# Patient Record
Sex: FEMALE | Race: WHITE | Employment: OTHER | URBAN - METROPOLITAN AREA
[De-identification: names, ages, dates, MRNs, and addresses within clinical notes are randomized per-mention and may not be internally consistent; named-entity substitution may affect disease eponyms.]

---

## 2017-01-02 ENCOUNTER — TELEPHONE (OUTPATIENT)
Dept: OTHER | Facility: CLINIC | Age: 72
End: 2017-01-02

## 2017-01-02 NOTE — TELEPHONE ENCOUNTER
Patient scheduled for diagnostic BLE CO2 angiogram on 1/13/17 based on her preference. Plan to check-in at 7:30 AM to the gold waiting room at the [facility]. Procedure at 9 am. NPO 6 hrs prior. Water ok up to 2 hrs prior. Pt may take AM medications. She has in insulin pump and will manage her blood sugar. She will plan to be here 6-8 hrs and will have a ride home.     She has my contact information with any questions.    Perla Ritter DNP, APRN  Interventional Radiology   Phone: [phone]  Pager: [phone]

## 2017-01-11 ENCOUNTER — TELEPHONE (OUTPATIENT)
Dept: INTERVENTIONAL RADIOLOGY/VASCULAR | Facility: CLINIC | Age: 72
End: 2017-01-11

## 2017-01-13 ENCOUNTER — APPOINTMENT (OUTPATIENT)
Dept: INTERVENTIONAL RADIOLOGY/VASCULAR | Facility: CLINIC | Age: 72
End: 2017-01-13
Attending: SURGERY
Payer: MEDICARE

## 2017-01-13 ENCOUNTER — APPOINTMENT (OUTPATIENT)
Dept: MEDSURG UNIT | Facility: CLINIC | Age: 72
End: 2017-01-13
Attending: SURGERY
Payer: MEDICARE

## 2017-01-13 ENCOUNTER — HOSPITAL ENCOUNTER (OUTPATIENT)
Facility: CLINIC | Age: 72
Discharge: HOME | End: 2017-01-13
Attending: SURGERY | Admitting: SURGERY
Payer: MEDICARE

## 2017-01-13 VITALS
DIASTOLIC BLOOD PRESSURE: 60 MMHG | RESPIRATION RATE: 16 BRPM | SYSTOLIC BLOOD PRESSURE: 162 MMHG | HEART RATE: 56 BPM | OXYGEN SATURATION: 98 %

## 2017-01-13 DIAGNOSIS — Z76.82 ORGAN TRANSPLANT CANDIDATE: ICD-10-CM

## 2017-01-13 DIAGNOSIS — N18.6 END STAGE RENAL DISEASE (H): ICD-10-CM

## 2017-01-13 LAB
APTT PPP: 32 SEC (ref 22–37)
CREAT SERPL-MCNC: 2.54 MG/DL (ref 0.52–1.04)
ERYTHROCYTE [DISTWIDTH] IN BLOOD BY AUTOMATED COUNT: 12.8 % (ref 10–15)
GFR SERPL CREATININE-BSD FRML MDRD: 19 ML/MIN/1.7M2
GLUCOSE BLDC GLUCOMTR-MCNC: 192 MG/DL (ref 70–99)
GLUCOSE BLDC GLUCOMTR-MCNC: 220 MG/DL (ref 70–99)
GLUCOSE BLDC GLUCOMTR-MCNC: 224 MG/DL (ref 70–99)
HCT VFR BLD AUTO: 37.2 % (ref 35–47)
HGB BLD-MCNC: 12.2 G/DL (ref 11.7–15.7)
INR PPP: 0.99 (ref 0.86–1.14)
MCH RBC QN AUTO: 30 PG (ref 26.5–33)
MCHC RBC AUTO-ENTMCNC: 32.8 G/DL (ref 31.5–36.5)
MCV RBC AUTO: 91 FL (ref 78–100)
PLATELET # BLD AUTO: 195 10E9/L (ref 150–450)
RBC # BLD AUTO: 4.07 10E12/L (ref 3.8–5.2)
WBC # BLD AUTO: 7 10E9/L (ref 4–11)

## 2017-01-13 PROCEDURE — 27210805 ZZH SHEATH CR4

## 2017-01-13 PROCEDURE — 25000128 H RX IP 250 OP 636: Performed by: RADIOLOGY

## 2017-01-13 PROCEDURE — 99153 MOD SED SAME PHYS/QHP EA: CPT

## 2017-01-13 PROCEDURE — C1887 CATHETER, GUIDING: HCPCS

## 2017-01-13 PROCEDURE — 85610 PROTHROMBIN TIME: CPT | Performed by: RADIOLOGY PRACTITIONER ASSISTANT

## 2017-01-13 PROCEDURE — 27210732 ZZH ACCESSORY CR1

## 2017-01-13 PROCEDURE — 40000168 ZZH STATISTIC PP CARE STAGE 3

## 2017-01-13 PROCEDURE — 85027 COMPLETE CBC AUTOMATED: CPT | Performed by: RADIOLOGY PRACTITIONER ASSISTANT

## 2017-01-13 PROCEDURE — 27210995 ZZH RX 272: Performed by: RADIOLOGY

## 2017-01-13 PROCEDURE — 27210804 ZZH SHEATH CR3

## 2017-01-13 PROCEDURE — 82565 ASSAY OF CREATININE: CPT | Performed by: RADIOLOGY PRACTITIONER ASSISTANT

## 2017-01-13 PROCEDURE — 82962 GLUCOSE BLOOD TEST: CPT

## 2017-01-13 PROCEDURE — 27210905 ZZH KIT CR7

## 2017-01-13 PROCEDURE — 99152 MOD SED SAME PHYS/QHP 5/>YRS: CPT

## 2017-01-13 PROCEDURE — 85730 THROMBOPLASTIN TIME PARTIAL: CPT | Performed by: RADIOLOGY PRACTITIONER ASSISTANT

## 2017-01-13 PROCEDURE — 36246 INS CATH ABD/L-EXT ART 2ND: CPT

## 2017-01-13 PROCEDURE — 25000125 ZZHC RX 250: Performed by: RADIOLOGY

## 2017-01-13 PROCEDURE — C1760 CLOSURE DEV, VASC: HCPCS

## 2017-01-13 PROCEDURE — 75710 ARTERY X-RAYS ARM/LEG: CPT

## 2017-01-13 PROCEDURE — C1769 GUIDE WIRE: HCPCS

## 2017-01-13 RX ORDER — FENTANYL CITRATE 50 UG/ML
25-50 INJECTION, SOLUTION INTRAMUSCULAR; INTRAVENOUS EVERY 5 MIN PRN
Status: DISCONTINUED | OUTPATIENT
Start: 2017-01-13 | End: 2017-01-13 | Stop reason: HOSPADM

## 2017-01-13 RX ORDER — ACETAMINOPHEN 500 MG
500-1000 TABLET ORAL EVERY 6 HOURS PRN
Status: CANCELLED | OUTPATIENT
Start: 2017-01-13

## 2017-01-13 RX ORDER — NALOXONE HYDROCHLORIDE 0.4 MG/ML
.1-.4 INJECTION, SOLUTION INTRAMUSCULAR; INTRAVENOUS; SUBCUTANEOUS
Status: DISCONTINUED | OUTPATIENT
Start: 2017-01-13 | End: 2017-01-13 | Stop reason: HOSPADM

## 2017-01-13 RX ORDER — SODIUM CHLORIDE 9 MG/ML
INJECTION, SOLUTION INTRAVENOUS CONTINUOUS
Status: CANCELLED | OUTPATIENT
Start: 2017-01-13

## 2017-01-13 RX ORDER — FLUMAZENIL 0.1 MG/ML
0.2 INJECTION, SOLUTION INTRAVENOUS
Status: DISCONTINUED | OUTPATIENT
Start: 2017-01-13 | End: 2017-01-13 | Stop reason: HOSPADM

## 2017-01-13 RX ORDER — IODIXANOL 320 MG/ML
50 INJECTION, SOLUTION INTRAVASCULAR ONCE
Status: DISCONTINUED | OUTPATIENT
Start: 2017-01-13 | End: 2017-01-13 | Stop reason: HOSPADM

## 2017-01-13 RX ORDER — SODIUM CHLORIDE 9 MG/ML
INJECTION, SOLUTION INTRAVENOUS CONTINUOUS
Status: DISCONTINUED | OUTPATIENT
Start: 2017-01-13 | End: 2017-01-13 | Stop reason: HOSPADM

## 2017-01-13 RX ADMIN — LIDOCAINE HYDROCHLORIDE 10 ML: 10 INJECTION, SOLUTION EPIDURAL; INFILTRATION; INTRACAUDAL; PERINEURAL at 09:39

## 2017-01-13 RX ADMIN — FENTANYL CITRATE 75 MCG: 50 INJECTION, SOLUTION INTRAMUSCULAR; INTRAVENOUS at 09:38

## 2017-01-13 RX ADMIN — SODIUM CHLORIDE: 9 INJECTION, SOLUTION INTRAVENOUS at 08:07

## 2017-01-13 RX ADMIN — MIDAZOLAM 1 MG: 1 INJECTION INTRAMUSCULAR; INTRAVENOUS at 09:38

## 2017-01-13 NOTE — PROGRESS NOTES
Pt arrived back to unit at 950.  Pt alert and oriented.  VSS.  Pt resting.  Right groin flat and dry.  Pulse 3+.      1315:  Pt up in mercado to bathroom.  Right groin flat and dry.  PIV d/c'd.  D/C instructions went over with pt.  Pt wheeled to front lobby at 1415.

## 2017-01-13 NOTE — IP AVS SNAPSHOT
[address] [address]                                       After Visit Summary   1/13/2017    Yue Valenzuela    MRN: [id number]           After Visit Summary Signature Page     I have received my discharge instructions, and my questions have been answered. I have discussed any challenges I see with this plan with the nurse or doctor.    ..........................................................................................................................................  Patient/Patient Representative Signature      ..........................................................................................................................................  Patient Representative Print Name and Relationship to Patient    ..................................................               ................................................  Date                                            Time    ..........................................................................................................................................  Reviewed by Signature/Title    ...................................................              ..............................................  Date                                                            Time

## 2017-01-13 NOTE — PROGRESS NOTES
Interventional Radiology Pre-Procedure Sedation Assessment   Time of Assessment: 7:46 AM    Expected Level: Moderate Sedation    Indication: Sedation is required for the following type of Procedure: Arterial    Sedation and procedural consent: Risks, benefits and alternatives were discussed with Patient    PO Intake: Appropriately NPO for procedure    ASA Class: Class 2 - MILD SYSTEMIC DISEASE, NO ACUTE PROBLEMS, NO FUNCTIONAL LIMITATIONS.    Mallampati: Grade 2:  Soft palate, base of uvula, tonsillar pillars, and portion of posterior pharyngeal wall visible    Lungs: Lungs Clear with good breath sounds bilaterally    Heart: Normal heart sounds and rate    History and physical reviewed and no updates needed. I have reviewed the lab findings, diagnostic data, medications, and the plan for sedation. I have determined this patient to be an appropriate candidate for the planned sedation and procedure and have reassessed the patient IMMEDIATELY PRIOR to sedation and procedure.    Alex P. Pallas, DO

## 2017-01-13 NOTE — PROGRESS NOTES
Interventional Radiology Intra-procedural Nursing Note    Patient Name: Yue Valenzuela  Medical Record Number: [id number]  Today's Date: January 13, 2017    Start Time: 0910  End of procedure time: 0945  Procedure: Diagnostic Lower extremity Angiogram  Staff: Shreshta, Pallas  Report given to: 2A RN      Other Notes:  Pt transported from  on cart.  Pt alert & oriented, VSS.  Pedal pulses assessed.  Pt positioned supine on table and prepped for procedure.  Pt tolerated procedure with no noted complications.  Transferred back to  for further care.    Mynx Closure Device Deployed @ 0945 to right groin.  3 hours bedrest.  Lot #: [id number]    Ambulation Time: 1245    ST: 30 min  Fentanyl: 75 mcg  Versed: 1 mg    Dorothea Ruth RN

## 2017-01-13 NOTE — DISCHARGE INSTRUCTIONS
[facility]   Interventional Radiology  Discharge Instructions Post Peripheral Angiogram     AFTER YOU GO HOME          Do relax and take it easy for 24 hours.       Do drink plenty of fluids.       Do resume your regular diet, unless otherwise instructed by your Primary Physician.       DO NOT smoke for at least 24 hours, if you were given any sedation.       DO NOT drink alcoholic beverages the day of your procedure.       Do not drive or operate machinery at home or at work for 24 hours.          DO NOT do any strenuous exercise or lifting for at least 2 days following your Procedure.       DO NOT take a bath or shower for at least 12 hours following your procedure.       DO NOT make any important or legal decisions for 24 hours following your procedure.    CALL THE PHYSICIAN IF:      - You start bleeding from the procedure site.  If you do start to bleed from the site, lie down flat and hold pressure on the site. A small lump or bruise is common at the puncture site.Your physician will tell you if you need to return to the hospital.      - You develop numbness, coolness or a change in color of the arm or leg that was punctured.      - You experience increased pain or redness at the puncture site.      - You develop hives or a rash or unexplained itching.      - You develop a temperature of 101 degrees F or greater    Instructions for closure device:   See brochure.            [facility] INTERVENTIONAL RADIOLOGY DEPARTMENT         Procedure Physician:  Dr. Pallas                             Date of Procedure: January 13, 2017       Telephone Numbers:   [phone]......Monday-Friday 8:00 to 4:30 pm                                        [phone].....After 4:30 pm Monday-Friday, Weekends and  Holidays. Ask for the Interventional Radiologist on call. Someone is on call 24 hrs/day.

## 2017-01-13 NOTE — PROGRESS NOTES
Pt arrived back to unit 2A at 950.  VSS.  Denies pain.  Right groin dry and intact.  Right pedal pulse 3+.  MD here to speak with pt and .  Pt declines food at this time.  RN will check glucose at 1030-11.   at bedside.

## 2017-01-13 NOTE — PROGRESS NOTES
Interventional Radiology Brief Post Procedure Note    Procedure: @FVRISFRMTLINK([id number])@    Proceduralist: AMBER Moran MD    Assistant: Alex P. Pallas, DO    Time Out: Prior to the start of the procedure and with procedural staff participation, I verbally confirmed the patient s identity using two indicators, relevant allergies, that the procedure was appropriate and matched the consent or emergent situation, and that the correct equipment/implants were available. Immediately prior to starting the procedure I conducted the Time Out with the procedural staff and re-confirmed the patient s name, procedure, and site/side. (The Joint [facility] universal protocol was followed.)  Yes    Sedation: IR Nurse Monitored Care   Post Procedure Summary:  Prior to the start of the procedure and with procedural staff participation, I verbally confirmed the patient s identity using two indicators, relevant allergies, that the procedure was appropriate and matched the consent or emergent situation, and that the correct equipment/implants were available. Immediately prior to starting the procedure I conducted the Time Out with the procedural staff and re-confirmed the patient s name, procedure, and site/side. (The Joint Commission universal protocol was followed.)  Yes       Sedatives: Fentanyl and Midazolam (Versed)    Vital signs, airway and pulse oximetry were monitored and remained stable throughout the procedure and sedation was maintained until the procedure was complete.  The patient was monitored by staff until sedation discharge criteria were met.    Patient tolerance: Patient tolerated the procedure well with no immediate complications.    Time of sedation in minutes: 30 Minutes minutes from beginning to end of physician one to one monitoring.    Findings: Hemodynamically significant short segment stenosis of the right common iliac artery with a pressure gradient of 10mmHg.    Estimated Blood Loss: Minimal    Fluoroscopy  Time: 2.7 minutes    SPECIMENS: None    Complications: 1. None     Condition: Stable    Plan: See dictation.    Comments: See dictated procedure note for full details.    Alex P. Pallas, DO

## 2017-01-19 ENCOUNTER — DOCUMENTATION ONLY (OUTPATIENT)
Dept: TRANSPLANT | Facility: CLINIC | Age: 72
End: 2017-01-19

## 2017-01-19 DIAGNOSIS — E10.9 TYPE 1 DIABETES MELLITUS (H): Primary | ICD-10-CM

## 2017-01-19 DIAGNOSIS — Z76.82 ORGAN TRANSPLANT CANDIDATE: ICD-10-CM

## 2017-01-19 DIAGNOSIS — I73.9 PVD (PERIPHERAL VASCULAR DISEASE) (H): ICD-10-CM

## 2017-01-19 DIAGNOSIS — N18.6 END STAGE RENAL DISEASE (H): ICD-10-CM

## 2017-01-19 NOTE — PROGRESS NOTES
Call to Yue to discuss review with Dr. Brewer yesterday. She is due for a return appt with nephrology and would like to do this before she goes to [place] from 2-9 to 3-9. She is also due for cardiology in March. Will ask Geovanna to schedule asap.

## 2017-01-23 ENCOUNTER — TELEPHONE (OUTPATIENT)
Dept: TRANSPLANT | Facility: CLINIC | Age: 72
End: 2017-01-23

## 2017-01-23 NOTE — TELEPHONE ENCOUNTER
Called Yue to see if 02/02 would work to see nephrology and SW and 3/15 for cards.  She agreed and I sent her a schedule.

## 2017-01-23 NOTE — Clinical Note
January 23, 2017    Yue Valenzuela  [address] [facility] [address]      Dear Ms. Valenzuela,    Enclosed you will find a copy of your transplant waitlist appointment schedule and a map to our location.    If you are unable to come in for your appointments for any reason, please contact your Transplant Coordinator or Transplant ; numbers for both are on your schedule.      Sincerely,       [facility]    CC: QUIQUE Robles, LUIS CARLOS                                          [facility]  [address] [place] [address]  [id number]    WAITLIST CLINIC APPOINTMENTS    Patient   Yue Valenzuela  MR#:    [id number]  Coordinator:  Lizette LUBIN     [phone]  LPN:    Shannon RASHEED     [phone]  :  Geovanna     [phone]  Location:   Transplant Center  Dates:   February 2, 2017 & March 15, 2017      Day/Date:  Thursday, February 2, 2017  Time Location Activity   9:00 a.m. Transplant Center Clinic   (3rd Floor [facility]) Appointment with Dr. Ellington,  Transplant Nephrology   10:00 a.m. Transplant Center Consult Room  (3rd Floor [facility]) Appointment with Catalina Hyde,  Transplant      Day/Date:  Wednesday, March 15, 2017  Time Location Activity   9:30 a.m. Cardiovascular Center  (3rd Floor [facility]) Appointment with Dr. Bryan,  Cardiology

## 2017-02-02 ENCOUNTER — ALLIED HEALTH/NURSE VISIT (OUTPATIENT)
Dept: TRANSPLANT | Facility: CLINIC | Age: 72
End: 2017-02-02
Attending: INTERNAL MEDICINE
Payer: MEDICARE

## 2017-02-02 ENCOUNTER — OFFICE VISIT (OUTPATIENT)
Dept: NEPHROLOGY | Facility: CLINIC | Age: 72
End: 2017-02-02
Attending: INTERNAL MEDICINE
Payer: MEDICARE

## 2017-02-02 VITALS
OXYGEN SATURATION: 98 % | HEART RATE: 67 BPM | DIASTOLIC BLOOD PRESSURE: 51 MMHG | BODY MASS INDEX: 23.8 KG/M2 | WEIGHT: 139.4 LBS | TEMPERATURE: 97.7 F | HEIGHT: 64 IN | SYSTOLIC BLOOD PRESSURE: 173 MMHG

## 2017-02-02 DIAGNOSIS — Z76.82 ORGAN TRANSPLANT CANDIDATE: ICD-10-CM

## 2017-02-02 DIAGNOSIS — N18.6 END STAGE RENAL DISEASE (H): ICD-10-CM

## 2017-02-02 DIAGNOSIS — Z76.82 AWAITING ORGAN TRANSPLANT: Primary | ICD-10-CM

## 2017-02-02 DIAGNOSIS — N18.4 CKD (CHRONIC KIDNEY DISEASE) STAGE 4, GFR 15-29 ML/MIN (H): ICD-10-CM

## 2017-02-02 DIAGNOSIS — I73.9 PVD (PERIPHERAL VASCULAR DISEASE) (H): ICD-10-CM

## 2017-02-02 DIAGNOSIS — N18.6 END STAGE RENAL DISEASE (H): Primary | ICD-10-CM

## 2017-02-02 PROCEDURE — 99212 OFFICE O/P EST SF 10 MIN: CPT | Mod: ZF

## 2017-02-02 RX ORDER — CLONAZEPAM 0.5 MG/1
0.25 TABLET ORAL 2 TIMES DAILY PRN
Qty: 90 TABLET | Refills: 1 | Status: ON HOLD | OUTPATIENT
Start: 2017-02-02 | End: 2017-05-26

## 2017-02-02 ASSESSMENT — PAIN SCALES - GENERAL: PAINLEVEL: NO PAIN (0)

## 2017-02-02 NOTE — PROGRESS NOTES
CHIEF COMPLAINT:  Evaluation for kidney transplantation and management for renal replacement therapy.       HISTORY OF PRESENT ILLNESS:  Yue is a delightful 71-year-old woman with a longstanding history of type 1 diabetes.  She is currently on an insulin pump.  She has been evaluated here for kidney transplantation roughly 2 years ago.  Her general issues center around peripheral vascular disease and iliac artery stenosis in addition to extensive coronary artery disease.  At one point, her  was considered for donation and I believe he may have passed the required testing.  However, there has been concern about the presence of enough support for her as she gets the transplant because although they have kids in town, they may not be able to be around much after the transplantation.       Yue has lost 20 pounds unintentionally due to poor appetite.  She is extremely tired and the most problematic symptom is myoclonic jerks at night which keep her from sleeping.  She is also somewhat cold.  In essence, she is exhibiting marked symptoms of uremia.       Regarding her current kidney function, her serum creatinine is 2.5 with an estimated GFR of    19 mL/minute.  This creatinine is certainly deceptive considering her age and the fact that she has lost 20 pounds and she has lost quite a bit of muscle mass.      To address her issues, we plan on doing the followin.  She will meet with a dietitian today to see if we could help provide more information regarding high biological value proteins to maintain her body weight.    2.  She is developing more hypoglycemia.  This is certainly due to lack of insulin clearance with reduced kidney function.  Therefore, she was instructed to back off on her insulin pump.   3.  To better understand the degree of her kidney dysfunction, we will proceed with an Iohexol GFR.  I suspect her true GFR is around 7 mL/minute.    4.  She has considered peritoneal dialysis.  I  certainly am not a big fan of peritoneal dialysis in diabetic females above age 55.  This is certainly the group that tends to have the highest of mortality with peritoneal dialysis.  I think she is better off with hemodialysis.  More importantly, I think she is obviously better off with a live donor transplantation.  I would like to discuss with our donor team the feasibility of her  donating.  She also has one person from work who is interested in donating.  Again, we will discuss that possibility as that would be an ideal solution rather than dialysis.       Forty-five minutes were spent with her and her , over half of which were in counseling regarding living donation.  I have also given her a prescription for Klonopin 0.5 mg to be taken at bedtime for myoclonic jerks.       cc:  Vinicius Sim MD, [facility]

## 2017-02-02 NOTE — MR AVS SNAPSHOT
After Visit Summary   2/2/2017    Yue Valenzuela    MRN: [id number]           Patient Information     Date Of Birth          1945        Visit Information        Provider Department      2/2/2017 10:00 AM Susan Hyde MSW [facility] Solid Organ Transplant        Today's Diagnoses     Awaiting organ transplant    -  1       Follow-ups after your visit        Your next 10 appointments already scheduled     Mar 15, 2017  9:30 AM CDT   (Arrive by 9:15 AM)   Return Visit with Mike Bryan MD   [facility] ([facility])    [address] [place] [id number] [phone]              Who to contact     If you have questions or need follow up information about today's clinic visit or your schedule please contact [facility] SOLID ORGAN TRANSPLANT directly at [phone].  Normal or non-critical lab and imaging results will be communicated to you by MyChart, letter or phone within 4 business days after the clinic has received the results. If you do not hear from us within 7 days, please contact the clinic through Spunkmobilehart or phone. If you have a critical or abnormal lab result, we will notify you by phone as soon as possible.  Submit refill requests through Server[employer] or call your pharmacy and they will forward the refill request to us. Please allow 3 business days for your refill to be completed.          Additional Information About Your Visit        MyChart Information     Servergy gives you secure access to your electronic health record. If you see a primary care provider, you can also send messages to your care team and make appointments. If you have questions, please call your primary care clinic.  If you do not have a primary care provider, please call [phone] and they will assist you.        Care EveryWhere ID     This is your Care EveryWhere ID. This could be used by other organizations to access your [facility]  records  [id number]         Blood Pressure from Last 3 Encounters:   02/08/17 (!) 163/36   02/02/17 173/51   01/13/17 162/60    Weight from Last 3 Encounters:   02/08/17 63.9 kg (140 lb 14.4 oz)   02/02/17 63.2 kg (139 lb 6.4 oz)   03/30/16 69.9 kg (154 lb 3.2 oz)              Today, you had the following     No orders found for display         Today's Medication Changes          These changes are accurate as of: 2/2/17 11:59 PM.  If you have any questions, ask your nurse or doctor.               Start taking these medicines.        Dose/Directions    clonazePAM 0.5 MG tablet   Commonly known as:  klonoPIN   Used for:  CKD (chronic kidney disease) stage 4, GFR 15-29 ml/min (H)   Started by:  Elisa Ellington MD        Dose:  0.25 mg   Take 0.5 tablets (0.25 mg) by mouth 2 times daily as needed for anxiety   Quantity:  90 tablet   Refills:  1            Where to get your medicines      Some of these will need a paper prescription and others can be bought over the counter.  Ask your nurse if you have questions.     Bring a paper prescription for each of these medications     clonazePAM 0.5 MG tablet                Primary Care Provider Office Phone # Fax #    Nory Elliott [phone]       [facility] [address] [facility] [address]        Thank you!     Thank you for choosing [facility] SOLID ORGAN TRANSPLANT  for your care. Our goal is always to provide you with excellent care. Hearing back from our patients is one way we can continue to improve our services. Please take a few minutes to complete the written survey that you may receive in the mail after your visit with us. Thank you!             Your Updated Medication List - Protect others around you: Learn how to safely use, store and throw away your medicines at www.disposemymeds.org.          This list is accurate as of: 2/2/17 11:59 PM.  Always use your most recent med list.                   Brand Name Dispense  Instructions for use    albuterol 108 (90 BASE) MCG/ACT Inhaler    PROAIR HFA/PROVENTIL HFA/VENTOLIN HFA    1 Inhaler    Inhale 2 puffs into the lungs every 4 hours as needed for shortness of breath / dyspnea or wheezing       amLODIPine 10 MG tablet    NORVASC     Take 10 mg by mouth       aspirin EC 81 MG EC tablet      Take 81 mg by mouth       budesonide-formoterol 80-4.5 MCG/ACT Inhaler    SYMBICORT    3 Inhaler    Inhale 2 puffs into the lungs 2 times daily       calcium acetate 667 MG Caps capsule    PHOSLO     Take 667 mg by mouth       Calcium Carb-Cholecalciferol 600-1000 MG-UNIT Tabs          carvedilol 3.125 MG tablet    COREG     Take 6.25 mg by mouth 2 times daily (with meals)       cetirizine 5 MG Chew    zyrTEC     Take 5 mg by mouth daily       cholecalciferol 1000 UNIT tablet    vitamin D     Take 1,000 Units by mouth       clonazePAM 0.5 MG tablet    klonoPIN    90 tablet    Take 0.5 tablets (0.25 mg) by mouth 2 times daily as needed for anxiety       furosemide 20 MG tablet    LASIX         GLUCAGEN 1 MG Solr injection   Generic drug:  glucagon      1 mg       insulin aspart 100 UNITS/ML injection    NovoLOG         lisinopril 20 MG tablet    PRINIVIL/ZESTRIL     Take 10 mg by mouth       ONE TOUCH ULTRA test strip   Generic drug:  blood glucose monitoring          ONETOUCH DELICA LANCETS 33G Misc          pravastatin 40 MG tablet    PRAVACHOL         TYLENOL PO      Take by mouth every 8 hours as needed for mild pain or fever

## 2017-02-02 NOTE — NURSING NOTE
"Chief Complaint   Patient presents with     Consult For     WAITLIST CLEARANCE, CKD       Initial /51 mmHg  Pulse 67  Temp(Src) 97.7  F (36.5  C) (Oral)  Ht 1.626 m (5' 4\")  Wt 63.231 kg (139 lb 6.4 oz)  BMI 23.92 kg/m2  SpO2 98% Estimated body mass index is 23.92 kg/(m^2) as calculated from the following:    Height as of this encounter: 1.626 m (5' 4\").    Weight as of this encounter: 63.231 kg (139 lb 6.4 oz).  BP completed using cuff size: kari COLEMAN CMA    "

## 2017-02-02 NOTE — PROGRESS NOTES
"OUTPATIENT NUTRITION REASSESSMENT NOTE    REASON FOR REASSESSMENT  Yue Valenzuela is a 71 year old female seen by the dietitian for concern for wt loss referred by Dr. Ellington  Pt accompanied by her   Initial RD assessment was for txp eval (8/2014).   F/U ON PREVIOUS GOALS  Limit Na <2000mg/day - Goal likely met: they do not cook with a lot of salt nor use heavily processed food items. They typically dine out 1-2x/week, except when they winter in [place] for 1 month.     PREVIOUS NUTRITION DX:  Food and nutrition related knowledge deficit r/t pre kidney transplant eval AEB pt verbalized not hearing pre/post transplant diet guidelines.   Update/Evaluation: Updated    INTERVAL HISTORY  Pt has lost ~10# since initial eval (8/2014), with this wt loss occurring in the past 1 year. This would be a 7% wt loss x 1 year. Pt has been eating less with noticeable taste changes. She continues to eat TID meals and has since transitioned from a diabetic diet to more of a kidney friendly diet. Her Nephrologist (Dr Sim) is concerned (per pt report) of her high K+ of 5.5 (last taken Dec/Jan). He is not as concerned about Phos, which supposedly is high end of WNL.   Pt and her  are leaving for [place] for 1 month within the next few days. There, they will dine out daily.     Current Diet Recall:  B: bagel, english muffin, or toast with <1 T peanut butter; occasional eggs  L: LS turkey or ham s/w with vee and halo mandarin oranges or peach or pear fruit cups   D: frozen veggies, rice/pasta/garlic bread and meat (pork, beef, occ seafood)  Sn: crackers or occasional cookie  Azul: water, 1 can soda/day (Diet Ginger Ale, Twist, or Root Beer), coffee, tea, cranberry juice, almond or lactose free milk only   ETOH: infrequent    ANTHROPOMETRICS  Height: 64\"  Weight: 139 lbs  BMI: 23.9  IBW: 120 lbs  % IBW: 116%  Weight History: Pt has lost 10 lbs x 1 year.   Adjusted/dosing weight: 139 lbs/63 kg    NUTRITION DIAGNOSIS:  Unintended " weight loss related to reduced PO 2/2 declining kidney function, uremic sx (taste changes) as evidenced by pt report, 10# or 7% wt loss x 1 year.     INTERVENTIONS  1. Keep an eye on your weight. If weight continues to drop of if your appetite or oral intake changes significantly, we can talk further.   2. Ways to add extra calories to meals: increase PB to 2 T (2 pieces of toast) instead of 1, cook with butter or oil (pt currently uses butter spray in cooking and on veggies).   3. Discussed kidney diet: low Na+ (ask for no added salt when out to eat, aim for 600-700 mg per meal); avoiding high K+ foods; moderation with high Phos foods (peanut butter, yogurt, etc pending labs).     Goals  1. Weight maintenance  2. Monitor K+/Phos/Na+ intakes    Follow up/Monitoring  PRN  Current adherence to recommended diet (low Na+, low K+, low Phos): Good    Patient Understanding: Good  Expected Compliance: Good  Follow-Up Plans: PRN  Provided pt with contact info.   Time spent with patient: 30 minutes.  Waleska Ennis, RD, LD

## 2017-02-02 NOTE — MR AVS SNAPSHOT
After Visit Summary   2/2/2017    Yue Valenzuela    MRN: [id number]           Patient Information     Date Of Birth          1945        Visit Information        Provider Department      2/2/2017 9:00 AM Elisa Ellington MD [facility]        Today's Diagnoses     Type 1 diabetes mellitus (H)    -  1     End stage renal disease (H)         Organ transplant candidate         PVD (peripheral vascular disease) (H)         CKD (chronic kidney disease) stage 4, GFR 15-29 ml/min (H)            Follow-ups after your visit        Your next 10 appointments already scheduled     Mar 15, 2017  9:30 AM   (Arrive by 9:15 AM)   Return Visit with Mike Bryan MD   [facility] ([facility])    [address]  [address] [place] [address] [phone]              Who to contact     If you have questions or need follow up information about today's clinic visit or your schedule please contact [facility] directly at [phone].  Normal or non-critical lab and imaging results will be communicated to you by CustExhart, letter or phone within 4 business days after the clinic has received the results. If you do not hear from us within 7 days, please contact the clinic through Lockr or phone. If you have a critical or abnormal lab result, we will notify you by phone as soon as possible.  Submit refill requests through Lockr or call your pharmacy and they will forward the refill request to us. Please allow 3 business days for your refill to be completed.          Additional Information About Your Visit        CustExharArchive Information     Lockr gives you secure access to your electronic health record. If you see a primary care provider, you can also send messages to your care team and make appointments. If you have questions, please call your primary care clinic.  If you do not have a primary care provider, please call [phone] and they will assist  "you.        Care EveryWhere ID     This is your Care EveryWhere ID. This could be used by other organizations to access your [facility] medical records  [id number]        Your Vitals Were     Pulse Temperature Height BMI (Body Mass Index) Pulse Oximetry       67 97.7  F (36.5  C) (Oral) 1.626 m (5' 4\") 23.92 kg/m2 98%        Blood Pressure from Last 3 Encounters:   02/02/17 173/51   01/13/17 162/60   11/30/16 177/59    Weight from Last 3 Encounters:   02/02/17 63.231 kg (139 lb 6.4 oz)   03/30/16 69.945 kg (154 lb 3.2 oz)   10/30/14 68.947 kg (152 lb)              Today, you had the following     No orders found for display         Today's Medication Changes          These changes are accurate as of: 2/2/17 10:14 AM.  If you have any questions, ask your nurse or doctor.               Start taking these medicines.        Dose/Directions    clonazePAM 0.5 MG tablet   Commonly known as:  klonoPIN   Used for:  CKD (chronic kidney disease) stage 4, GFR 15-29 ml/min (H)   Started by:  Elisa Ellington MD        Dose:  0.25 mg   Take 0.5 tablets (0.25 mg) by mouth 2 times daily as needed for anxiety   Quantity:  90 tablet   Refills:  1            Where to get your medicines      Some of these will need a paper prescription and others can be bought over the counter.  Ask your nurse if you have questions.     Bring a paper prescription for each of these medications    - clonazePAM 0.5 MG tablet             Primary Care Provider Office Phone # Fax #    Nory J Lexi [phone]       [facility] [address]  [facility] [address]        Thank you!     Thank you for choosing [facility]  for your care. Our goal is always to provide you with excellent care. Hearing back from our patients is one way we can continue to improve our services. Please take a few minutes to complete the written survey that you may receive in the mail after your visit with us. Thank you!             Your " Updated Medication List - Protect others around you: Learn how to safely use, store and throw away your medicines at www.disposemymeds.org.          This list is accurate as of: 2/2/17 10:14 AM.  Always use your most recent med list.                   Brand Name Dispense Instructions for use    albuterol 108 (90 BASE) MCG/ACT Inhaler    PROAIR HFA/PROVENTIL HFA/VENTOLIN HFA    1 Inhaler    Inhale 2 puffs into the lungs every 4 hours as needed for shortness of breath / dyspnea or wheezing       amLODIPine 10 MG tablet    NORVASC     Take 10 mg by mouth       aspirin EC 81 MG EC tablet      Take 81 mg by mouth       budesonide-formoterol 80-4.5 MCG/ACT Inhaler    SYMBICORT    3 Inhaler    Inhale 2 puffs into the lungs 2 times daily       calcium acetate 667 MG Caps capsule    PHOSLO     Take 667 mg by mouth       Calcium Carb-Cholecalciferol 600-1000 MG-UNIT Tabs          carvedilol 3.125 MG tablet    COREG     Take 6.25 mg by mouth 2 times daily (with meals)       cetirizine 5 MG Chew    zyrTEC     Take 5 mg by mouth daily       cholecalciferol 1000 UNIT tablet    vitamin D     Take 1,000 Units by mouth       clonazePAM 0.5 MG tablet    klonoPIN    90 tablet    Take 0.5 tablets (0.25 mg) by mouth 2 times daily as needed for anxiety       furosemide 20 MG tablet    LASIX         GLUCAGEN 1 MG Solr injection   Generic drug:  glucagon      1 mg       insulin aspart 100 UNITS/ML injection    NovoLOG         lisinopril 20 MG tablet    PRINIVIL/ZESTRIL     Take 10 mg by mouth       ONE TOUCH ULTRA test strip   Generic drug:  blood glucose monitoring          ONETOUCH DELICA LANCETS 33G Misc          pravastatin 40 MG tablet    PRAVACHOL         TYLENOL PO      Take by mouth every 8 hours as needed for mild pain or fever

## 2017-02-02 NOTE — Clinical Note
2017       RE: Yue Valenzuela  [address] [address]     Dear Colleague,    Thank you for referring your patient, Yue Valenzuela, to the [facility] at [facility]. Please see a copy of my visit note below.    CHIEF COMPLAINT:  Evaluation for kidney transplantation and management for renal replacement therapy.       HISTORY OF PRESENT ILLNESS:  Yue is a delightful 71-year-old woman with a longstanding history of type 1 diabetes.  She is currently on an insulin pump.  She has been evaluated here for kidney transplantation roughly 2 years ago.  Her general issues center around peripheral vascular disease and iliac artery stenosis in addition to extensive coronary artery disease.  At one point, her  was considered for donation and I believe he may have passed the required testing.  However, there has been concern about the presence of enough support for her as she gets the transplant because although they have kids in town, they may not be able to be around much after the transplantation.       Yue has lost 20 pounds unintentionally due to poor appetite.  She is extremely tired and the most problematic symptom is myoclonic jerks at night which keep her from sleeping.  She is also somewhat cold.  In essence, she is exhibiting marked symptoms of uremia.       Regarding her current kidney function, her serum creatinine is 2.5 with an estimated GFR of    19 mL/minute.  This creatinine is certainly deceptive considering her age and the fact that she has lost 20 pounds and she has lost quite a bit of muscle mass.      To address her issues, we plan on doing the followin.  She will meet with a dietitian today to see if we could help provide more information regarding high biological value proteins to maintain her body weight.    2.  She is developing more hypoglycemia.  This is certainly due to lack of insulin clearance with  reduced kidney function.  Therefore, she was instructed to back off on her insulin pump.   3.  To better understand the degree of her kidney dysfunction, we will proceed with an Iohexol GFR.  I suspect her true GFR is around 7 mL/minute.    4.  She has considered peritoneal dialysis.  I certainly am not a big fan of peritoneal dialysis in diabetic females above age 55.  This is certainly the group that tends to have the highest of mortality with peritoneal dialysis.  I think she is better off with hemodialysis.  More importantly, I think she is obviously better off with a live donor transplantation.  I would like to discuss with our donor team the feasibility of her  donating.  She also has one person from work who is interested in donating.  Again, we will discuss that possibility as that would be an ideal solution rather than dialysis.       Forty-five minutes were spent with her and her , over half of which were in counseling regarding living donation.  I have also given her a prescription for Klonopin 0.5 mg to be taken at bedtime for myoclonic jerks.       Again, thank you for allowing me to participate in the care of your patient.      Sincerely,    Elisa Ellington MD    cc:  Vinicius Sim MD, [facility]

## 2017-02-08 ENCOUNTER — INFUSION THERAPY VISIT (OUTPATIENT)
Dept: INFUSION THERAPY | Facility: CLINIC | Age: 72
End: 2017-02-08
Attending: INTERNAL MEDICINE
Payer: MEDICARE

## 2017-02-08 VITALS
WEIGHT: 140.9 LBS | SYSTOLIC BLOOD PRESSURE: 163 MMHG | BODY MASS INDEX: 24.05 KG/M2 | RESPIRATION RATE: 16 BRPM | HEART RATE: 57 BPM | DIASTOLIC BLOOD PRESSURE: 36 MMHG | TEMPERATURE: 96.2 F | HEIGHT: 64 IN

## 2017-02-08 DIAGNOSIS — N18.4 CHRONIC KIDNEY DISEASE, STAGE 4 (SEVERE) (H): Primary | ICD-10-CM

## 2017-02-08 PROCEDURE — 96374 THER/PROPH/DIAG INJ IV PUSH: CPT

## 2017-02-08 PROCEDURE — 36415 COLL VENOUS BLD VENIPUNCTURE: CPT

## 2017-02-08 PROCEDURE — 82542 COL CHROMOTOGRAPHY QUAL/QUAN: CPT | Performed by: INTERNAL MEDICINE

## 2017-02-08 PROCEDURE — 25500064 ZZH RX 255 OP 636: Performed by: INTERNAL MEDICINE

## 2017-02-08 RX ADMIN — IOHEXOL 5 ML: 300 INJECTION, SOLUTION INTRAVENOUS at 12:17

## 2017-02-08 ASSESSMENT — PAIN SCALES - GENERAL: PAINLEVEL: NO PAIN (0)

## 2017-02-08 NOTE — PROGRESS NOTES
Donor Iohexol test    Yue Valenzuela presents today to [facility] for a Donor Iohexol test.      Progress note:  ID verified by name and .     The following information was verified with the patient:  Female Patients is there any possibility of being pregnant No  Is there a history of allergy (skin rash, swelling, ect) to:   A.  Iodine (except skin reactions to betadine): No   B. Intravenous radio-contrast agents: No   C. Seafood No    R.N. provided patient with educational handout regarding timed test. Yes    22 gauge PIV placed in left AC.  Iohexol administered.  Following iohexol administration extension set replaced.  PIV left in place for blood draws this afternoon.    Medication administered:  Iohexol (Omnipaque 300mg iodine/ml concentration) 5 mls.    Start time: 1217  Stop time: 1219    Drug Waste Record    Drug Name: iohexol  Dose: 5ml  Route administered: IV  NDC #: [id number]  Amount of waste(mL):5ml  Reason for waste: Single use vial      Labs drawn by [facility] CMA/RN at +120, +150, +[id number], +210 and +240.    Patient tolerated the procedure:  Yes    Pt d/c'd after last lab draw.

## 2017-02-14 ENCOUNTER — CARE COORDINATION (OUTPATIENT)
Dept: NEPHROLOGY | Facility: CLINIC | Age: 72
End: 2017-02-14

## 2017-02-14 LAB
BSA: 1.7 M2
COEFF DETERMINATION: 0.99 %
IOHEXOL CL UR+SERPL-VRATE: 16.6 MG/DL
IOHEXOL CL UR+SERPL-VRATE: 18.21 MG/DL
IOHEXOL CL UR+SERPL-VRATE: 19.38 MG/DL
IOHEXOL CL UR+SERPL-VRATE: 20.09 MG/DL
IOHEXOL CL UR+SERPL-VRATE: 21 /1.73 M2
IOHEXOL CL UR+SERPL-VRATE: 21 ML/MIN
IOHEXOL CL UR+SERPL-VRATE: NORMAL MG/DL

## 2017-02-14 NOTE — PROGRESS NOTES
Reviewed patient's Iohexol results with Dr. Ellington. Iohexol test shows that patient's kidney function is at about 20% which is similar to what her GFR shows. Called patient and updated her on results. Informed her there is no need to prepare for dialysis at this time. Encouraged seeking out a living donor as was discussed during her last appointment.     Patient did question if she was active on the transplant list; informed patient that based on her chart she is listed as inactive. Advised her to call her transplant coordinator if she has further questions.     Tra Wayne, RN, BAN  Nephrology RN Care Coordinator

## 2017-02-16 ENCOUNTER — TELEPHONE (OUTPATIENT)
Dept: TRANSPLANT | Facility: CLINIC | Age: 72
End: 2017-02-16

## 2017-02-17 ENCOUNTER — TELEPHONE (OUTPATIENT)
Dept: TRANSPLANT | Facility: CLINIC | Age: 72
End: 2017-02-17

## 2017-02-20 NOTE — PROGRESS NOTES
D: Yue and her  Oscar for kidney Wait List Appointment.  Initial assessment completed on 8/4/2014, refer to note for details.  I: Yue indicated she continues to be employed two days a week and Oscar is working 3-4 days a week. Yue continues to not be on dialysis but has been prescribed Valium to assist with sleeping at night.  Yue continues to have the same insurance (Medicare Parts A and B, WPS Medicare Supplement Policy and Part D through [employer].  Yue pays her own premiums.   Yue continues to be an appropriate transplant candidate.  Reviewed transplant education (Medicare, rehabilitation, donor issues, community/financial resources, and psych/family adjustment) as well as psychosocial risks of transplant. Provided patient with a copy of post-transplant informational sheet that includes information on potential costs of medications, Medicare ESRD, post-transplant lodging, etc.   A: Patient seemed to process information well. Appeared well informed, motivated, and able to follow post transplant requirements. Behavior was appropriate during interview. Has adequate income and insurance coverage. Adequate social support. No major contraindications noted for transplant. At this time, patient appears to understand the risks and benefits of transplant.   P: SW to continue to follow as needed.

## 2017-03-13 ENCOUNTER — PRE VISIT (OUTPATIENT)
Dept: CARDIOLOGY | Facility: CLINIC | Age: 72
End: 2017-03-13

## 2017-03-15 ENCOUNTER — OFFICE VISIT (OUTPATIENT)
Dept: CARDIOLOGY | Facility: CLINIC | Age: 72
End: 2017-03-15
Attending: INTERNAL MEDICINE
Payer: MEDICARE

## 2017-03-15 VITALS
HEART RATE: 63 BPM | WEIGHT: 150.4 LBS | SYSTOLIC BLOOD PRESSURE: 150 MMHG | HEIGHT: 64 IN | OXYGEN SATURATION: 98 % | DIASTOLIC BLOOD PRESSURE: 61 MMHG | BODY MASS INDEX: 25.68 KG/M2

## 2017-03-15 DIAGNOSIS — I25.119 CORONARY ARTERY DISEASE INVOLVING NATIVE CORONARY ARTERY OF NATIVE HEART WITH ANGINA PECTORIS (H): Primary | ICD-10-CM

## 2017-03-15 PROCEDURE — 99212 OFFICE O/P EST SF 10 MIN: CPT | Mod: ZF

## 2017-03-15 PROCEDURE — 99213 OFFICE O/P EST LOW 20 MIN: CPT | Mod: ZP | Performed by: INTERNAL MEDICINE

## 2017-03-15 ASSESSMENT — PAIN SCALES - GENERAL: PAINLEVEL: NO PAIN (0)

## 2017-03-15 NOTE — NURSING NOTE
Cardiac Testing: Patient given instructions regarding Lexiscan. Discussed purpose, preparation, procedure and when to expect results reported back to the patient. Patient demonstrated understanding of this information and agreed to call with further questions or concerns.    Med Reconcile: Reviewed and verified all current medications with the patient. The updated medication list was printed and given to the patient.    Return Appointment: Follow up to be determined based on results of testing. Patient given instructions regarding scheduling next clinic visit. Patient demonstrated understanding of this information and agreed to call with further questions or concerns.    Patient stated she understood all health information given and agreed to call with further questions or concerns.    Dave Campo, RN  RN Care Coordinator  [facility]  [phone]

## 2017-03-15 NOTE — LETTER
3/15/2017      RE: Yue Valenzuela  [address]  [place] [address]       Dear Colleague,    Thank you for the opportunity to participate in the care of your patient, Yue Valenzuela, at [facility]. Please see a copy of my visit note below.    March 15, 2017            Nory Elliott MD   [facility]    [address] [place] [place] [address]      RE: Yue Valenzuela   MRN: [id number]   : 1945      Dear Dr. Elliott:      It was a pleasure participating in the care of your patient Ms. Yue Valenzuela.  As you know, she is a 71-year-old lady whom I see today in continuing preoperative evaluation prior to kidney transplant.        PAST MEDICAL HISTORY:      1.  Diabetes.   2.  Hypertension.   3.  Hyperlipidemia.   4.  Chronic renal insufficiency with a baseline GFR of 19 mL/min as of 2017.  Currently not on dialysis.   5.  Anemia.   6.  Leg cramps with peripheral vascular disease with stenosis of the right common iliac artery by lower extremity angiogram on 2017.  Left side without significant stenoses.   7.  Vitamin D deficiency.   8.  Osteopenia.      Her cardiac history is significant for known coronary artery disease and normal LV systolic function.  Coronary angiogram 2006 in [place], [place], revealed the following:     Left main normal.   LAD, moderate stenosis in the mid portion that involved the ostium of the first diagonal branch, which had a 50% lesion.   Circumflex 70%-80% proximal stenosis stented with a drug-eluting stent.   RCA, mild disease.        After her circumflex territory was revascularized, she had no change in symptoms.  Notably, she had no symptoms prior to the stenting, and her coronary artery disease was discovered incidentally after cardiac workup was performed.      I last saw her 2016.  She presents today for continuing care.      Since our last visit, she  still does not exercise routinely, but she does walk for about 30 minutes on a daily basis.  She denies any new chest pain or shortness of breath.  She denies PND, orthopnea, edema, palpitations, syncope or near-syncope.  She has not noticed any gross change in her overall exertional capacity over the past year, and she is tolerating her medications well with no other complaints.      PRESENT MEDICATIONS:       1.  Clonazepam.     2.  Zyrtec.     3.  Inhalers.     4.  Amlodipine 10 mg a day.   5.  Aspirin 81 mg a day.   6.  Carvedilol 6.25 twice daily.   7.  Lasix 20 mg a day.   8.  Lisinopril 10 mg a day.   9.  Pravastatin 40 mg a day.      PHYSICAL EXAMINATION:     VITAL SIGNS:  Her blood pressure is 150/60 with a pulse of 63.  Her weight is 150 pounds.   NECK:  No obvious jugular venous distention.   LUNGS:  Clear to auscultation.  Respiratory effort is normal.   CARDIAC:  Regular rate and rhythm.  There is a very soft 1-2/6 systolic ejection murmur that is benign in nature, a soft S4, no gross S3.   ABDOMEN:  Belly is soft and nontender.   EXTREMITIES:  Without gross edema.      IMAGING:  Echocardiogram 03/30/2016 reveals a technically difficult study, normal LV systolic function.        IMPRESSION:      Yue is a 71-year-old lady whose cardiac history is significant for known coronary artery disease and normal LV systolic function.  She had stenting of her circumflex coronary territory performed in 2006 and has a residual known moderate stenosis in the mid LAD involving the ostium of the first diagonal branch.  She presents now in continuing preoperative evaluation prior to kidney transplant.      Notably, from a functional standpoint, she has never had symptoms even prior to stenting, and her coronary artery disease was discovered through functional testing.      She is currently active at a low to moderate level of exertion and has not had any gross changes over the past year.  However, in the context of her  complete lack of symptoms despite hemodynamically significant underlying coronary artery disease, further noninvasive evaluation would be indicated.        PLAN:      1.  Pharmacologic nuclear stress test to rule out significant inducible ischemia.      If this is completely unremarkable, then she would be once again approved for her procedure at a somewhat increased but reasonable perioperative risk for event; otherwise, further updates will follow.      Once again, it was a pleasure participating in the care of your patient Ms. Yue Valenzuela.  Please feel free to contact me at any time if there are any questions regarding her care in the future.       Sincerely,   Mike Bryan MD

## 2017-03-15 NOTE — PROGRESS NOTES
March 15, 2017            Nory Elliott MD   [facility]    [address] [place] [address]      RE: Yue Valenzuela   MRN: [id number]   : 1945      Dear Dr. Elliott:      It was a pleasure participating in the care of your patient Ms. Yue Valenzuela.  As you know, she is a 71-year-old lady whom I see today in continuing preoperative evaluation prior to kidney transplant.        PAST MEDICAL HISTORY:      1.  Diabetes.   2.  Hypertension.   3.  Hyperlipidemia.   4.  Chronic renal insufficiency with a baseline GFR of 19 mL/min as of 2017.  Currently not on dialysis.   5.  Anemia.   6.  Leg cramps with peripheral vascular disease with stenosis of the right common iliac artery by lower extremity angiogram on 2017.  Left side without significant stenoses.   7.  Vitamin D deficiency.   8.  Osteopenia.      Her cardiac history is significant for known coronary artery disease and normal LV systolic function.  Coronary angiogram 2006 in [place], [place], revealed the following:     Left main normal.   LAD, moderate stenosis in the mid portion that involved the ostium of the first diagonal branch, which had a 50% lesion.   Circumflex 70%-80% proximal stenosis stented with a drug-eluting stent.   RCA, mild disease.        After her circumflex territory was revascularized, she had no change in symptoms.  Notably, she had no symptoms prior to the stenting, and her coronary artery disease was discovered incidentally after cardiac workup was performed.      I last saw her 2016.  She presents today for continuing care.      Since our last visit, she still does not exercise routinely, but she does walk for about 30 minutes on a daily basis.  She denies any new chest pain or shortness of breath.  She denies PND, orthopnea, edema, palpitations, syncope or near-syncope.  She has not noticed any gross change in her overall exertional capacity over the past year, and she is  tolerating her medications well with no other complaints.      PRESENT MEDICATIONS:       1.  Clonazepam.     2.  Zyrtec.     3.  Inhalers.     4.  Amlodipine 10 mg a day.   5.  Aspirin 81 mg a day.   6.  Carvedilol 6.25 twice daily.   7.  Lasix 20 mg a day.   8.  Lisinopril 10 mg a day.   9.  Pravastatin 40 mg a day.      PHYSICAL EXAMINATION:     VITAL SIGNS:  Her blood pressure is 150/60 with a pulse of 63.  Her weight is 150 pounds.   NECK:  No obvious jugular venous distention.   LUNGS:  Clear to auscultation.  Respiratory effort is normal.   CARDIAC:  Regular rate and rhythm.  There is a very soft 1-2/6 systolic ejection murmur that is benign in nature, a soft S4, no gross S3.   ABDOMEN:  Belly is soft and nontender.   EXTREMITIES:  Without gross edema.      IMAGING:  Echocardiogram 03/30/2016 reveals a technically difficult study, normal LV systolic function.        IMPRESSION:      Yue is a 71-year-old lady whose cardiac history is significant for known coronary artery disease and normal LV systolic function.  She had stenting of her circumflex coronary territory performed in 2006 and has a residual known moderate stenosis in the mid LAD involving the ostium of the first diagonal branch.  She presents now in continuing preoperative evaluation prior to kidney transplant.      Notably, from a functional standpoint, she has never had symptoms even prior to stenting, and her coronary artery disease was discovered through functional testing.      She is currently active at a low to moderate level of exertion and has not had any gross changes over the past year.  However, in the context of her complete lack of symptoms despite hemodynamically significant underlying coronary artery disease, further noninvasive evaluation would be indicated.        PLAN:      1.  Pharmacologic nuclear stress test to rule out significant inducible ischemia.      If this is completely unremarkable, then she would be once again  approved for her procedure at a somewhat increased but reasonable perioperative risk for event; otherwise, further updates will follow.      Once again, it was a pleasure participating in the care of your patient Ms. Yue Valenzuela.  Please feel free to contact me at any time if there are any questions regarding her care in the future.       Sincerely,      Mike Bryan MD

## 2017-03-15 NOTE — NURSING NOTE
Chief Complaint   Patient presents with     Follow Up For     heart problem-- 71 year old female with a history of Hypertension, Hyperlipidemia, CAD s/p PTCI, DM and CKD presenting for cardiac clearance for pre kidney transplant

## 2017-03-15 NOTE — MR AVS SNAPSHOT
After Visit Summary   3/15/2017    Yue Valenzuela    MRN: [id number]           Patient Information     Date Of Birth          1945        Visit Information        Provider Department      3/15/2017 9:30 AM Mike Bryan MD [facility]        Today's Diagnoses     Coronary artery disease involving native coronary artery of native heart with angina pectoris (H)    -  1      Care Instructions    You were seen today in the Cardiovascular Clinic at the [facility].     Cardiology Providers you saw during your visit: Dr Mike Bryna    Diagnosis: History of Coronary Artery Disease    Results: No testing at this time    Recommendations:   1.  We will schedule a Lexiscan.  For this nothing to eat or drink 3 hours prior except for water.  No caffeine, alcohol, or tobacco 12 hours prior.  No need to hold any medications for this test    Follow-up: Follow up to be determined based on results of testing      For emergencies call 911.    For any scheduling needs or nursing related questions, please call [phone].    Thank you for your visit today! If you have questions or concerns about today's visit, please call me.      Dave Campo RN  RN Care Coordinator  [facility]  [phone]    Press option 1 for the University and then 3 for nursing related questions                Follow-ups after your visit        Follow-up notes from your care team     Return for Dr Bryan Follow up Dx: PKW.      Your next 10 appointments already scheduled     Mar 24, 2017 10:00 AM CDT   NM INJECTION with UUNMINJ2   [facility], Nuclear Medicine ([facility])    [address] [address] [phone]            Mar 24, 2017 10:45 AM CDT   NM SCAN3 with UUNM1   [facility], Nuclear Medicine ([facility])    [address] [address]    [phone]            Mar 24, 2017 11:15 AM CDT   Ekg Stress Nm Lexiscan with UUEKGNMS   [facility] ([facility])    [address] [address]               Mar 24, 2017 12:15 PM CDT   NM MPI WITH LEXISCAN with UUNM1   [facility] [place], Nuclear Medicine ([facility])    [address] [place] [address] [phone]           For a ONE day exam: Allow 3-4 hours for test. For a TWO day exam: Allow 2 hours PER day for test.  You may need to stop some medicines before the test. Follow your doctor s orders. - If you take a beta blocker: Follow your doctor s specific instructions on taking it prior to and on the day of your exam. - If you take Aggrenox or dipyridamole (Persantine, Permole), stop taking it 48 hours before your test. - If you take Viagra, Cialis or Levitra, stop taking it 48 hours before your test. - If you take theophylline or aminophylline, stop taking it 12 hours before your test.  For patients with diabetes: - If you take insulin, call your diabetes care team. Ask if you should take a 1/2 dose the morning of your test. - If you take diabetes medicine by mouth, don t take it on the morning of your test. Bring it with you to take after the test. (If you have questions, call your diabetes care team.)  Do not take nitrates on the day of your test. Do not wear your Nitro-Patch.  Stop all caffeine 12 hours before the test. This includes coffee, tea, soda pop, chocolate and certain medicines (such as Anacin, Excedrin and NoDoz). Also avoid decaf coffee and tea, as these contain small amounts of caffeine.  No alcohol, smoking or other tobacco for 12 hours before the test.  Stop eating 3 hours before the test. You may drink water.  Please wear a loose two-piece outfit. If you will have an exercise test, bring rubber-soled walking shoes.  When you arrive, please tell us if you: - Have  "diabetes - Are breastfeeding - May be pregnant - Have a pacemaker of ICD (implantable defibrillator).  Please call your Imaging Department at your exam site with any questions.              Future tests that were ordered for you today     Open Future Orders        Priority Expected Expires Ordered    Stress NM Lexiscan Routine  3/15/2018 3/15/2017            Who to contact     If you have questions or need follow up information about today's clinic visit or your schedule please contact [facility] directly at [phone].  Normal or non-critical lab and imaging results will be communicated to you by [employer], letter or phone within 4 business days after the clinic has received the results. If you do not hear from us within 7 days, please contact the clinic through Clause[employer] or phone. If you have a critical or abnormal lab result, we will notify you by phone as soon as possible.  Submit refill requests through [employer] or call your pharmacy and they will forward the refill request to us. Please allow 3 business days for your refill to be completed.          Additional Information About Your Visit        [employer] Information     [employer] gives you secure access to your electronic health record. If you see a primary care provider, you can also send messages to your care team and make appointments. If you have questions, please call your primary care clinic.  If you do not have a primary care provider, please call [phone] and they will assist you.        Care EveryWhere ID     This is your Care EveryWhere ID. This could be used by other organizations to access your [place] medical records  [id number]        Your Vitals Were     Pulse Height Pulse Oximetry BMI (Body Mass Index)          63 1.626 m (5' 4\") 98% 25.82 kg/m2         Blood Pressure from Last 3 Encounters:   03/15/17 150/61   02/08/17 (!) 163/36   02/02/17 173/51    Weight from Last 3 Encounters:   03/15/17 68.2 kg (150 lb 6.4 oz)   02/08/17 63.9 kg " (140 lb 14.4 oz)   02/02/17 63.2 kg (139 lb 6.4 oz)               Primary Care Provider Office Phone # Fax #    Nory Elliott [phone]       [facility] [address]  [facility] [address]        Thank you!     Thank you for choosing [facility]  for your care. Our goal is always to provide you with excellent care. Hearing back from our patients is one way we can continue to improve our services. Please take a few minutes to complete the written survey that you may receive in the mail after your visit with us. Thank you!             Your Updated Medication List - Protect others around you: Learn how to safely use, store and throw away your medicines at www.disposemymeds.org.          This list is accurate as of: 3/15/17 10:04 AM.  Always use your most recent med list.                   Brand Name Dispense Instructions for use    albuterol 108 (90 BASE) MCG/ACT Inhaler    PROAIR HFA/PROVENTIL HFA/VENTOLIN HFA    1 Inhaler    Inhale 2 puffs into the lungs every 4 hours as needed for shortness of breath / dyspnea or wheezing       amLODIPine 10 MG tablet    NORVASC     Take 10 mg by mouth       aspirin EC 81 MG EC tablet      Take 81 mg by mouth       budesonide-formoterol 80-4.5 MCG/ACT Inhaler    SYMBICORT    3 Inhaler    Inhale 2 puffs into the lungs 2 times daily       calcium acetate 667 MG Caps capsule    PHOSLO     Take 667 mg by mouth       Calcium Carb-Cholecalciferol 600-1000 MG-UNIT Tabs          carvedilol 3.125 MG tablet    COREG     Take 6.25 mg by mouth 2 times daily (with meals)       cetirizine 5 MG Chew    zyrTEC     Take 5 mg by mouth daily       cholecalciferol 1000 UNIT tablet    vitamin D     Take 1,000 Units by mouth       clonazePAM 0.5 MG tablet    klonoPIN    90 tablet    Take 0.5 tablets (0.25 mg) by mouth 2 times daily as needed for anxiety       furosemide 20 MG tablet    LASIX         GLUCAGEN 1 MG Solr injection   Generic drug:  glucagon      1  mg       insulin aspart 100 UNITS/ML injection    NovoLOG         lisinopril 20 MG tablet    PRINIVIL/ZESTRIL     Take 10 mg by mouth       ONE TOUCH ULTRA test strip   Generic drug:  blood glucose monitoring          ONETOUCH DELICA LANCETS 33G Misc          pravastatin 40 MG tablet    PRAVACHOL         TYLENOL PO      Take by mouth every 8 hours as needed for mild pain or fever

## 2017-03-15 NOTE — PATIENT INSTRUCTIONS
You were seen today in the Cardiovascular Clinic at the [facility].     Cardiology Providers you saw during your visit: Dr Mike Bryan    Diagnosis: History of Coronary Artery Disease    Results: No testing at this time    Recommendations:   1.  We will schedule a Lexiscan.  For this nothing to eat or drink 3 hours prior except for water.  No caffeine, alcohol, or tobacco 12 hours prior.  No need to hold any medications for this test    Follow-up: Follow up to be determined based on results of testing      For emergencies call 911.    For any scheduling needs or nursing related questions, please call [phone].    Thank you for your visit today! If you have questions or concerns about today's visit, please call me.      Dave Campo RN  RN Care Coordinator  [facility]  [phone]    Press option 1 for the University and then 3 for nursing related questions

## 2017-03-22 ENCOUNTER — RESULTS ONLY (OUTPATIENT)
Dept: OTHER | Facility: CLINIC | Age: 72
End: 2017-03-22

## 2017-03-22 ENCOUNTER — APPOINTMENT (OUTPATIENT)
Dept: LAB | Facility: CLINIC | Age: 72
End: 2017-03-22
Attending: TRANSPLANT SURGERY
Payer: MEDICARE

## 2017-03-22 PROCEDURE — 86832 HLA CLASS I HIGH DEFIN QUAL: CPT | Performed by: INTERNAL MEDICINE

## 2017-03-22 PROCEDURE — 86833 HLA CLASS II HIGH DEFIN QUAL: CPT | Performed by: INTERNAL MEDICINE

## 2017-03-24 ENCOUNTER — HOSPITAL ENCOUNTER (OUTPATIENT)
Dept: NUCLEAR MEDICINE | Facility: CLINIC | Age: 72
Setting detail: NUCLEAR MEDICINE
End: 2017-03-24
Attending: INTERNAL MEDICINE
Payer: MEDICARE

## 2017-03-24 ENCOUNTER — HOSPITAL ENCOUNTER (OUTPATIENT)
Dept: CARDIOLOGY | Facility: CLINIC | Age: 72
Discharge: HOME | End: 2017-03-24
Attending: INTERNAL MEDICINE | Admitting: INTERNAL MEDICINE
Payer: MEDICARE

## 2017-03-24 ENCOUNTER — TRANSFERRED RECORDS (OUTPATIENT)
Dept: CARDIOLOGY | Facility: CLINIC | Age: 72
End: 2017-03-24

## 2017-03-24 DIAGNOSIS — I25.119 CORONARY ARTERY DISEASE INVOLVING NATIVE CORONARY ARTERY OF NATIVE HEART WITH ANGINA PECTORIS (H): ICD-10-CM

## 2017-03-24 LAB — RADIOLOGIST FLAGS: NORMAL

## 2017-03-24 PROCEDURE — 93017 CV STRESS TEST TRACING ONLY: CPT

## 2017-03-24 PROCEDURE — 34300033 ZZH RX 343: Performed by: INTERNAL MEDICINE

## 2017-03-24 PROCEDURE — A9502 TC99M TETROFOSMIN: HCPCS | Performed by: INTERNAL MEDICINE

## 2017-03-24 PROCEDURE — 93018 CV STRESS TEST I&R ONLY: CPT | Performed by: INTERNAL MEDICINE

## 2017-03-24 PROCEDURE — 93016 CV STRESS TEST SUPVJ ONLY: CPT | Performed by: INTERNAL MEDICINE

## 2017-03-24 PROCEDURE — 78452 HT MUSCLE IMAGE SPECT MULT: CPT

## 2017-03-24 PROCEDURE — 25000128 H RX IP 250 OP 636: Performed by: INTERNAL MEDICINE

## 2017-03-24 RX ORDER — REGADENOSON 0.08 MG/ML
0.4 INJECTION, SOLUTION INTRAVENOUS ONCE
Status: COMPLETED | OUTPATIENT
Start: 2017-03-24 | End: 2017-03-24

## 2017-03-24 RX ADMIN — REGADENOSON 0.4 MG: 0.08 INJECTION, SOLUTION INTRAVENOUS at 11:17

## 2017-03-24 RX ADMIN — TETROFOSMIN 10.7 MCI.: 0.23 INJECTION, POWDER, LYOPHILIZED, FOR SOLUTION INTRAVENOUS at 10:59

## 2017-03-24 RX ADMIN — TETROFOSMIN 41 MCI.: 0.23 INJECTION, POWDER, LYOPHILIZED, FOR SOLUTION INTRAVENOUS at 12:16

## 2017-03-31 ENCOUNTER — TELEPHONE (OUTPATIENT)
Dept: TRANSPLANT | Facility: CLINIC | Age: 72
End: 2017-03-31

## 2017-03-31 DIAGNOSIS — R91.8 MASS OF RIGHT LUNG: Primary | ICD-10-CM

## 2017-03-31 NOTE — TELEPHONE ENCOUNTER
Called patient to discuss new and unexpected finding of R lung mass on recent lexiscan.  This requires further workup, preferably urgent. I explained that it could be lung cancer, other cancer metastatic to lung, or benign, and that further imaging and workup would be required to sort out.  Her  was also part of the conversation via speakerphone.  I have contacted pulmonary fellow on call (Natalee) who guided recs for urgen CT chest.  We will do without contrast due to measured GFR of 20 per recent iohexol and patient no needing to start dialysis yet.  Has longstanding diabetes as well.   The pulmonary fellow will contact her counterpart taking over Monday who will help secure a near term appt with the pulmonary nodule clinic faculty (?Opal or Bola).   The patient knows to expect call from radiology scheduling and pulmonary clinic by end of day Monday.      Cc Lizette Jones

## 2017-04-03 DIAGNOSIS — R91.1 NODULE OF RIGHT LUNG: Primary | ICD-10-CM

## 2017-04-04 ENCOUNTER — OFFICE VISIT (OUTPATIENT)
Dept: PULMONOLOGY | Facility: CLINIC | Age: 72
End: 2017-04-04
Attending: INTERNAL MEDICINE
Payer: MEDICARE

## 2017-04-04 ENCOUNTER — OFFICE VISIT (OUTPATIENT)
Dept: INTERVENTIONAL RADIOLOGY/VASCULAR | Facility: CLINIC | Age: 72
End: 2017-04-04

## 2017-04-04 VITALS
SYSTOLIC BLOOD PRESSURE: 179 MMHG | HEIGHT: 64 IN | HEART RATE: 60 BPM | WEIGHT: 143.9 LBS | TEMPERATURE: 97.7 F | BODY MASS INDEX: 24.57 KG/M2 | DIASTOLIC BLOOD PRESSURE: 69 MMHG | RESPIRATION RATE: 18 BRPM | OXYGEN SATURATION: 97 %

## 2017-04-04 VITALS — SYSTOLIC BLOOD PRESSURE: 162 MMHG | OXYGEN SATURATION: 100 % | DIASTOLIC BLOOD PRESSURE: 62 MMHG | HEART RATE: 60 BPM

## 2017-04-04 DIAGNOSIS — D49.9 NEOPLASM: ICD-10-CM

## 2017-04-04 DIAGNOSIS — R91.1 LESION OF RIGHT LUNG: Primary | ICD-10-CM

## 2017-04-04 DIAGNOSIS — R91.1 LESION OF RIGHT LUNG: ICD-10-CM

## 2017-04-04 DIAGNOSIS — R91.1 PULMONARY NODULE: Primary | ICD-10-CM

## 2017-04-04 LAB
HCT VFR BLD AUTO: 37.2 % (ref 35–47)
HGB BLD-MCNC: 12.2 G/DL (ref 11.7–15.7)
INR PPP: 1.08 (ref 0.86–1.14)
PLATELET # BLD AUTO: 219 10E9/L (ref 150–450)
SA1 CELL: NORMAL
SA1 COMMENTS: NORMAL
SA1 HI RISK ABY: NORMAL
SA1 MOD RISK ABY: NORMAL
SA1 TEST METHOD: NORMAL
SA2 CELL: NORMAL
SA2 COMMENTS: NORMAL
SA2 HI RISK ABY UA: NORMAL
SA2 MOD RISK ABY: NORMAL
SA2 TEST METHOD: NORMAL

## 2017-04-04 PROCEDURE — 87385 HISTOPLASMA CAPSUL AG IA: CPT | Performed by: INTERNAL MEDICINE

## 2017-04-04 PROCEDURE — 86612 BLASTOMYCES ANTIBODY: CPT | Performed by: INTERNAL MEDICINE

## 2017-04-04 PROCEDURE — 86606 ASPERGILLUS ANTIBODY: CPT | Performed by: INTERNAL MEDICINE

## 2017-04-04 PROCEDURE — 99212 OFFICE O/P EST SF 10 MIN: CPT | Mod: ZF

## 2017-04-04 PROCEDURE — 86698 HISTOPLASMA ANTIBODY: CPT | Performed by: INTERNAL MEDICINE

## 2017-04-04 PROCEDURE — 86635 COCCIDIOIDES ANTIBODY: CPT | Performed by: INTERNAL MEDICINE

## 2017-04-04 ASSESSMENT — PAIN SCALES - GENERAL: PAINLEVEL: NO PAIN (0)

## 2017-04-04 NOTE — PATIENT INSTRUCTIONS
You are scheduled for your biopsy on Tuesday, April 11, 2017   Report to the [facility] Waiting room at 9:30 AM  The [facility] Waiting Room is located on the 2nd floor (street level) of the [address]  Your procedure is scheduled to start at approximately 11:00 AM    No solid foods or milk products for 6 hours prior on the day of the procedure.5:00 AM  You may have clear liquids until 2 hours prior on the day of the procedure.(water, apple juice, broth, coffee or melissa without milk or sugar, jell-o, white grape juice) 9:00 AM    Stop the aspirin until after the biopsy    You will need a     If you have any questions you may call the Radiology Nurse Line [phone]

## 2017-04-04 NOTE — MR AVS SNAPSHOT
After Visit Summary   4/4/2017    Yue Valenzuela    MRN: [id number]           Patient Information     Date Of Birth          1945        Visit Information        Provider Department      4/4/2017 11:00 AM Ira Parekh, KATHERYN [facility] Interventional Radiology        Today's Diagnoses     Lesion of right lung    -  1    Neoplasm           Care Instructions    You are scheduled for your biopsy on Tuesday, April 11, 2017   Report to the [facility] Waiting room at 9:30 AM  The [facility] Waiting Room is located on the 2nd floor (street level) of [address]  Your procedure is scheduled to start at approximately 11:00 AM    No solid foods or milk products for 6 hours prior on the day of the procedure.5:00 AM  You may have clear liquids until 2 hours prior on the day of the procedure.(water, apple juice, broth, coffee or melissa without milk or sugar, jell-o, white grape juice) 9:00 AM    Stop the aspirin until after the biopsy    You will need a     If you have any questions you may call the Radiology Nurse Line [phone]        Follow-ups after your visit        Your next 10 appointments already scheduled     Jul 11, 2017  9:20 AM CDT   (Arrive by 9:05 AM)   CT CHEST W/O CONTRAST with UCCT2   [facility] ([facility])    [address]  1st Floor  [facility] [address] [phone]           Please bring any scans or X-rays taken at other hospitals, if similar tests were done. Also bring a list of your medicines, including vitamins, minerals and over-the-counter drugs. It is safest to leave personal items at home.  Be sure to tell your doctor:   If you have any allergies.   If there s any chance you are pregnant.   If you are breastfeeding.   If you have any special needs.  You do not need to do anything special to prepare.  Please wear loose clothing, such as a sweat suit or jogging clothes. Avoid snaps, zippers and  other metal. We may ask you to undress and put on a hospital gown.            Jul 11, 2017 10:30 AM CDT   (Arrive by 10:15 AM)   Return Visit with John Plaza MD   [facility] ([facility])    [address]  [address] [facility] [address] [phone]              Future tests that were ordered for you today     Open Future Orders        Priority Expected Expires Ordered    Hemoglobin and hematocrit Routine  4/4/2018 4/4/2017    Platelet count Routine  4/4/2018 4/4/2017    INR Routine  4/4/2018 4/4/2017    CT Lung Mediastinum Biopsy Routine 4/10/2017 5/19/2017 4/4/2017    CT Chest w/o contrast Routine 7/4/2017 10/4/2017 4/4/2017            Who to contact     Please call your clinic at [phone] to:    Ask questions about your health    Make or cancel appointments    Discuss your medicines    Learn about your test results    Speak to your doctor   If you have compliments or concerns about an experience at your clinic, or if you wish to file a complaint, please contact [facility] Patient Relations at [phone] or email us at [email or web address]         Additional Information About Your Visit        [employer] Information     Mind[employer] gives you secure access to your electronic health record. If you see a primary care provider, you can also send messages to your care team and make appointments. If you have questions, please call your primary care clinic.  If you do not have a primary care provider, please call [phone] and they will assist you.      Mindmancer is an electronic gateway that provides easy, online access to your medical records. With Mind[employer], you can request a clinic appointment, read your test results, renew a prescription or communicate with your care team.     To access your existing account, please contact your [facility] or call [phone] for assistance.         Care EveryWhere ID     This is your Care EveryWhere ID. This could be used by other organizations to access your [place] medical records  [id number]        Your Vitals Were     Pulse Pulse Oximetry                60 100%           Blood Pressure from Last 3 Encounters:   04/04/17 162/62   04/04/17 179/69   03/15/17 150/61    Weight from Last 3 Encounters:   04/04/17 65.3 kg (143 lb 14.4 oz)   03/15/17 68.2 kg (150 lb 6.4 oz)   02/08/17 63.9 kg (140 lb 14.4 oz)               Primary Care Provider Office Phone # Fax #    Nory Elliott [phone]       [facility] [address]  [facility] [address]        Thank you!     Thank you for choosing [facility] INTERVENTIONAL RADIOLOGY  for your care. Our goal is always to provide you with excellent care. Hearing back from our patients is one way we can continue to improve our services. Please take a few minutes to complete the written survey that you may receive in the mail after your visit with us. Thank you!             Your Updated Medication List - Protect others around you: Learn how to safely use, store and throw away your medicines at www.disposemymeds.org.          This list is accurate as of: 4/4/17 11:48 AM.  Always use your most recent med list.                   Brand Name Dispense Instructions for use    albuterol 108 (90 BASE) MCG/ACT Inhaler    PROAIR HFA/PROVENTIL HFA/VENTOLIN HFA    1 Inhaler    Inhale 2 puffs into the lungs every 4 hours as needed for shortness of breath / dyspnea or wheezing       amLODIPine 10 MG tablet    NORVASC     Take 10 mg by mouth       aspirin EC 81 MG EC tablet      Take 81 mg by mouth       budesonide-formoterol 80-4.5 MCG/ACT Inhaler    SYMBICORT    3 Inhaler    Inhale 2 puffs into the lungs 2 times daily       calcium acetate 667 MG Caps capsule    PHOSLO     Take 667 mg by mouth       Calcium Carb-Cholecalciferol 600-1000 MG-UNIT Tabs          carvedilol 3.125 MG tablet    COREG     Take  6.25 mg by mouth 2 times daily (with meals)       cetirizine 5 MG Chew    zyrTEC     Take 5 mg by mouth daily       cholecalciferol 1000 UNIT tablet    vitamin D     Take 1,000 Units by mouth       clonazePAM 0.5 MG tablet    klonoPIN    90 tablet    Take 0.5 tablets (0.25 mg) by mouth 2 times daily as needed for anxiety       furosemide 20 MG tablet    LASIX         GLUCAGEN 1 MG Solr injection   Generic drug:  glucagon      1 mg       insulin aspart 100 UNITS/ML injection    NovoLOG         lisinopril 20 MG tablet    PRINIVIL/ZESTRIL     Take 10 mg by mouth       ONE TOUCH ULTRA test strip   Generic drug:  blood glucose monitoring          ONETOUCH DELICA LANCETS 33G Misc          pravastatin 40 MG tablet    PRAVACHOL         TYLENOL PO      Take by mouth every 8 hours as needed for mild pain or fever

## 2017-04-04 NOTE — PATIENT INSTRUCTIONS
1. Have urine test  2.  Have blood test  3.  See IR (interventional radiology)  4.  CT scan of chest in 3 months

## 2017-04-04 NOTE — PROGRESS NOTES
Pulmonary Clinic     We have been asked by Dr. Everett to evaluate this patient in regards to   Chief Complaint   Patient presents with     Oncology Clinic Visit     Diabetes mellitus type 1         HPI:     71 year old female referred to the Pulmonary Clinic secondary to the above noted chief complaint.  The patient has long-standing type 1 diabetes and with this she has renal failure and she is being further evaluated for the possibility of kidney transplantation.  She has other comorbidities including coronary artery disease and peripheral vascular disease.  There has been report of some weight loss but she reports that this has been over-estimated and she reports that she has lost less than 10 pounds which is not far from her norm.    She had a lexiscan performed which demonstrated a right lower lobe pulmonary nodule.  She has not had imaging of her chest in the past.  I do see an MRI scan was performed on November 8, 2016 where the right lower lobe nodular opacity can be identified in hindsight.  Quantiferon gold negative. No active pulmonary symptoms outside of known mild obstructive symptoms which she is taking symbicort on a prn basis (rx'd for scheduled dosing).     She does note that in November to December she had a significant respiratory illness with cough, fevers, increasing fatigue.  This is subsequently resolved.         Review of Systems:   10 point ROS performed with pertinent +/- noted in the HPI.  The remainder of the ROS was otherwise negative.        Pertinent Medications     Current Outpatient Prescriptions   Medication     clonazePAM (KLONOPIN) 0.5 MG tablet     Acetaminophen (TYLENOL PO)     cetirizine (ZYRTEC) 5 MG CHEW     budesonide-formoterol (SYMBICORT) 80-4.5 MCG/ACT inhaler     albuterol (PROAIR HFA, PROVENTIL HFA, VENTOLIN HFA) 108 (90 BASE) MCG/ACT inhaler     amLODIPine (NORVASC) 10 MG tablet     aspirin EC 81 MG tablet     blood glucose (ONE TOUCH ULTRA) test strip     calcium  acetate (PHOSLO) 667 MG CAPS     Calcium Carb-Cholecalciferol 600-1000 MG-UNIT TABS     carvedilol (COREG) 3.125 MG tablet     cholecalciferol (VITAMIN D) 1000 UNIT tablet     furosemide (LASIX) 20 MG tablet     glucagon (GLUCAGEN) 1 MG SOLR     insulin aspart (NOVOLOG VIAL) 100 UNITS/ML soln     ONETOUCH DELICA LANCETS 33G MISC     lisinopril (PRINIVIL,ZESTRIL) 20 MG tablet     pravastatin (PRAVACHOL) 40 MG tablet     No current facility-administered medications for this visit.         Allergies:      Allergies   Allergen Reactions     Loratadine Other (See Comments)     Other reaction(s): Other, see comments  Extreme sleepiness  Extreme sleepiness     Codeine Other (See Comments)     Other reaction(s): Other, see comments  Makes her sleepless  Makes her sleepless     Sulfa Drugs Unknown     Other reaction(s): Unknown     Terazosin Other (See Comments)     Other reaction(s): Other, see comments  Swellling in legs, improved when went off. Rere Becker, [facility]  12/30/2015, 8:52 AM  Swellling in legs, improved when went off. Rere Becker, [facility]  12/30/2015, 8:52 AM     Adhesive Tape Rash     Latex Rash     Liquid Adhesive Rash          Past Medical Hx:     Diabetes    Hypertension    Dyslipidemia    Chronic kidney disease    Anemia    Peripheral vascular disease    Osteopenia    Pulmonary nodule    Coronary artery disease    Asthma - symbicort and albuterol     Hx of thyroid lesion / multinodular goiter- benign (needle and surgical evaluation)       Family Hx:     Family History   Problem Relation Age of Onset     Cancer - colorectal Father      Scleroderma Mother      DIABETES Father      CEREBROVASCULAR DISEASE Brother      DIABETES Brother      Hypertension Brother      DIABETES Brother      2nd brother        Social Hx:   7 pk year history, no active use since 1985. No alcohol use. She is retired and previously worked in retail.  She is  and has 2 children.          Objective   Vitals:  /69 (BP  "Location: Left arm, Patient Position: Chair, Cuff Size: Adult Regular)  Pulse 60  Temp 97.7  F (36.5  C) (Oral)  Resp 18  Ht 1.626 m (5' 4.02\")  Wt 65.3 kg (143 lb 14.4 oz)  SpO2 97%  BMI 24.69 kg/m2    General:  Adult female;appears stated age; no acute distress; the patient is a good historian  HEENT:  NCAT; EOMI; No icterus; no injection; MMM  Pulm: CTAB, normal to percussion  CV: RRR, no murmurs, rubs or gallops        Labs / Imaging/Studies     CBC RESULTS:   Recent Labs   Lab Test  01/13/17   0800  08/04/14   0712   WBC  7.0  7.4   RBC  4.07  3.90   HGB  12.2  11.2*   HCT  37.2  35.0   MCV  91  90   MCH  30.0  28.7   MCHC  32.8  32.0   RDW  12.8  13.1   PLT  195  179          Lab Results   Component Value Date     08/04/2014    Lab Results   Component Value Date    CHLORIDE 105 08/04/2014    Lab Results   Component Value Date    BUN 50 08/04/2014      Lab Results   Component Value Date    POTASSIUM 4.6 08/04/2014    Lab Results   Component Value Date    CO2 24 08/04/2014    Lab Results   Component Value Date    CR 2.54 01/13/2017    CR 2.25 08/04/2014        Pertinent Cultures / Microbiology:   None    Imaging:   CT pending         Assessment and Plan:   Assessment: This is a 71-year-old female with some history of reversible obstructive lung disease who presents now during kidney transplant evaluation with a spiculated right lower lobe lung nodule with a fair amount of surrounding groundglass opacity.  She has no active respiratory symptoms or infectious symptoms though there is a possibility of a fungal infection and this is less likely.  She does have about a seven-pack-year tobacco use history.  At this time the right lower lobe nodule remains indeterminate but I would recommend further evaluation with core biopsy which can be performed by interventional radiology.  We have made an appointment for the patient to be seen by interventional radiology at 11:00 AM to discuss the feasibility of this " biopsy.  If interventional radiology will strongly that we should perform a bronchoscopy prior to biopsy that is possible though the yield will be very low in the right lower lobe.  In regards to pulmonary nodules these may or may not be related to this primary right lower lobe nodule and may be more consistent with atypical infection.  I would recommend a three-month follow-up CT scan for the other small nodules, this may very based on the biopsy results from the right lower lobe lesion.    I would also recommend fungal antibodies and histoplasma antigen (urine) test.  Would recommend a CT scan in three months.    1. Pulmonary nodule  See above  - Fungal antibodies  - Histoplasma Capsulatum Agn Non Blood  - CT Chest w/o contrast; Future    I spent 30 minutes with direct face to face interaction with this patient and provided at least 50% of this time counseling and coordinating care for pulmonary nodules as noted above in the assessment and plan.        John Plaza MD  Pulmonary and Critical Care  [facility]  Pager:  [phone]

## 2017-04-04 NOTE — MR AVS SNAPSHOT
After Visit Summary   4/4/2017    Yue Valenzuela    MRN: [id number]           Patient Information     Date Of Birth          1945        Visit Information        Provider Department      4/4/2017 9:30 AM John Plaza MD [facility]        Today's Diagnoses     Pulmonary nodule    -  1      Care Instructions    1. Have urine test  2.  Have blood test  3.  See IR (interventional radiology)  4.  CT scan of chest in 3 months          Follow-ups after your visit        Follow-up notes from your care team     Return in about 3 months (around 7/4/2017).      Your next 10 appointments already scheduled     Jul 11, 2017  9:20 AM CDT   (Arrive by 9:05 AM)   CT CHEST W/O CONTRAST with UCCT2   [facility] [place] [facility] ([facility])    [address] [place] [id number] [phone]           Please bring any scans or X-rays taken at other hospitals, if similar tests were done. Also bring a list of your medicines, including vitamins, minerals and over-the-counter drugs. It is safest to leave personal items at home.  Be sure to tell your doctor:   If you have any allergies.   If there s any chance you are pregnant.   If you are breastfeeding.   If you have any special needs.  You do not need to do anything special to prepare.  Please wear loose clothing, such as a sweat suit or jogging clothes. Avoid snaps, zippers and other metal. We may ask you to undress and put on a hospital gown.            Jul 11, 2017 10:30 AM CDT   (Arrive by 10:15 AM)   Return Visit with John Plaza MD   [facility] ([facility] [place])    [address] [place] [id number] [phone]              Future tests that were ordered for you today     Open Future Orders        Priority Expected Expires Ordered    CT Chest w/o contrast Routine 7/4/2017 10/4/2017 4/4/2017            Who to  "contact     If you have questions or need follow up information about today's clinic visit or your schedule please contact [facility] directly at [phone].  Normal or non-critical lab and imaging results will be communicated to you by [employer], letter or phone within 4 business days after the clinic has received the results. If you do not hear from us within 7 days, please contact the clinic through [employer] or phone. If you have a critical or abnormal lab result, we will notify you by phone as soon as possible.  Submit refill requests through [employer] or call your pharmacy and they will forward the refill request to us. Please allow 3 business days for your refill to be completed.          Additional Information About Your Visit        [employer] Information     [employer] gives you secure access to your electronic health record. If you see a primary care provider, you can also send messages to your care team and make appointments. If you have questions, please call your primary care clinic.  If you do not have a primary care provider, please call [phone] and they will assist you.        Care EveryWhere ID     This is your Care EveryWhere ID. This could be used by other organizations to access your [facility] medical records  [id number]        Your Vitals Were     Pulse Temperature Respirations Height Pulse Oximetry BMI (Body Mass Index)    60 97.7  F (36.5  C) (Oral) 18 1.626 m (5' 4.02\") 97% 24.69 kg/m2       Blood Pressure from Last 3 Encounters:   04/04/17 179/69   03/15/17 150/61   02/08/17 (!) 163/36    Weight from Last 3 Encounters:   04/04/17 65.3 kg (143 lb 14.4 oz)   03/15/17 68.2 kg (150 lb 6.4 oz)   02/08/17 63.9 kg (140 lb 14.4 oz)              We Performed the Following     Fungal antibodies     Histoplasma Capsulatum Agn Non Blood        Primary Care Provider Office Phone # Fax #    Nory RADER Jefryjay [phone]       [facility] [address] " [facility] [address]        Thank you!     Thank you for choosing [facility]  for your care. Our goal is always to provide you with excellent care. Hearing back from our patients is one way we can continue to improve our services. Please take a few minutes to complete the written survey that you may receive in the mail after your visit with us. Thank you!             Your Updated Medication List - Protect others around you: Learn how to safely use, store and throw away your medicines at www.disposemymeds.org.          This list is accurate as of: 4/4/17 11:04 AM.  Always use your most recent med list.                   Brand Name Dispense Instructions for use    albuterol 108 (90 BASE) MCG/ACT Inhaler    PROAIR HFA/PROVENTIL HFA/VENTOLIN HFA    1 Inhaler    Inhale 2 puffs into the lungs every 4 hours as needed for shortness of breath / dyspnea or wheezing       amLODIPine 10 MG tablet    NORVASC     Take 10 mg by mouth       aspirin EC 81 MG EC tablet      Take 81 mg by mouth       budesonide-formoterol 80-4.5 MCG/ACT Inhaler    SYMBICORT    3 Inhaler    Inhale 2 puffs into the lungs 2 times daily       calcium acetate 667 MG Caps capsule    PHOSLO     Take 667 mg by mouth       Calcium Carb-Cholecalciferol 600-1000 MG-UNIT Tabs          carvedilol 3.125 MG tablet    COREG     Take 6.25 mg by mouth 2 times daily (with meals)       cetirizine 5 MG Chew    zyrTEC     Take 5 mg by mouth daily       cholecalciferol 1000 UNIT tablet    vitamin D     Take 1,000 Units by mouth       clonazePAM 0.5 MG tablet    klonoPIN    90 tablet    Take 0.5 tablets (0.25 mg) by mouth 2 times daily as needed for anxiety       furosemide 20 MG tablet    LASIX         GLUCAGEN 1 MG Solr injection   Generic drug:  glucagon      1 mg       insulin aspart 100 UNITS/ML injection    NovoLOG         lisinopril 20 MG tablet    PRINIVIL/ZESTRIL     Take 10 mg by mouth       ONE TOUCH ULTRA test strip   Generic drug:  blood  glucose monitoring          ONETOUCH DELICA LANCETS 33G Misc          pravastatin 40 MG tablet    PRAVACHOL         TYLENOL PO      Take by mouth every 8 hours as needed for mild pain or fever

## 2017-04-04 NOTE — LETTER
4/4/2017       RE: Yue Valenzuela  [address]  [place] [address]     Dear Colleague,    Thank you for referring your patient, Yue Valenzuela, to the [facility] INTERVENTIONAL RADIOLOGY at [facility]. Please see a copy of my visit note below.    First Name: Yue   Age: 71 year old   Referring Physician: Dr. Plaza   REASON FOR REFERRAL: Consult for right lower lobe lesion bipsy    Assessment:  Yue is 72 yo with a PMH of failing kidney's undergoing work-up for kidney transplant who was incidentally found to have a 2.7 x 2.3 cm right lower lobe lung lesion.  Biopsy requested to evaluate for malignancy.  Plan:  Image guided biopsy of right lower lobe lung lesion.  In addition to pathology please send for fungal  Hold aspirin for 5 days prior to the biopsy  She uses an insulin pump and will adjust herself depending on her glucose when she is not eating.  She will just take if off during the biopsy as she will be prone.    HPI: This is a patient with failing kidney's who is currently undergoing work-up for a kidney transplant.  She recently had a Lexiscan performed and it was at that time that a right lung lesion was noted.  The patient then had a CT scan of the chest which found a 2.7 x 2.3 cm right lower lobe lesion that has a small area of calcification and has ground glass appearance around the perihiphery.  The patient met with pulmonology today and recommended that the patient have a biopsy.  Dr. Bernal from  reviewed the imaging and approved the biopsy.   The patient also has a PMH of type 1 diabetes and uses an insulin pump. She has hypertension, h/o Factor V Leiden mutation, coronary artery stent in 2006, mixed hyperlipidemia. She smoked 0.5 ppd for 15 yrs and quit in 1985.  She tells me that where she lived in [place] for 20 yrs it is known for blastomycosis.  They have returned to the year to help out their friends with year work, etc.  From  November 2016-January 2017 she had a chronic cough which was eventually treated with a Z-Evangelist and eventually she got over it.  PAST MEDICAL HISTORY:   Past Medical History:   Diagnosis Date     CAD (coronary artery disease)      Carotid artery stenosis      CKD (chronic kidney disease) stage 4, GFR 15-29 ml/min (H)      Diabetes mellitus type 1 (H)      H/O factor V Leiden mutation      H/O heart artery stent     2006     H/O partial thyroidectomy     2012     Hypertension      Mixed hyperlipidemia (DYSLIPIDEMIA) 8/1/2014     Thyroid nodule 04/12/2012    Hurtle Cells, non malignant     PAST SURGICAL HISTORY:   Past Surgical History:   Procedure Laterality Date     ganglion cyst removal       hemithyroidectomy  4/13/2012    PATH report showed benign Hurtle Cell Adenoma     lipoma removal       SHOULDER SURGERY Left      TONSILLECTOMY       FAMILY HISTORY:   Family History   Problem Relation Age of Onset     Cancer - colorectal Father      DIABETES Father      Scleroderma Mother      CEREBROVASCULAR DISEASE Brother      DIABETES Brother      Hypertension Brother      DIABETES Brother      2nd brother     SOCIAL HISTORY:   Social History   Substance Use Topics     Smoking status: Former Smoker     Packs/day: 0.50     Years: 15.00     Types: Cigarettes     Start date: 1/1/1970     Quit date: 12/31/1985     Smokeless tobacco: Never Used     Alcohol use 0.0 - 0.5 oz/week     0 - 1 Standard drinks or equivalent per week     PROBLEM LIST:   Patient Active Problem List    Diagnosis Date Noted     CAD (coronary artery disease) 09/17/2014     Priority: Medium     2006 PCI circumflex territory   Mid LAD lesion       Personal history of tobacco use, presenting hazards to health 09/17/2014     Priority: Medium     No tobacco since 86       Reactive airway disease 09/17/2014     Priority: Medium     History of tonsillectomy 09/17/2014     Priority: Medium     History of partial thyroidectomy 09/17/2014     Priority: Medium      Chronic kidney disease 08/01/2014     Priority: Medium     Essential hypertension 08/01/2014     Priority: Medium     Problem list name updated by automated process. Provider to review       Diabetes mellitus type 1 (H) 08/01/2014     Priority: Medium     Mixed hyperlipidemia (DYSLIPIDEMIA) 08/01/2014     Priority: Medium     Cramp of limb 08/01/2014     Priority: Medium     MEDICATIONS:   Prescription Medications as of 4/5/2017             clonazePAM (KLONOPIN) 0.5 MG tablet Take 0.5 tablets (0.25 mg) by mouth 2 times daily as needed for anxiety    Acetaminophen (TYLENOL PO) Take by mouth every 8 hours as needed for mild pain or fever    cetirizine (ZYRTEC) 5 MG CHEW Take 5 mg by mouth daily    budesonide-formoterol (SYMBICORT) 80-4.5 MCG/ACT inhaler Inhale 2 puffs into the lungs 2 times daily    albuterol (PROAIR HFA, PROVENTIL HFA, VENTOLIN HFA) 108 (90 BASE) MCG/ACT inhaler Inhale 2 puffs into the lungs every 4 hours as needed for shortness of breath / dyspnea or wheezing    amLODIPine (NORVASC) 10 MG tablet Take 10 mg by mouth    aspirin EC 81 MG tablet Take 81 mg by mouth    blood glucose (ONE TOUCH ULTRA) test strip     calcium acetate (PHOSLO) 667 MG CAPS Take 667 mg by mouth    Calcium Carb-Cholecalciferol 600-1000 MG-UNIT TABS     carvedilol (COREG) 3.125 MG tablet Take 6.25 mg by mouth 2 times daily (with meals)     cholecalciferol (VITAMIN D) 1000 UNIT tablet Take 1,000 Units by mouth    furosemide (LASIX) 20 MG tablet     glucagon (GLUCAGEN) 1 MG SOLR 1 mg    insulin aspart (NOVOLOG VIAL) 100 UNITS/ML soln     ONETOUCH DELICA LANCETS 33G MISC     lisinopril (PRINIVIL,ZESTRIL) 20 MG tablet Take 10 mg by mouth     pravastatin (PRAVACHOL) 40 MG tablet         ALLERGIES: Loratadine; Codeine; Sulfa drugs; Terazosin; Adhesive tape; Latex; and Liquid adhesive  VITALS: /62  Pulse 60  SpO2 100%    ROS:  Skin: negative  Eyes: negative  Ears/Nose/Throat: negative  Respiratory: No shortness of breath,  dyspnea on exertion, cough, or hemoptysis  Cardiovascular: negative  Gastrointestinal: negative  Genitourinary: negative  Musculoskeletal: negative  Neurologic: negative  Psychiatric: negative  Hematologic/Lymphatic/Immunologic: negative  Endocrine: negative      Physical Examination: Vital signs are reviewed and they are stable  Constitutional: Pleasant, older woman, in no acute physical distress, came with her  to the appt  Cardiovascular: negative, RRR  Respiratory: negative findings: normal respiratory rate and rhythm, lungs clear to auscultation  Musculoskeletal: extremities normal- no gross deformities noted, gait normal and normal muscle tone  Skin: no suspicious lesions or rashes  Neurologic: negative  Psychiatric: affect normal/bright and mentation appears normal.    BMP RESULTS:  Lab Results   Component Value Date     08/04/2014    POTASSIUM 4.6 08/04/2014    CHLORIDE 105 08/04/2014    CO2 24 08/04/2014    ANIONGAP 10 08/04/2014     (H) 08/04/2014    BUN 50 (H) 08/04/2014    CR 2.54 (H) 01/13/2017    GFRESTIMATED 19 (L) 01/13/2017    GFRESTBLACK 23 (L) 01/13/2017    ZAHEER 9.0 08/04/2014        CBC RESULTS:  Lab Results   Component Value Date    WBC 7.0 01/13/2017    RBC 4.07 01/13/2017    HGB 12.2 04/04/2017    HCT 37.2 04/04/2017    MCV 91 01/13/2017    MCH 30.0 01/13/2017    MCHC 32.8 01/13/2017    RDW 12.8 01/13/2017     04/04/2017       INR/PTT:  Lab Results   Component Value Date    INR 1.08 04/04/2017    PTT 32 01/13/2017       Diagnostic studies: see CT scan of the chest    PROVIDER NOTE:  I explained the procedure to Yue and her , Oscar.  This included:  Preparing for the procedure, the actual procedure and recovery.  I explained the risk of the lung biopsy:  pneumothorax, hemoptysis, possible need for a chest tube and overnight stay in the hospital, bleeding from the lung requiring an embolization procedure and death from the procedure.  I explained that usually the  results return after three to four business days.    I explained that they would be contacted by a nurse coordinator following this to determine a future plan.  Thank you for involving us in the care of your patient.    40 minutes was spent with Yue and her , Oscar.  30 minutes was spent in counseling.  Ira Parekh MS, APRN, CNS  Clinical Nurse Specialist  Interventional Radiology  [phone] (voice mail)  [phone] (pager)    CC  Patient Care Team:  Nory Elliott as PCP - General (Internal Medicine)  Vinicius Sim (Nephrology)  Juan Dvaid Sprague MD as MD (INTERNAL MEDICINE - ENDOCRINOLOGY, DIABETES & METABOLISM)  Dave Campo, RN as Nurse Coordinator (Cardiology)  Leoncio Brewer MD as MD (Transplant)  Betito Moran MD as Resident (Radiology)  John Plaza MD as Referring Physician (Pulmonary)  Ira Parekh APRN CNS as Nurse Practitioner (Nurse)  JOHN PLAZA

## 2017-04-04 NOTE — NURSING NOTE
"Yue Valenzuela is a 71 year old female who presents for:  Chief Complaint   Patient presents with     Oncology Clinic Visit     Diabetes mellitus type 1        Initial Vitals:  /69 (BP Location: Left arm, Patient Position: Chair, Cuff Size: Adult Regular)  Pulse 60  Temp 97.7  F (36.5  C) (Oral)  Resp 18  Ht 1.626 m (5' 4.02\")  Wt 65.3 kg (143 lb 14.4 oz)  SpO2 97%  BMI 24.69 kg/m2 Estimated body mass index is 24.69 kg/(m^2) as calculated from the following:    Height as of this encounter: 1.626 m (5' 4.02\").    Weight as of this encounter: 65.3 kg (143 lb 14.4 oz).. Body surface area is 1.72 meters squared. BP completed using cuff size: regular  No Pain (0) No LMP recorded. Patient is postmenopausal. Allergies and medications reviewed.     Medications: Medication refills not needed today.  Pharmacy name entered into [employer]: [id number] - [facility] [address]    Comments:     7 minutes for nursing intake (face to face time)   Anabella Gardner MA        "

## 2017-04-04 NOTE — LETTER
4/4/2017       RE: Yue Valenzuela  [address]  [place] [facility] [address]     Dear Colleague,    Thank you for referring your patient, Yue Vlaenzuela, to the [facility] at [facility]. Please see a copy of my visit note below.    Pulmonary Clinic     We have been asked by Dr. Everett to evaluate this patient in regards to   Chief Complaint   Patient presents with     Oncology Clinic Visit     Diabetes mellitus type 1         HPI:     71 year old female referred to the Pulmonary Clinic secondary to the above noted chief complaint.  The patient has long-standing type 1 diabetes and with this she has renal failure and she is being further evaluated for the possibility of kidney transplantation.  She has other comorbidities including coronary artery disease and peripheral vascular disease.  There has been report of some weight loss but she reports that this has been over-estimated and she reports that she has lost less than 10 pounds which is not far from her norm.    She had a lexiscan performed which demonstrated a right lower lobe pulmonary nodule.  She has not had imaging of her chest in the past.  I do see an MRI scan was performed on November 8, 2016 where the right lower lobe nodular opacity can be identified in hindsight.  Quantiferon gold negative. No active pulmonary symptoms outside of known mild obstructive symptoms which she is taking symbicort on a prn basis (rx'd for scheduled dosing).     She does note that in November to December she had a significant respiratory illness with cough, fevers, increasing fatigue.  This is subsequently resolved.         Review of Systems:   10 point ROS performed with pertinent +/- noted in the HPI.  The remainder of the ROS was otherwise negative.        Pertinent Medications     Current Outpatient Prescriptions   Medication     clonazePAM (KLONOPIN) 0.5 MG tablet     Acetaminophen (TYLENOL PO)      cetirizine (ZYRTEC) 5 MG CHEW     budesonide-formoterol (SYMBICORT) 80-4.5 MCG/ACT inhaler     albuterol (PROAIR HFA, PROVENTIL HFA, VENTOLIN HFA) 108 (90 BASE) MCG/ACT inhaler     amLODIPine (NORVASC) 10 MG tablet     aspirin EC 81 MG tablet     blood glucose (ONE TOUCH ULTRA) test strip     calcium acetate (PHOSLO) 667 MG CAPS     Calcium Carb-Cholecalciferol 600-1000 MG-UNIT TABS     carvedilol (COREG) 3.125 MG tablet     cholecalciferol (VITAMIN D) 1000 UNIT tablet     furosemide (LASIX) 20 MG tablet     glucagon (GLUCAGEN) 1 MG SOLR     insulin aspart (NOVOLOG VIAL) 100 UNITS/ML soln     ONETOUCH DELICA LANCETS 33G MISC     lisinopril (PRINIVIL,ZESTRIL) 20 MG tablet     pravastatin (PRAVACHOL) 40 MG tablet     No current facility-administered medications for this visit.         Allergies:      Allergies   Allergen Reactions     Loratadine Other (See Comments)     Other reaction(s): Other, see comments  Extreme sleepiness  Extreme sleepiness     Codeine Other (See Comments)     Other reaction(s): Other, see comments  Makes her sleepless  Makes her sleepless     Sulfa Drugs Unknown     Other reaction(s): Unknown     Terazosin Other (See Comments)     Other reaction(s): Other, see comments  Swellling in legs, improved when went off. Rere Becker, [facility]  12/30/2015, 8:52 AM  Swellling in legs, improved when went off. Rere Becker, [facility]  12/30/2015, 8:52 AM     Adhesive Tape Rash     Latex Rash     Liquid Adhesive Rash          Past Medical Hx:     Diabetes    Hypertension    Dyslipidemia    Chronic kidney disease    Anemia    Peripheral vascular disease    Osteopenia    Pulmonary nodule    Coronary artery disease    Asthma - symbicort and albuterol     Hx of thyroid lesion / multinodular goiter- benign (needle and surgical evaluation)       Family Hx:     Family History   Problem Relation Age of Onset     Cancer - colorectal Father      Scleroderma Mother      DIABETES Father      CEREBROVASCULAR DISEASE  "Brother      DIABETES Brother      Hypertension Brother      DIABETES Brother      2nd brother        Social Hx:   7 pk year history, no active use since 1985. No alcohol use. She is retired and previously worked in retail.  She is  and has 2 children.          Objective   Vitals:  /69 (BP Location: Left arm, Patient Position: Chair, Cuff Size: Adult Regular)  Pulse 60  Temp 97.7  F (36.5  C) (Oral)  Resp 18  Ht 1.626 m (5' 4.02\")  Wt 65.3 kg (143 lb 14.4 oz)  SpO2 97%  BMI 24.69 kg/m2    General:  Adult female;appears stated age; no acute distress; the patient is a good historian  HEENT:  NCAT; EOMI; No icterus; no injection; MMM  Pulm: CTAB, normal to percussion  CV: RRR, no murmurs, rubs or gallops        Labs / Imaging/Studies     CBC RESULTS:   Recent Labs   Lab Test  01/13/17   0800  08/04/14   0712   WBC  7.0  7.4   RBC  4.07  3.90   HGB  12.2  11.2*   HCT  37.2  35.0   MCV  91  90   MCH  30.0  28.7   MCHC  32.8  32.0   RDW  12.8  13.1   PLT  195  179          Lab Results   Component Value Date     08/04/2014    Lab Results   Component Value Date    CHLORIDE 105 08/04/2014    Lab Results   Component Value Date    BUN 50 08/04/2014      Lab Results   Component Value Date    POTASSIUM 4.6 08/04/2014    Lab Results   Component Value Date    CO2 24 08/04/2014    Lab Results   Component Value Date    CR 2.54 01/13/2017    CR 2.25 08/04/2014        Pertinent Cultures / Microbiology:   None    Imaging:   CT pending         Assessment and Plan:   Assessment: This is a 71-year-old female with some history of reversible obstructive lung disease who presents now during kidney transplant evaluation with a spiculated right lower lobe lung nodule with a fair amount of surrounding groundglass opacity.  She has no active respiratory symptoms or infectious symptoms though there is a possibility of a fungal infection and this is less likely.  She does have about a seven-pack-year tobacco use history.  " At this time the right lower lobe nodule remains indeterminate but I would recommend further evaluation with core biopsy which can be performed by interventional radiology.  We have made an appointment for the patient to be seen by interventional radiology at 11:00 AM to discuss the feasibility of this biopsy.  If interventional radiology will strongly that we should perform a bronchoscopy prior to biopsy that is possible though the yield will be very low in the right lower lobe.  In regards to pulmonary nodules these may or may not be related to this primary right lower lobe nodule and may be more consistent with atypical infection.  I would recommend a three-month follow-up CT scan for the other small nodules, this may very based on the biopsy results from the right lower lobe lesion.    I would also recommend fungal antibodies and histoplasma antigen (urine) test.  Would recommend a CT scan in three months.    1. Pulmonary nodule  See above  - Fungal antibodies  - Histoplasma Capsulatum Agn Non Blood  - CT Chest w/o contrast; Future    I spent 30 minutes with direct face to face interaction with this patient and provided at least 50% of this time counseling and coordinating care for pulmonary nodules as noted above in the assessment and plan.        John Plaza MD  Pulmonary and Critical Care  [facility]  Pager:  [phone]

## 2017-04-05 ENCOUNTER — TELEPHONE (OUTPATIENT)
Dept: INTERVENTIONAL RADIOLOGY/VASCULAR | Facility: CLINIC | Age: 72
End: 2017-04-05

## 2017-04-05 NOTE — PROGRESS NOTES
First Name: Yue   Age: 71 year old   Referring Physician: Dr. Plaza   REASON FOR REFERRAL: Consult for right lower lobe lesion bipsy    Assessment:  Yue is 72 yo with a PMH of failing kidney's undergoing work-up for kidney transplant who was incidentally found to have a 2.7 x 2.3 cm right lower lobe lung lesion.  Biopsy requested to evaluate for malignancy.  Plan:  Image guided biopsy of right lower lobe lung lesion.  In addition to pathology please send for fungal  Hold aspirin for 5 days prior to the biopsy  She uses an insulin pump and will adjust herself depending on her glucose when she is not eating.  She will just take if off during the biopsy as she will be prone.    HPI: This is a patient with failing kidney's who is currently undergoing work-up for a kidney transplant.  She recently had a Lexiscan performed and it was at that time that a right lung lesion was noted.  The patient then had a CT scan of the chest which found a 2.7 x 2.3 cm right lower lobe lesion that has a small area of calcification and has ground glass appearance around the perihiphery.  The patient met with pulmonology today and recommended that the patient have a biopsy.  Dr. Bernal from  reviewed the imaging and approved the biopsy.   The patient also has a PMH of type 1 diabetes and uses an insulin pump. She has hypertension, h/o Factor V Leiden mutation, coronary artery stent in 2006, mixed hyperlipidemia. She smoked 0.5 ppd for 15 yrs and quit in 1985.  She tells me that where she lived in [place] for 20 yrs it is known for blastomycosis.  They have returned to the year to help out their friends with year work, etc.  From November 2016-January 2017 she had a chronic cough which was eventually treated with a Z-Evangelist and eventually she got over it.  PAST MEDICAL HISTORY:   Past Medical History:   Diagnosis Date     CAD (coronary artery disease)      Carotid artery stenosis      CKD (chronic kidney disease) stage 4, GFR 15-29  ml/min (H)      Diabetes mellitus type 1 (H)      H/O factor V Leiden mutation      H/O heart artery stent     2006     H/O partial thyroidectomy     2012     Hypertension      Mixed hyperlipidemia (DYSLIPIDEMIA) 8/1/2014     Thyroid nodule 04/12/2012    Hurtle Cells, non malignant     PAST SURGICAL HISTORY:   Past Surgical History:   Procedure Laterality Date     ganglion cyst removal       hemithyroidectomy  4/13/2012    PATH report showed benign Hurtle Cell Adenoma     lipoma removal       SHOULDER SURGERY Left      TONSILLECTOMY       FAMILY HISTORY:   Family History   Problem Relation Age of Onset     Cancer - colorectal Father      DIABETES Father      Scleroderma Mother      CEREBROVASCULAR DISEASE Brother      DIABETES Brother      Hypertension Brother      DIABETES Brother      2nd brother     SOCIAL HISTORY:   Social History   Substance Use Topics     Smoking status: Former Smoker     Packs/day: 0.50     Years: 15.00     Types: Cigarettes     Start date: 1/1/1970     Quit date: 12/31/1985     Smokeless tobacco: Never Used     Alcohol use 0.0 - 0.5 oz/week     0 - 1 Standard drinks or equivalent per week     PROBLEM LIST:   Patient Active Problem List    Diagnosis Date Noted     CAD (coronary artery disease) 09/17/2014     Priority: Medium     2006 PCI circumflex territory   Mid LAD lesion       Personal history of tobacco use, presenting hazards to health 09/17/2014     Priority: Medium     No tobacco since 86       Reactive airway disease 09/17/2014     Priority: Medium     History of tonsillectomy 09/17/2014     Priority: Medium     History of partial thyroidectomy 09/17/2014     Priority: Medium     Chronic kidney disease 08/01/2014     Priority: Medium     Essential hypertension 08/01/2014     Priority: Medium     Problem list name updated by automated process. Provider to review       Diabetes mellitus type 1 (H) 08/01/2014     Priority: Medium     Mixed hyperlipidemia (DYSLIPIDEMIA) 08/01/2014      Priority: Medium     Cramp of limb 08/01/2014     Priority: Medium     MEDICATIONS:   Prescription Medications as of 4/5/2017             clonazePAM (KLONOPIN) 0.5 MG tablet Take 0.5 tablets (0.25 mg) by mouth 2 times daily as needed for anxiety    Acetaminophen (TYLENOL PO) Take by mouth every 8 hours as needed for mild pain or fever    cetirizine (ZYRTEC) 5 MG CHEW Take 5 mg by mouth daily    budesonide-formoterol (SYMBICORT) 80-4.5 MCG/ACT inhaler Inhale 2 puffs into the lungs 2 times daily    albuterol (PROAIR HFA, PROVENTIL HFA, VENTOLIN HFA) 108 (90 BASE) MCG/ACT inhaler Inhale 2 puffs into the lungs every 4 hours as needed for shortness of breath / dyspnea or wheezing    amLODIPine (NORVASC) 10 MG tablet Take 10 mg by mouth    aspirin EC 81 MG tablet Take 81 mg by mouth    blood glucose (ONE TOUCH ULTRA) test strip     calcium acetate (PHOSLO) 667 MG CAPS Take 667 mg by mouth    Calcium Carb-Cholecalciferol 600-1000 MG-UNIT TABS     carvedilol (COREG) 3.125 MG tablet Take 6.25 mg by mouth 2 times daily (with meals)     cholecalciferol (VITAMIN D) 1000 UNIT tablet Take 1,000 Units by mouth    furosemide (LASIX) 20 MG tablet     glucagon (GLUCAGEN) 1 MG SOLR 1 mg    insulin aspart (NOVOLOG VIAL) 100 UNITS/ML soln     ONETOUCH DELICA LANCETS 33G MISC     lisinopril (PRINIVIL,ZESTRIL) 20 MG tablet Take 10 mg by mouth     pravastatin (PRAVACHOL) 40 MG tablet         ALLERGIES: Loratadine; Codeine; Sulfa drugs; Terazosin; Adhesive tape; Latex; and Liquid adhesive  VITALS: /62  Pulse 60  SpO2 100%    ROS:  Skin: negative  Eyes: negative  Ears/Nose/Throat: negative  Respiratory: No shortness of breath, dyspnea on exertion, cough, or hemoptysis  Cardiovascular: negative  Gastrointestinal: negative  Genitourinary: negative  Musculoskeletal: negative  Neurologic: negative  Psychiatric: negative  Hematologic/Lymphatic/Immunologic: negative  Endocrine: negative      Physical Examination: Vital signs are reviewed  and they are stable  Constitutional: Pleasant, older woman, in no acute physical distress, came with her  to the appt  Cardiovascular: negative, RRR  Respiratory: negative findings: normal respiratory rate and rhythm, lungs clear to auscultation  Musculoskeletal: extremities normal- no gross deformities noted, gait normal and normal muscle tone  Skin: no suspicious lesions or rashes  Neurologic: negative  Psychiatric: affect normal/bright and mentation appears normal.    BMP RESULTS:  Lab Results   Component Value Date     08/04/2014    POTASSIUM 4.6 08/04/2014    CHLORIDE 105 08/04/2014    CO2 24 08/04/2014    ANIONGAP 10 08/04/2014     (H) 08/04/2014    BUN 50 (H) 08/04/2014    CR 2.54 (H) 01/13/2017    GFRESTIMATED 19 (L) 01/13/2017    GFRESTBLACK 23 (L) 01/13/2017    ZAHEER 9.0 08/04/2014        CBC RESULTS:  Lab Results   Component Value Date    WBC 7.0 01/13/2017    RBC 4.07 01/13/2017    HGB 12.2 04/04/2017    HCT 37.2 04/04/2017    MCV 91 01/13/2017    MCH 30.0 01/13/2017    MCHC 32.8 01/13/2017    RDW 12.8 01/13/2017     04/04/2017       INR/PTT:  Lab Results   Component Value Date    INR 1.08 04/04/2017    PTT 32 01/13/2017       Diagnostic studies: see CT scan of the chest    PROVIDER NOTE:  I explained the procedure to Yue and her , Oscar.  This included:  Preparing for the procedure, the actual procedure and recovery.  I explained the risk of the lung biopsy:  pneumothorax, hemoptysis, possible need for a chest tube and overnight stay in the hospital, bleeding from the lung requiring an embolization procedure and death from the procedure.  I explained that usually the results return after three to four business days.    I explained that they would be contacted by a nurse coordinator following this to determine a future plan.  Thank you for involving us in the care of your patient.    40 minutes was spent with Yue and her , Oscar.  30 minutes was spent in  counseling.  Ira Parekh MS, APRN, CNS  Clinical Nurse Specialist  Interventional Radiology  [phone] (voice mail)  [phone] (pager)    CC  Patient Care Team:  Nory Elliott as PCP - General (Internal Medicine)  Vinicius Sim (Nephrology)  Juan David Sprague MD as MD (INTERNAL MEDICINE - ENDOCRINOLOGY, DIABETES & METABOLISM)  Dave Campo, RN as Nurse Coordinator (Cardiology)  Leoncio Brewer MD as MD (Transplant)  Betito Moran MD as Resident (Radiology)  John Plaza MD as Referring Physician (Pulmonary)  Ira Parekh, KATHERYN CNS as Nurse Practitioner (Nurse)  J[facility]

## 2017-04-08 LAB
ASPERGILLUS AB TITR SER CF: NORMAL {TITER}
B DERMAT AB TITR SER CF: NORMAL {TITER}
COCCIDIOIDES AB TITR SER CF: NORMAL {TITER}
H CAPSUL MYC AB TITR SER CF: NORMAL {TITER}
H CAPSUL YST AB TITR SER CF: NORMAL {TITER}

## 2017-04-09 LAB — LAB SCANNED RESULT: NORMAL

## 2017-04-11 ENCOUNTER — HOSPITAL ENCOUNTER (OUTPATIENT)
Dept: CT IMAGING | Facility: CLINIC | Age: 72
End: 2017-04-11
Attending: CLINICAL NURSE SPECIALIST | Admitting: INTERNAL MEDICINE
Payer: MEDICARE

## 2017-04-11 ENCOUNTER — HOSPITAL ENCOUNTER (OUTPATIENT)
Facility: CLINIC | Age: 72
Discharge: HOME | End: 2017-04-11
Attending: INTERNAL MEDICINE | Admitting: INTERNAL MEDICINE
Payer: MEDICARE

## 2017-04-11 ENCOUNTER — APPOINTMENT (OUTPATIENT)
Dept: MEDSURG UNIT | Facility: CLINIC | Age: 72
End: 2017-04-11
Attending: INTERNAL MEDICINE
Payer: MEDICARE

## 2017-04-11 ENCOUNTER — APPOINTMENT (OUTPATIENT)
Dept: GENERAL RADIOLOGY | Facility: CLINIC | Age: 72
End: 2017-04-11
Attending: RADIOLOGY
Payer: MEDICARE

## 2017-04-11 VITALS
HEART RATE: 62 BPM | RESPIRATION RATE: 18 BRPM | DIASTOLIC BLOOD PRESSURE: 48 MMHG | TEMPERATURE: 97.8 F | OXYGEN SATURATION: 98 % | SYSTOLIC BLOOD PRESSURE: 157 MMHG

## 2017-04-11 VITALS
RESPIRATION RATE: 16 BRPM | OXYGEN SATURATION: 100 % | SYSTOLIC BLOOD PRESSURE: 170 MMHG | DIASTOLIC BLOOD PRESSURE: 53 MMHG

## 2017-04-11 DIAGNOSIS — D49.9 NEOPLASM: ICD-10-CM

## 2017-04-11 DIAGNOSIS — R91.1 LESION OF RIGHT LUNG: ICD-10-CM

## 2017-04-11 LAB
GLUCOSE BLDC GLUCOMTR-MCNC: 225 MG/DL (ref 70–99)
GLUCOSE BLDC GLUCOMTR-MCNC: 245 MG/DL (ref 70–99)
GLUCOSE BLDC GLUCOMTR-MCNC: 251 MG/DL (ref 70–99)
KOH PREP SPEC: NORMAL
MICRO REPORT STATUS: NORMAL
SPECIMEN SOURCE: NORMAL

## 2017-04-11 PROCEDURE — 40000167 ZZH STATISTIC PP CARE STAGE 2

## 2017-04-11 PROCEDURE — 25000128 H RX IP 250 OP 636: Performed by: PHYSICIAN ASSISTANT

## 2017-04-11 PROCEDURE — 40000065 ZZH STATISTIC EKG NON-CHARGEABLE

## 2017-04-11 PROCEDURE — 93005 ELECTROCARDIOGRAM TRACING: CPT

## 2017-04-11 PROCEDURE — 27210995 ZZH RX 272: Performed by: RADIOLOGY

## 2017-04-11 PROCEDURE — 32405 CT LUNG MEDIASTINUM BIOPSY: CPT

## 2017-04-11 PROCEDURE — 25000132 ZZH RX MED GY IP 250 OP 250 PS 637: Mod: GY | Performed by: RADIOLOGY

## 2017-04-11 PROCEDURE — 82962 GLUCOSE BLOOD TEST: CPT

## 2017-04-11 PROCEDURE — 87102 FUNGUS ISOLATION CULTURE: CPT | Performed by: CLINICAL NURSE SPECIALIST

## 2017-04-11 PROCEDURE — 99152 MOD SED SAME PHYS/QHP 5/>YRS: CPT

## 2017-04-11 PROCEDURE — 71010 XR CHEST 1 VW: CPT

## 2017-04-11 PROCEDURE — 27210909 ZZH NEEDLE CR5

## 2017-04-11 PROCEDURE — 25000125 ZZHC RX 250: Performed by: RADIOLOGY

## 2017-04-11 PROCEDURE — 88342 IMHCHEM/IMCYTCHM 1ST ANTB: CPT | Performed by: INTERNAL MEDICINE

## 2017-04-11 PROCEDURE — 25000128 H RX IP 250 OP 636: Performed by: RADIOLOGY

## 2017-04-11 PROCEDURE — 88305 TISSUE EXAM BY PATHOLOGIST: CPT | Performed by: INTERNAL MEDICINE

## 2017-04-11 PROCEDURE — 88333 PATH CONSLTJ SURG CYTO XM 1: CPT | Performed by: INTERNAL MEDICINE

## 2017-04-11 PROCEDURE — 99153 MOD SED SAME PHYS/QHP EA: CPT

## 2017-04-11 PROCEDURE — 87210 SMEAR WET MOUNT SALINE/INK: CPT | Performed by: CLINICAL NURSE SPECIALIST

## 2017-04-11 PROCEDURE — A9270 NON-COVERED ITEM OR SERVICE: HCPCS | Mod: GY | Performed by: RADIOLOGY

## 2017-04-11 PROCEDURE — 88341 IMHCHEM/IMCYTCHM EA ADD ANTB: CPT | Performed by: INTERNAL MEDICINE

## 2017-04-11 RX ORDER — LIDOCAINE 40 MG/G
CREAM TOPICAL
Status: DISCONTINUED | OUTPATIENT
Start: 2017-04-11 | End: 2017-04-11 | Stop reason: HOSPADM

## 2017-04-11 RX ORDER — SODIUM CHLORIDE 9 MG/ML
INJECTION, SOLUTION INTRAVENOUS CONTINUOUS
Status: DISCONTINUED | OUTPATIENT
Start: 2017-04-11 | End: 2017-04-11 | Stop reason: HOSPADM

## 2017-04-11 RX ORDER — FENTANYL CITRATE 50 UG/ML
25-50 INJECTION, SOLUTION INTRAMUSCULAR; INTRAVENOUS EVERY 5 MIN PRN
Status: DISCONTINUED | OUTPATIENT
Start: 2017-04-11 | End: 2017-04-12 | Stop reason: HOSPADM

## 2017-04-11 RX ORDER — ACETAMINOPHEN 325 MG/1
650 TABLET ORAL EVERY 4 HOURS PRN
Status: DISCONTINUED | OUTPATIENT
Start: 2017-04-11 | End: 2017-04-11 | Stop reason: HOSPADM

## 2017-04-11 RX ORDER — NALOXONE HYDROCHLORIDE 0.4 MG/ML
.1-.4 INJECTION, SOLUTION INTRAMUSCULAR; INTRAVENOUS; SUBCUTANEOUS
Status: DISCONTINUED | OUTPATIENT
Start: 2017-04-11 | End: 2017-04-12 | Stop reason: HOSPADM

## 2017-04-11 RX ORDER — FLUMAZENIL 0.1 MG/ML
0.2 INJECTION, SOLUTION INTRAVENOUS
Status: DISCONTINUED | OUTPATIENT
Start: 2017-04-11 | End: 2017-04-12 | Stop reason: HOSPADM

## 2017-04-11 RX ORDER — IBUPROFEN 200 MG
200-400 TABLET ORAL EVERY 6 HOURS PRN
Status: DISCONTINUED | OUTPATIENT
Start: 2017-04-11 | End: 2017-04-11 | Stop reason: HOSPADM

## 2017-04-11 RX ADMIN — MIDAZOLAM 1 MG: 1 INJECTION INTRAMUSCULAR; INTRAVENOUS at 12:38

## 2017-04-11 RX ADMIN — ACETAMINOPHEN 650 MG: 325 TABLET, FILM COATED ORAL at 14:54

## 2017-04-11 RX ADMIN — SODIUM CHLORIDE: 9 INJECTION, SOLUTION INTRAVENOUS at 10:21

## 2017-04-11 RX ADMIN — LIDOCAINE HYDROCHLORIDE 10 ML: 10 INJECTION, SOLUTION EPIDURAL; INFILTRATION; INTRACAUDAL; PERINEURAL at 12:39

## 2017-04-11 RX ADMIN — FENTANYL CITRATE 50 MCG: 50 INJECTION INTRAMUSCULAR; INTRAVENOUS at 12:38

## 2017-04-11 NOTE — PROCEDURES
Interventional Radiology Brief Post Procedure Note    Procedure: CT guided RLL lung lesion biopsy.     Proceduralist: Rito King MD and None    Assistant: None    Time Out: Prior to the start of the procedure and with procedural staff participation, I verbally confirmed the patient s identity using two indicators, relevant allergies, that the procedure was appropriate and matched the consent or emergent situation, and that the correct equipment/implants were available. Immediately prior to starting the procedure I conducted the Time Out with the procedural staff and re-confirmed the patient s name, procedure, and site/side. (The Joint Commission universal protocol was followed.)  Yes    Sedation: IR Nurse Monitored Care   Post Procedure Summary:  Prior to the start of the procedure and with procedural staff participation, I verbally confirmed the patient s identity using two indicators, relevant allergies, that the procedure was appropriate and matched the consent or emergent situation, and that the correct equipment/implants were available. Immediately prior to starting the procedure I conducted the Time Out with the procedural staff and re-confirmed the patient s name, procedure, and site/side. (The Joint Commission universal protocol was followed.)  Yes       Sedatives: Fentanyl and Midazolam (Versed)    Vital signs, airway and pulse oximetry were monitored and remained stable throughout the procedure and sedation was maintained until the procedure was complete.  The patient was monitored by staff until sedation discharge criteria were met.    Patient tolerance: Patient tolerated the procedure well with no immediate complications.    Time of sedation in minutes: 45 Minutes minutes from beginning to end of physician one to one monitoring.        Findings: small ptx adjacent to the target lesion.  Otherwise uncomplicated biopsy.     Estimated Blood Loss: Minimal    Fluoroscopy Time: Not  applicable    SPECIMENS: Core needle biopsy specimens sent for pathological analysis and culture.     Complications: 1. None     Condition: Stable    Plan: Routine post procedure monitoring. CXR at one hour post.     Comments: See dictated procedure note for full details.    Rito King MD

## 2017-04-11 NOTE — PROGRESS NOTES
Interventional Radiology Intra-procedural Nursing Note    Patient Name: Yue Valenzuela  Medical Record Number: [id number]  Today's Date: April 11, 2017    Start Time: 1200  End of procedure time: 1240  Procedure: Right lung biopsy  Report given to: Yari RIVERO RN    Sedation Time: 40 minutes  Fentanyl: 50 mcg  Versed: 1 mg    Other Notes:  Pt to CT 2 on cart.  Consent verified, pt is alert & oriented.  VSS.  Positioned prone on table and pre-scan completed and reviewed by Dr. King.  Right side prepped.  Pathology present and responsible for specimen.  Specimen collected and sent to lab for fungal culture as well.  Pt tolerated procedure with no noted complications.  Transferred back to  for further care.    Dorothea Ruth RN

## 2017-04-11 NOTE — IP AVS SNAPSHOT
MRN:[id number]                      After Visit Summary   4/11/2017    Yue Valenzuela    MRN: [id number]           Visit Information        Department      4/11/2017  9:28 AM [address] [facility]          Review of your medicines      UNREVIEWED medicines. Ask your doctor about these medicines        Dose / Directions    albuterol 108 (90 BASE) MCG/ACT Inhaler   Commonly known as:  PROAIR HFA/PROVENTIL HFA/VENTOLIN HFA   Used for:  Obstructive lung disease (H)        Dose:  2 puff   Inhale 2 puffs into the lungs every 4 hours as needed for shortness of breath / dyspnea or wheezing   Quantity:  1 Inhaler   Refills:  3       amLODIPine 10 MG tablet   Commonly known as:  NORVASC        Dose:  10 mg   Take 10 mg by mouth daily   Refills:  0       aspirin EC 81 MG EC tablet        Dose:  81 mg   Take 81 mg by mouth daily   Refills:  0       budesonide-formoterol 80-4.5 MCG/ACT Inhaler   Commonly known as:  SYMBICORT   Used for:  Chronic airway obstruction, not elsewhere classified        Dose:  2 puff   Inhale 2 puffs into the lungs 2 times daily   Quantity:  3 Inhaler   Refills:  3       calcium acetate 667 MG Caps capsule   Commonly known as:  PHOSLO        Dose:  667 mg   Take 667 mg by mouth 3 times daily (with meals)   Refills:  0       Calcium Carb-Cholecalciferol 600-1000 MG-UNIT Tabs        Dose:  1 tablet   1 tablet daily   Refills:  0       carvedilol 3.125 MG tablet   Commonly known as:  COREG        Dose:  3.125 mg   Take 3.125 mg by mouth 2 times daily (with meals)   Refills:  0       cetirizine 5 MG Chew   Commonly known as:  zyrTEC        Dose:  5 mg   Take 5 mg by mouth as needed   Refills:  0       cholecalciferol 1000 UNIT tablet   Commonly known as:  vitamin D        Dose:  1000 Units   Take 1,000 Units by mouth daily   Refills:  0       clonazePAM 0.5 MG tablet   Commonly known as:  klonoPIN   Used for:  CKD (chronic kidney disease) stage 4, GFR 15-29 ml/min (H)        Dose:  0.25  mg   Take 0.5 tablets (0.25 mg) by mouth 2 times daily as needed for anxiety   Quantity:  90 tablet   Refills:  1       furosemide 20 MG tablet   Commonly known as:  LASIX        Dose:  20 mg   20 mg daily   Refills:  0       GLUCAGEN 1 MG Solr injection   Generic drug:  glucagon        Dose:  1 mg   1 mg   Refills:  0       insulin aspart 100 UNITS/ML injection   Commonly known as:  NovoLOG        Refills:  0       lisinopril 20 MG tablet   Commonly known as:  PRINIVIL/ZESTRIL        Dose:  10 mg   Take 10 mg by mouth daily   Refills:  0       pravastatin 40 MG tablet   Commonly known as:  PRAVACHOL        Dose:  40 mg   40 mg daily   Refills:  0       TYLENOL PO        Take by mouth every 8 hours as needed for mild pain or fever   Refills:  0         CONTINUE these medicines which have NOT CHANGED        Dose / Directions    ONE TOUCH ULTRA test strip   Generic drug:  blood glucose monitoring        Refills:  0       ONETOUCH DELICA LANCETS 33G Misc        Refills:  0                Protect others around you: Learn how to safely use, store and throw away your medicines at www.disposemymeds.org.         Follow-ups after your visit        Your next 10 appointments already scheduled     Jul 11, 2017  9:20 AM CDT   (Arrive by 9:05 AM)   CT CHEST W/O CONTRAST with UCCT2   [facility] ([facility])    [address] [address] [phone]           Please bring any scans or X-rays taken at other hospitals, if similar tests were done. Also bring a list of your medicines, including vitamins, minerals and over-the-counter drugs. It is safest to leave personal items at home.  Be sure to tell your doctor:   If you have any allergies.   If there s any chance you are pregnant.   If you are breastfeeding.   If you have any special needs.  You do not need to do anything special to prepare.  Please wear loose clothing, such as a sweat suit or jogging  clothes. Avoid snaps, zippers and other metal. We may ask you to undress and put on a hospital gown.            Jul 11, 2017 10:30 AM CDT   (Arrive by 10:15 AM)   Return Visit with John Plaza MD   [facility] ([facility])    [address] [facility]  [address] [facility] [id number] [phone]               Care Instructions        Further instructions from your care team       [facility],   Interventional Radiology Discharge Instructions Following  Lung Biopsy        AFTER YOU GO HOME:  ? Resume previous diet and medications  ? Limit strenuous physical activity such as lifting, straining, or exercising for 48 hours.  You may resume normal activity in 24 hours  ? Change gauze at the puncture site as needed to keep site dry.  Leaking of additional fluid is not uncommon during the first 24-48 hours    CALL THE PHYSICIAN:  ? If you develop a fever, shortness of breath, chest pain, cough up blood, excessive bleeding from the biopsy site, severe lightheadedness, or fainting call you doctor immediately or come to the closest Emergency Department.  Do not drive yourself.   ? If you have questions or concerns regarding your procedure call the radiologist.      ADDITIONAL INSTRUCTIONS: none      [facility] INTERVENTIONAL RADIOLOGY DEPARTMENT  Procedure Physician: Dr. King                 Date of procedure: April 11, 2017  Telephone Numbers: [phone]       Monday-Friday 8:00 am to 4:30 pm                                                          [phone]       After 4:30 pm Monday - Friday,  Ask for the Interventional Radiologist on call.  Someone is on call 24 hrs/day  [facility] toll free number: [phone]    Monday-Friday 8:00 am to 4:30 pm  [facility] Emergency Dept: [phone]  _             Additional Information About Your Visit        [email or web address] Information     The [employer] gives you secure access to your electronic health record. If you see a primary  care provider, you can also send messages to your care team and make appointments. If you have questions, please call your primary care clinic.  If you do not have a primary care provider, please call [phone] and they will assist you.        Care EveryWhere ID     This is your Care EveryWhere ID. This could be used by other organizations to access your [facility] medical records  [id number]        Your Vitals Were     Blood Pressure Pulse Temperature Respirations Pulse Oximetry       166/49 (BP Location: Right arm) 62 97.8  F (36.6  C) (Oral) 18 97%        Primary Care Provider Office Phone # Fax #    Nory Elliott [phone]      Thank you!     Thank you for choosing [facility] for your care. Our goal is always to provide you with excellent care. Hearing back from our patients is one way we can continue to improve our services. Please take a few minutes to complete the written survey that you may receive in the mail after you visit with us. Thank you!             Medication List: This is a list of all your medications and when to take them. Check marks below indicate your daily home schedule. Keep this list as a reference.      Medications           Morning Afternoon Evening Bedtime As Needed    albuterol 108 (90 BASE) MCG/ACT Inhaler   Commonly known as:  PROAIR HFA/PROVENTIL HFA/VENTOLIN HFA   Inhale 2 puffs into the lungs every 4 hours as needed for shortness of breath / dyspnea or wheezing                                amLODIPine 10 MG tablet   Commonly known as:  NORVASC   Take 10 mg by mouth daily                                aspirin EC 81 MG EC tablet   Take 81 mg by mouth daily                                budesonide-formoterol 80-4.5 MCG/ACT Inhaler   Commonly known as:  SYMBICORT   Inhale 2 puffs into the lungs 2 times daily                                calcium acetate 667 MG Caps capsule   Commonly known as:  PHOSLO   Take 667 mg by mouth 3 times daily (with meals)                                 Calcium Carb-Cholecalciferol 600-1000 MG-UNIT Tabs   1 tablet daily                                carvedilol 3.125 MG tablet   Commonly known as:  COREG   Take 3.125 mg by mouth 2 times daily (with meals)                                cetirizine 5 MG Chew   Commonly known as:  zyrTEC   Take 5 mg by mouth as needed                                cholecalciferol 1000 UNIT tablet   Commonly known as:  vitamin D   Take 1,000 Units by mouth daily                                clonazePAM 0.5 MG tablet   Commonly known as:  klonoPIN   Take 0.5 tablets (0.25 mg) by mouth 2 times daily as needed for anxiety                                furosemide 20 MG tablet   Commonly known as:  LASIX   20 mg daily                                GLUCAGEN 1 MG Solr injection   1 mg   Generic drug:  glucagon                                insulin aspart 100 UNITS/ML injection   Commonly known as:  NovoLOG                                lisinopril 20 MG tablet   Commonly known as:  PRINIVIL/ZESTRIL   Take 10 mg by mouth daily                                ONE TOUCH ULTRA test strip   Generic drug:  blood glucose monitoring                                ONETOUCH DELICA LANCETS 33G Misc                                pravastatin 40 MG tablet   Commonly known as:  PRAVACHOL   40 mg daily                                TYLENOL PO   Take by mouth every 8 hours as needed for mild pain or fever

## 2017-04-11 NOTE — PROGRESS NOTES
Interventional Radiology Pre-Procedure Sedation Assessment   Time of Assessment: 11:36 AM    Expected Level: Moderate Sedation    Indication: Sedation is required for the following type of Procedure: Biopsy    Sedation and procedural consent: Risks, benefits and alternatives were discussed with Patient    PO Intake: Appropriately NPO for procedure    ASA Class: Class 2 - MILD SYSTEMIC DISEASE, NO ACUTE PROBLEMS, NO FUNCTIONAL LIMITATIONS.    Mallampati: Grade 2:  Soft palate, base of uvula, tonsillar pillars, and portion of posterior pharyngeal wall visible    Lungs: Lungs Clear with good breath sounds bilaterally    Heart: Normal heart sounds and rate    History and physical reviewed and no updates needed. I have reviewed the lab findings, diagnostic data, medications, and the plan for sedation. I have determined this patient to be an appropriate candidate for the planned sedation and procedure and have reassessed the patient IMMEDIATELY PRIOR to sedation and procedure.    Sebastián Nielson MD

## 2017-04-11 NOTE — PROGRESS NOTES
1245 Pt arrived on 2a post lung biopsy. BP remains elevated, OVSS Ra. Dressing c/d/i. Pt exp mild pain under right breast. Glucose check 225. Family at BS. Pt sipping on pop at this time. 1400 Pt to cxr at this time acc by staff. 1415 Cxr ready by Dr. King, rec order for pt to dc at 1500. 1450 Pt alberto po intake. Pt up amb in [place], alberto well. PIV dc'd. Dc instructions reviewed with pt, copy given to pt. Tylenol given for mild pain. 1510 Pt dc'd home acc by .

## 2017-04-11 NOTE — IP AVS SNAPSHOT
[address] [address]                                       After Visit Summary   4/11/2017    Yue Valenzuela    MRN: [id number]           After Visit Summary Signature Page     I have received my discharge instructions, and my questions have been answered. I have discussed any challenges I see with this plan with the nurse or doctor.    ..........................................................................................................................................  Patient/Patient Representative Signature      ..........................................................................................................................................  Patient Representative Print Name and Relationship to Patient    ..................................................               ................................................  Date                                            Time    ..........................................................................................................................................  Reviewed by Signature/Title    ...................................................              ..............................................  Date                                                            Time

## 2017-04-11 NOTE — DISCHARGE INSTRUCTIONS
[facility],   Interventional Radiology Discharge Instructions Following  Lung Biopsy        AFTER YOU GO HOME:  ? Resume previous diet and medications  ? Limit strenuous physical activity such as lifting, straining, or exercising for 48 hours.  You may resume normal activity in 24 hours  ? Change gauze at the puncture site as needed to keep site dry.  Leaking of additional fluid is not uncommon during the first 24-48 hours    CALL THE PHYSICIAN:  ? If you develop a fever, shortness of breath, chest pain, cough up blood, excessive bleeding from the biopsy site, severe lightheadedness, or fainting call you doctor immediately or come to the closest Emergency Department.  Do not drive yourself.   ? If you have questions or concerns regarding your procedure call the radiologist.      ADDITIONAL INSTRUCTIONS: none      [facility] INTERVENTIONAL RADIOLOGY DEPARTMENT  Procedure Physician: Dr. King                 Date of procedure: April 11, 2017  Telephone Numbers: [phone]       Monday-Friday 8:00 am to 4:30 pm                                                          [phone]       After 4:30 pm Monday - Friday,  Ask for the Interventional Radiologist on call.  Someone is on call 24 hrs/day  [facility] toll free number: [phone]    Monday-Friday 8:00 am to 4:30 pm  [facility] Emergency Dept: [phone]  _

## 2017-04-11 NOTE — PROGRESS NOTES
1030 Pt on 2a prepped and ready for lung biopsy. PIV placed. Labs done prior to 2a arrival. H&P current. Family at BS. Glucose 253. Pt will turn off insulin pump for procedure. Pt ready for consent.

## 2017-04-12 LAB — INTERPRETATION ECG - MUSE: NORMAL

## 2017-04-13 LAB — COPATH REPORT: NORMAL

## 2017-04-14 ENCOUNTER — TELEPHONE (OUTPATIENT)
Dept: OTHER | Facility: CLINIC | Age: 72
End: 2017-04-14

## 2017-04-14 ENCOUNTER — TELEPHONE (OUTPATIENT)
Dept: SURGERY | Facility: CLINIC | Age: 72
End: 2017-04-14

## 2017-04-14 DIAGNOSIS — C34.90 MALIGNANT NEOPLASM OF LUNG, UNSPECIFIED LATERALITY, UNSPECIFIED PART OF LUNG (H): Primary | ICD-10-CM

## 2017-04-14 NOTE — TELEPHONE ENCOUNTER
Called pt in regards to bx results.   Called  and updated him.     Plan:   --> PET scan  --> MRI brain  --> Likely will need EBUS and lavage    John Plaza MD  Pulmonary and Critical Care  [facility]  Pager:  [phone]

## 2017-04-14 NOTE — TELEPHONE ENCOUNTER
"I spoke to patient and her  about scheduled PET and brain MRI next Thursday 4/20.   I reviewed prep including having her insulin pump on \"baseline setting\" and NPO x 6 hours.   She isn't sure what \"baseline setting\" means.   In addition, she expressed anxiety over being in a tube for long.   She has Klonopin 0.5 mg tablets at home to use PRN.   I explained that she can take 1 or 2 tablets 30-45 minutes prior to her scan and may repeat it as needed later in morning to get through both PET and brain MRI.   She asked about the tube she would be in.   I referred her to the PET department and told her to discuss with a tech because I hadn't had these image studies and wasn't aware of the equipment/what to expect.  I will try calling her endocrinologist at [facility] (Dr. Sprague) [phone];     ADDENDUM:  I left VM at Dr. Sprague's office and asked that someone call me and/or patient to explain baseline setting for insulin pump.  In addition, I suggested that patient ask the PET tech for more details about this setting, as well.       Patient asked if she could talk to Dr. Plaza again, \"now that I have had time to let this sink in, I have more questions for him\".   I will send message to Dr. Plaza and ask him to call her when able.  "

## 2017-04-14 NOTE — TELEPHONE ENCOUNTER
Called patient to answer additional questions.     Hx of anxiety with mri and has anti-anxiety medication that she will take to the examination.     Would like clarification on use of dye for MRI.     I will contact Dr. Sim () - I contacted Dr. Sim and he would prefer NO gadalinium and if needed he would prefer CT Head with contrast over MRI with gadalinium.     I spoke with radiology who would recommend MRI W/O fany.     Updated patient.     John Plaza MD  Pulmonary and Critical Care  [facility]  Pager:  [phone]

## 2017-04-16 ENCOUNTER — HOSPITAL ENCOUNTER (INPATIENT)
Facility: CLINIC | Age: 72
LOS: 5 days | Discharge: HOME | DRG: 200 | End: 2017-04-21
Attending: INTERNAL MEDICINE | Admitting: INTERNAL MEDICINE
Payer: MEDICARE

## 2017-04-16 ENCOUNTER — TELEPHONE (OUTPATIENT)
Dept: NURSING | Facility: CLINIC | Age: 72
End: 2017-04-16

## 2017-04-16 DIAGNOSIS — C34.90 MALIGNANT NEOPLASM OF LUNG, UNSPECIFIED LATERALITY, UNSPECIFIED PART OF LUNG (H): ICD-10-CM

## 2017-04-16 PROBLEM — J93.9 PNEUMOTHORAX: Status: ACTIVE | Noted: 2017-04-16

## 2017-04-16 LAB — GLUCOSE BLDC GLUCOMTR-MCNC: 98 MG/DL (ref 70–99)

## 2017-04-16 PROCEDURE — 25000128 H RX IP 250 OP 636: Performed by: PHYSICIAN ASSISTANT

## 2017-04-16 PROCEDURE — 80048 BASIC METABOLIC PNL TOTAL CA: CPT | Performed by: PHYSICIAN ASSISTANT

## 2017-04-16 PROCEDURE — 99207 ZZC APP CREDIT; MD BILLING SHARED VISIT: CPT | Performed by: PHYSICIAN ASSISTANT

## 2017-04-16 PROCEDURE — A9270 NON-COVERED ITEM OR SERVICE: HCPCS | Mod: GY | Performed by: PHYSICIAN ASSISTANT

## 2017-04-16 PROCEDURE — 00000146 ZZHCL STATISTIC GLUCOSE BY METER IP

## 2017-04-16 PROCEDURE — 36415 COLL VENOUS BLD VENIPUNCTURE: CPT | Performed by: PHYSICIAN ASSISTANT

## 2017-04-16 PROCEDURE — 85027 COMPLETE CBC AUTOMATED: CPT | Performed by: PHYSICIAN ASSISTANT

## 2017-04-16 PROCEDURE — 12000001 ZZH R&B MED SURG/OB UMMC

## 2017-04-16 PROCEDURE — 25000132 ZZH RX MED GY IP 250 OP 250 PS 637: Mod: GY | Performed by: PHYSICIAN ASSISTANT

## 2017-04-16 RX ORDER — NALOXONE HYDROCHLORIDE 0.4 MG/ML
.1-.4 INJECTION, SOLUTION INTRAMUSCULAR; INTRAVENOUS; SUBCUTANEOUS
Status: DISCONTINUED | OUTPATIENT
Start: 2017-04-16 | End: 2017-04-21 | Stop reason: HOSPADM

## 2017-04-16 RX ORDER — LIDOCAINE 40 MG/G
CREAM TOPICAL
Status: DISCONTINUED | OUTPATIENT
Start: 2017-04-16 | End: 2017-04-21 | Stop reason: HOSPADM

## 2017-04-16 RX ORDER — FUROSEMIDE 20 MG
20 TABLET ORAL DAILY
Status: DISCONTINUED | OUTPATIENT
Start: 2017-04-17 | End: 2017-04-21 | Stop reason: HOSPADM

## 2017-04-16 RX ORDER — LISINOPRIL 10 MG/1
10 TABLET ORAL DAILY
Status: DISCONTINUED | OUTPATIENT
Start: 2017-04-17 | End: 2017-04-16

## 2017-04-16 RX ORDER — NICOTINE POLACRILEX 4 MG
15-30 LOZENGE BUCCAL
Status: DISCONTINUED | OUTPATIENT
Start: 2017-04-16 | End: 2017-04-21 | Stop reason: HOSPADM

## 2017-04-16 RX ORDER — CARVEDILOL 3.12 MG/1
3.12 TABLET ORAL 2 TIMES DAILY WITH MEALS
Status: DISCONTINUED | OUTPATIENT
Start: 2017-04-17 | End: 2017-04-21 | Stop reason: HOSPADM

## 2017-04-16 RX ORDER — ASPIRIN 81 MG/1
81 TABLET ORAL EVERY EVENING
Status: DISCONTINUED | OUTPATIENT
Start: 2017-04-17 | End: 2017-04-21 | Stop reason: HOSPADM

## 2017-04-16 RX ORDER — ALBUTEROL SULFATE 90 UG/1
2 AEROSOL, METERED RESPIRATORY (INHALATION) EVERY 4 HOURS PRN
Status: DISCONTINUED | OUTPATIENT
Start: 2017-04-16 | End: 2017-04-21 | Stop reason: HOSPADM

## 2017-04-16 RX ORDER — ACETAMINOPHEN 325 MG/1
650 TABLET ORAL EVERY 6 HOURS PRN
Status: DISCONTINUED | OUTPATIENT
Start: 2017-04-16 | End: 2017-04-21 | Stop reason: HOSPADM

## 2017-04-16 RX ORDER — PRAVASTATIN SODIUM 10 MG
40 TABLET ORAL DAILY
Status: DISCONTINUED | OUTPATIENT
Start: 2017-04-17 | End: 2017-04-21 | Stop reason: HOSPADM

## 2017-04-16 RX ORDER — DEXTROSE MONOHYDRATE 25 G/50ML
25-50 INJECTION, SOLUTION INTRAVENOUS
Status: DISCONTINUED | OUTPATIENT
Start: 2017-04-16 | End: 2017-04-21 | Stop reason: HOSPADM

## 2017-04-16 RX ORDER — CLONAZEPAM 0.5 MG/1
0.25 TABLET ORAL 2 TIMES DAILY PRN
Status: DISCONTINUED | OUTPATIENT
Start: 2017-04-16 | End: 2017-04-21 | Stop reason: HOSPADM

## 2017-04-16 RX ORDER — HYDROMORPHONE HCL/0.9% NACL/PF 0.2MG/0.2
0.2 SYRINGE (ML) INTRAVENOUS
Status: DISCONTINUED | OUTPATIENT
Start: 2017-04-16 | End: 2017-04-21 | Stop reason: HOSPADM

## 2017-04-16 RX ORDER — AMLODIPINE BESYLATE 10 MG/1
10 TABLET ORAL DAILY
Status: DISCONTINUED | OUTPATIENT
Start: 2017-04-17 | End: 2017-04-21 | Stop reason: HOSPADM

## 2017-04-16 RX ADMIN — ACETAMINOPHEN 650 MG: 325 TABLET, FILM COATED ORAL at 23:54

## 2017-04-16 RX ADMIN — HYDROMORPHONE HYDROCHLORIDE 0.2 MG: 10 INJECTION, SOLUTION INTRAMUSCULAR; INTRAVENOUS; SUBCUTANEOUS at 22:40

## 2017-04-16 NOTE — LETTER
Transition Communication Hand-off for Care Transitions to Next Level of Care Provider    Name: Yue Valenzuela  MRN #: [id number]  Primary Care Provider: DIVYA RUSSELL     Primary Clinic: [facility] [address]  [facility] [address]     Reason for Hospitalization:  pneumothorax  Pneumothorax  Admit Date/Time: 4/16/2017  9:23 PM  Discharge Date: 4/21/2017   Payor Source: Payor: MEDICARE / Plan: MEDICARE / Product Type: Medicare /          Reason for Communication Hand-off Referral: Fragility  Multiple providers/specialties    Discharge Plan:  Per Pulm:  1. Iatrogenic Right Pneumothorax: Secondary to IR biopsy. Tube now removed without evidence or symptoms of PTX.   -will go home today   2. RLL adenocarcinoma: PET and MRI done without evidence of metastatic disease. Talked about in lung nodule conference this morning and everyone agreed that should get seen by thoracic surgery as an outpatient next Thursday, then discuss mediastinoscopy and R lower lobectomy. Will not plan on EBUS for staging as all negative and would prefer to get all procedures/surgery done in 1 setting.   -Contacted thoracic surgery nurses who will follow-up with patient to get appt next Thursday scheduled  -SHE SHOULD NOT GET PFT'S given her recent pneumothorax, told patient and the nurses doing scheduling    Pt to follow up with PCP this week, nephrology for BP control, and surgery will maicol to schedule her follow up.   Discharge Needs Assessment:  Needs       Most Recent Value    Equipment Currently Used at Home none    Transportation Available car [pt drives and has support of  ]        Follow-up plan:  Future Appointments  Date Time Provider Department Center   7/11/2017 9:20 AM UCCT2 [facility]   7/11/2017 10:30 AM John Plaza MD [facility]       Juana Castellon, RNCC, BSN    [facility]  [address] [place] [address]    [email or web address]   [email or web address]    Office: [phone] Pager: [phone]       AVS/Discharge Summary is the source of truth; this is a helpful guide for improved communication of patient story

## 2017-04-16 NOTE — IP AVS SNAPSHOT
[address] [address]    Phone:  [phone]                                       After Visit Summary   4/16/2017    Yue Valenzuela    MRN: [id number]           After Visit Summary Signature Page     I have received my discharge instructions, and my questions have been answered. I have discussed any challenges I see with this plan with the nurse or doctor.    ..........................................................................................................................................  Patient/Patient Representative Signature      ..........................................................................................................................................  Patient Representative Print Name and Relationship to Patient    ..................................................               ................................................  Date                                            Time    ..........................................................................................................................................  Reviewed by Signature/Title    ...................................................              ..............................................  Date                                                            Time

## 2017-04-16 NOTE — IP AVS SNAPSHOT
MRN:[id number]                      After Visit Summary   4/16/2017    Yue Valenzuela    MRN: [id number]           Thank you!     Thank you for choosing [facility] for your care. Our goal is always to provide you with excellent care. Hearing back from our patients is one way we can continue to improve our services. Please take a few minutes to complete the written survey that you may receive in the mail after you visit with us. Thank you!        Patient Information     Date Of Birth          1945        Designated Caregiver       Most Recent Value    Caregiver    Will someone help with your care after discharge? no      About your hospital stay     You were admitted on:  April 16, 2017 You last received care in the:  [address] [facility]    You were discharged on:  April 21, 2017        Reason for your hospital stay       You were hospitalized for a right pneumothorax                  Who to Call     For medical emergencies, please call 911.  For non-urgent questions about your medical care, please call your primary care provider or clinic, [phone]          Attending Provider     Provider Specialty    Eloise Huerta MD Internal Medicine    Tayo Jade MD Internal Medicine    Cruz Baer MD Internal Medicine       Primary Care Provider Office Phone # Fax #    Nory J Heck [phone]       [facility] [address] [facility] [address]         When to contact your care team       Call your primary doctor if you have any of the following: temperature greater than 101 or less than 95, shortness of breath, difficulty breathing, chest pain.                  After Care Instructions     Activity       Your activity upon discharge: activity as tolerated            Diet       Follow this diet upon discharge: Regular diet                  Follow-up Appointments     Adult [facility] Follow-up and recommended labs and tests       Follow  up with primary care provider, DIVYA RUSSELL, within 7 days for hospital follow- up.  The following labs/tests are recommended: BMP, CBC.  Follow up with Thoracic Surgery in clinic next week, they will call to schedule this appointment.  Follow up with Nephrology for continued blood pressure management.      Appointments on [place] and/or [facility] (with [facility] or [facility] provider or service). Call [phone] if you haven't heard regarding these appointments within 7 days of discharge.                  Your next 10 appointments already scheduled     Jul 11, 2017  9:20 AM CDT   (Arrive by 9:05 AM)   CT CHEST W/O CONTRAST with UC[facility]2   [facility] ([facility] [place])    [address] [place] [id number] [phone]           Please bring any scans or X-rays taken at other hospitals, if similar tests were done. Also bring a list of your medicines, including vitamins, minerals and over-the-counter drugs. It is safest to leave personal items at home.  Be sure to tell your doctor:   If you have any allergies.   If there s any chance you are pregnant.   If you are breastfeeding.   If you have any special needs.  You do not need to do anything special to prepare.  Please wear loose clothing, such as a sweat suit or jogging clothes. Avoid snaps, zippers and other metal. We may ask you to undress and put on a hospital gown.            Jul 11, 2017 10:30 AM CDT   (Arrive by 10:15 AM)   Return Visit with John Plaza MD   [facility] ([facility])    [address] [place] [address] [phone]              Pending Results     No orders found from 4/14/2017 to 4/17/2017.            Statement of Approval     Ordered          04/21/17 0942  I have reviewed and agree with all the recommendations and orders detailed in this document.  EFFECTIVE NOW     Approved and electronically signed  by:  Antonietta Cortez PA-C             Admission Information     Date & Time Provider Department Dept. Phone    4/16/2017 Cruz Baer MD [address] [facility] [phone]      Your Vitals Were     Blood Pressure Pulse Temperature Respirations Weight Pulse Oximetry    170/52 71 97.6  F (36.4  C) (Oral) 17 62.7 kg (138 lb 3.2 oz) 96%    BMI (Body Mass Index)                   23.71 kg/m2           [facility] Information     Walk-in gives you secure access to your electronic health record. If you see a primary care provider, you can also send messages to your care team and make appointments. If you have questions, please call your primary care clinic.  If you do not have a primary care provider, please call [phone] and they will assist you.        Care EveryWhere ID     This is your Care EveryWhere ID. This could be used by other organizations to access your [place] medical records  [id number]           Review of your medicines      CONTINUE these medicines which may have CHANGED, or have new prescriptions. If we are uncertain of the size of tablets/capsules you have at home, strength may be listed as something that might have changed.        Dose / Directions    budesonide-formoterol 80-4.5 MCG/ACT Inhaler   Commonly known as:  SYMBICORT   This may have changed:    - when to take this  - reasons to take this   Used for:  Chronic airway obstruction, not elsewhere classified        Dose:  2 puff   Inhale 2 puffs into the lungs 2 times daily   Quantity:  3 Inhaler   Refills:  3         CONTINUE these medicines which have NOT CHANGED        Dose / Directions    albuterol 108 (90 BASE) MCG/ACT Inhaler   Commonly known as:  PROAIR HFA/PROVENTIL HFA/VENTOLIN HFA   Used for:  Obstructive lung disease (H)        Dose:  2 puff   Inhale 2 puffs into the lungs every 4 hours as needed for shortness of breath / dyspnea or wheezing   Quantity:  1 Inhaler   Refills:  3       amLODIPine 10 MG tablet   Commonly known  as:  NORVASC        Dose:  10 mg   Take 10 mg by mouth daily   Refills:  0       aspirin EC 81 MG EC tablet        Dose:  81 mg   Take 81 mg by mouth daily   Refills:  0       calcium acetate 667 MG Caps capsule   Commonly known as:  PHOSLO        Dose:  667 mg   Take 667 mg by mouth 3 times daily (with meals)   Refills:  0       calcium carbonate 600 MG tablet   Commonly known as:  OS-ZAHEER 600 mg [place]. Ca        Dose:  1 tablet   Take 1 tablet by mouth daily   Refills:  0       carvedilol 3.125 MG tablet   Commonly known as:  COREG        Dose:  3.125 mg   Take 3.125 mg by mouth 2 times daily (with meals)   Refills:  0       cetirizine 5 MG Chew   Commonly known as:  zyrTEC        Dose:  5 mg   Take 5 mg by mouth as needed   Refills:  0       cholecalciferol 1000 UNIT tablet   Commonly known as:  vitamin D        Dose:  1000 Units   Take 1,000 Units by mouth daily   Refills:  0       clonazePAM 0.5 MG tablet   Commonly known as:  klonoPIN   Used for:  CKD (chronic kidney disease) stage 4, GFR 15-29 ml/min (H)        Dose:  0.25 mg   Take 0.5 tablets (0.25 mg) by mouth 2 times daily as needed for anxiety   Quantity:  90 tablet   Refills:  1       furosemide 20 MG tablet   Commonly known as:  LASIX        Dose:  20 mg   20 mg daily   Refills:  0       GLUCAGEN 1 MG Solr injection   Generic drug:  glucagon        Dose:  1 mg   1 mg   Refills:  0       insulin aspart 100 UNITS/ML injection   Commonly known as:  NovoLOG        Refills:  0       lisinopril 20 MG tablet   Commonly known as:  PRINIVIL/ZESTRIL        Dose:  10 mg   Take 10 mg by mouth daily   Refills:  0       ONE TOUCH ULTRA test strip   Generic drug:  blood glucose monitoring        Refills:  0       ONETOUCH DELICA LANCETS 33G Misc        Refills:  0       pravastatin 40 MG tablet   Commonly known as:  PRAVACHOL        Dose:  40 mg   40 mg daily   Refills:  0       TYLENOL PO        Take by mouth every 8 hours as needed for mild pain or fever   Refills:   0                Protect others around you: Learn how to safely use, store and throw away your medicines at www.disposemymeds.org.             Medication List: This is a list of all your medications and when to take them. Check marks below indicate your daily home schedule. Keep this list as a reference.      Medications           Morning Afternoon Evening Bedtime As Needed    albuterol 108 (90 BASE) MCG/ACT Inhaler   Commonly known as:  PROAIR HFA/PROVENTIL HFA/VENTOLIN HFA   Inhale 2 puffs into the lungs every 4 hours as needed for shortness of breath / dyspnea or wheezing                                amLODIPine 10 MG tablet   Commonly known as:  NORVASC   Take 10 mg by mouth daily   Last time this was given:  10 mg on 4/20/2017  9:51 PM                                aspirin EC 81 MG EC tablet   Take 81 mg by mouth daily   Last time this was given:  81 mg on 4/20/2017  9:51 PM                                budesonide-formoterol 80-4.5 MCG/ACT Inhaler   Commonly known as:  SYMBICORT   Inhale 2 puffs into the lungs 2 times daily                                calcium acetate 667 MG Caps capsule   Commonly known as:  PHOSLO   Take 667 mg by mouth 3 times daily (with meals)   Last time this was given:  667 mg on 4/21/2017  7:56 AM                                calcium carbonate 600 MG tablet   Commonly known as:  OS-ZAHEER 600 mg [place]. Ca   Take 1 tablet by mouth daily   Last time this was given:  1,500 mg on 4/20/2017  9:17 AM                                carvedilol 3.125 MG tablet   Commonly known as:  COREG   Take 3.125 mg by mouth 2 times daily (with meals)   Last time this was given:  3.125 mg on 4/21/2017  7:59 AM                                cetirizine 5 MG Chew   Commonly known as:  zyrTEC   Take 5 mg by mouth as needed                                cholecalciferol 1000 UNIT tablet   Commonly known as:  vitamin D   Take 1,000 Units by mouth daily   Last time this was given:  1,000 Units on 4/21/2017   7:57 AM                                clonazePAM 0.5 MG tablet   Commonly known as:  klonoPIN   Take 0.5 tablets (0.25 mg) by mouth 2 times daily as needed for anxiety                                furosemide 20 MG tablet   Commonly known as:  LASIX   20 mg daily                                GLUCAGEN 1 MG Solr injection   1 mg   Generic drug:  glucagon                                insulin aspart 100 UNITS/ML injection   Commonly known as:  NovoLOG                                lisinopril 20 MG tablet   Commonly known as:  PRINIVIL/ZESTRIL   Take 10 mg by mouth daily   Last time this was given:  10 mg on 4/20/2017  9:51 PM                                ONE TOUCH ULTRA test strip   Generic drug:  blood glucose monitoring                                ONEUCH DELICA LANCETS 33G Misc                                pravastatin 40 MG tablet   Commonly known as:  PRAVACHOL   40 mg daily   Last time this was given:  40 mg on 4/20/2017  9:51 PM                                TYLENOL PO   Take by mouth every 8 hours as needed for mild pain or fever   Last time this was given:  650 mg on 4/19/2017 11:12 AM

## 2017-04-17 ENCOUNTER — APPOINTMENT (OUTPATIENT)
Dept: GENERAL RADIOLOGY | Facility: CLINIC | Age: 72
DRG: 200 | End: 2017-04-17
Attending: PHYSICIAN ASSISTANT
Payer: MEDICARE

## 2017-04-17 LAB
ANION GAP SERPL CALCULATED.3IONS-SCNC: 9 MMOL/L (ref 3–14)
BUN SERPL-MCNC: 60 MG/DL (ref 7–30)
CALCIUM SERPL-MCNC: 9.2 MG/DL (ref 8.5–10.1)
CHLORIDE SERPL-SCNC: 106 MMOL/L (ref 94–109)
CO2 SERPL-SCNC: 20 MMOL/L (ref 20–32)
CREAT SERPL-MCNC: 2.36 MG/DL (ref 0.52–1.04)
GFR SERPL CREATININE-BSD FRML MDRD: 20 ML/MIN/1.7M2
GLUCOSE BLDC GLUCOMTR-MCNC: 128 MG/DL (ref 70–99)
GLUCOSE BLDC GLUCOMTR-MCNC: 186 MG/DL (ref 70–99)
GLUCOSE BLDC GLUCOMTR-MCNC: 235 MG/DL (ref 70–99)
GLUCOSE BLDC GLUCOMTR-MCNC: 252 MG/DL (ref 70–99)
GLUCOSE SERPL-MCNC: 264 MG/DL (ref 70–99)
POTASSIUM SERPL-SCNC: 4.9 MMOL/L (ref 3.4–5.3)
POTASSIUM SERPL-SCNC: 5.3 MMOL/L (ref 3.4–5.3)
POTASSIUM SERPL-SCNC: 5.7 MMOL/L (ref 3.4–5.3)
RADIOLOGIST FLAGS: ABNORMAL
SODIUM SERPL-SCNC: 135 MMOL/L (ref 133–144)

## 2017-04-17 PROCEDURE — 80048 BASIC METABOLIC PNL TOTAL CA: CPT | Performed by: PHYSICIAN ASSISTANT

## 2017-04-17 PROCEDURE — 99223 1ST HOSP IP/OBS HIGH 75: CPT | Mod: AI | Performed by: INTERNAL MEDICINE

## 2017-04-17 PROCEDURE — 25000132 ZZH RX MED GY IP 250 OP 250 PS 637: Mod: GY | Performed by: PHYSICIAN ASSISTANT

## 2017-04-17 PROCEDURE — 25000132 ZZH RX MED GY IP 250 OP 250 PS 637: Mod: GY | Performed by: NURSE PRACTITIONER

## 2017-04-17 PROCEDURE — 12000001 ZZH R&B MED SURG/OB UMMC

## 2017-04-17 PROCEDURE — A9270 NON-COVERED ITEM OR SERVICE: HCPCS | Mod: GY | Performed by: INTERNAL MEDICINE

## 2017-04-17 PROCEDURE — 25000132 ZZH RX MED GY IP 250 OP 250 PS 637: Mod: GY | Performed by: INTERNAL MEDICINE

## 2017-04-17 PROCEDURE — 84132 ASSAY OF SERUM POTASSIUM: CPT | Performed by: NURSE PRACTITIONER

## 2017-04-17 PROCEDURE — 25000128 H RX IP 250 OP 636: Performed by: INTERNAL MEDICINE

## 2017-04-17 PROCEDURE — 36415 COLL VENOUS BLD VENIPUNCTURE: CPT | Performed by: PHYSICIAN ASSISTANT

## 2017-04-17 PROCEDURE — 00000146 ZZHCL STATISTIC GLUCOSE BY METER IP

## 2017-04-17 PROCEDURE — 36415 COLL VENOUS BLD VENIPUNCTURE: CPT | Performed by: NURSE PRACTITIONER

## 2017-04-17 PROCEDURE — A9270 NON-COVERED ITEM OR SERVICE: HCPCS | Mod: GY | Performed by: PHYSICIAN ASSISTANT

## 2017-04-17 PROCEDURE — 99232 SBSQ HOSP IP/OBS MODERATE 35: CPT | Performed by: INTERNAL MEDICINE

## 2017-04-17 PROCEDURE — 25000128 H RX IP 250 OP 636: Performed by: PHYSICIAN ASSISTANT

## 2017-04-17 PROCEDURE — A9270 NON-COVERED ITEM OR SERVICE: HCPCS | Mod: GY | Performed by: NURSE PRACTITIONER

## 2017-04-17 PROCEDURE — 71020 XR CHEST 2 VW: CPT

## 2017-04-17 PROCEDURE — 99207 ZZC APP CREDIT; MD BILLING SHARED VISIT: CPT | Performed by: NURSE PRACTITIONER

## 2017-04-17 RX ORDER — SODIUM POLYSTYRENE SULFONATE 15 G/60ML
30 SUSPENSION ORAL; RECTAL ONCE
Status: COMPLETED | OUTPATIENT
Start: 2017-04-17 | End: 2017-04-17

## 2017-04-17 RX ORDER — HYDROMORPHONE HCL/0.9% NACL/PF 0.2MG/0.2
0.2 SYRINGE (ML) INTRAVENOUS ONCE
Status: DISCONTINUED | OUTPATIENT
Start: 2017-04-17 | End: 2017-04-17

## 2017-04-17 RX ORDER — IBUPROFEN 200 MG
1 CAPSULE ORAL DAILY
Status: DISCONTINUED | OUTPATIENT
Start: 2017-04-17 | End: 2017-04-17

## 2017-04-17 RX ADMIN — CALCIUM ACETATE 667 MG: 667 CAPSULE ORAL at 14:38

## 2017-04-17 RX ADMIN — ACETAMINOPHEN 650 MG: 325 TABLET, FILM COATED ORAL at 14:42

## 2017-04-17 RX ADMIN — HYDROMORPHONE HYDROCHLORIDE 0.2 MG: 10 INJECTION, SOLUTION INTRAMUSCULAR; INTRAVENOUS; SUBCUTANEOUS at 01:19

## 2017-04-17 RX ADMIN — SODIUM POLYSTYRENE SULFONATE 30 G: 15 SUSPENSION ORAL; RECTAL at 11:02

## 2017-04-17 RX ADMIN — CARVEDILOL 3.12 MG: 3.12 TABLET, FILM COATED ORAL at 11:01

## 2017-04-17 RX ADMIN — HYDROMORPHONE HYDROCHLORIDE 0.2 MG: 10 INJECTION, SOLUTION INTRAMUSCULAR; INTRAVENOUS; SUBCUTANEOUS at 00:17

## 2017-04-17 RX ADMIN — VITAMIN D, TAB 1000IU (100/BT) 1000 UNITS: 25 TAB at 16:10

## 2017-04-17 RX ADMIN — HYDROMORPHONE HYDROCHLORIDE 0.2 MG: 10 INJECTION, SOLUTION INTRAMUSCULAR; INTRAVENOUS; SUBCUTANEOUS at 08:23

## 2017-04-17 RX ADMIN — CARVEDILOL 3.12 MG: 3.12 TABLET, FILM COATED ORAL at 18:59

## 2017-04-17 RX ADMIN — ACETAMINOPHEN 650 MG: 325 TABLET, FILM COATED ORAL at 20:04

## 2017-04-17 RX ADMIN — AMLODIPINE BESYLATE 10 MG: 10 TABLET ORAL at 22:00

## 2017-04-17 RX ADMIN — Medication 1000 UNITS: at 09:56

## 2017-04-17 RX ADMIN — Medication 1500 MG: at 18:59

## 2017-04-17 RX ADMIN — CALCIUM ACETATE 667 MG: 667 CAPSULE ORAL at 11:01

## 2017-04-17 RX ADMIN — CALCIUM ACETATE 667 MG: 667 CAPSULE ORAL at 18:59

## 2017-04-17 RX ADMIN — PRAVASTATIN SODIUM 40 MG: 10 TABLET ORAL at 22:00

## 2017-04-17 RX ADMIN — ASPIRIN 81 MG: 81 TABLET, COATED ORAL at 22:00

## 2017-04-17 ASSESSMENT — PAIN DESCRIPTION - DESCRIPTORS
DESCRIPTORS: SHARP
DESCRIPTORS: SHARP

## 2017-04-17 NOTE — PROGRESS NOTES
SPIRITUAL HEALTH SERVICES  SPIRITUAL ASSESSMENT Progress Note  [facility]  I attempted to visit Pt but the pt was busy with medical staff     Andrew Del Rio   Intern  Pager [phone]

## 2017-04-17 NOTE — H&P
[facility] History and Physical  Department of Internal Medicine    Patient Name: Yue Valenzuela MRN# [id number]   Age: 71 year old YOB: 1945     Date of Admission: 4/16/2017    Primary care provider: Nory Elliott         Assessment and Plan:   Yue Valenzuela is a 71 year old woman with a history of DM I, CKD IV, factor V Leiden mutation, HTN, HLD, CAD s/p stent, PVD, carotid artery stenosis, asthma, and newly diagnosed lung adenocarcinoma who presented to outside ED w/ dyspnea and was found to have large right pneumothorax (likely iatrogenic following lung biospy on 4/11) now s/p chest tube placement. Transferred to [facility] for further management.     Right Pneumothorax, suspected Iatrogenic. Biopsy of right lower lobe lesion on 4/11. Dyspnea developed 4/14 w/ CXR today showing large right pneumothorax w/ essentially complete collapse of the right lung and mild leftward shift of the mediastinal contour. Chest tube placed and f/u CXR showed reexpansion of right lung w/ atelectasis at base and no visible residual pneumothorax. O2 sats 94% on 2L NC.   - Continue chest tube to suction overnight.   - F/u CXR ordered for AM.   - Pulmonology consult placed.   - PRN O2.   - Tylenol and/or low dose IV Dilaudid as needed for chest tube pain.      Lung Adenocarcinoma. Pt made aware of dx on 4/14. Per report has PET scan and brain MRI ordered for staging on 4/20. Continued OP f/u.      CKD IV. Cr 2.46 at outside ED, recent baseline appears 2.2-2.6. Was undergoing w/u for renal transplant however now on hold d/t new CA dx. Will need continued nephrology f/u; unclear discussions to date surrounding dialysis.     Hyperkalemia. K 5.2 at outside ED; was 5.0 on 4/5 and 5.5 on 1/6 as well as 5s in 9/2016. Likely d/t underlying late stage CKD. Also on ACE-I. Reports she follows a low K diet.   - Tele.   - Recheck K this evening, consider intervention if rising.   - Hold home lisinopril for now.     DM I. Recent  HgbA1c 6.9%. BG 98 this evening which is low for her. Has been experiencing more hypoglycemia as renal failure has progressed.   - Feels comfortable managing home insulin pump here. Will need close monitoring. If remaining in house may benefit from diabetic CNS consult.     HTN. /39.   - Continue home Coreg (dose due tonight), Norvasc, and Lasix 20 mg.   - Hold home lisinopril for now as above.     Asthma. Stable. Uses inhalers PRN.     CAD, PVD. Stable. Continue home ASA and statin.     CODE: full  DVT: mechanical   FEN: renal diet   Disposition/Admission Status: inpatient; anticipate > 2 midnight stay     Plan of care was discussed with attending physician, Dr. Huerta.     [place] ([facility] LUPE Munoz  Hospitalist Service  Pager: [phone]           Chief Complaint:   Pneumothorax          HPI:   History is obtained from the patient, , and medical record.     Patient presented to outside ED w/ shortness of breath since Friday 4/14. They had initially attributed this to anxiety but symptoms continued to worsen. In ED was found to have large right sided pneumothorax and chest tube was placed w/ improvement. Transferred to [facility] for further management as no bed was available in [facility]. Had lung biopsy here on 4/11 and is aware that path shows adenocarcinoma. The lung lesion had been incidentally found during w/u for kidney transplant and patient is now aware that she will not be able to move forward with this. Since having chest tube placed she feels less short of breath. She is having pain around the tube. No chest pain. No f/c. Feels comfortable managing her insulin pump here.          Review of Systems:   A 10 point ROS was performed and negative unless otherwise noted in HPI.          Past Medical History:   Reviewed and updated in Epic.   Past Medical History:   Diagnosis Date     Adenocarcinoma, lung (H)      Asthma      CAD (coronary artery disease)      Carotid artery stenosis      CKD  (chronic kidney disease) stage 4, GFR 15-29 ml/min (H)      Diabetes mellitus type 1 (H)      H/O factor V Leiden mutation      H/O heart artery stent     2006     Hypertension      Mixed hyperlipidemia (DYSLIPIDEMIA) 8/1/2014     Osteopenia      PVD (peripheral vascular disease) (H)      Thyroid nodule 04/12/2012    Hurtle Cells, non malignant            Past Surgical History:   Reviewed and updated in Epic.   Past Surgical History:   Procedure Laterality Date     ganglion cyst removal       hemithyroidectomy  4/13/2012    PATH report showed benign Hurtle Cell Adenoma     lipoma removal       SHOULDER SURGERY Left      TONSILLECTOMY              Social History:   Reviewed and updated in Epic.   Social History     Social History     Marital status:  w/ 2 adult children      Social History Main Topics     Smoking status: Former Smoker     Packs/day: 0.50     Years: 15.00     Types: Cigarettes     Start date: 1/1/1970     Quit date: 12/31/1985     Smokeless tobacco: Never Used     Alcohol use No     Drug use: No          Family History:   Reviewed and updated in Epic.   Family History   Problem Relation Age of Onset     Cancer - colorectal Father      DIABETES Father      Scleroderma Mother      CEREBROVASCULAR DISEASE Brother      DIABETES Brother      Hypertension Brother      DIABETES Brother      2nd brother            Allergies:      Allergies   Allergen Reactions     Codeine Other (See Comments)     Makes her sleepless     Loratadine Other (See Comments)     Extreme sleepiness       Sulfa Drugs Unknown     Other reaction(s): Unknown     Terazosin Other (See Comments)     Swellling in legs, improved when went off     Adhesive Tape Rash     Latex Rash     Liquid Adhesive Rash            Medications:     Prescriptions Prior to Admission   Medication Sig Dispense Refill Last Dose     clonazePAM (KLONOPIN) 0.5 MG tablet Take 0.5 tablets (0.25 mg) by mouth 2 times daily as needed for anxiety 90 tablet 1  4/15/2017 at Unknown time     Acetaminophen (TYLENOL PO) Take by mouth every 8 hours as needed for mild pain or fever   4/15/2017 at Unknown time     cetirizine (ZYRTEC) 5 MG CHEW Take 5 mg by mouth as needed    4/16/2017 at Unknown time     budesonide-formoterol (SYMBICORT) 80-4.5 MCG/ACT inhaler Inhale 2 puffs into the lungs 2 times daily (Patient taking differently: Inhale 2 puffs into the lungs as needed ) 3 Inhaler 3 4/16/2017 at Unknown time     albuterol (PROAIR HFA, PROVENTIL HFA, VENTOLIN HFA) 108 (90 BASE) MCG/ACT inhaler Inhale 2 puffs into the lungs every 4 hours as needed for shortness of breath / dyspnea or wheezing 1 Inhaler 3 4/15/2017 at Unknown time     amLODIPine (NORVASC) 10 MG tablet Take 10 mg by mouth daily    4/15/2017 at Unknown time     aspirin EC 81 MG tablet Take 81 mg by mouth daily    4/15/2017 at Unknown time     blood glucose (ONE TOUCH ULTRA) test strip    4/16/2017 at Unknown time     calcium acetate (PHOSLO) 667 MG CAPS Take 667 mg by mouth 3 times daily (with meals)    4/16/2017 at Unknown time     Calcium Carb-Cholecalciferol 600-1000 MG-UNIT TABS 1 tablet daily    4/16/2017 at Unknown time     carvedilol (COREG) 3.125 MG tablet Take 3.125 mg by mouth 2 times daily (with meals)    4/16/2017 at Unknown time     cholecalciferol (VITAMIN D) 1000 UNIT tablet Take 1,000 Units by mouth daily    4/16/2017 at Unknown time     furosemide (LASIX) 20 MG tablet 20 mg daily    4/16/2017 at Unknown time     insulin aspart (NOVOLOG VIAL) 100 UNITS/ML soln    4/16/2017 at Unknown time     ONETOUCH DELICA LANCETS 33G MISC    4/16/2017 at Unknown time     lisinopril (PRINIVIL,ZESTRIL) 20 MG tablet Take 10 mg by mouth daily    4/15/2017 at Unknown time     pravastatin (PRAVACHOL) 40 MG tablet 40 mg daily    4/15/2017 at Unknown time     glucagon (GLUCAGEN) 1 MG SOLR 1 mg   Taking             Physical Exam:   Blood pressure (!) 160/39, pulse 64, temperature 96.9  F (36.1  C), temperature source  Oral, resp. rate 16, weight 68.3 kg (150 lb 9.6 oz), SpO2 94 %, not currently breastfeeding.  GENERAL: Alert and oriented x 3. Lying in bed, appears comfortable. Pleasant and conversant.   HEENT: Anicteric sclera. Mucous membranes moist.   CV: RRR. S1, S2. No murmurs appreciated.   RESPIRATORY: Effort normal on 2L NC. Lungs diminished on right, otherwise clear.   GI: Abdomen soft and non distended, bowel sounds present. No tenderness, rebound, guarding.   MUSCULOSKELETAL: No joint swelling or tenderness.   NEUROLOGICAL: No focal deficits. Moves all extremities.   EXTREMITIES: No peripheral edema. Intact bilateral pedal pulses.   SKIN: No jaundice. No rashes.     Lines/Tubes/Drains: Right chest tube, PIV            Data:   I personally reviewed the following studies:   Outside labs 4/16/16:  Na 139  K 5.2  Cl 103  CO2 21  BUN 64  Cr 2.46   Hgb 12.4  Hct 37.6  WBC 9.7  Plt 236    CXR 4/16/16:  Initial:   FINDINGS: Large right pneumothorax with essentially complete collapse of   the right lung. Mild leftward shift of the mediastinal contour implies a   slight degree of tension. The left lung is expanded and clear. Heart size   is normal.  Follow up:   FINDINGS: Interval right-sided chest tube placement with reexpansion of the   right lung. There is atelectasis at the right lung base. No visible   residual pneumothorax. The left lung is clear. Heart size is normal.    Unresulted Labs Ordered in the Past 30 Days of this Admission     Date and Time Order Name Status Description    4/11/2017 1329 Fungus Culture, non-blood Preliminary

## 2017-04-17 NOTE — PLAN OF CARE
Problem: Goal Outcome Summary  Goal: Goal Outcome Summary  Outcome: No Change  Pt admitted from OSH with a right pneumothorax and new diagnosis of RLL adenocarcinoma.   at bedside and supportive. Pt is a SBA to the bathroom. Pt has a chest tube to - 20 suction with minimal sangious output.  Pt having moderate pain in right flank given tylenol and IV dilaudid with relief. Pt on 2 liters of oxygen with sats at 99%. Lungs on ausculation diminished in right lung and left base.  VSS, diastolic BP is low 38-40.  Will continue to monitor and follow POC.  Able to swallow pills and tolerating food and fluids.  Pt is orientated x 4.  Will continue to monitor and follow POC.

## 2017-04-17 NOTE — PLAN OF CARE
Problem: Goal Outcome Summary  Goal: Goal Outcome Summary  Outcome: No Change  Pt is A&O x 4, VSS ex hypertensive, and on 2.5L O2 NC. Pt was admitted from [facility] ED with possible collapsed lung. Chest tube in place and intact at -20. Tele is present. IV Dilaudid given for pain in back from chest tube. Up independent, added additional tubing for pt to get to bathroom independently.  Oscar is in room and will be staying with her. Pt denies nausea. Continue to monitor and POC.

## 2017-04-17 NOTE — CONSULTS
"[facility]    Pulmonary, Allergy, Critical Care and Sleep Medicine    Initial Consultation  April 17, 2017      Yue Valenzuela MRN# [id number]   Age: 71 year old YOB: 1945     Date of Admission: 4/16/2017  Reason for Consultation: Pneumothorax  Requesting Team: Medicine    Primary care provider: Nory Elliott     Assessment and Plan:  1. Iatrogenic Right Pneumothorax: Secondary to IR biopsy.  Small airleak with maneuvers this am.  Will get CXR to evaluate that lung all the way up.  On suction but can put to waterseal.  We will do clamping when air leak resolves.  -Continue to waterseal (ordered)  -Will review 2view CXR when done  2. RLL adenocarcinoma: Has PET and MRI ordered for staging purposes on Monday.  Staging will dictate treatment plan      Seen and discussed with Dr Jeannette Lamar  Pulmonary/Critical Care Fellow    CC:  \"Collapsed lung\"  HPI: Patient is a 72 y/o female with history of Type1 DM, CAD, PVD, history of asthma(patient questions and took symbicort PRN) and CKD who was referred for evaluation of possible kidney transplant and during the workup had a lexiscan which found a RLL pulmonary nodule.  No prior imaging done.  This nodule then prompted pulmonary evaluation which prompted IR guided lung biopsy on 4/11.  There was a small PTX immediately after the procedure that was evacuated and F/U CXR that day was without PTX so sent home.  She had a slight tickle in her throat for the past few days, no chest pain or dyspnea until yesterday which prompted her evaluation.  She had a CXR which we do not have the image of yet, that reportedly showed complete R lung collapse.  She had a chest tube placed in the ER and was transported here.  She is having some pain from the chest tube site however otherwise doing well.  Minimal cough, no fevers, chills.       Past Medical History:  Past Medical History:   Diagnosis Date     Adenocarcinoma, lung (H)      " Asthma      CAD (coronary artery disease)      Carotid artery stenosis      CKD (chronic kidney disease) stage 4, GFR 15-29 ml/min (H)      Diabetes mellitus type 1 (H)      H/O factor V Leiden mutation      H/O heart artery stent     2006     Hypertension      Mixed hyperlipidemia (DYSLIPIDEMIA) 8/1/2014     Osteopenia      PVD (peripheral vascular disease) (H)      Thyroid nodule 04/12/2012    Hurtle Cells, non malignant       Past Surgical History:  Past Surgical History:   Procedure Laterality Date     ganglion cyst removal       hemithyroidectomy  4/13/2012    PATH report showed benign Hurtle Cell Adenoma     lipoma removal       SHOULDER SURGERY Left      TONSILLECTOMY         Past Social History:  Social History     Social History     Marital status:      Spouse name: N/A     Number of children: N/A     Years of education: N/A     Occupational History     retail Other     part-time     Social History Main Topics     Smoking status: Former Smoker     Packs/day: 0.50     Years: 15.00     Types: Cigarettes     Start date: 1/1/1970     Quit date: 12/31/1985     Smokeless tobacco: Never Used     Alcohol use 0.0 - 0.5 oz/week     0 - 1 Standard drinks or equivalent per week     Drug use: No     Sexual activity: Not on file     Other Topics Concern     Not on file     Social History Narrative    The patient has a 7 pk yr tobacco hx.  She has no active use since 1985.  Alcohol use is 0 alcoholic drinks per week.  She denies use of recreational drugs.          She is retired.  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           The patient is .  Has 2 children.        Hot Tub Exposure: NO    Recent Travel: NO     Hx of incarceration:  NO    Bird Exposure:   NO    Animal Exposure:  NO    Inhalation Exposure:  NO             Family History:  Family History   Problem Relation Age of Onset     Cancer - colorectal Father      DIABETES Father      Scleroderma Mother       CEREBROVASCULAR DISEASE Brother      DIABETES Brother      Hypertension Brother      DIABETES Brother      2nd brother         Medications:    sodium chloride (PF)  3 mL Intracatheter Q8H     insulin bolus from AMBULATORY PUMP   Subcutaneous TID AC     insulin aspart   Device See Admin Instructions     aspirin EC  81 mg Oral QPM     calcium acetate  667 mg Oral TID w/meals     Calcium Carb-Cholecalciferol  1 tablet Oral Daily     carvedilol  3.125 mg Oral BID w/meals     cholecalciferol  1,000 Units Oral Daily     furosemide  20 mg Oral Daily     pravastatin  40 mg Oral Daily     amLODIPine  10 mg Oral Daily     acetaminophen (TYLENOL) tablet 650 mg, naloxone, lidocaine, lidocaine 4%, sodium chloride (PF), HYDROmorphone, glucose **OR** dextrose **OR** glucagon, albuterol, fluticasone-vilanterol, clonazePAM    Review of Systems:  Complete 12 point ROS negative unless mentioned in HPI    Physical Exam:  Temp:  [96.9  F (36.1  C)-97  F (36.1  C)] 97  F (36.1  C)  Pulse:  [64-70] 70  Resp:  [16-18] 18  BP: (147-160)/(39) 147/39  SpO2:  [94 %] 94 %  No intake or output data in the 24 hours ending 04/17/17 0841    General: laying in bed in NAD  HEENT: anicteric, moist mucosa  Neck: no palpable lymphadenopathy, no JVD noted  Chest: CTAB, no wheezing, R sided chest tube, small air leak with coughing, small amount crepitus in R armpit  Cardiac: RRR no murmurs  Abdomen: Soft, flat, non tender, active BS  Extremities: No LE Edema  Neuro: A&Ox3, no focal defecits  Skin: no rash noted    Data:  All laboratory and imaging data reviewed.    CMP  Recent Labs  Lab 04/17/17  0749      POTASSIUM 5.7*   CHLORIDE 106   CO2 20   ANIONGAP 9   *   BUN 60*   CR 2.36*   GFRESTIMATED 20*   GFRESTBLACK 25*   ZAHEER 9.2     Urine Studies  Recent Labs   Lab Test  08/04/14   0832   URINEPH  5.5   NITRITE  Negative   LEUKEST  Negative   WBCU  <1      Sputum Cultures in the last 3 months:  Specimen Description   Date Value Ref Range  Status   04/11/2017 Lung Biopsy Tissue  Final   04/11/2017 Lung Biopsy Tissue  Final    Culture Micro   Date Value Ref Range Status   04/11/2017 Culture negative after 3 days  Final

## 2017-04-17 NOTE — PLAN OF CARE
Problem: Goal Outcome Summary  Goal: Goal Outcome Summary  Outcome: No Change     BP (!) 147/39 (BP Location: Right arm)  Pulse 70  Temp 97  F (36.1  C) (Oral)  Resp 18  Wt 63.6 kg (140 lb 4.8 oz)  SpO2 94%  BMI 24.07 kg/m2     Pt A&Ox4, VSS on RA, up with SBA. C/o pain around chest tube site, PRN Dilaudid given once and PO Tylenol with relief in symptoms. Chest tube changed to water seal per orders after CXR, dressing CDI. K elevated this AM at 5.7, PO Kayexalate given and recheck at trending down with result of 5.3, will continue to monitor. BGs elevated in 200s, insulin via ambulatory pump self-administered per home regimen. Continue to monitor and with POC.

## 2017-04-17 NOTE — PHARMACY-CONSULT NOTE
Yue Valenzuela is a 71 year old female admitted from OSH for pneumothorax following RLL biopsy and new diagnosis of RLL adenocarcinoma. Labs from this AM show an elevated potassium at 5.7 mmol/L.    Pharmacy was consulted to review the patient's medications for any agents with potential to cause hyperkalemia. Findings include:  1. Lisinopril - per chart review, the patient has been taking this medication since at least August 2014.  2. Carvedilol and Aspirin have a small potential to cause hyperkalemia, however the patient has been taking these since at least August 2014 as well so this seems less likely.    Please contact pharmacy if there are any questions.    Daniel Lopez, PharmD Student  April 17, 2017

## 2017-04-17 NOTE — PROGRESS NOTES
[facility]  Transfer Triage Note    Date of call: 4/16/2017  Time of call: 7:45pm    Reason for Transfer: availability of higher level of care if needed, patient requesting transfer to [facility]  Diagnosis: pneumothorax following RLL biopsy    Outside Records: Unavailable    Stability of Patient: stable for transfer    Expected Time of Arrival for Transfer: less than 24 hours    Recommendations for Management and Stabilization: Pt presented to [facility] ED with SOB beginning on Friday 4/14, worsening yesterday and again today. In ED noted to have hypoxia to 80s on room air, on CXR has R pneumothorax. Chest tube placed by ED provider with quick improvement both subjectively in dyspnea and objectively with patient satting 99% on 2L NC (previously on higher O2 by re-breather mask), significant improvement in pneumothorax on repeat CXR after chest tube placement per ED provider. Pt undergoing work up for consideration of renal transplant, had recent biopsy of a RLL pulmonary mass with Dr. Plaza on 4/11/17, path returned adenocarcinoma, Pt called and informed of new malignancy diagnosis on Friday 4/14. Stable vitals, EKG unremarkable per transferring provider.     Additional Comments: Will admit to Med/Surg bed with telemetry given clinical stability and quick improvement clinically after chest tube placement, it is possible that chest tube may be able to be removed in short time frame pending clinical course. Consider Pulmonology consult once patient arrives, defer to primary team.     Eloise Huerta MD  [phone]

## 2017-04-18 ENCOUNTER — APPOINTMENT (OUTPATIENT)
Dept: GENERAL RADIOLOGY | Facility: CLINIC | Age: 72
DRG: 200 | End: 2017-04-18
Attending: PHYSICIAN ASSISTANT
Payer: MEDICARE

## 2017-04-18 ENCOUNTER — APPOINTMENT (OUTPATIENT)
Dept: GENERAL RADIOLOGY | Facility: CLINIC | Age: 72
DRG: 200 | End: 2017-04-18
Attending: NURSE PRACTITIONER
Payer: MEDICARE

## 2017-04-18 LAB
ANION GAP SERPL CALCULATED.3IONS-SCNC: 9 MMOL/L (ref 3–14)
BUN SERPL-MCNC: 52 MG/DL (ref 7–30)
CALCIUM SERPL-MCNC: 9 MG/DL (ref 8.5–10.1)
CHLORIDE SERPL-SCNC: 105 MMOL/L (ref 94–109)
CO2 SERPL-SCNC: 24 MMOL/L (ref 20–32)
CREAT SERPL-MCNC: 1.99 MG/DL (ref 0.52–1.04)
ERYTHROCYTE [DISTWIDTH] IN BLOOD BY AUTOMATED COUNT: 13.3 % (ref 10–15)
GFR SERPL CREATININE-BSD FRML MDRD: 25 ML/MIN/1.7M2
GLUCOSE BLDC GLUCOMTR-MCNC: 140 MG/DL (ref 70–99)
GLUCOSE BLDC GLUCOMTR-MCNC: 177 MG/DL (ref 70–99)
GLUCOSE BLDC GLUCOMTR-MCNC: 193 MG/DL (ref 70–99)
GLUCOSE BLDC GLUCOMTR-MCNC: 260 MG/DL (ref 70–99)
GLUCOSE BLDC GLUCOMTR-MCNC: 72 MG/DL (ref 70–99)
GLUCOSE SERPL-MCNC: 199 MG/DL (ref 70–99)
HCT VFR BLD AUTO: 35.5 % (ref 35–47)
HGB BLD-MCNC: 11.1 G/DL (ref 11.7–15.7)
INR PPP: 1.06 (ref 0.86–1.14)
MAGNESIUM SERPL-MCNC: 2.2 MG/DL (ref 1.6–2.3)
MCH RBC QN AUTO: 29.4 PG (ref 26.5–33)
MCHC RBC AUTO-ENTMCNC: 31.3 G/DL (ref 31.5–36.5)
MCV RBC AUTO: 94 FL (ref 78–100)
PLATELET # BLD AUTO: 178 10E9/L (ref 150–450)
POTASSIUM SERPL-SCNC: 4.3 MMOL/L (ref 3.4–5.3)
RBC # BLD AUTO: 3.78 10E12/L (ref 3.8–5.2)
SODIUM SERPL-SCNC: 138 MMOL/L (ref 133–144)
TROPONIN I SERPL-MCNC: 0.02 UG/L (ref 0–0.04)
WBC # BLD AUTO: 8.6 10E9/L (ref 4–11)

## 2017-04-18 PROCEDURE — A9270 NON-COVERED ITEM OR SERVICE: HCPCS | Mod: GY | Performed by: PHYSICIAN ASSISTANT

## 2017-04-18 PROCEDURE — 85027 COMPLETE CBC AUTOMATED: CPT | Performed by: NURSE PRACTITIONER

## 2017-04-18 PROCEDURE — 36415 COLL VENOUS BLD VENIPUNCTURE: CPT | Performed by: PHYSICIAN ASSISTANT

## 2017-04-18 PROCEDURE — 25000132 ZZH RX MED GY IP 250 OP 250 PS 637: Mod: GY | Performed by: INTERNAL MEDICINE

## 2017-04-18 PROCEDURE — 80048 BASIC METABOLIC PNL TOTAL CA: CPT | Performed by: NURSE PRACTITIONER

## 2017-04-18 PROCEDURE — 71020 XR CHEST 2 VW: CPT

## 2017-04-18 PROCEDURE — 25000128 H RX IP 250 OP 636: Performed by: PHYSICIAN ASSISTANT

## 2017-04-18 PROCEDURE — 12000008 ZZH R&B INTERMEDIATE UMMC

## 2017-04-18 PROCEDURE — 83735 ASSAY OF MAGNESIUM: CPT | Performed by: PHYSICIAN ASSISTANT

## 2017-04-18 PROCEDURE — 99232 SBSQ HOSP IP/OBS MODERATE 35: CPT | Performed by: INTERNAL MEDICINE

## 2017-04-18 PROCEDURE — 93005 ELECTROCARDIOGRAM TRACING: CPT

## 2017-04-18 PROCEDURE — 85610 PROTHROMBIN TIME: CPT | Performed by: NURSE PRACTITIONER

## 2017-04-18 PROCEDURE — 36415 COLL VENOUS BLD VENIPUNCTURE: CPT | Performed by: NURSE PRACTITIONER

## 2017-04-18 PROCEDURE — 71010 XR CHEST PORT 1 VW: CPT

## 2017-04-18 PROCEDURE — 84484 ASSAY OF TROPONIN QUANT: CPT | Performed by: PHYSICIAN ASSISTANT

## 2017-04-18 PROCEDURE — 99207 ZZC APP CREDIT; MD BILLING SHARED VISIT: CPT | Performed by: NURSE PRACTITIONER

## 2017-04-18 PROCEDURE — 00000146 ZZHCL STATISTIC GLUCOSE BY METER IP

## 2017-04-18 PROCEDURE — A9270 NON-COVERED ITEM OR SERVICE: HCPCS | Mod: GY | Performed by: INTERNAL MEDICINE

## 2017-04-18 PROCEDURE — 25000132 ZZH RX MED GY IP 250 OP 250 PS 637: Mod: GY | Performed by: PHYSICIAN ASSISTANT

## 2017-04-18 PROCEDURE — 93010 ELECTROCARDIOGRAM REPORT: CPT | Performed by: INTERNAL MEDICINE

## 2017-04-18 RX ORDER — PHENOL 1.4 %
1 AEROSOL, SPRAY (ML) MUCOUS MEMBRANE DAILY
COMMUNITY
End: 2017-04-27

## 2017-04-18 RX ORDER — HYDRALAZINE HYDROCHLORIDE 20 MG/ML
10 INJECTION INTRAMUSCULAR; INTRAVENOUS EVERY 6 HOURS PRN
Status: DISCONTINUED | OUTPATIENT
Start: 2017-04-18 | End: 2017-04-20

## 2017-04-18 RX ADMIN — HYDROMORPHONE HYDROCHLORIDE 0.2 MG: 10 INJECTION, SOLUTION INTRAMUSCULAR; INTRAVENOUS; SUBCUTANEOUS at 21:04

## 2017-04-18 RX ADMIN — ACETAMINOPHEN 650 MG: 325 TABLET, FILM COATED ORAL at 12:50

## 2017-04-18 RX ADMIN — AMLODIPINE BESYLATE 10 MG: 10 TABLET ORAL at 21:05

## 2017-04-18 RX ADMIN — CARVEDILOL 3.12 MG: 3.12 TABLET, FILM COATED ORAL at 18:30

## 2017-04-18 RX ADMIN — ASPIRIN 81 MG: 81 TABLET, COATED ORAL at 21:09

## 2017-04-18 RX ADMIN — ACETAMINOPHEN 325 MG: 325 TABLET, FILM COATED ORAL at 20:45

## 2017-04-18 RX ADMIN — ACETAMINOPHEN 325 MG: 325 TABLET, FILM COATED ORAL at 19:53

## 2017-04-18 RX ADMIN — Medication 1000 UNITS: at 08:39

## 2017-04-18 RX ADMIN — CARVEDILOL 3.12 MG: 3.12 TABLET, FILM COATED ORAL at 08:32

## 2017-04-18 RX ADMIN — ACETAMINOPHEN 650 MG: 325 TABLET, FILM COATED ORAL at 07:05

## 2017-04-18 RX ADMIN — CALCIUM ACETATE 667 MG: 667 CAPSULE ORAL at 08:32

## 2017-04-18 RX ADMIN — PRAVASTATIN SODIUM 40 MG: 10 TABLET ORAL at 21:09

## 2017-04-18 RX ADMIN — Medication 1500 MG: at 08:34

## 2017-04-18 RX ADMIN — VITAMIN D, TAB 1000IU (100/BT) 1000 UNITS: 25 TAB at 08:33

## 2017-04-18 RX ADMIN — CALCIUM ACETATE 667 MG: 667 CAPSULE ORAL at 18:30

## 2017-04-18 NOTE — DOWNTIME EVENT NOTE
The EMR was down for 10 hours on 4/18/2017.     Yanira Heath RN was responsible for completing the paper charting during this time period.      The following information was re-entered into the system by Sandi Blanton RN: Intake and output and MAR, and 0600 vital signs.     The following information will remain in the paper chart: flowsheets, progress note, and remaining vital signs     Sandi Blanton RN   4/18/2017

## 2017-04-18 NOTE — PHARMACY-ADMISSION MEDICATION HISTORY
Admission medication history interview status for the 4/16/2017 admission is complete. See Epic admission navigator for allergy information, pharmacy, prior to admission medications and immunization status.     Medication history interview sources:  Patient and , Oscar    Changes made to PTA medication list (reason)  Added:   Calcium carbonate 600mg tablet- Take 1 tablet by mouth daily. Patient is no longer taking a combination product  Deleted:   Calcium carb-cholecalciferol 600-1000 mg-unit- Take 1 tablet by mouth daily. Patient is not taking a combination product.   Changed: None    Additional medication history information (including reliability of information, actions taken by pharmacist):  Reliability-Good  Flu vaccination- Per patient and [facility], patient received a flu vaccination on 10/13/16  Insulin pump settings-Per patient and chart review, patients pump settings are:  12am-6am- 0.5 units/hr  6am-4pm- 1 unit/hr  4pm-8pm- 1.95 units/hr  8pm-12am- 1.35 units/hr      Prior to Admission medications    Medication Sig Last Dose Taking? Auth Provider   calcium carbonate (OS-ZAHEER 600 MG [place]'aq. CA) 600 MG tablet Take 1 tablet by mouth daily 4/16/2017 at Noon Yes Unknown, Entered By History   clonazePAM (KLONOPIN) 0.5 MG tablet Take 0.5 tablets (0.25 mg) by mouth 2 times daily as needed for anxiety 4/15/2017 at Unknown time Yes Elisa Ellington MD   Acetaminophen (TYLENOL PO) Take by mouth every 8 hours as needed for mild pain or fever Past Week at Unknown time Yes Reported, Patient   cetirizine (ZYRTEC) 5 MG CHEW Take 5 mg by mouth as needed  4/16/2017 at Unknown time Yes Reported, Patient   budesonide-formoterol (SYMBICORT) 80-4.5 MCG/ACT inhaler Inhale 2 puffs into the lungs 2 times daily  Patient taking differently: Inhale 2 puffs into the lungs as needed  4/16/2017 at Unknown time Yes John Plaza MD   albuterol (PROAIR HFA, PROVENTIL HFA, VENTOLIN HFA) 108 (90 BASE) MCG/ACT inhaler Inhale 2 puffs  into the lungs every 4 hours as needed for shortness of breath / dyspnea or wheezing 4/15/2017 at AM Yes John Plaza MD   amLODIPine (NORVASC) 10 MG tablet Take 10 mg by mouth daily  4/15/2017 at PM Yes Reported, Patient   aspirin EC 81 MG tablet Take 81 mg by mouth daily  4/15/2017 at PM Yes Reported, Patient   blood glucose (ONE TOUCH ULTRA) test strip  4/16/2017 at Unknown time Yes Reported, Patient   calcium acetate (PHOSLO) 667 MG CAPS Take 667 mg by mouth 3 times daily (with meals)  4/16/2017 at Noon Yes Reported, Patient   carvedilol (COREG) 3.125 MG tablet Take 3.125 mg by mouth 2 times daily (with meals)  4/16/2017 at Unknown time Yes Reported, Patient   cholecalciferol (VITAMIN D) 1000 UNIT tablet Take 1,000 Units by mouth daily  4/16/2017 at Noonme Yes Reported, Patient   furosemide (LASIX) 20 MG tablet 20 mg daily  4/16/2017 at Unknown time Yes Reported, Patient   insulin aspart (NOVOLOG VIAL) 100 UNITS/ML soln  4/16/2017 at Unknown time Yes Reported, Patient   ONETOUCH DELICA LANCETS 33G MISC  4/16/2017 at Unknown time Yes Reported, Patient   lisinopril (PRINIVIL,ZESTRIL) 20 MG tablet Take 10 mg by mouth daily  4/15/2017 at PMtime Yes Reported, Patient   pravastatin (PRAVACHOL) 40 MG tablet 40 mg daily  4/15/2017 at Unknown time Yes Reported, Patient   glucagon (GLUCAGEN) 1 MG SOLR 1 mg More than a month at Unknown time  Reported, Patient     Medication history completed by: Juan David Monroy, PharmD Student

## 2017-04-18 NOTE — PROGRESS NOTES
[facility], [place]    Internal Medicine Progress Note - Gold Service      Assessment & Plan   Yue Valenzuela is a 71 year old female admitted on 4/16/2017. She has a history of Type 1 DM, CKD Stage IV, factor V Leiden mutation, HTN, HLD, CAD s/p stent placement, PVD, carotid artery stenosis, asthma, and newly diagnosed lung adenocarcinoma (4/14/17) and is admitted from an OSH for further management of R sided iatrogenic pneumothorax with CT in place, following lung biopsy on 4/11.     # Right pneumothorax, likely iatrogenic - Stable.  No O2 overnight or this am, sats 94-98%. Currently c/o pain at insertion site but no dyspnea or chest pain.  CXR on 4/17 shows malpositioned CT projected outside pleural space, diffuse R sided opacities.  Per Pulmonary, small airleak and persistent residual ptx.  Plan for CT exchange for smaller tube in IR once space available. Patient is s/p R lung bx on 4/11 with worsening dyspnea on 4/14.        - Pulmonary following, appreciate recommendations - plan as above with CT exchange with continued to waterseal   - Continue Tylenol and Dilaudid for pain     # Lung adenocarcinoma - Newly diagnosed on 4/14.  Follows with Dr. Plaza and established with [facility] for PET scan and MRI brain, scheduled on 4/20 here at [facility]. Per discussion w/ Pulmonary, can order these while inpatient   - Pulmonary following    # CKD Stage IV - BUN 52 (60), Cr 1.99 (2.36) this am 4/18. BL appears to be 2.2 - 2.6. Follows with Dr. Vinicius Sim at [facility] outpatient, last seen on 4/6. Had been pursuing workup for kidney transplant at [facility] prior to CA on 4/14.    - BMP in am     # Hyperkalemia - Improved. K 4.3 this am. Appears to have chronically high K 2/2 advanced CKD, long term use w/ Lisinopril likely also contributory. Managed at home with kayexalate 1-2x weekly. BL typically 5.0-5.5.   - Recheck BMP in am   - Hold PTA Lisinopril   - Continue tele     # Type DM 1 - Ha1c  6.9% on 4/5.  BG 72 - 199 last 24 hr.  On insulin pump PTA.  - Continue home insulin pump  - Continue BG checks AC/HS  - Hypoglycemia protocol in place - pt usually drinks Sprite to correct when BG feels low, ok if spouse brings in from home     # CAD, Hypertension - Stable.   - Continue PTA Coreg, Norvasc, and Lasix  - Hold PTA Lisinopril - will increase other antihypertensives where possible if BP not adequately controlled  - Hydralazine 10mg Q6 hr PRN for SBP > 160   - Continue PTA Pravastatin     Diet: Renal Diet (non-dialysis)  DVT Prophylaxis: mechanical   Code Status: Full Code    Disposition Plan   Expected discharge: 2 - 3 days; likely discharge to home pending PT/OT evaluation      Entered: Carmen Garcia 04/18/2017, 4:08 PM   Information in the above section will display in the discharge planner report.      The patient's care was discussed with the Attending Physician, Dr. Cruz Baer.    Carmen Garcia  Internal Medicine Staff Hospitalist Service  [facility]  Pager: [phone]  Please see sticky note for cross cover information    Interval History   Patient reports dull, achy pain at chest tube insertion site, radiating to shoulder today d/t malpositioning.  No pain with inspiration, mostly occurring with movement of R arm, R side.  She denies fevers, chills, abdominal pain, nausea, vomiting, chest pain, and dyspnea.       Data reviewed today: I reviewed all medications, new labs and imaging results over the last 24 hours.     Physical Exam   Vital Signs: Temp: 99.4  F (37.4  C) Temp src: Oral BP: 168/43 Pulse: 72   Resp: 18 SpO2: 94 % O2 Device: None (Room air)    Weight: 141 lbs 1.6 oz  General Appearance: AAO x 4. Well nourished, well developed. No acute distress.   HEENT: Normocephalic, atraumatic. Anicteric sclera. Mucous membranes moist.   Respiratory: Effort normal on room air. Slight wheeze LLL, otherwise clear.   Cardiovascular: RRR. S1, S2. No murmurs appreciated.    GI: Abdomen soft and non distended, bowel sounds present x all 4 quadrants. No tenderness, rebound, guarding.  Skin: Grossly warm, dry, and intact. No jaundice.  Neurological: No focal deficits. Moves all extremities         Data   Data     Recent Labs  Lab 04/18/17  0722 04/17/17  1355 04/17/17  1149 04/17/17  0749   WBC 8.6  --   --   --    HGB 11.1*  --   --   --    MCV 94  --   --   --      --   --   --    INR 1.06  --   --   --      --   --  135   POTASSIUM 4.3 4.9 5.3 5.7*   CHLORIDE 105  --   --  106   CO2 24  --   --  20   BUN 52*  --   --  60*   CR 1.99*  --   --  2.36*   ANIONGAP 9  --   --  9   ZAHEER 9.0  --   --  9.2   *  --   --  264*

## 2017-04-18 NOTE — PROGRESS NOTES
[facility] Phys[facility]    Pulmonary, Allergy, Critical Care and Sleep Medicine    Follow-up Note  April 18, 2017    Assessment and Plan:  1. Iatrogenic Right Pneumothorax: Secondary to IR biopsy. Small airleak with maneuvers this am and small residual PTX.  Large bore tube in the ER is coming out.  Will put in small tube in IR today given minimal PTX left and would benefit from imaging guidance.    -exchange for small tube in IR, will need to remove her surgical tube once new one in place  -2view CXR in am  -continue to waterseal    2. RLL adenocarcinoma: Has PET and MRI ordered for staging purposes on Thursday, I am trying to contact to see if can get this done when inpatient or would need to discharge to have it done. Staging will dictate treatment plan.    Seen and discussed with Dr Jeannette Lamar  Pulmonary/Critical Care Fellow    Interval History:  Patient doing about the same this am.  Still has airleak.  Good spirits but having pain at site of chest tube.    Medications:       cholecalciferol  1,000 Units Oral Daily    And     calcium carbonate  1,500 mg Oral Daily     sodium chloride (PF)  3 mL Intracatheter Q8H     insulin bolus from AMBULATORY PUMP   Subcutaneous TID AC     insulin aspart   Device See Admin Instructions     aspirin EC  81 mg Oral QPM     calcium acetate  667 mg Oral TID w/meals     carvedilol  3.125 mg Oral BID w/meals     cholecalciferol  1,000 Units Oral Daily     furosemide  20 mg Oral Daily     pravastatin  40 mg Oral Daily     amLODIPine  10 mg Oral Daily     hydrALAZINE, acetaminophen (TYLENOL) tablet 650 mg, naloxone, lidocaine, lidocaine 4%, sodium chloride (PF), HYDROmorphone, glucose **OR** dextrose **OR** glucagon, albuterol, fluticasone-vilanterol, clonazePAM    Physical Exam:    Temp:  [99  F (37.2  C)] 99  F (37.2  C)  Pulse:  [68] 68  Resp:  [16] 16  BP: (160-164)/(32-36) 160/36  SpO2:  [95 %] 95 %    Intake/Output Summary (Last 24 hours) at 04/18/17  1407  Last data filed at 04/17/17 2300   Gross per 24 hour   Intake              320 ml   Output              400 ml   Net              -80 ml       General: laying in bed in NAD  HEENT: anicteric, moist mucosa  Neck: no palpable lymphadenopathy, no JVD noted  Chest: CTAB, no wheezing, R sided chest tube, small air leak with coughing, no crepitus in R armpit today  Cardiac: RRR no murmurs  Abdomen: Soft, flat, non tender, active BS  Extremities: No LE Edema  Neuro: A&Ox3, no focal defecits  Skin: no rash noted         Data:   All laboratory and imaging data reviewed.    CMP  Recent Labs  Lab 04/18/17  0722 04/17/17  1355 04/17/17  1149 04/17/17  0749     --   --  135   POTASSIUM 4.3 4.9 5.3 5.7*   CHLORIDE 105  --   --  106   CO2 24  --   --  20   ANIONGAP 9  --   --  9   *  --   --  264*   BUN 52*  --   --  60*   CR 1.99*  --   --  2.36*   GFRESTIMATED 25*  --   --  20*   GFRESTBLACK 30*  --   --  25*   ZAHEER 9.0  --   --  9.2     CBC  Recent Labs  Lab 04/18/17  0722   WBC 8.6   RBC 3.78*   HGB 11.1*   HCT 35.5   MCV 94   MCH 29.4   MCHC 31.3*   RDW 13.3        INR  Recent Labs  Lab 04/18/17  0722   INR 1.06       Urine Studies  Recent Labs   Lab Test  08/04/14   0832   URINEPH  5.5   NITRITE  Negative   LEUKEST  Negative   WBCU  <1     Sputum Culture last 3 months:  Specimen Description   Date Value Ref Range Status   04/11/2017 Lung Biopsy Tissue  Final   04/11/2017 Lung Biopsy Tissue  Final    Culture Micro   Date Value Ref Range Status   04/11/2017 Culture negative after 6 days  Final

## 2017-04-18 NOTE — PLAN OF CARE
Problem: Goal Outcome Summary  Goal: Goal Outcome Summary  Outcome: No Change  PT A&O x 4, hypertensive and on room air. Pt is managing BG on own through insulin pump, gave bolus at 1700,  at start of shift. Pt states having some pain around chest tube site. No PRN given during shift. States having diarrhea from Kayexalate, brief given per pt request. K+ down from 5.7 to 5.3, re-check scheduled for AM. Tele in place. Chest tube in place with water seal, small amount of output. R PIV saline locked. Continue to monitor and POC.

## 2017-04-18 NOTE — PLAN OF CARE
"Problem: Goal Outcome Summary  Goal: Goal Outcome Summary  Outcome: No Change  Pt AOx4, VSS on RA. Transfers ind to bathroom/in hallways. Tele NSR. R chest tube in place and plan is to receive new chest tube for better placement and comfort. IR does not have pt on schedule yet, so this will most likely happen tomorrow. C/o pain at tube insertion site. PRN tylenol available q6 hrs, pt has dilaudid available but wants to \"avoid taking it.\"  However, tylenol has helped manage her pain. Has ambulatory insulin pump, BG's checked before meals/bedtime. RPIV saline locked. Renal diet, good appetite.  at bedside. Very pleasant, calls appropriately and makes needs known. Will continue to monitor and follow POC.       "

## 2017-04-19 ENCOUNTER — APPOINTMENT (OUTPATIENT)
Dept: GENERAL RADIOLOGY | Facility: CLINIC | Age: 72
DRG: 200 | End: 2017-04-19
Attending: INTERNAL MEDICINE
Payer: MEDICARE

## 2017-04-19 ENCOUNTER — APPOINTMENT (OUTPATIENT)
Dept: PHYSICAL THERAPY | Facility: CLINIC | Age: 72
DRG: 200 | End: 2017-04-19
Attending: NURSE PRACTITIONER
Payer: MEDICARE

## 2017-04-19 ENCOUNTER — APPOINTMENT (OUTPATIENT)
Dept: GENERAL RADIOLOGY | Facility: CLINIC | Age: 72
DRG: 200 | End: 2017-04-19
Attending: NURSE PRACTITIONER
Payer: MEDICARE

## 2017-04-19 ENCOUNTER — TELEPHONE (OUTPATIENT)
Dept: PULMONOLOGY | Facility: CLINIC | Age: 72
End: 2017-04-19

## 2017-04-19 LAB
ANION GAP SERPL CALCULATED.3IONS-SCNC: 9 MMOL/L (ref 3–14)
BUN SERPL-MCNC: 44 MG/DL (ref 7–30)
CALCIUM SERPL-MCNC: 9.9 MG/DL (ref 8.5–10.1)
CHLORIDE SERPL-SCNC: 107 MMOL/L (ref 94–109)
CO2 SERPL-SCNC: 24 MMOL/L (ref 20–32)
CREAT SERPL-MCNC: 1.72 MG/DL (ref 0.52–1.04)
GFR SERPL CREATININE-BSD FRML MDRD: 29 ML/MIN/1.7M2
GLUCOSE BLDC GLUCOMTR-MCNC: 121 MG/DL (ref 70–99)
GLUCOSE BLDC GLUCOMTR-MCNC: 135 MG/DL (ref 70–99)
GLUCOSE BLDC GLUCOMTR-MCNC: 154 MG/DL (ref 70–99)
GLUCOSE BLDC GLUCOMTR-MCNC: 155 MG/DL (ref 70–99)
GLUCOSE BLDC GLUCOMTR-MCNC: 167 MG/DL (ref 70–99)
GLUCOSE SERPL-MCNC: 147 MG/DL (ref 70–99)
INTERPRETATION ECG - MUSE: NORMAL
POTASSIUM SERPL-SCNC: 4.3 MMOL/L (ref 3.4–5.3)
SODIUM SERPL-SCNC: 140 MMOL/L (ref 133–144)

## 2017-04-19 PROCEDURE — 71020 XR CHEST 2 VW: CPT | Mod: 77

## 2017-04-19 PROCEDURE — 71020 XR CHEST 2 VW: CPT | Mod: 76

## 2017-04-19 PROCEDURE — 12000008 ZZH R&B INTERMEDIATE UMMC

## 2017-04-19 PROCEDURE — 25000128 H RX IP 250 OP 636: Performed by: PHYSICIAN ASSISTANT

## 2017-04-19 PROCEDURE — 40000193 ZZH STATISTIC PT WARD VISIT

## 2017-04-19 PROCEDURE — 80048 BASIC METABOLIC PNL TOTAL CA: CPT | Performed by: NURSE PRACTITIONER

## 2017-04-19 PROCEDURE — 25000132 ZZH RX MED GY IP 250 OP 250 PS 637: Mod: GY | Performed by: INTERNAL MEDICINE

## 2017-04-19 PROCEDURE — 97116 GAIT TRAINING THERAPY: CPT | Mod: GP

## 2017-04-19 PROCEDURE — 36415 COLL VENOUS BLD VENIPUNCTURE: CPT | Performed by: NURSE PRACTITIONER

## 2017-04-19 PROCEDURE — 97110 THERAPEUTIC EXERCISES: CPT | Mod: GP

## 2017-04-19 PROCEDURE — 25000132 ZZH RX MED GY IP 250 OP 250 PS 637: Mod: GY | Performed by: PHYSICIAN ASSISTANT

## 2017-04-19 PROCEDURE — A9270 NON-COVERED ITEM OR SERVICE: HCPCS | Mod: GY | Performed by: PHYSICIAN ASSISTANT

## 2017-04-19 PROCEDURE — A9270 NON-COVERED ITEM OR SERVICE: HCPCS | Mod: GY | Performed by: INTERNAL MEDICINE

## 2017-04-19 PROCEDURE — 71020 XR CHEST 2 VW: CPT

## 2017-04-19 PROCEDURE — 00000146 ZZHCL STATISTIC GLUCOSE BY METER IP

## 2017-04-19 PROCEDURE — 99232 SBSQ HOSP IP/OBS MODERATE 35: CPT | Performed by: INTERNAL MEDICINE

## 2017-04-19 PROCEDURE — 97161 PT EVAL LOW COMPLEX 20 MIN: CPT | Mod: GP

## 2017-04-19 PROCEDURE — 99207 ZZC APP CREDIT; MD BILLING SHARED VISIT: CPT | Performed by: NURSE PRACTITIONER

## 2017-04-19 RX ORDER — LORAZEPAM 0.5 MG/1
0.5 TABLET ORAL ONCE
Status: DISCONTINUED | OUTPATIENT
Start: 2017-04-20 | End: 2017-04-19

## 2017-04-19 RX ORDER — LORAZEPAM 0.5 MG/1
0.5 TABLET ORAL ONCE
Status: COMPLETED | OUTPATIENT
Start: 2017-04-20 | End: 2017-04-20

## 2017-04-19 RX ORDER — LORAZEPAM 0.5 MG/1
0.25 TABLET ORAL ONCE
Status: DISCONTINUED | OUTPATIENT
Start: 2017-04-20 | End: 2017-04-19

## 2017-04-19 RX ORDER — LORAZEPAM 0.5 MG/1
0.25 TABLET ORAL
Status: COMPLETED | OUTPATIENT
Start: 2017-04-20 | End: 2017-04-20

## 2017-04-19 RX ADMIN — Medication 1000 UNITS: at 08:03

## 2017-04-19 RX ADMIN — ASPIRIN 81 MG: 81 TABLET, COATED ORAL at 21:25

## 2017-04-19 RX ADMIN — Medication 1500 MG: at 08:01

## 2017-04-19 RX ADMIN — CALCIUM ACETATE 667 MG: 667 CAPSULE ORAL at 12:47

## 2017-04-19 RX ADMIN — CARVEDILOL 3.12 MG: 3.12 TABLET, FILM COATED ORAL at 08:01

## 2017-04-19 RX ADMIN — CALCIUM ACETATE 667 MG: 667 CAPSULE ORAL at 08:01

## 2017-04-19 RX ADMIN — CARVEDILOL 3.12 MG: 3.12 TABLET, FILM COATED ORAL at 17:49

## 2017-04-19 RX ADMIN — CALCIUM ACETATE 667 MG: 667 CAPSULE ORAL at 17:49

## 2017-04-19 RX ADMIN — PRAVASTATIN SODIUM 40 MG: 10 TABLET ORAL at 21:25

## 2017-04-19 RX ADMIN — AMLODIPINE BESYLATE 10 MG: 10 TABLET ORAL at 21:25

## 2017-04-19 RX ADMIN — ACETAMINOPHEN 650 MG: 325 TABLET, FILM COATED ORAL at 11:12

## 2017-04-19 RX ADMIN — HYDROMORPHONE HYDROCHLORIDE 0.2 MG: 10 INJECTION, SOLUTION INTRAMUSCULAR; INTRAVENOUS; SUBCUTANEOUS at 20:54

## 2017-04-19 RX ADMIN — VITAMIN D, TAB 1000IU (100/BT) 1000 UNITS: 25 TAB at 08:01

## 2017-04-19 RX ADMIN — HYDROMORPHONE HYDROCHLORIDE 0.2 MG: 10 INJECTION, SOLUTION INTRAMUSCULAR; INTRAVENOUS; SUBCUTANEOUS at 17:49

## 2017-04-19 NOTE — PLAN OF CARE
Problem: Goal Outcome Summary  Goal: Goal Outcome Summary  OT/5A: Cancel- Per discussion with PT, pt with no acute OT needs at this time. Pt I in ADLs and no cognition concerns. Will defer OT at this time. Please reorder if new needs arise

## 2017-04-19 NOTE — PROGRESS NOTES
[facility]    Pulmonary, Allergy, Critical Care and Sleep Medicine    Follow-up Note  April 19, 2017    Assessment and Plan:  1. Iatrogenic Right Pneumothorax: Secondary to IR biopsy. Still has intermittent airleak even without maneuvers this am.  Did not get tube yesterday in IR, talked with them numerous times today.  They requested that we clamp tube and repeat CXR's to prove it increases size to allow safer procedure.  Initial looks like PTX increased but subsequent have been stable, unclear if that slight increase allowed the hole to seal?  IRasked that the tube be removed as the patient has pain there however patient and our team are not comfortable with that plan as she still has small leak this am and was under tension when first presented, in the event something got worse overnight would not have as easy access to get a repeat tube emergently.  Patient wants to leave it in overnight, will assess with CXR in the morning after remains clamped all night.  If PTX has not increased will then pull at that time.  -keep tube clamped overnight  -2view CXR in am  -if patient has any symptoms would unclamp tube (watching for air leak- and please note in the chart if happened) and get stat CXR    2. RLL adenocarcinoma: PET and MRI scheduled as an outpt can be done tomorrow.  I have talked with PET, Financial and both assured it should be covered and won't be an issue with being done as inpatient though previously scheduled as an outpt.      Seen and discussed with Dr Jeannette Lamar  Pulmonary/Critical Care Fellow      Interval History:  Patient very frustrated this am that did not get new tube yesterday and that wasn't told it wasn't going to happen.  Had panic attack last night due to her frustration.  This morning is still frustrated, but after relaying that we did not hear it wasn't going to happen just asked that there be better communication in the future between providers so  she does not wait around.  Updated patient throughout the day of changing plan as above.    Medications:       cholecalciferol  1,000 Units Oral Daily    And     calcium carbonate  1,500 mg Oral Daily     sodium chloride (PF)  3 mL Intracatheter Q8H     insulin bolus from AMBULATORY PUMP   Subcutaneous TID AC     insulin aspart   Device See Admin Instructions     aspirin EC  81 mg Oral QPM     calcium acetate  667 mg Oral TID w/meals     carvedilol  3.125 mg Oral BID w/meals     cholecalciferol  1,000 Units Oral Daily     furosemide  20 mg Oral Daily     pravastatin  40 mg Oral Daily     amLODIPine  10 mg Oral Daily       Physical Exam:    Vitals:    04/19/17 0242 04/19/17 0625 04/19/17 0900 04/19/17 1012   BP: 157/41 175/44     BP Location:  Left arm     Pulse:  70     Resp:  16     Temp:  98  F (36.7  C)     TempSrc:  Oral     SpO2:  96% 94%    Weight:    63.6 kg (140 lb 4.8 oz)     General: laying in bed in NAD  HEENT: anicteric, moist mucosa  Neck: no palpable lymphadenopathy, no JVD noted  Chest: CTAB, no wheezing, R sided chest tube with small air leak this morning- clamped when later seen  Cardiac: RRR no murmurs  Abdomen: Soft, flat, non tender, active BS  Extremities: No LE Edema  Neuro: A&Ox3, no focal defecits  Skin: no rash noted           Data:   All laboratory and imaging data reviewed.    CMP    Recent Labs  Lab 04/18/17  2109 04/18/17  0722 04/17/17  1355 04/17/17  1149 04/17/17  0749   NA  --  138  --   --  135   POTASSIUM  --  4.3 4.9 5.3 5.7*   CHLORIDE  --  105  --   --  106   CO2  --  24  --   --  20   ANIONGAP  --  9  --   --  9   GLC  --  199*  --   --  264*   BUN  --  52*  --   --  60*   CR  --  1.99*  --   --  2.36*   GFRESTIMATED  --  25*  --   --  20*   GFRESTBLACK  --  30*  --   --  25*   ZAHEER  --  9.0  --   --  9.2   MAG 2.2  --   --   --   --      CBC    Recent Labs  Lab 04/18/17  0722   WBC 8.6   RBC 3.78*   HGB 11.1*   HCT 35.5   MCV 94   MCH 29.4   MCHC 31.3*   RDW 13.3         INR    Recent Labs  Lab 04/18/17  0722   INR 1.06       Urine Studies    Recent Labs   Lab Test  08/04/14   0832   URINEPH  5.5   NITRITE  Negative   LEUKEST  Negative   WBCU  <1     Sputum Culture last 3 months:  Specimen Description   Date Value Ref Range Status   04/11/2017 Lung Biopsy Tissue  Final   04/11/2017 Lung Biopsy Tissue  Final    Culture Micro   Date Value Ref Range Status   04/11/2017 Culture negative after 1 week  Final

## 2017-04-19 NOTE — PLAN OF CARE
Problem: Goal Outcome Summary  Goal: Goal Outcome Summary  PT: PT evaluation complete and treatment initiated.  Pt supine in bed, agreeable to therapy but frustrated she has not had a new chest tube placed.  Pt ambulated 400' with no AD, pt was steady on her feet and no gait belt was placed due to insulin pump and chest tube placement.  Pt performed standing exercises and static balance.  She was encouraged to ambulated 3x a day with  and perform standing exercises.  Recommend she continue PT 3x a week to progress aerobic capacity and strength.  Anticipated d/c home from a PT standpoint when medically stable with support of .

## 2017-04-19 NOTE — PLAN OF CARE
Problem: Goal Outcome Summary  Goal: Goal Outcome Summary  Pt is alert and oriented x 4. Up to the bathroom with assist of 1. R chest tube to water seal. Complained of mid sternal pain and chest tube site around 8 pm. SANDI Tsai informed and seen pt. Had orders for stat troponin, magnesium, ECG and chest xray given.  Pt verbalized feeling better after dressing on chest tube site was changed. Ambulatory insulin pump in place. BG monitored overnight. Ongoing tele monitoring. Vital signs stable.  at bedside overnight. Possible chest tube replacement today. Will continue to monitor and follow plan of care

## 2017-04-19 NOTE — PROGRESS NOTES
04/19/17 0800   Quick Adds   Type of Visit Initial PT Evaluation       Present no   Living Environment   Lives With significant other   Living Arrangements ([facility])   Home Accessibility bed and bath on same level   Number of Stairs to Enter Home 1  (through front door, 2 through garage)   Number of Stairs Within Home (16 stairs to go upstairs but only used for guests )   Transportation Available car  (pt drives and has support of  )   Self-Care   Dominant Hand right   Usual Activity Tolerance good  (laundry, taking care of grandchildren)   Current Activity Tolerance fair   Regular Exercise yes   Activity/Exercise Type walking   Equipment Currently Used at Home none   Functional Level Prior   Ambulation 0-->independent   Transferring 0-->independent   Toileting 0-->independent   Bathing 0-->independent   Dressing 0-->independent   Eating 0-->independent   Communication 0-->understands/communicates without difficulty   Swallowing 0-->swallows foods/liquids without difficulty   Cognition 0 - no cognition issues reported   Fall history within last six months no   Which of the above functional risks had a recent onset or change? ambulation;transferring   General Information   Onset of Illness/Injury or Date of Surgery - Date 04/17/17   Referring Physician Carmen Garcia APRN CNP   Patient/Family Goals Statement 4/16/17   Pertinent History of Current Problem (include personal factors and/or comorbidities that impact the POC) newly diagnosed lung adenocarcinoma who presented to outside ED w/ dyspnea and was found to have large right pneumothorax (likely iatrogenic following lung biospy on 4/11) now s/p chest tube placement. Transferred to [facility] for further management.    Precautions/Limitations no known precautions/limitations  (chest tube )   Weight-Bearing Status - LUE full weight-bearing   Weight-Bearing Status - RUE full weight-bearing   Weight-Bearing Status - LLE full  weight-bearing   Weight-Bearing Status - RLE full weight-bearing   General Observations Supine in bed with  present    General Info Comments Chest tube in place, O2 monitor    Cognitive Status Examination   Orientation orientation to person, place and time   Level of Consciousness alert   Follows Commands and Answers Questions 100% of the time   Personal Safety and Judgment intact   Memory intact   Pain Assessment   Patient Currently in Pain Yes, see Vital Sign flowsheet   Integumentary/Edema   Integumentary/Edema no deficits were identifed   Posture    Posture Not impaired   Range of Motion (ROM)   ROM Comment not formally assessed but able to perform all functional mobility and standing LE exercises, limited R UE flexion due to past shoulder injury and pain    Strength   Strength Comments Not formally assess but able to perform all functional mobility and LE standing ex's    Bed Mobility   Bed Mobility Comments all bed mobility SBA    Transfer Skills   Transfer Comments all transfers SBA    Gait   Gait Comments 400' SBA while managing chest tubing, no AD, no gait belt applied due to chest tube and insulin pump, pt was steady on feet    Balance   Balance Comments SBA EOB balance, SBA standing balance WBOS (EO, EC), NBOS, tandem, needed UE support with one legged stance.    Sensory Examination   Sensory Perception Comments past history of tingling in R UE    General Therapy Interventions   Planned Therapy Interventions balance training;progressive activity/exercise;home program guidelines;strengthening;stretching;ROM;neuromuscular re-education;gait training   Clinical Impression   Criteria for Skilled Therapeutic Intervention yes, treatment indicated   PT Diagnosis impaired aerobic capacity    Influenced by the following impairments decreased strength, chest tube, SOB    Functional limitations due to impairments aerobic activity   Clinical Presentation Stable/Uncomplicated   Clinical Presentation Rationale Pt  "SBA with all mobility   Clinical Decision Making (Complexity) Low complexity   Therapy Frequency` 3 times/week   Predicted Duration of Therapy Intervention (days/wks) 7 days   Anticipated Discharge Disposition Home with Assist  (assist from  )   Risk & Benefits of therapy have been explained Yes   Patient, Family & other staff in agreement with plan of care Yes   [facility] TM \"6 Clicks\"   2016, Trustees of [facility], under license to [employer].  All rights reserved.   6 Clicks Short Forms Basic Mobility Inpatient Short Form   [facility]  \"6 Clicks\" V.2 Basic Mobility Inpatient Short Form   1. Turning from your back to your side while in a flat bed without using bedrails? 4 - None   2. Moving from lying on your back to sitting on the side of a flat bed without using bedrails? 4 - None   3. Moving to and from a bed to a chair (including a wheelchair)? 4 - None   4. Standing up from a chair using your arms (e.g., wheelchair, or bedside chair)? 4 - None   5. To walk in hospital room? 4 - None   6. Climbing 3-5 steps with a railing? 3 - A Little   Basic Mobility Raw Score (Score out of 24.Lower scores equate to lower levels of function) 23   Total Evaluation Time   Total Evaluation Time (Minutes) 8     "

## 2017-04-19 NOTE — CONSULTS
IR consulted 4/19 for replacement of right sided chest tube placed at OSH 4/14. Right sided pneumothorax post IR biopsy on 4/11. Current chest tube placed at OSH in the ER. Large bore chest is uncomfortable for patient. Her team reports she denies SOB and oxygen saturation is WNLs on room air. There is a small air leak noted.     Reviewed imaging with IR attendings Dr. Serrano and Dr. Flores. Per chest xray from 4/18, there is a tiny right apical pneumothorax and sidehole of current chest tube is outside of right lateral hemithorax. Per our request, inpatient team clamped chest tube and repeated chest xray 30 minutes post. There was no change in small apical pneumothorax. There is no indication for placement of apical chest tube at this time and IR does not have a safe window for placement. Dr. Serrano recommends the current chest tube be pulled and patient be followed with serial chest xrays. This current chest tube is not only causing the patient pain, but is malpositioned and could be contributing to current small pneumo. If pneumothorax grows after CT is pulled, IR can place a small bore chest tube at that time.     Case discussed between Dr. Serrano and pulmonary fellow, Dr. Lamar. Primary medicine team also updated.    Perla Ritter, MERI, APRN  Interventional Radiology   Phone: [phone]  Pager: [phone]

## 2017-04-19 NOTE — PROGRESS NOTES
[facility], [place]    Internal Medicine Progress Note - Gold Service      Assessment & Plan   Yue Valenzuela is a 71 year old female admitted on 4/16/2017. She has a history of Type 1 DM, CKD Stage IV, factor V Leiden mutation, HTN, HLD, CAD s/p stent placement, PVD, carotid artery stenosis, asthma, and newly diagnosed lung adenocarcinoma (4/14/17) and is admitted from an OSH for further management of R sided iatrogenic pneumothorax with CT in place, following lung biopsy on 4/11.     # Right pneumothorax, likely iatrogenic - Stable.  0.5L NC overnight, room air this am with sats 94-96%. Asymptomatic.  CXR on 4/17 shows malpositioned CT projected outside pleural space, diffuse R sided opacities.  Per Pulmonary, small airleak and persistent residual ptx slightly increased from CXR on 4/18 - will benefit from smaller CT.  Pt did not have CT exchange yesterday.  D/w IR, Pulmonary on 4/19.  CT clamped and serial CXRs obtained - slight increase ptx from CXR 4/18. Patient is s/p R lung bx on 4/11 with worsening dyspnea on 4/14.        - Pulmonary following, appreciate recommendations - no CT replacement today, will repeat CXR in am   - CT to be clamped overnight - if patient symptomatic w/ chest pain, worsening dyspnea, or increased O2 requirements please unclamp CT and obtain STAT CXR  - Continue Tylenol and Dilaudid PRN for pain     # Lung adenocarcinoma - Newly diagnosed on 4/14.  Follows with Dr. Plaza and established with [facility] for PET scan and MRI brain, scheduled on 4/20 here at [facility].   - Will have PET and MRI tomorrow am 4/20 - Ativan 0.5mg PO x 1 dose ONCE ordered for anxiety, please give prior to transport, can offer Ativan 0.25mg PO x 1 dose if anxiety not improved on 0.5mg  - Pulmonary following    # CKD Stage IV - BUN 44 (52), Cr 1.72 (1.99) this am 4/19. BL appears to be 2.2 - 2.6. Follows with Dr. Vinicius Sim at [facility] outpatient, last seen on 4/6. Had been  pursuing workup for kidney transplant at [facility] prior to CA on 4/14.    - BMP in am     # Hyperkalemia - Improved. K 4.3 this am. Appears to have chronically high K 2/2 advanced CKD, long term use w/ Lisinopril likely also contributory. Managed at home with kayexalate 1-2x weekly. BL typically 5.0-5.5.   - Recheck BMP in am   - Hold PTA Lisinopril   - Continue tele     # Type DM 1 - Ha1c 6.9% on 4/5.  BG 72 - 199 last 24 hr.  On insulin pump PTA.  - Continue home insulin pump  - Continue BG checks AC/HS  - Hypoglycemia protocol in place - pt usually drinks Sprite to correct when BG feels low, ok if spouse brings in from home     # CAD, Hypertension - Stable.   - Continue PTA Coreg, Norvasc, and Lasix  - Hold PTA Lisinopril - will increase other antihypertensives where possible if BP not adequately controlled  - Hydralazine 10mg Q6 hr PRN for SBP > 160   - Continue PTA Pravastatin     Diet: Renal Diet (non-dialysis)  DVT Prophylaxis: mechanical   Code Status: Full Code    Disposition Plan   Expected discharge: 2 - 3 days; likely discharge to home pending PT/OT evaluation      Entered: Carmen Garcia 04/19/2017, 1:30 PM   Information in the above section will display in the discharge planner report.      The patient's care was discussed with the Attending Physician, Dr. Cruz Baer.    Carmen Garcia  Internal Medicine Staff Hospitalist Service  [facility]  Pager: [phone]  Please see sticky note for cross cover information    Interval History   Patient reports dull, achy pain at chest tube insertion site, but improved with dressing change.  No pain with inspiration, mostly occurring with movement of R arm, R side.  She denies fevers, chills, abdominal pain, nausea, vomiting, chest pain, and dyspnea.       Data reviewed today: I reviewed all medications, new labs and imaging results over the last 24 hours.     Physical Exam   Vital Signs: Temp: 98  F (36.7  C) Temp src: Oral BP: 175/44  Pulse: 70   Resp: 16 SpO2: 94 % O2 Device: None (Room air) Oxygen Delivery: 1/2 LPM  Weight: 140 lbs 4.8 oz  General Appearance: AAO x 4. Well nourished, well developed. No acute distress.   HEENT: Normocephalic, atraumatic. Anicteric sclera. Mucous membranes moist.   Respiratory: Effort normal on room air. Lung sounds CTAB.  Cardiovascular: RRR. S1, S2. No murmurs appreciated.   GI: Abdomen soft and non distended, bowel sounds present x all 4 quadrants. No tenderness, rebound, guarding.  Skin: Grossly warm, dry, and intact. No jaundice.  Neurological: No focal deficits. Moves all extremities         Data   Data     Recent Labs  Lab 04/19/17  0704 04/18/17  2109 04/18/17  0722 04/17/17  1355  04/17/17  0749   WBC  --   --  8.6  --   --   --    HGB  --   --  11.1*  --   --   --    MCV  --   --  94  --   --   --    PLT  --   --  178  --   --   --    INR  --   --  1.06  --   --   --      --  138  --   --  135   POTASSIUM 4.3  --  4.3 4.9  < > 5.7*   CHLORIDE 107  --  105  --   --  106   CO2 24  --  24  --   --  20   BUN 44*  --  52*  --   --  60*   CR 1.72*  --  1.99*  --   --  2.36*   ANIONGAP 9  --  9  --   --  9   ZAHEER 9.9  --  9.0  --   --  9.2   *  --  199*  --   --  264*   TROPI  --  0.019  --   --   --   --    < > = values in this interval not displayed.

## 2017-04-19 NOTE — PLAN OF CARE
Problem: Goal Outcome Summary  Goal: Goal Outcome Summary  Outcome: No Change  Pt AOx4, VSS on RA. Up independently to bathroom and hallways.  at bedside. Tele NSR. C/o pain around right chest tube, PRN IV dilaudid 0.2 mg provided for relief. PRN tylenol also available. Dressing around site CDI. Chest tube taped to pt's side, which has helped relieve some of the pain when the chest tube moves. RPIV saline locked. Chest tube clamped at 1100, RN to unclamp if pt experiences any SOB. Asymptomatic since tube has been clamped. Multiple xrays today to determine status of pneumo. Recheck xray will be done tomorrow and whether or not the tube itself will be removed or replaced is still pending. PET scan scheduled tomorrow at 0645. Per pt request, RN/NST to check BG at 0530 and goal -140. Voiding WDL, no BM today. Renal diet with good appetite. Calls appropriately and makes needs known. Will continue to monitor and follow POC.

## 2017-04-19 NOTE — TELEPHONE ENCOUNTER
Called  per request.  He expresses frustration with delays in chest tube exchange and just wanted me to be aware of the frustration. He has expressed these concerns to the pulmonary consult team as well as the primary medicine team. I apologized for the delays and explained that sometimes non-emergent cases have to be bumped for emergent cases but that I did not know the details in Yue's case. I will forward this message to Gold Team and Pulmonary Consult Team as FYI of my communication with the family.     John Plaza MD  Pulmonary and Critical Care  [facility]  Pager:  [phone]

## 2017-04-19 NOTE — PROGRESS NOTES
Brief Medicine Note    Contacted by RN regarding patient with complaints of sharp right-sided CP with radiation to the right scapula occurring with movement. No sob, palpitations, diaphoresis reported. VSS. Patient states that she has experienced this pain previously on hospitalization and has taken tylenol with relief, though she believes that she may have gotten behind on dosing today. Chest tube dressing changed by RN with improvement in sx. Patient voiced frustration regarding IR schedule for CT replacement. Concerns validated and plan to address in am. Given significant cardiac hx EKG and Troponin ordered. CXR to evaluate CT position.   - Continue to monitor    Quin Nascimento PA-C  Hospitalist Medicine  Pager: [phone]

## 2017-04-20 ENCOUNTER — APPOINTMENT (OUTPATIENT)
Dept: PET IMAGING | Facility: CLINIC | Age: 72
DRG: 200 | End: 2017-04-20
Attending: CLINICAL NURSE SPECIALIST
Payer: MEDICARE

## 2017-04-20 ENCOUNTER — APPOINTMENT (OUTPATIENT)
Dept: MRI IMAGING | Facility: CLINIC | Age: 72
DRG: 200 | End: 2017-04-20
Attending: CLINICAL NURSE SPECIALIST
Payer: MEDICARE

## 2017-04-20 ENCOUNTER — APPOINTMENT (OUTPATIENT)
Dept: GENERAL RADIOLOGY | Facility: CLINIC | Age: 72
DRG: 200 | End: 2017-04-20
Attending: NURSE PRACTITIONER
Payer: MEDICARE

## 2017-04-20 LAB
ANION GAP SERPL CALCULATED.3IONS-SCNC: 10 MMOL/L (ref 3–14)
BUN SERPL-MCNC: 42 MG/DL (ref 7–30)
CALCIUM SERPL-MCNC: 10.1 MG/DL (ref 8.5–10.1)
CHLORIDE SERPL-SCNC: 109 MMOL/L (ref 94–109)
CO2 SERPL-SCNC: 24 MMOL/L (ref 20–32)
CREAT SERPL-MCNC: 1.62 MG/DL (ref 0.52–1.04)
GFR SERPL CREATININE-BSD FRML MDRD: 31 ML/MIN/1.7M2
GLUCOSE BLDC GLUCOMTR-MCNC: 152 MG/DL (ref 70–99)
GLUCOSE BLDC GLUCOMTR-MCNC: 159 MG/DL (ref 70–99)
GLUCOSE BLDC GLUCOMTR-MCNC: 160 MG/DL (ref 70–99)
GLUCOSE BLDC GLUCOMTR-MCNC: 185 MG/DL (ref 70–99)
GLUCOSE BLDC GLUCOMTR-MCNC: 211 MG/DL (ref 70–99)
GLUCOSE BLDC GLUCOMTR-MCNC: 232 MG/DL (ref 70–99)
GLUCOSE BLDC GLUCOMTR-MCNC: 242 MG/DL (ref 70–99)
GLUCOSE BLDC GLUCOMTR-MCNC: 259 MG/DL (ref 70–99)
GLUCOSE BLDC GLUCOMTR-MCNC: 59 MG/DL (ref 70–99)
GLUCOSE BLDC GLUCOMTR-MCNC: 81 MG/DL (ref 70–99)
GLUCOSE SERPL-MCNC: 242 MG/DL (ref 70–99)
POTASSIUM SERPL-SCNC: 4.5 MMOL/L (ref 3.4–5.3)
SODIUM SERPL-SCNC: 143 MMOL/L (ref 133–144)

## 2017-04-20 PROCEDURE — 99207 ZZC APP CREDIT; MD BILLING SHARED VISIT: CPT | Performed by: NURSE PRACTITIONER

## 2017-04-20 PROCEDURE — 71020 XR CHEST 2 VW: CPT

## 2017-04-20 PROCEDURE — A9552 F18 FDG: HCPCS | Performed by: INTERNAL MEDICINE

## 2017-04-20 PROCEDURE — A9270 NON-COVERED ITEM OR SERVICE: HCPCS | Mod: GY | Performed by: INTERNAL MEDICINE

## 2017-04-20 PROCEDURE — 25000128 H RX IP 250 OP 636: Performed by: PHYSICIAN ASSISTANT

## 2017-04-20 PROCEDURE — A9270 NON-COVERED ITEM OR SERVICE: HCPCS | Mod: GY | Performed by: PHYSICIAN ASSISTANT

## 2017-04-20 PROCEDURE — A9270 NON-COVERED ITEM OR SERVICE: HCPCS | Mod: GY | Performed by: NURSE PRACTITIONER

## 2017-04-20 PROCEDURE — 36415 COLL VENOUS BLD VENIPUNCTURE: CPT | Performed by: NURSE PRACTITIONER

## 2017-04-20 PROCEDURE — 00000146 ZZHCL STATISTIC GLUCOSE BY METER IP

## 2017-04-20 PROCEDURE — 99231 SBSQ HOSP IP/OBS SF/LOW 25: CPT | Performed by: INTERNAL MEDICINE

## 2017-04-20 PROCEDURE — 25000132 ZZH RX MED GY IP 250 OP 250 PS 637: Mod: GY | Performed by: NURSE PRACTITIONER

## 2017-04-20 PROCEDURE — 80048 BASIC METABOLIC PNL TOTAL CA: CPT | Performed by: NURSE PRACTITIONER

## 2017-04-20 PROCEDURE — 25800025 ZZH RX 258: Performed by: PHYSICIAN ASSISTANT

## 2017-04-20 PROCEDURE — 12000008 ZZH R&B INTERMEDIATE UMMC

## 2017-04-20 PROCEDURE — 34300033 ZZH RX 343: Performed by: INTERNAL MEDICINE

## 2017-04-20 PROCEDURE — 25000132 ZZH RX MED GY IP 250 OP 250 PS 637: Mod: GY | Performed by: INTERNAL MEDICINE

## 2017-04-20 PROCEDURE — 25000132 ZZH RX MED GY IP 250 OP 250 PS 637: Mod: GY | Performed by: PHYSICIAN ASSISTANT

## 2017-04-20 RX ORDER — LISINOPRIL 10 MG/1
10 TABLET ORAL DAILY
Status: DISCONTINUED | OUTPATIENT
Start: 2017-04-20 | End: 2017-04-21 | Stop reason: HOSPADM

## 2017-04-20 RX ADMIN — CARVEDILOL 3.12 MG: 3.12 TABLET, FILM COATED ORAL at 18:31

## 2017-04-20 RX ADMIN — PRAVASTATIN SODIUM 40 MG: 10 TABLET ORAL at 21:51

## 2017-04-20 RX ADMIN — LISINOPRIL 10 MG: 10 TABLET ORAL at 21:51

## 2017-04-20 RX ADMIN — Medication 1500 MG: at 09:17

## 2017-04-20 RX ADMIN — CALCIUM ACETATE 667 MG: 667 CAPSULE ORAL at 18:31

## 2017-04-20 RX ADMIN — VITAMIN D, TAB 1000IU (100/BT) 1000 UNITS: 25 TAB at 09:17

## 2017-04-20 RX ADMIN — AMLODIPINE BESYLATE 10 MG: 10 TABLET ORAL at 21:51

## 2017-04-20 RX ADMIN — DEXTROSE MONOHYDRATE 50 ML: 25 INJECTION, SOLUTION INTRAVENOUS at 21:46

## 2017-04-20 RX ADMIN — CARVEDILOL 3.12 MG: 3.12 TABLET, FILM COATED ORAL at 13:58

## 2017-04-20 RX ADMIN — FLUDEOXYGLUCOSE F-18 10.16 MCI.: 500 INJECTION, SOLUTION INTRAVENOUS at 10:51

## 2017-04-20 RX ADMIN — CALCIUM ACETATE 667 MG: 667 CAPSULE ORAL at 13:56

## 2017-04-20 RX ADMIN — HYDROMORPHONE HYDROCHLORIDE 0.2 MG: 10 INJECTION, SOLUTION INTRAMUSCULAR; INTRAVENOUS; SUBCUTANEOUS at 08:04

## 2017-04-20 RX ADMIN — Medication 0.25 MG: at 09:57

## 2017-04-20 RX ADMIN — LORAZEPAM 0.5 MG: 0.5 TABLET ORAL at 06:36

## 2017-04-20 RX ADMIN — ASPIRIN 81 MG: 81 TABLET, COATED ORAL at 21:51

## 2017-04-20 NOTE — PLAN OF CARE
Problem: Goal Outcome Summary  Goal: Goal Outcome Summary  Pt is alert and oriented x 4. Ambulating in the room with standby assist. Has ambulatory insulin pump. Complained of right rib cage pain with relief from prn pain med. Right chest tube clamped overnight. On room air , denies shortness of breath, chest pain overnight. O2 sats in the low to mid 90's. Noted with high SBP, refused prn med, Zohaib mendoza MD informed. BG monitored overnight. BG around 2 am was 81, insulin pump stopped by pt. Rechecked BG at 0515, result is 159, pt restarted insulin pump at 0.5 units/hr. Maintained on NPO after midnight for PET scan today. Evening NST scheduled transport to bring pt to PET scan at 0645. Continued on tele monitor overnight. For chest Xray this morning.  at bedside overnight. Spoke with Ramos from PET scan, informed him of blood sugar readings and Insulin pump rate. He said Pt should have no insulin at least 2 hours before the procedure. Pt informed and got anxious. Per Ramos, insulin pump can be stopped and okay to give med with water. He will call back for further instructions. Zohaib Fishman informed. Will continue to monitor and follow plan of care

## 2017-04-20 NOTE — DISCHARGE SUMMARY
[facility], [place]    Internal Medicine Discharge Summary- Gold Service    Date of Admission:  4/16/2017  Date of Discharge:  4/21/2017  Discharging Provider: Antonietta Cortez PA-C/ Dr. Baer  Discharge Team: Gold 2    Discharge Diagnoses   1. Right pneumothorax, iatrogenic   2. Lung adenocarcinoma  3. CKD Stage IV   4. Hyperkalemia   5. Type 1 DM   6. CAD w/ HTN       Follow-ups Needed After Discharge   Follow up with Pulmonology, Thoracic pending results of PET-CT and MRI  Follow up with PCP in 5-7 days for recheck BMP, CBC  Follow up with Nephrologist OP for recheck Cr, BUN, K and adjustment of HTN meds   Follow up with Endocrinology     Hospital Course   Yue Valenzuela was admitted from an OSH on 4/16/2017 for further management of R sided iatrogenic pneumothorax with CT in place, following lung biopsy on 4/11..  The following problems were addressed during her hospitalization:    # Right pneumothorax, likely iatrogenic - Stable. Large bore CT removed by Pulmonary at bedside on 4/20. No evidence or symptoms of PTX on discharge.    # Lung adenocarcinoma - Newly diagnosed on 4/14. Follows with Dr. Plaza and established with [facility]. S/p PET CT and MRI brain 4/20 without evidence of metastatic disease. Pulmonary following.   - Follow up with Thoracic Surgery next Thursday to discuss mediastinoscopy and R lower lobectomy. Clinic will call to schedule this appointment.       # CKD Stage IV - BUN 36, Cr 1.47 at discharge.  BL appears to be 2.2 - 2.6. Follows with Dr. Vinicius Sim at [facility] outpatient, last seen on 4/6. Had been pursuing workup for kidney transplant at [facility] prior to CA on 4/14. Recommend follow up and repeat BMP, CBC with Nephrologist OP.      # Hyperkalemia - Improved. K stable at 4.3 at discharge. Appears to have chronically high K 2/2 advanced CKD, long term use w/ Lisinopril likely also contributory. Managed at home with kayexalate 1-2x weekly. BL typically  5.0-5.5.  Recommend follow up and repeat BMP, CBC with Nephrologist OP.      # Type DM 1 - Ha1c 6.9% on 4/5.  at discharge. Continue insulin pump.  No issues this hospitalization. Follow up with Endocrinologist as planned.     # CAD, Hypertension - Labile BPs this admission likely due to med changes and acute illness, have stabilized at discharged.  Patient restarted on PTA antihypertensives. Should follow up with nephrologist for ongoing management.       Consultations This Hospital Stay   PULMONARY GENERAL ADULT IP CONSULT  PHARMACY IP CONSULT  PHYSICAL THERAPY ADULT IP CONSULT  OCCUPATIONAL THERAPY ADULT IP CONSULT  MEDICATION HISTORY IP PHARMACY CONSULT  INTERVENTIONAL RADIOLOGY IP CONSULT  VASCULAR ACCESS CARE ADULT IP CONSULT     Code Status   Full Code    Time Spent on this Encounter   I, Antonietta Cortez, personally saw the patient today and spent greater than 30 minutes discharging this patient.       Antonietta Cortez  Internal Medicine Staff Hospitalist Service  [facility]  Pager: [phone]  ______________________________________________________________________    Physical Exam   Vital Signs: Temp: 97.6  F (36.4  C) Temp src: Oral BP: 183/45 Pulse: 70   Resp: 16 SpO2: 94 % O2 Device: None (Room air)    Weight: 138 lbs 3.2 oz    General Appearance: Alert and oriented x 3, NAD.   Respiratory: Effort normal on RA, lungs CTAB  Cardiovascular: RRR, no murmurs or gallops appreciated  GI: Abdomen soft and non distended, bowel sounds active in all four quadrants. No tenderness, rebound or guarding  Skin: Warm and dry to touch, no jaundice  Other: No focal deficits    Significant Results and Procedures   Results for orders placed or performed during the hospital encounter of 04/16/17   XR Chest 2 Views     Value    Radiologist flags (Urgent)     Right apical pneumothorax. Malpositioned chest tube.    Narrative    Exam: XR CHEST 2 VW, 4/17/2017 9:08 AM    Indication: f/u PNTX    Comparison:  Chest radiograph 4/11/2017. CT chest 4/20/2017.    Findings:   PA and lateral views of the chest. Patient is rotated to the right.  Right apical pneumothorax with right-sided chest tube sidehole  appearing to project outside the pleural space. Cardiac silhouette  within normal limits with partial obscuration of the right heart  border. There is been interval increase in asymmetric perihilar and  basilar opacities, right greater than left, when compared to prior  from 4/11/2017. Small right pleural effusion. Left costophrenic angle  is clear. No pneumothorax or left lung. No acute bony abnormalities.  Upper abdomen is unremarkable.      Impression    Impression:   1. Small right apical pneumothorax with chest tube with sidehole  appearing to project outside the pleural space. Recommend  repositioning.  2. Interval increase in patchy, asymmetric perihilar and bibasilar  opacities, right greater than left. Asymmetric distribution favors  infection/aspiration versus edema.  3. Small right-sided pleural effusion,    [Urgent Result: Right apical pneumothorax. Malpositioned chest tube.]    Finding was identified on 4/17/2017 1:20 PM.     SANDI Martinez was contacted by Dr. Garza at 4/17/2017 1:33 PM and  verbalized understanding of the urgent finding.      I have personally reviewed the examination and initial interpretation  and I agree with the findings.    JOSE MANUEL LAUREANO MD   XR Chest 2 Views    Narrative    PA and lateral chest    HISTORY: Chest tube placement    COMPARISON STUDY: 4/17/2017    FINDINGS: Right apical chest tube with sidehole external to the  pleural space is again seen. Small right pneumothorax slightly  decreased. Patchy airspace opacity is noted in the right lung is again  noted. Cardiac silhouette is not enlarged.      Impression    IMPRESSION: Slight decrease in right pneumothorax. Sidehole of the  right chest tube is external to the pleural space and repositioning  should be considered. Airspace  opacities in the right lung  significantly changed..    JOSE MANUEL LAUREANO MD   XR Chest Port 1 View    Narrative    EXAM: XR CHEST PORT 1 VW  4/18/2017 9:25 PM      HISTORY: CT in place and with acute sternal pain . Please eval CT  placement and for acute pathology    COMPARISON: Earlier same day, 4/17/2017    FINDINGS: Stable position of right apical chest tube with the side  whole projecting lateral to the right hemithorax. Tiny right apical  pneumothorax not significantly changed. Cardiac silhouette is stable.  Patchy right basilar airspace opacities unchanged. Trace right pleural  effusion is not significantly changed. Left lung is clear.       Impression    IMPRESSION:   1. Stable tiny right apical pneumothorax. Sidehole of right apical  chest tube remains outside of the lateral right hemithorax. Consider  repositioning.  2. Stable patchy right basilar airspace opacities concerning for  infection or atelectasis.    I have personally reviewed the examination and initial interpretation  and I agree with the findings.    JIMY GUPTA MD   XR Chest 2 Views    Narrative    Exam: XR CHEST 2 VW, 4/19/2017 11:00 AM    Indication: serial CXR for ptx    Comparison: Chest radiograph 4/18/2017.    Findings:   PA and lateral views the chest. Right apical chest tube stable  position with sidehole projecting outside the pleural cavity. Right  chest wall subcutaneous emphysema. Stable appearance small right  apical pneumothorax. No pneumothorax on the left. Cardiac silhouette  and pulmonary vascular are within normal limits. Patchy right  perihilar and basilar opacities are slightly increased compared to  prior. Small right pleural effusion, stable. No acute bony  abnormalities. Upper abdomen is unremarkable.      Impression    Impression:   1. Stable small right apical pneumothorax, no significant change,  allowing for respiratory variation. Right chest tube with sidehole  projecting outside the pleural cavity,  unchanged in location. Consider  repositioning.  2. Slight interval increase in right perihilar and basilar opacities,  concerning for infection versus atelectasis.  3. Small right pleural effusion.    I have personally reviewed the examination and initial interpretation  and I agree with the findings.    JOSHUA PORTILLO MD   XR Chest 2 Views    Narrative    Exam: XR CHEST 2 VW, 4/19/2017 12:29 PM    Indication: serial CXR d/t airleak, ptx    Comparison: Same-day chest radiograph 3 hours prior.    Findings:   PA and lateral views the chest. Stable appearance small right apical  pneumothorax. Right apical chest tube with sidehole projecting outside  the pleural cavity is again noted and unchanged in position from prior  exam. No left-sided pneumothorax. Cardiac silhouette and pulmonary  vasculature are within normal limits. Stable appearance right  perihilar and basilar dominant opacities. Small right pleural  effusion, stable. Left costophrenic angle is clear. No acute bony  bodies. Appearance is unremarkable.      Impression    Impression:   1. No significant change small right apical pneumothorax. Right apical  chest tube with sidehole projecting outside the pleural cavity,  unchanged in location. Recommend repositioning.  2. Stable patchy right perihilar and basilar opacities, concerning for  infection/aspiration versus atelectasis.  2. Stable small right pleural effusion.    I have personally reviewed the examination and initial interpretation  and I agree with the findings.    JOSHUA PORTILLO MD   XR Chest 2 Views    Narrative    Exam: XR CHEST 2 VW, 4/19/2017 2:38 PM    Indication: repeat exam of pneumothorax    Comparison: Multiple same-day chest radiographs.    Findings:   PA and lateral view the chest. Ossific change in small right apical  pneumothorax. Right apical chest tube with sidehole projecting outside  the pleural cavity is unchanged in location. Small right chest wall  subcutaneous emphysema.  No left-sided pneumothorax. Trach silhouette  and pulmonary vasculature are within normal limits. Stable patchy  right perihilar and basilar opacities. Stable small right pleural  effusion. No pleural effusion. No bony abnormalities. Upper abdomen is  unremarkable.      Impression    Impression:   1. No significant change small right apical pneumothorax. Right apical  chest with sidehole outside the pleural cavity, unchanged. Recommend  repositioning.  2. Stable patchy perihilar and basilar opacities concerning for  infection versus atelectasis.    I have personally reviewed the examination and initial interpretation  and I agree with the findings.    JOSHUA PORTILLO MD   XR Chest 2 Views    Narrative    Exam: XR CHEST 2 VW, 4/20/2017 8:31 AM    Indication: serial CXR for monitoring ptx    Comparison: Chest radiograph 4/19/2017    Findings:   PA and lateral views of chest. Residual tiny apical pneumothorax with  chest tube in stable location and sidehole projecting outside the  pleural cavity. Resolving small right chest wall subcutaneous  emphysema. No left pneumothorax. Stable small right pleural effusion.  Left costophrenic angle is clear. Cardiac silhouette and pulmonary  vasculature are within normal limits. Stable patchy perihilar and  bibasilar opacities. No acute bony abnormalities. Upper abdomen is  unremarkable.      Impression    Impression:   1. Tiny residual right apical pneumothorax. Right chest tube with  sidehole projecting outside the pleural cavity, stable.  2. Stable minimal perihilar and bibasilar opacities. Infection versus  atelectasis.    I have personally reviewed the examination and initial interpretation  and I agree with the findings.    JOSE MANUEL LAUREANO MD   MRI Brain w/o contrast    Narrative    MR BRAIN W/O CONTRAST 4/20/2017 10:41 AM    Provided History: Staging lung cancer, Malignant neoplasm of  unspecified part of unspecified bronchus or lung    Comparison:  No similar prior  studies     Technique: Sagittal T1-weighted and axial T2-weighted, turboFLAIR and  diffusion-weighted with ADC map images of the brain were obtained  without intravenous contrast due to patient's elevated creatinine.    Findings:   Study limited by lack of IV contrast.    Changes of suboccipital craniectomy. The vermis appears surgically  removed.    Mild prominence of the lateral, third and fourth ventricles as well as  mild enlargement of the cerebral sulci.    Scattered T2 hyperintensities in the periventricular and subcortical  white matter.    These images reveal no intracranial mass lesion, mass effect, midline  shift or abnormal extraaxial fluid collection. Diffusion-weighted  images demonstrate no restricted diffusion.  Normal intravascular flow  voids are identified.      Impression    Impression:  1. Study somewhat limited by lack of IV contrast, however there is no  intracranial mass lesion.  2. Cerebral volume loss, with enlargement of the ventricular system  which may be slightly more than expected compared to the sulci.  Noncommunicating hydrocephalus is difficult to exclude. Recommend  correlation with outside images.  3. Moderate leukoaraiosis.    I have personally reviewed the examination and initial interpretation  and I agree with the findings.    DIA GOODMAN MD   PET Oncology Whole Body    Narrative    Combined Report of:    PET and CT on  4/20/2017 12:36 PM :    1. PET of the neck, chest, abdomen, and pelvis.  2. PET CT Fusion for Attenuation Correction and Anatomical  Localization:    3. Diagnostic CT scan of the chest, abdomen, and pelvis with  intravenous contrast for interpretation.  3. CT of the chest, abdomen and pelvis obtained for diagnostic  interpretation.  4. 3D MIP and PET-CT fused images were processed on an independent  workstation and archived to PACS and reviewed by a radiologist.    Technique:    1. PET: The patient received 10.16 mCi of F-18-FDG; the serum glucose  was  185 prior to administration, body weight was 62.7 kg. Images were  evaluated in the axial, sagittal, and coronal planes as well as the  rotational whole body MIP. Images were acquired from the Vertex to the  Feet.    UPTAKE WAS MEASURED AT 70 MINUTES.     2. CT: Volumetric acquisition for clinical interpretation of the  chest, abdomen, and pelvis acquired at 3 mm sections without  intravenous contrast The chest, abdomen, and pelvis were evaluated at  5 mm sections in bone, soft tissue, and lung windows.      3. 3D MIP and PET-CT fused images were processed on an independent  workstation and archived to PACS and reviewed by a radiologist.    INDICATION: LUNG CANCER, Malignant neoplasm of unspecified part of  unspecified bronchus or lung    ADDITIONAL INFORMATION OBTAINED FROM EMR: 71-year-old female with a  history of recently diagnosed right lower lobe lung cancer, with  recent biopsy complicated by pneumothorax requiring chest tube  placement    COMPARISON: CT 4/11/2017, 4/4/2017.    FINDINGS:     HEAD/NECK:  There is no  suspicious FDG uptake in the neck.     The paranasal sinuses are clear. The mastoid air cells are clear. The  mucosal pharyngeal space, the , prevertebral and carotid  spaces are within normal limits. No masses, mass effect or  pathologically enlarged lymph nodes are evident. The thyroid gland is  multinodular, with the largest nodule in the right measuring up to 2.1  cm. No associated increased FDG uptake.    CHEST:  There are postprocedural changes to the right lateral chest wall  corresponding to the recent surgical chest tube, which is no longer  present. There are small foci of subcutaneous trace emphysema. The  biopsy-proven right lower lobe adenocarcinoma continues to be of mixed  density but does appear larger with an increased solid component when  compared to the CT from 4/4/2017. This mass now measures 2.7 cm x 1.8  cm (series 6, image 122) with a maximum SUV of approximately  3.2.  There were numerous additional with groundglass and consolidative  nodules throughout the right lung, some of which demonstrate mild  hypermetabolism. For example, a 1.7 cm consolidative nodule in the  medial right lower lobe (series 3, image 154) has a max SUV of 3.8 and  is new from 4/4/2017. Additional small tree-in-bud nodules  predominantly throughout the remainder of the right lung but also in  the left lung are increased from 4/4/2017. There is increased FDG  uptake associated with pleural thickening along the recent chest tube  tract, presumably postprocedural in nature. No enlarged or  hypermetabolic mediastinal or hilar lymph nodes are identified. There  is a small right pleural effusion.    The heart size is normal. Extensive vascular calcifications. No  enlarged axillary lymph nodes.    ABDOMEN AND PELVIS:  There is no suspicious FDG uptake in the abdomen or pelvis.    There are no suspicious hepatic lesions. There is no splenomegaly or  evidence for splenic or pancreatic mass lesion. There are no  suspicious adrenal mass lesions or opaque gallbladder calculi. There  is a 2.0 cm hyperdense left renal cyst with an average Hounsfield  density of approximately 52 Hounsfield units. There is no  hydronephrosis. There is no evidence for diverticulitis, bowel  obstruction or free fluid.  Extensive vascular calcifications.    LOWER EXTREMITIES:   No abnormal masses or hypermetabolic lesions.    BONES:   There are no suspicious lytic or blastic osseous lesions.  There is no  abnormal FDG uptake in the skeleton.      Impression    IMPRESSION:   This patient with a history of recently diagnosed pulmonary  adenocarcinoma:  1.  Mild hypermetabolism associated with the primary mass in the right  lower lobe is compatible with the pathologic diagnosis of malignancy.  The apparent mild interval increase in size and density of the lesion  is likely related to the recent biopsy.  2.  Increasing patchy consolidative,  groundglass, and tree-in-bud  nodularity in the right greater than left lung, some of which is  associated with mild hypermetabolism. Given the relatively abrupt  increase since 4/4/2017, these findings are favored to represent  worsening of infectious/inflammatory process. Metastatic disease is  considered less likely.  3.  No other evidence of metastatic disease is identified.  4.  Multinodular thyroid gland, with a large nodule on the right  measuring up to at least 2.1 cm. Consider thyroid ultrasound.  5.  Hyperdense 2.0 cm mildly complex left renal cyst. This could  represent a proteinaceous or hemorrhagic cyst, although it is overall  indeterminate given the inability to administer intravenous contrast.  An ultrasound may be helpful for further characterization.    I have personally reviewed the examination and initial interpretation  and I agree with the findings.    MARIA TERESA SANTOS MD       Pending Results   These results will be followed up by PCP  Unresulted Labs Ordered in the Past 30 Days of this Admission     Date and Time Order Name Status Description    4/11/2017 1329 Fungus Culture, non-blood Preliminary              Primary Care Physician   DIVYA RUSSELL    Discharge Disposition   Discharged to home  Condition at discharge: Stable    Discharge Orders   No discharge procedures on file.  Discharge Medications   Current Discharge Medication List      CONTINUE these medications which have NOT CHANGED    Details   calcium carbonate (OS-ZAHEER 600 MG [place]. CA) 600 MG tablet Take 1 tablet by mouth daily      clonazePAM (KLONOPIN) 0.5 MG tablet Take 0.5 tablets (0.25 mg) by mouth 2 times daily as needed for anxiety  Qty: 90 tablet, Refills: 1    Associated Diagnoses: CKD (chronic kidney disease) stage 4, GFR 15-29 ml/min (H)      Acetaminophen (TYLENOL PO) Take by mouth every 8 hours as needed for mild pain or fever      cetirizine (ZYRTEC) 5 MG CHEW Take 5 mg by mouth as needed       budesonide-formoterol  (SYMBICORT) 80-4.5 MCG/ACT inhaler Inhale 2 puffs into the lungs 2 times daily  Qty: 3 Inhaler, Refills: 3    Associated Diagnoses: Chronic airway obstruction, not elsewhere classified      albuterol (PROAIR HFA, PROVENTIL HFA, VENTOLIN HFA) 108 (90 BASE) MCG/ACT inhaler Inhale 2 puffs into the lungs every 4 hours as needed for shortness of breath / dyspnea or wheezing  Qty: 1 Inhaler, Refills: 3    Associated Diagnoses: Obstructive lung disease (H)      amLODIPine (NORVASC) 10 MG tablet Take 10 mg by mouth daily       aspirin EC 81 MG tablet Take 81 mg by mouth daily       blood glucose (ONE TOUCH ULTRA) test strip       calcium acetate (PHOSLO) 667 MG CAPS Take 667 mg by mouth 3 times daily (with meals)       carvedilol (COREG) 3.125 MG tablet Take 3.125 mg by mouth 2 times daily (with meals)       cholecalciferol (VITAMIN D) 1000 UNIT tablet Take 1,000 Units by mouth daily       furosemide (LASIX) 20 MG tablet 20 mg daily       insulin aspart (NOVOLOG VIAL) 100 UNITS/ML soln       ONETOUCH DELICA LANCETS 33G MISC       lisinopril (PRINIVIL,ZESTRIL) 20 MG tablet Take 10 mg by mouth daily       pravastatin (PRAVACHOL) 40 MG tablet 40 mg daily       glucagon (GLUCAGEN) 1 MG SOLR 1 mg           Allergies   Allergies   Allergen Reactions     Codeine Other (See Comments)     Makes her sleepless     Loratadine Other (See Comments)     Extreme sleepiness       Sulfa Drugs Unknown     Other reaction(s): Unknown     Terazosin Other (See Comments)     Swellling in legs, improved when went off     Adhesive Tape Rash     Latex Rash     Liquid Adhesive Rash

## 2017-04-20 NOTE — PROGRESS NOTES
[facility]    Pulmonary, Allergy, Critical Care and Sleep Medicine    Follow-up Note  April 20, 2017    Assessment and Plan:  1. Iatrogenic Right Pneumothorax: Secondary to IR biopsy, clamped tube for about 20hrs total with minimal change on first CXR and then stable on all subsequent so removed.  PET/CT today after tube removed showed some subcutaneous air and the smallest bit of PTX.    -Tube removed  -No need for CXR overnight unless pain redevelops  -2view CXR in am  -likely home tomorrow if imaging looks good.    2. RLL adenocarcinoma: PET and MRI done today.  MRI without abnormality pet scan still pending.      Seen and discussed with Dr Jeannette Lamar  Pulmonary/Critical Care Fellow      Interval History:  No pain overnight at site of chest tube. Up walking with her  multiple times.  Would like tube removed today.  Went for PET and MRI this late morning/afternoon.    Medications:       cholecalciferol  1,000 Units Oral Daily    And     calcium carbonate  1,500 mg Oral Daily     sodium chloride (PF)  3 mL Intracatheter Q8H     insulin bolus from AMBULATORY PUMP   Subcutaneous TID AC     insulin aspart   Device See Admin Instructions     aspirin EC  81 mg Oral QPM     calcium acetate  667 mg Oral TID w/meals     carvedilol  3.125 mg Oral BID w/meals     cholecalciferol  1,000 Units Oral Daily     furosemide  20 mg Oral Daily     pravastatin  40 mg Oral Daily     amLODIPine  10 mg Oral Daily       Physical Exam:    Vitals:    04/20/17 0929 04/20/17 1001 04/20/17 1236 04/20/17 1400   BP: 177/49  184/51 183/45   BP Location: Left arm  Left arm Left arm   Pulse:    70   Resp:    16   Temp:   96.5  F (35.8  C) 97.6  F (36.4  C)   TempSrc:   Oral Oral   SpO2:   96% 94%   Weight:  62.7 kg (138 lb 3.2 oz)       General: laying in bed in NAD  HEENT: anicteric, moist mucosa  Neck: no palpable lymphadenopathy, no JVD noted  Chest: CTAB, no wheezing, R sided chest tube  removed  Cardiac: RRR no murmurs  Abdomen: Soft, flat, non tender, active BS  Extremities: No LE Edema  Neuro: A&Ox3, no focal defecits  Skin: no rash noted           Data:   All laboratory and imaging data reviewed.    CMP    Recent Labs  Lab 04/20/17  0930 04/19/17  0704 04/18/17  2109 04/18/17  0722 04/17/17  1355  04/17/17  0749    140  --  138  --   --  135   POTASSIUM 4.5 4.3  --  4.3 4.9  < > 5.7*   CHLORIDE 109 107  --  105  --   --  106   CO2 24 24  --  24  --   --  20   ANIONGAP 10 9  --  9  --   --  9   * 147*  --  199*  --   --  264*   BUN 42* 44*  --  52*  --   --  60*   CR 1.62* 1.72*  --  1.99*  --   --  2.36*   GFRESTIMATED 31* 29*  --  25*  --   --  20*   GFRESTBLACK 38* 35*  --  30*  --   --  25*   ZAHEER 10.1 9.9  --  9.0  --   --  9.2   MAG  --   --  2.2  --   --   --   --    < > = values in this interval not displayed.  CBC    Recent Labs  Lab 04/18/17  0722   WBC 8.6   RBC 3.78*   HGB 11.1*   HCT 35.5   MCV 94   MCH 29.4   MCHC 31.3*   RDW 13.3        INR    Recent Labs  Lab 04/18/17  0722   INR 1.06       Urine Studies    Recent Labs   Lab Test  08/04/14   0832   URINEPH  5.5   NITRITE  Negative   LEUKEST  Negative   WBCU  <1     Sputum Culture last 3 months:  Specimen Description   Date Value Ref Range Status   04/11/2017 Lung Biopsy Tissue  Final   04/11/2017 Lung Biopsy Tissue  Final    Culture Micro   Date Value Ref Range Status   04/11/2017 Culture negative after 1 week  Final

## 2017-04-20 NOTE — PROGRESS NOTES
No increase in size of PTX on CXR this am.  Pulled tube at 0850 this am.  PET this afternoon to assess for residual PTX

## 2017-04-20 NOTE — PLAN OF CARE
Problem: Goal Outcome Summary  Goal: Goal Outcome Summary  Patient admitted with pneumothorax. Recently diagnosed with lung cancer. BP elevated (provider notified, refuses hydralazine) on RA. Gave Coreg later due to AM procedures requiring NPO status. Recheck BP in an hour. Right PIV SL. Renal diet. On ambulatory insulin pump. Last BG @  152. Chest Xray this AM. Pulled chest tube, dressing c/d/i. MRI completed this shift. PET scan completed this shift. Felt anxious before procedure, gave .25 mg of Ativan. Complaint of @ chest tube site. Gave IV Dilaudid x1, with complete relief. Continue to monitor and POC.

## 2017-04-20 NOTE — PROGRESS NOTES
Care Coordinator Progress Note     Admission Date/Time:  4/16/2017  Attending MD:  Cruz Baer MD     Data  Chart reviewed, discussed with interdisciplinary team.   Patient was admitted for: Malignant neoplasm of lung, unspecified laterality, unspecified part of lung (H).    Coordination of Care and Referrals: Provided patient/family with options for nothing at this time.        Assessment  Patient discussed during interdisciplinary rounds.  Patient without needs for any resources or services for discharge at this time.     Plan  Anticipated Discharge Date:  TBD  Anticipated Discharge Plan:  home    Maureen Hilton RN, BSN  Care Coordinator [facility] [address]  Pager: [phone]  Phone: [phone]

## 2017-04-20 NOTE — PROGRESS NOTES
[facility], [place]    Internal Medicine Progress Note - Gold Service      Assessment & Plan   Yue Valenzuela is a 71 year old female admitted on 4/16/2017. She has a history of Type 1 DM, CKD Stage IV, factor V Leiden mutation, HTN, HLD, CAD s/p stent placement, PVD, carotid artery stenosis, asthma, and newly diagnosed lung adenocarcinoma (4/14/17) and is admitted from an OSH for further management of R sided iatrogenic pneumothorax with CT in place, following lung biopsy on 4/11.     # Right pneumothorax, likely iatrogenic - Stable.  On room air overnight 4/19 and this am 4/20 with sats 94-96%. Asymptomatic.  Large bore CT removed by Pulmonary at bedside this am 4/20 after tolerating clamped CT overnight without issue. CXR this am 4/20 without significant changes c/w films on 4/19. Serial CXRs on 4/19 had shown persistent small apical ptx with airleak c/w films from 4/17-4/18, with initial plan to insert smaller CT.      - Pulmonary following, appreciate recommendations - may repeat CXR 2 views in am to assess resolution of R sided ptx   - S/p bedside CT removal this am 4/20, if patient symptomatic w/ chest pain, worsening dyspnea, or increased O2 requirements please obtain STAT CXR  - Continue Tylenol and Dilaudid PRN for pain     # Lung adenocarcinoma - Newly diagnosed on 4/14.  Follows with Dr. Plaza and established with [facility]. S/p PET CT and MRI brain today, 4/20. Pulmonary following.     # CKD Stage IV - BUN 42 (44), Cr 1.62 (1.72) this am 4/20. BL appears to be 2.2 - 2.6. Follows with Dr. Vinicius Sim at [facility] outpatient, last seen on 4/6. Had been pursuing workup for kidney transplant at [facility] prior to CA on 4/14.    - BMP in am     # Hyperkalemia - Improved. K stable at 4.5 today 4/20. Appears to have chronically high K 2/2 advanced CKD, long term use w/ Lisinopril likely also contributory. Managed at home with kayexalate 1-2x weekly. BL typically 5.0-5.5.   - Recheck  "BMP in am   - Hold PTA Lisinopril   - Continue tele     # Type DM 1 - Ha1c 6.9% on 4/5.  BG 81 - 242 last 24 hr.  On insulin pump PTA.  - Continue home insulin pump  - Continue BG checks AC/HS  - Hypoglycemia protocol in place - pt usually drinks Sprite to correct when BG feels low, ok if spouse brings in from home     # CAD, Hypertension - Stable. SBP persistently 170-180s during evening 4/19 and am 4/20, though patient refusing Hydralazine IV.  Recent elevations may be 2/2 stress/anxiety, discontinuation of Lisinopril, and missed dose of Carvedilol this am.  Reluctant to med changes recommended here - prefers adjustments per her Nephrologist.    - Continue PTA Coreg, Norvasc, and Lasix  - Restarted PTA Lisinopril   - Continue PTA Pravastatin     Diet: Renal Diet (non-dialysis)  DVT Prophylaxis: mechanical   Code Status: Full Code    Disposition Plan   Expected discharge: Tomorrow ; discharge to home      Entered: Carmen Garcia 04/20/2017, 1:26 PM   Information in the above section will display in the discharge planner report.      The patient's care was discussed with the Attending Physician, Dr. Cruz Baer.    Carmen Garcia  Internal Medicine Staff Hospitalist Service  [facility]  Pager: [phone]  Please see sticky note for cross cover information    Interval History   Patient reports \"feeling great\" now that CT removed.  No pain with inspiration. She denies fevers, chills, abdominal pain, nausea, vomiting, chest pain, and dyspnea.       Data reviewed today: I reviewed all medications, new labs and imaging results over the last 24 hours.     Physical Exam   Vital Signs: Temp: 96.5  F (35.8  C) Temp src: Oral BP: 184/51 Pulse: 74   Resp: 16 SpO2: 96 % O2 Device: None (Room air)    Weight: 138 lbs 3.2 oz  General Appearance: AAO x 4. Well nourished, well developed. No acute distress.   HEENT: Normocephalic, atraumatic. Anicteric sclera. Mucous membranes moist.   Respiratory: Effort " normal on room air. Lung sounds CTAB.  Cardiovascular: RRR. S1, S2. No murmurs appreciated.   GI: Abdomen soft and non distended, bowel sounds present x all 4 quadrants. No tenderness, rebound, guarding.  Skin: Grossly warm, dry, and intact. No jaundice.  Neurological: No focal deficits. Moves all extremities         Data   Data     Recent Labs  Lab 04/20/17  0930 04/19/17  0704 04/18/17  2109 04/18/17  0722   WBC  --   --   --  8.6   HGB  --   --   --  11.1*   MCV  --   --   --  94   PLT  --   --   --  178   INR  --   --   --  1.06    140  --  138   POTASSIUM 4.5 4.3  --  4.3   CHLORIDE 109 107  --  105   CO2 24 24  --  24   BUN 42* 44*  --  52*   CR 1.62* 1.72*  --  1.99*   ANIONGAP 10 9  --  9   ZAHEER 10.1 9.9  --  9.0   * 147*  --  199*   TROPI  --   --  0.019  --

## 2017-04-21 VITALS
WEIGHT: 138.2 LBS | TEMPERATURE: 97.6 F | HEART RATE: 71 BPM | RESPIRATION RATE: 17 BRPM | BODY MASS INDEX: 23.71 KG/M2 | SYSTOLIC BLOOD PRESSURE: 170 MMHG | OXYGEN SATURATION: 96 % | DIASTOLIC BLOOD PRESSURE: 52 MMHG

## 2017-04-21 LAB
ANION GAP SERPL CALCULATED.3IONS-SCNC: 9 MMOL/L (ref 3–14)
BUN SERPL-MCNC: 36 MG/DL (ref 7–30)
CALCIUM SERPL-MCNC: 9.6 MG/DL (ref 8.5–10.1)
CHLORIDE SERPL-SCNC: 105 MMOL/L (ref 94–109)
CO2 SERPL-SCNC: 27 MMOL/L (ref 20–32)
CREAT SERPL-MCNC: 1.47 MG/DL (ref 0.52–1.04)
GFR SERPL CREATININE-BSD FRML MDRD: 35 ML/MIN/1.7M2
GLUCOSE BLDC GLUCOMTR-MCNC: 123 MG/DL (ref 70–99)
GLUCOSE BLDC GLUCOMTR-MCNC: 148 MG/DL (ref 70–99)
GLUCOSE BLDC GLUCOMTR-MCNC: 153 MG/DL (ref 70–99)
GLUCOSE BLDC GLUCOMTR-MCNC: 216 MG/DL (ref 70–99)
GLUCOSE SERPL-MCNC: 180 MG/DL (ref 70–99)
POTASSIUM SERPL-SCNC: 4.3 MMOL/L (ref 3.4–5.3)
SODIUM SERPL-SCNC: 140 MMOL/L (ref 133–144)

## 2017-04-21 PROCEDURE — 36415 COLL VENOUS BLD VENIPUNCTURE: CPT | Performed by: NURSE PRACTITIONER

## 2017-04-21 PROCEDURE — A9270 NON-COVERED ITEM OR SERVICE: HCPCS | Mod: GY | Performed by: PHYSICIAN ASSISTANT

## 2017-04-21 PROCEDURE — 80048 BASIC METABOLIC PNL TOTAL CA: CPT | Performed by: NURSE PRACTITIONER

## 2017-04-21 PROCEDURE — 25000132 ZZH RX MED GY IP 250 OP 250 PS 637: Mod: GY | Performed by: PHYSICIAN ASSISTANT

## 2017-04-21 PROCEDURE — 00000146 ZZHCL STATISTIC GLUCOSE BY METER IP

## 2017-04-21 PROCEDURE — 25000132 ZZH RX MED GY IP 250 OP 250 PS 637: Mod: GY | Performed by: INTERNAL MEDICINE

## 2017-04-21 PROCEDURE — 99239 HOSP IP/OBS DSCHRG MGMT >30: CPT | Performed by: INTERNAL MEDICINE

## 2017-04-21 PROCEDURE — A9270 NON-COVERED ITEM OR SERVICE: HCPCS | Mod: GY | Performed by: INTERNAL MEDICINE

## 2017-04-21 RX ADMIN — CALCIUM ACETATE 667 MG: 667 CAPSULE ORAL at 07:56

## 2017-04-21 RX ADMIN — Medication 1500 MG: at 11:51

## 2017-04-21 RX ADMIN — VITAMIN D, TAB 1000IU (100/BT) 1000 UNITS: 25 TAB at 07:57

## 2017-04-21 RX ADMIN — CARVEDILOL 3.12 MG: 3.12 TABLET, FILM COATED ORAL at 07:59

## 2017-04-21 NOTE — PROGRESS NOTES
[facility]    Pulmonary, Allergy, Critical Care and Sleep Medicine    Follow-up Note  April 21, 2017    Assessment and Plan:  1. Iatrogenic Right Pneumothorax: Secondary to IR biopsy.  Tube now removed without evidence or symptoms of PTX.    -will go home today    2. RLL adenocarcinoma: PET and MRI done without evidence of metastatic disease.  Talked about in lung nodule conference this morning and everyone agreed that should get seen by thoracic surgery as an outpatient next Thursday, then discuss mediastinoscopy and R lower lobectomy.  Will not plan on EBUS for staging as all negative and would prefer to get all procedures/surgery done in 1 setting.    -Contacted thoracic surgery nurses who will follow-up with patient to get appt next Thursday scheduled  -SHE SHOULD NOT GET PFT'S given her recent pneumothorax, told patient and the nurses doing scheduling      Seen and discussed with Dr Bola Lamar  Pulmonary/Critical Care Fellow      Interval History:  Tube removed yesterday, feeling much better.  No pain at insertion site now.      Medications:       lisinopril  10 mg Oral Daily     cholecalciferol  1,000 Units Oral Daily    And     calcium carbonate  1,500 mg Oral Daily     sodium chloride (PF)  3 mL Intracatheter Q8H     insulin bolus from AMBULATORY PUMP   Subcutaneous TID AC     insulin aspart   Device See Admin Instructions     aspirin EC  81 mg Oral QPM     calcium acetate  667 mg Oral TID w/meals     carvedilol  3.125 mg Oral BID w/meals     cholecalciferol  1,000 Units Oral Daily     furosemide  20 mg Oral Daily     pravastatin  40 mg Oral Daily     amLODIPine  10 mg Oral Daily       Physical Exam:    Vitals:    04/20/17 2151 04/20/17 2354 04/21/17 0623 04/21/17 0700   BP: 184/43 166/48 166/45 170/52   BP Location: Left arm Right arm Left arm    Pulse: 63  69 71   Resp: 16  16 17   Temp: 97.5  F (36.4  C)  97.6  F (36.4  C) 97.6  F (36.4  C)   TempSrc: Oral  Oral Oral    SpO2:   95% 96%   Weight:         General: laying in bed in NAD  HEENT: anicteric, moist mucosa  Neck: no palpable lymphadenopathy, no JVD noted  Chest: CTAB, no wheezing, R sided chest tube removed  Cardiac: RRR no murmurs  Abdomen: Soft, flat, non tender, active BS  Extremities: No LE Edema  Neuro: A&Ox3, no focal defecits  Skin: no rash noted           Data:   All laboratory and imaging data reviewed.    CMP    Recent Labs  Lab 04/21/17  0704 04/20/17  0930 04/19/17  0704 04/18/17  2109 04/18/17  0722    143 140  --  138   POTASSIUM 4.3 4.5 4.3  --  4.3   CHLORIDE 105 109 107  --  105   CO2 27 24 24  --  24   ANIONGAP 9 10 9  --  9   * 242* 147*  --  199*   BUN 36* 42* 44*  --  52*   CR 1.47* 1.62* 1.72*  --  1.99*   GFRESTIMATED 35* 31* 29*  --  25*   GFRESTBLACK 42* 38* 35*  --  30*   ZAHEER 9.6 10.1 9.9  --  9.0   MAG  --   --   --  2.2  --      CBC    Recent Labs  Lab 04/18/17  0722   WBC 8.6   RBC 3.78*   HGB 11.1*   HCT 35.5   MCV 94   MCH 29.4   MCHC 31.3*   RDW 13.3        INR    Recent Labs  Lab 04/18/17  0722   INR 1.06       Urine Studies    Recent Labs   Lab Test  08/04/14   0832   URINEPH  5.5   NITRITE  Negative   LEUKEST  Negative   WBCU  <1     Sputum Culture last 3 months:  Specimen Description   Date Value Ref Range Status   04/11/2017 Lung Biopsy Tissue  Final   04/11/2017 Lung Biopsy Tissue  Final    Culture Micro   Date Value Ref Range Status   04/11/2017 Culture negative after 1 week  Final            The patient was seen and examined with the resident / fellow physician.  I have reviewed the above note and agree with the findings, assessment, and plan as documented. I have made changes above in order to accurately reflect my assessment and plan.    In brief, this is a 71-year-old female with a right lower lobe adenocarcinoma which is biopsy-proven and no definitive evidence of metastatic disease but with an area tree-in-bud opacities in the right middle lobe which will  need to have further follow-up.  She had a right-sided pneumothorax following biopsy.   There was initial concern about the side port position of the previous chest tube and ultimately this tube was removed with no recurrence of the pneumothorax.  No repeat chest tube was required.  Patient is planning to  Discharged to home and she will have close follow-up arranged by us with thoracic surgery discuss  Surgical intervention for the malignancy.  We spent a significant amount of time discussing the overall plan with both the patient and her .  They may ultimately get a second opinion at [facility] which we encouraged we will continue to offer further assistance here at [facility]..      John Plaza MD  Pulmonary and Critical Care  [facility]  Pager:  [phone]

## 2017-04-21 NOTE — TELEPHONE ENCOUNTER
1.  Date/reason for appt: 4/27/17 - Right lower lobe adenocarcinoma  2.  Referring provider: Dr Plaza     3.  Call to patient (Yes / No - short description): Yes  4.  Previous care at / records requested from: [facility] - records in Epic  5.  Recent Imaging: in Epic per Deneen

## 2017-04-21 NOTE — PLAN OF CARE
Problem: Goal Outcome Summary  Goal: Goal Outcome Summary  Patient admitted with right pneumothorax. VSS on RA. A&Ox4. BG's elevated. Stabilized by ambulatory pump. Renal diet. No BM this shift. Voids adequately. Removed PIV. Discharge instructions given. Patient demonstrated understanding. Transportation to home with .

## 2017-04-21 NOTE — PLAN OF CARE
Problem: Goal Outcome Summary  Goal: Goal Outcome Summary  Physical Therapy Discharge Summary     Reason for therapy discharge:    Discharged to home.     Progress towards therapy goal(s). See goals on Care Plan in [facility] electronic health record for goal details.  Goals partially met.  Barriers to achieving goals:   discharge from facility.     Therapy recommendation(s):    Continue home exercise program.

## 2017-04-21 NOTE — PLAN OF CARE
Problem: Goal Outcome Summary  Goal: Goal Outcome Summary  Outcome: No Change  Pt A&O. VSS on RA. Hypertensive before bed at 180's/40's, improving overnight. Denies SOB/chest pain. Denies pain/nausea. Dressing to CT removal site, CDI with pressure tape. BG stable this shift, pt managing with home insulin pump.  at the bedside. Plan for d/c today, pending CXR results this am.

## 2017-04-27 ENCOUNTER — PRE VISIT (OUTPATIENT)
Dept: SURGERY | Facility: CLINIC | Age: 72
End: 2017-04-27

## 2017-04-27 ENCOUNTER — OFFICE VISIT (OUTPATIENT)
Dept: SURGERY | Facility: CLINIC | Age: 72
End: 2017-04-27
Attending: STUDENT IN AN ORGANIZED HEALTH CARE EDUCATION/TRAINING PROGRAM
Payer: MEDICARE

## 2017-04-27 VITALS
SYSTOLIC BLOOD PRESSURE: 182 MMHG | HEIGHT: 64 IN | TEMPERATURE: 98.3 F | WEIGHT: 140.2 LBS | OXYGEN SATURATION: 95 % | BODY MASS INDEX: 23.93 KG/M2 | HEART RATE: 88 BPM | RESPIRATION RATE: 18 BRPM | DIASTOLIC BLOOD PRESSURE: 80 MMHG

## 2017-04-27 DIAGNOSIS — C34.31 MALIGNANT NEOPLASM OF LOWER LOBE OF RIGHT LUNG (H): Primary | ICD-10-CM

## 2017-04-27 PROCEDURE — 99212 OFFICE O/P EST SF 10 MIN: CPT | Mod: ZF

## 2017-04-27 ASSESSMENT — PAIN SCALES - GENERAL: PAINLEVEL: NO PAIN (0)

## 2017-04-27 NOTE — LETTER
4/27/2017       RE: Yue Valenzuela  [address]  [place] [address]     Dear Colleague,    Thank you for referring your patient, Yue Valenzuela, to the [facility]. Please see a copy of my visit note below.    THORACIC SURGERY - NEW PATIENT OFFICE VISIT      Dear Dr. Plaza,    I saw Ms. Valenzuela at your request in consultation for the evaluation and treatment of a RIGHT lower lobe adenocarcinoma.     HPI  Ms. Yue Valenzuela is a 71 year old female who presents for evaluation of RLL adenocarcinoma.  Mass found during workup for renal transplantation.  IR biopsy on 4/11 was complicated by pneumothorax requiring chest tube placement and admission. She is here today to discuss surgical options.  She also has some PET positive nodules in the middle lobe which are currently being considered to be of infectious etiology.    Previsit Tests   PET/CT 4/20- Enlarging mass when compared to previous CT on 4/4 2.7cmx1.8cm, SUV 3.2.  Multiple tree in bud opacities new since last CT favored to be infectious.  Renal cyst indeterminate, US for further characterization.      PFTs 8/214: FEV1 2.27 (67%), DLCO 71%  MRI brain 4/20 negative for mets  IR Biopsy 4/11- well differentiated adenocarcinoma with mucinous features    PMH  Factor V Leiden is listed in chart, however she denies this  CKD being evaluated for txp  CAD s/p PCI, last in 2006  PAD  DMI on insulin pump    No chest surgeries, previous left hemithyroidectomy    Past Medical History:   Diagnosis Date     Adenocarcinoma, lung (H)      Asthma      CAD (coronary artery disease)      Carotid artery stenosis      CKD (chronic kidney disease) stage 4, GFR 15-29 ml/min (H)      Diabetes mellitus type 1 (H)      H/O factor V Leiden mutation      H/O heart artery stent     2006     Hypertension      Mixed hyperlipidemia (DYSLIPIDEMIA) 8/1/2014     Osteopenia      PVD (peripheral vascular disease) (H)      Thyroid nodule 04/12/2012    Hurtle  Cells, non malignant        ETOH occasional  TOB 10 years, 1PPD, 30 years ago    Physical examination  BMI 24.07    From a personal perspective, she is here today with multiple family members: her  Oscar, Daughter in Law Dorothea, and Son Ramos.  She lives with her .  2 children.    IMPRESSION (C34.31) Malignant neoplasm of lower lobe of right lung (H)  (primary encounter diagnosis)     71 year old year-old female with RLL lung nodule, adenocarcinoma.    PLAN  I spent a total of 45 minutes with Ms. Valenzuela and her family, more than 50% of which were spent in counseling, coordination of care, and face-to-face time. I reviewed the plan as follows:  Procedure planned: Flexible bronchoscopy, mediastiniscopy and lymph node biopsy, RIGHT VATS RLL lobectomy, with mediastinal lymph node dissection,  possible  thoracotomy.  I reviewed the indications, risks, and benefits of the procedure with Ms. Yue Valenzuela. We discussed the intraoperative risks of bleeding and injury to vital organs, potential postoperative complications including, but not limited to, major respiratory events, arrhythmia, bleeding, infection, reoperation, and death. I explained the anticipated hospital course (+/- 3-6days) and postoperative recovery including pain control, chest drain management, and variable degrees of dyspnea (or need for supplemental oxygen) and fatigue that tend to get better with time  Consent:completed In clinic  Necessary Preop Tests & Appointments:     PAC    PFTS     Discuss with primary nephrologist regardimg any specific periop nstructions    Discuss with pulm if further antibiotics necessary.  Regional Anesthesia Plan: epidural (Low pain threshold)   Anticoagulation Plan: periop s/c heparin  Smoking Cessation: n/a    All questions were answered and Yue Valenzuela and present family were in agreement with the plan.  I appreciate the opportunity to participate in the care of your patient and will keep you  updated.  Sincerely,      Again, thank you for allowing me to participate in the care of your patient.      Sincerely,    Glenna Hall MD

## 2017-04-27 NOTE — PROGRESS NOTES
THORACIC SURGERY - NEW PATIENT OFFICE VISIT      Dear Dr. Plaza,    I saw Ms. Valenzuela at your request in consultation for the evaluation and treatment of a RIGHT lower lobe adenocarcinoma.     HPI  Ms. Yue Valenzuela is a 71 year old female who presents for evaluation of RLL adenocarcinoma.  Mass found during workup for renal transplantation.  IR biopsy on 4/11 was complicated by pneumothorax requiring chest tube placement and admission. She is here today to discuss surgical options.  She also has some PET positive nodules in the middle lobe which are currently being considered to be of infectious etiology.    Previsit Tests   PET/CT 4/20- Enlarging mass when compared to previous CT on 4/4 2.7cmx1.8cm, SUV 3.2.  Multiple tree in bud opacities new since last CT favored to be infectious.  Renal cyst indeterminate, US for further characterization.      PFTs 8/214: FEV1 2.27 (67%), DLCO 71%  MRI brain 4/20 negative for mets  IR Biopsy 4/11- well differentiated adenocarcinoma with mucinous features    PMH  Factor V Leiden is listed in chart, however she denies this  CKD being evaluated for txp  CAD s/p PCI, last in 2006  PAD  DMI on insulin pump    No chest surgeries, previous left hemithyroidectomy    Past Medical History:   Diagnosis Date     Adenocarcinoma, lung (H)      Asthma      CAD (coronary artery disease)      Carotid artery stenosis      CKD (chronic kidney disease) stage 4, GFR 15-29 ml/min (H)      Diabetes mellitus type 1 (H)      H/O factor V Leiden mutation      H/O heart artery stent     2006     Hypertension      Mixed hyperlipidemia (DYSLIPIDEMIA) 8/1/2014     Osteopenia      PVD (peripheral vascular disease) (H)      Thyroid nodule 04/12/2012    Hurtle Cells, non malignant        ETOH occasional  TOB 10 years, 1PPD, 30 years ago    Physical examination  BMI 24.07    From a personal perspective, she is here today with multiple family members: her  Oscar, Daughter in Law Dorothea, and Son Ramos.   She lives with her .  2 children.    IMPRESSION (C34.31) Malignant neoplasm of lower lobe of right lung (H)  (primary encounter diagnosis)     71 year old year-old female with RLL lung nodule, adenocarcinoma.    PLAN  I spent a total of 45 minutes with Ms. Valenzuela and her family, more than 50% of which were spent in counseling, coordination of care, and face-to-face time. I reviewed the plan as follows:  Procedure planned: Flexible bronchoscopy, mediastiniscopy and lymph node biopsy, RIGHT VATS RLL lobectomy, with mediastinal lymph node dissection,  possible  thoracotomy.  I reviewed the indications, risks, and benefits of the procedure with Ms. Yue Valenzuela. We discussed the intraoperative risks of bleeding and injury to vital organs, potential postoperative complications including, but not limited to, major respiratory events, arrhythmia, bleeding, infection, reoperation, and death. I explained the anticipated hospital course (+/- 3-6days) and postoperative recovery including pain control, chest drain management, and variable degrees of dyspnea (or need for supplemental oxygen) and fatigue that tend to get better with time  Consent:completed In clinic  Necessary Preop Tests & Appointments:     PAC    PFTS     Discuss with primary nephrologist regardimg any specific periop nstructions    Discuss with pulm if further antibiotics necessary.  Regional Anesthesia Plan: epidural (Low pain threshold)   Anticoagulation Plan: periop s/c heparin  Smoking Cessation: n/a    All questions were answered and Yue Valenzuela and present family were in agreement with the plan.  I appreciate the opportunity to participate in the care of your patient and will keep you updated.  Sincerely,

## 2017-04-27 NOTE — NURSING NOTE
"Oncology Rooming Note    April 27, 2017 4:45 PM   Yue Valenzuela is a 53 year old female who presents for: No chief complaint on file.    Initial Vitals: BP (!) 204/63  Pulse 88  Temp 98.3  F (36.8  C) (Oral)  Resp 18  Ht 1.626 m (5' 4\")  Wt 63.6 kg (140 lb 3.2 oz)  SpO2 95%  BMI 24.07 kg/m2 Estimated body mass index is 24.07 kg/(m^2) as calculated from the following:    Height as of this encounter: 1.626 m (5' 4\").    Weight as of this encounter: 63.6 kg (140 lb 3.2 oz). Body surface area is 1.69 meters squared.  No Pain (0) Comment: Data Unavailable   No LMP recorded. Patient is postmenopausal.  Allergies reviewed: Yes  Medications reviewed: Yes    Medications: Medication refills not needed today.  Pharmacy name entered into [employer]: [id number] [place] [address] [facility]    Clinical concerns:pt is having high blood pressure she stated it is always high when she is at the clinic at home it is normal  will notify fellow/jenkins when leaving exam  was notified.    6 minutes for nursing intake (face to face time)     Cristiane Porter CMA                              "

## 2017-04-27 NOTE — MR AVS SNAPSHOT
After Visit Summary   4/27/2017    Yue Valenzuela    MRN: [id number]           Patient Information     Date Of Birth          1945        Visit Information        Provider Department      4/27/2017 4:45 PM Glenna Hall MD [facility]        Today's Diagnoses     Malignant neoplasm of lower lobe of right lung (H)    -  1       Follow-ups after your visit        Additional Services     PAC Visit Referral (For [facility] Only)       Does this visit require an Anesthesia consult?  ERAS    H&P done by:  Other (Specify): PAC      Please be aware that coverage of these services is subject to the terms and limitations of your health insurance plan.  Call member services at your health plan with any benefit or coverage questions.      Please bring the following to your appointment:  >>   Any x-rays, CTs or MRIs which have been performed.  Contact the facility where they were done to arrange for  prior to your scheduled appointment.  Any new CT, MRI or other procedures ordered by your specialist must be performed at a [place] facility or coordinated by your clinic's referral office.    >>   List of current medications  >>   This referral request   >>   Any documents/labs given to you for this referral                  Your next 10 appointments already scheduled     Jul 11, 2017  9:20 AM CDT   (Arrive by 9:05 AM)   CT CHEST W/O CONTRAST with UCCT2   [facility] ([facility])    [address] [address] [phone]           Please bring any scans or X-rays taken at other hospitals, if similar tests were done. Also bring a list of your medicines, including vitamins, minerals and over-the-counter drugs. It is safest to leave personal items at home.  Be sure to tell your doctor:   If you have any allergies.   If there s any chance you are pregnant.   If you are breastfeeding.   If you have any special needs.   You do not need to do anything special to prepare.  Please wear loose clothing, such as a sweat suit or jogging clothes. Avoid snaps, zippers and other metal. We may ask you to undress and put on a hospital gown.            Jul 11, 2017 10:30 AM CDT   (Arrive by 10:15 AM)   Return Visit with John Plaza MD   [facility]    [address] [facility]  2nd Floor  [facility] [address] [phone]              Future tests that were ordered for you today     Open Future Orders        Priority Expected Expires Ordered    Pulmonary Function Test Routine  4/27/2018 4/27/2017            Who to contact     If you have questions or need follow up information about today's clinic visit or your schedule please contact [facility] directly at [phone].  Normal or non-critical lab and imaging results will be communicated to you by MyChart, letter or phone within 4 business days after the clinic has received the results. If you do not hear from us within 7 days, please contact the clinic through [employer] or phone. If you have a critical or abnormal lab result, we will notify you by phone as soon as possible.  Submit refill requests through [employer] or call your pharmacy and they will forward the refill request to us. Please allow 3 business days for your refill to be completed.          Additional Information About Your Visit        MyChart Information     [employer] gives you secure access to your electronic health record. If you see a primary care provider, you can also send messages to your care team and make appointments. If you have questions, please call your primary care clinic.  If you do not have a primary care provider, please call [phone] and they will assist you.        Care EveryWhere ID     This is your Care EveryWhere ID. This could be used by other organizations to access your [facility] medical records  [id number]       "  Your Vitals Were     Pulse Temperature Respirations Height Pulse Oximetry BMI (Body Mass Index)    88 98.3  F (36.8  C) (Oral) 18 1.626 m (5' 4\") 95% 24.07 kg/m2       Blood Pressure from Last 3 Encounters:   04/27/17 182/80   04/21/17 170/52   04/11/17 157/48    Weight from Last 3 Encounters:   04/27/17 63.6 kg (140 lb 3.2 oz)   04/20/17 62.7 kg (138 lb 3.2 oz)   04/04/17 65.3 kg (143 lb 14.4 oz)              We Performed the Following     PAC Visit Referral (For [facility] Only)     Kerline-Operative Worksheet (Thoracic)          Today's Medication Changes          These changes are accurate as of: 4/27/17 11:59 PM.  If you have any questions, ask your nurse or doctor.               These medicines have changed or have updated prescriptions.        Dose/Directions    budesonide-formoterol 80-4.5 MCG/ACT Inhaler   Commonly known as:  SYMBICORT   This may have changed:    - when to take this  - reasons to take this   Used for:  Chronic airway obstruction, not elsewhere classified        Dose:  2 puff   Inhale 2 puffs into the lungs 2 times daily   Quantity:  3 Inhaler   Refills:  3         Stop taking these medicines if you haven't already. Please contact your care team if you have questions.     calcium carbonate 600 MG tablet   Commonly known as:  OS-ZAHEER 600 mg [place]   Stopped by:  Glenna Hall MD           GLUCAGEN 1 MG Solr injection   Generic drug:  glucagon   Stopped by:  Glenna Hall MD                    Primary Care Provider Office Phone # Fax #    [facility] [phone]       [facility] [address]  [facility] [address]        Thank you!     Thank you for choosing [facility]  for your care. Our goal is always to provide you with excellent care. Hearing back from our patients is one way we can continue to improve our services. Please take a few minutes to complete the written survey that you may receive in the mail after your visit with us. Thank " you!             Your Updated Medication List - Protect others around you: Learn how to safely use, store and throw away your medicines at www.disposemymeds.org.          This list is accurate as of: 4/27/17 11:59 PM.  Always use your most recent med list.                   Brand Name Dispense Instructions for use    albuterol 108 (90 BASE) MCG/ACT Inhaler    PROAIR HFA/PROVENTIL HFA/VENTOLIN HFA    1 Inhaler    Inhale 2 puffs into the lungs every 4 hours as needed for shortness of breath / dyspnea or wheezing       amLODIPine 10 MG tablet    NORVASC     Take 10 mg by mouth daily       aspirin EC 81 MG EC tablet      Take 81 mg by mouth daily       budesonide-formoterol 80-4.5 MCG/ACT Inhaler    SYMBICORT    3 Inhaler    Inhale 2 puffs into the lungs 2 times daily       calcium acetate 667 MG Caps capsule    PHOSLO     Take 667 mg by mouth 3 times daily (with meals)       carvedilol 3.125 MG tablet    COREG     Take 3.125 mg by mouth 2 times daily (with meals)       cetirizine 5 MG Chew    zyrTEC     Take 5 mg by mouth as needed       cholecalciferol 1000 UNIT tablet    vitamin D     Take 1,000 Units by mouth daily       clonazePAM 0.5 MG tablet    klonoPIN    90 tablet    Take 0.5 tablets (0.25 mg) by mouth 2 times daily as needed for anxiety       furosemide 20 MG tablet    LASIX     20 mg daily       insulin aspart 100 UNITS/ML injection    NovoLOG         lisinopril 20 MG tablet    PRINIVIL/ZESTRIL     Take 10 mg by mouth daily       ONE TOUCH ULTRA test strip   Generic drug:  blood glucose monitoring          ONETOUCH DELICA LANCETS 33G Misc          pravastatin 40 MG tablet    PRAVACHOL     40 mg daily       TYLENOL PO      Take by mouth every 8 hours as needed for mild pain or fever

## 2017-05-01 ENCOUNTER — TELEPHONE (OUTPATIENT)
Dept: SPIRITUAL SERVICES | Facility: CLINIC | Age: 72
End: 2017-05-01

## 2017-05-01 ENCOUNTER — TELEPHONE (OUTPATIENT)
Dept: ONCOLOGY | Facility: CLINIC | Age: 72
End: 2017-05-01

## 2017-05-01 NOTE — TELEPHONE ENCOUNTER
Social Work received referral from oncology distress screening to connect with patient regarding concerns about anxiety/depression/resources.  SW spoke with patient over the phone to provide support. Patient expressed having anxiety about her new diagnosis and upcoming surgery. She stated she is still processing her diagnosis and finds it difficult to navigate the unknown nature of attempting to predict needs for her post-surgery recovery and ongoing treatment.  Patient asked about who to contact if she has medical concerns and was given [facility] triage phone numbers.  SW also answered questions about the hospital regarding her likely admission following surgery.  SW provided contact information to patient for any other questions, needs and concerns.    Soo Yeon Han, CAROLINA  [phone]

## 2017-05-01 NOTE — TELEPHONE ENCOUNTER
"SPIRITUAL HEALTH SERVICES  Telephone Encounter Progress Note    PRIMARY FOCUS:     Goals of care    Emotional/spiritual/Buddhism distress    Support for coping    ILLNESS CIRCUMSTANCES:   Reviewed documentation. Reflective conversation shared with Yue integrating elements of her serious illness and family narratives.     Context of Serious Illness/Symptom(s) -   o Kidney failure: She described having been on the kidney transplant list \"the last three years, and it was a through a CT scan of my kidney that someone noticed a spot on my lung.\"  o Cancer diagnosis/treatment: \"So, I was diagnosed on Good Friday and my lung collapsed on Easter leading to my recent hospital stay.\"     Resources for Support - ; Scientologist yasmine    DISTRESS:     Emotional/Spiritual/Existential Distress - Yue tearfully named how disruptive and disorientating her cancer diagnosis has been and that at times, \"I just don't understand.\" She additionally added that this cancer diagnosis has \"thrown everything into disarray.\"    Jehovah's witness Distress - She spoke of her vacillation on how, \"depending on the day, I can go from wondering why to feeling loved and comforted by God.\"    Social/Cultural/Economic Distress - She also narrated how, in addition to her own struggles, she has experienced other loved ones in her life encountering how serious illness has been life restructuring and/or led to deaths.    SPIRIT (Coping):     Caodaism/Yasmine - Scientologist (Faith) yasmine was discussed to be important for positive coping. She is not currently connected with a Hindu.    Spiritual Practice(s) - Prayer was stressed to be significant for coping.     Emotional/Relational/Existential Connections - She named having sources of support from family and co-workers.    SENSE-MAKING:    Goals of Care - She is focused on her surgery scheduled for May 10 and \"fighting this cancer.\" She continued, \"I've fought this kidney failure and I'll fight this " "too.\"    Meaning/Sense-Making - She stated, overall, \"I realize I've just got to take it one day at a time.\"     PLAN: I will follow-up as Yue receives ongoing care within the [facility].    Phil Blank MDiv, [facility]  Staff   Pager [phone]    "

## 2017-05-03 ENCOUNTER — OFFICE VISIT (OUTPATIENT)
Dept: SURGERY | Facility: CLINIC | Age: 72
End: 2017-05-03

## 2017-05-03 ENCOUNTER — ALLIED HEALTH/NURSE VISIT (OUTPATIENT)
Dept: SURGERY | Facility: CLINIC | Age: 72
End: 2017-05-03

## 2017-05-03 ENCOUNTER — ANESTHESIA EVENT (OUTPATIENT)
Dept: SURGERY | Facility: CLINIC | Age: 72
DRG: 163 | End: 2017-05-03
Payer: MEDICARE

## 2017-05-03 VITALS
WEIGHT: 139.3 LBS | HEART RATE: 67 BPM | OXYGEN SATURATION: 100 % | SYSTOLIC BLOOD PRESSURE: 187 MMHG | DIASTOLIC BLOOD PRESSURE: 63 MMHG | HEIGHT: 64 IN | TEMPERATURE: 97.8 F | RESPIRATION RATE: 16 BRPM | BODY MASS INDEX: 23.78 KG/M2

## 2017-05-03 DIAGNOSIS — J44.9 CHRONIC OBSTRUCTIVE PULMONARY DISEASE, UNSPECIFIED COPD TYPE (H): ICD-10-CM

## 2017-05-03 DIAGNOSIS — E10.9 TYPE 1 DIABETES MELLITUS (H): ICD-10-CM

## 2017-05-03 DIAGNOSIS — C34.31 MALIGNANT NEOPLASM OF LOWER LOBE OF RIGHT LUNG (H): Primary | ICD-10-CM

## 2017-05-03 DIAGNOSIS — Z96.41 INSULIN PUMP IN PLACE: ICD-10-CM

## 2017-05-03 DIAGNOSIS — C34.31 MALIGNANT NEOPLASM OF LOWER LOBE OF RIGHT LUNG (H): ICD-10-CM

## 2017-05-03 DIAGNOSIS — N18.6 END STAGE RENAL DISEASE (H): ICD-10-CM

## 2017-05-03 DIAGNOSIS — Z01.818 PREOP EXAMINATION: Primary | ICD-10-CM

## 2017-05-03 DIAGNOSIS — Z76.82 ORGAN TRANSPLANT CANDIDATE: ICD-10-CM

## 2017-05-03 DIAGNOSIS — I73.9 PVD (PERIPHERAL VASCULAR DISEASE) (H): ICD-10-CM

## 2017-05-03 LAB — HBA1C MFR BLD: 7 % (ref 4.3–6)

## 2017-05-03 PROCEDURE — 86886 COOMBS TEST INDIRECT TITER: CPT | Performed by: SURGERY

## 2017-05-03 RX ORDER — IBUPROFEN 200 MG
1 CAPSULE ORAL EVERY MORNING
COMMUNITY

## 2017-05-03 RX ORDER — CETIRIZINE HYDROCHLORIDE 10 MG/1
5 TABLET ORAL DAILY
COMMUNITY

## 2017-05-03 RX ORDER — ACETAMINOPHEN 325 MG/1
975 TABLET ORAL ONCE
Status: CANCELLED | OUTPATIENT
Start: 2017-05-03 | End: 2017-05-03

## 2017-05-03 RX ORDER — IPRATROPIUM BROMIDE AND ALBUTEROL SULFATE 2.5; .5 MG/3ML; MG/3ML
3 SOLUTION RESPIRATORY (INHALATION) ONCE
Status: CANCELLED | OUTPATIENT
Start: 2017-05-03 | End: 2017-05-03

## 2017-05-03 RX ORDER — CHLORHEXIDINE GLUCONATE ORAL RINSE 1.2 MG/ML
15 SOLUTION DENTAL ONCE
Status: CANCELLED | OUTPATIENT
Start: 2017-05-03 | End: 2017-05-03

## 2017-05-03 ASSESSMENT — LIFESTYLE VARIABLES: TOBACCO_USE: 1

## 2017-05-03 ASSESSMENT — COPD QUESTIONNAIRES
COPD: 1
CAT_SEVERITY: MILD

## 2017-05-03 NOTE — MR AVS SNAPSHOT
After Visit Summary   5/3/2017    Yue Valenzuela    MRN: [id number]           Patient Information     Date Of Birth          1945        Visit Information        Provider Department      5/3/2017 10:30 AM Rn, [facility]        Care Instructions    Preparing for Your Surgery      Name:  Yue Valenzuela   MRN:  [id number]   :  1945   Today's Date:  5/3/2017     Arriving for surgery:  Bronchoscopy, Right thoroscopic lung resection  Surgery date:  5/10/17  Surgery time:  9:25 am   Arrival time:    7:25 am   Please come to:       [facility] [address]  [address] [place]  [address]    -   parking is available in front of the hospital from 5:15 am to 8:00 pm    -  Stop at the Information Desk in the lobby    -   Inform the information person that you are here for surgery. An escort to 3c will be provided. If you would not like an escort, please proceed to 3C on the 3rd floor. [phone]     What can I eat or drink?  -  You may have solid food or milk products until 8 hours prior to your surgery.  Nothing after 11 pm   -  You may have water, apple juice or 7up/Sprite until 2 hours prior to your surgery.  Until 7:25 am arrival time     Which medicines can I take?  -  Do NOT take these medications in the morning, the day of surgery:      Furosemide (lasix)      Lisinopril       -  Please take these medications the day of surgery:      Aspirin (ok to stay on aspirin pre-op)      Zyrtec       Inhaler and bring to hospital       Amlodipine       Carvedilol         How do I prepare myself?  -  Take two showers: one the night before surgery; and one the morning of surgery.         Use Scrubcare or Hibiclens to wash from neck down.  You may use your own shampoo and conditioner. No other hair products.   -  Do NOT use lotion, powder, deodorant, or antiperspirant the day of your surgery.  -  Do NOT wear any makeup,  fingernail polish or jewelry.  -  Begin using Incentive Spirometer 1 week prior to surgery.  Use 4 times per day, up to 5-10 breaths each time.  Bring Incentive Spirometer to hospital.  -Do not bring your own medications to the hospital, except for inhalers and eye drops.  -  Bring your ID and insurance card.    Questions or Concerns:  If you have questions or concerns, please call the  Preoperative Assessment Center, Monday-Friday 7AM-7PM:  [phone]          AFTER YOUR SURGERY  Breathing exercises   Breathing exercises help you recover faster. Take deep breaths and let the air out slowly. This will:     Help you wake up after surgery.    Help prevent complications like pneumonia.  Preventing complications will help you go home sooner.   We may give you a breathing device (incentive spirometer) to encourage you to breathe deeply.   Nausea and vomiting   You may feel sick to your stomach after surgery; if so, let your nurse know.    Pain control:  After surgery, you may have pain. Our goal is to help you manage your pain. Pain medicine will help you feel comfortable enough to do activities that will help you heal.  These activities may include breathing exercises, walking and physical therapy.   To help your health care team treat your pain we will ask: 1) If you have pain  2) where it is located 3) describe your pain in your words  Methods of pain control include medications given by mouth, vein or by nerve block for some surgeries.  We may give you a pain control pump that will:  1) Deliver the medicine through a tube placed in your vein  2) Control the amount of medicine you receive  3) Allow you to push a button to deliver a dose of pain medicine  Sequential Compression Device (SCD) or Pneumo Boots:  You may need to wear SCD S on your legs or feet. These are wraps connected to a machine that pumps in air and releases it. The repeated pumping helps prevent blood clots from forming.   Enhanced Recovery After  Surgery     This is a team effort, including you, to get you back on your feet, eating and drinking normally and out of the hospital as quickly as possible.  The goals are: 1) NO INFECTIONS and   2) RETURN TO NORMAL DIET    How can we achieve these goals?  1) STAY ACTIVE: Walk every day before your surgery; try to increase the amount every day.  Walk after surgery as much as you can-the nurses will help you.  Walking speeds healing and gets you home quicker, you heal better at home and have less risk of infection.     2) INCENTIVE SPIROMETER:( as instructed by respiratory therapy ) Practice your incentive spirometer 4 times per day with 5-10 repetitions each time.  Using the incentive spirometer can strengthen your muscles between your ribs and help you have a strong cough after surgery.  A more effective cough can help prevent problems with your lungs.    3) STAY HYDRATED: Drink clear liquids up until 2 hours before your surgery. We would like you to purchase a drink such as Gatorade.  Drink this before bedtime and on the way into the hospital, drink between 8-12 ounces or until you feel hydrated.  Keeping well hydrated leads to your veins being plump, you wake up faster, and you are less likely to be nauseated. Start drinking water as soon as you can after surgery and advance to clear liquids and food as tolerated.  IV fluids contain salt, drinking fluids will minimize the amount of IV fluids you need and decrease the amount of salt you get.     4) PAIN MANAGEMENT: If we minimize the amount of opioids and narcotics, and use regional blocks (which numb the area where your surgery is) along with oral pain medications; you will have less side effects of nausea and constipation. Narcotics can slow down your bowels and cause you to stay in the hospital longer.     Our goal is to keep you comfortable; eating and drinking normally and back home safely.         Follow-ups after your visit        Your next 10 appointments  already scheduled     May 03, 2017 10:30 AM CDT   (Arrive by 10:15 AM)   PAC RN ASSESSMENT with  Pac Rn   [facility]    [address]  [address] [facility] [address] [phone]            May 03, 2017 11:00 AM CDT   FULL PULMONARY FUNCTION with  PFL C   [facility] Pulmonary Function Testing ([facility]    [address]  [address] [facility] [address] [phone]            May 03, 2017 12:00 PM CDT   LAB with  LAB   [facility] Lab ([facility]    [address]  [address] [facility] [address] [phone]           Patient must bring picture ID.  Patient should be prepared to give a urine specimen  Please do not eat 10-12 hours before your appointment if you are coming in fasting for labs on lipids, cholesterol, or glucose (sugar).  Pregnant women should follow their Care Team instructions. Water with medications is okay. Do not drink coffee or other fluids.   If you have concerns about taking  your medications, please ask at office or if scheduling via [employer], send a message by clicking on Secure Messaging, Message Your Care Team.            May 10, 2017   Procedure with Glenna Hall MD   [facility] [place], Same Day Surgery (--)    [address]  [place] [address]   [phone]            Jul 11, 2017  9:20 AM CDT   (Arrive by 9:05 AM)   CT CHEST W/O CONTRAST with UCCT2   [facility] Imaging Center CT ([facility])    [address] [place] [address]  [address] [facility] [address] [phone]           Please bring any scans or X-rays taken at other hospitals, if similar tests were done. Also bring a list of your medicines, including vitamins, minerals and over-the-counter drugs. It is safest to leave personal items at home.  Be sure to tell your doctor:   If you have any allergies.   If there s any chance you are pregnant.   If  you are breastfeeding.   If you have any special needs.  You do not need to do anything special to prepare.  Please wear loose clothing, such as a sweat suit or jogging clothes. Avoid snaps, zippers and other metal. We may ask you to undress and put on a hospital gown.            Jul 11, 2017 10:30 AM CDT   (Arrive by 10:15 AM)   Return Visit with John Plaza MD   [facility] ([facility])    [address] [address] [phone]              Future tests that were ordered for you today     Open Future Orders        Priority Expected Expires Ordered    Hemoglobin A1c Routine 5/3/2017 6/2/2017 5/3/2017    ABO/Rh type and screen Routine 5/3/2017 6/2/2017 5/3/2017            Who to contact     Please call your clinic at [phone] to:    Ask questions about your health    Make or cancel appointments    Discuss your medicines    Learn about your test results    Speak to your doctor   If you have compliments or concerns about an experience at your clinic, or if you wish to file a complaint, please contact [facility] Patient Relations at [phone] or email us at [email or web address]         Additional Information About Your Visit        [employer] Information     [employer] gives you secure access to your electronic health record. If you see a primary care provider, you can also send messages to your care team and make appointments. If you have questions, please call your primary care clinic.  If you do not have a primary care provider, please call [phone] and they will assist you.      [employer] is an electronic gateway that provides easy, online access to your medical records. With [employer], you can request a clinic appointment, read your test results, renew a prescription or communicate with your care team.     To access your existing account, please contact your [facility] Clinic or call [phone] for assistance.        Care EveryWhere ID     This is your Care EveryWhere ID. This could be used by other organizations to access your [facility] medical records  [id number]         Blood Pressure from Last 3 Encounters:   05/03/17 187/63   04/27/17 182/80   04/21/17 170/52    Weight from Last 3 Encounters:   05/03/17 63.2 kg (139 lb 4.8 oz)   04/27/17 63.6 kg (140 lb 3.2 oz)   04/20/17 62.7 kg (138 lb 3.2 oz)              Today, you had the following     No orders found for display         Today's Medication Changes          These changes are accurate as of: 5/3/17 10:12 AM.  If you have any questions, ask your nurse or doctor.               Start taking these medicines.        Dose/Directions    GUANACOOBIREX Cantor   Used for:  Chronic obstructive pulmonary disease, unspecified COPD type (H)   Started by:  1, Uc Pac Eve        Dose:  1 Device   1 Device 6 times daily   Quantity:  1 each   Refills:  0            Where to get your medicines      These medications were sent to [address] [address]    Hours:  TRANSPLANT PHONE NUMBER [phone] Phone:  [phone]     TEODORA Cantor                Primary Care Provider Office Phone # Fax #    Nory J Jefryk [phone]       [employer] [address]  [facility] [address]        Thank you!     Thank you for choosing [facility] PREOPERATIVE ASSESSMENT [place]  for your care. Our goal is always to provide you with excellent care. Hearing back from our patients is one way we can continue to improve our services. Please take a few minutes to complete the written survey that you may receive in the mail after your visit with us. Thank you!             Your Updated Medication List - Protect others around you: Learn how to safely use, store and throw away your medicines at www.disposemymeds.org.           This list is accurate as of: 5/3/17 10:12 AM.  Always use your most recent med list.                   Brand Name Dispense Instructions for use    AEROBIKA Sakshi     1 each    1 Device 6 times daily       amLODIPine 10 MG tablet    NORVASC     Take 10 mg by mouth every evening       aspirin EC 81 MG EC tablet      Take 81 mg by mouth every evening       budesonide-formoterol 80-4.5 MCG/ACT Inhaler    SYMBICORT    3 Inhaler    Inhale 2 puffs into the lungs 2 times daily       calcium 600 MG tablet      Take 1 tablet by mouth every morning       calcium acetate 667 MG Caps capsule    PHOSLO     Take 667 mg by mouth 4 times daily (with meals and nightly)       carvedilol 3.125 MG tablet    COREG     Take 3.125 mg by mouth 2 times daily (with meals)       cetirizine 10 MG tablet    zyrTEC     Take 5 mg by mouth daily       cholecalciferol 1000 UNIT tablet    vitamin D     Take 1,000 Units by mouth every morning       clonazePAM 0.5 MG tablet    klonoPIN    90 tablet    Take 0.5 tablets (0.25 mg) by mouth 2 times daily as needed for anxiety       furosemide 20 MG tablet    LASIX     20 mg every morning       lisinopril 20 MG tablet    PRINIVIL/ZESTRIL     Take 10 mg by mouth every evening       ONE TOUCH ULTRA test strip   Generic drug:  blood glucose monitoring          ONETOUCH DELICA LANCETS 33G Misc          pravastatin 40 MG tablet    PRAVACHOL     40 mg every evening       TYLENOL PO      Take 1,000 mg by mouth every 8 hours as needed for mild pain or fever

## 2017-05-03 NOTE — ANESTHESIA PREPROCEDURE EVALUATION
Anesthesia Evaluation     . Pt has had prior anesthetic. Type: General and MAC    History of anesthetic complications    Slow to wake      ROS/MED HX    ENT/Pulmonary: Comment: 7.5 pack year smoking history    (+)tobacco use, Past use asthma Treatment: Inhaler prn,  mild COPD, , . .    Neurologic:  - neg neurologic ROS     Cardiovascular:     (+) Dyslipidemia, hypertension-range: 135-145/50, Peripheral Vascular Disease-- Symptomatic and Carotid Stenosis, CAD, --stent,2006  1 Drug Eluting Stent .. Taking blood thinners Pt has received instructions: Instructions Given to patient: Will remain on. . . :. . Previous cardiac testing Echodate:3/30/16results:Interpretation Summary  Technically difficult study. Extremely difficult acoustic windows despite the use of contrast for endcardial border definition. Global and regional left ventricular function is normal with an EF of 55-60%. Global right ventricular function is normal. Moderate left atrial enlargement.  Mild pulmonary hypertension. Right ventricular systolic pressure is 35 mmHg above the right atrial pressure.  The inferior vena cava was dilated at 2.3 cm without respiratory variability, consistent with increased right atrial pressure. Estimated mean right atrial pressure is >8 mmHg. No pericardial effusion is present.  Stress Testdate:3/24/17 results:Lexiscan   Impression:  1. Normal myocardial SPECT study with a summed stress score of 0.  2. No evidence of ischemia or scar.  3. Normal LV ejection fraction wall motion.  4. No change from 3/30/2016 and the appearance of the heart.  5. Right lower lobe mass, this should be considered malignant until proven otherwise. Based upon position the mass can be percutaneously biopsied.     ECG reviewed date:4/18/17 results:Sinus rhythm, short ME, possible left atrial enlargement, nonspecific T wave abnormality.   date: results:          METS/Exercise Tolerance:  3 - Able to walk 1-2 blocks without stopping   Hematologic:   - neg hematologic  ROS       Musculoskeletal:   (+) , , other musculoskeletal- Right shoulder pain      GI/Hepatic:  - neg GI/hepatic ROS       Renal/Genitourinary:     (+) chronic renal disease, type: CRI, Pt does not require dialysis, Pt has no history of transplant,       Endo:     (+) type I DM, Last HgA1c: 7.0 date: 5/3/17 Using insulin - using insulin pump Normal glucose range: 130-180 Diabetic complications: nephropathy neuropathy cardiac problems, thyroid problem  Thyroid disease - Other, .      Psychiatric:     (+) psychiatric history anxiety      Infectious Disease:  - neg infectious disease ROS       Malignancy:   (+) Malignancy History of Lung  Lung CA Active status post.         Other:    (+) No chance of pregnancy C-spine cleared: N/A, no H/O Chronic Pain,no other significant disability                    Physical Exam      Airway   Mallampati: I  TM distance: >3 FB  Neck ROM: full    Dental   (+) caps  Comment: Front uuper tooth cap (tooth 9)    Cardiovascular   Rhythm and rate: regular and normal      Pulmonary    breath sounds clear to auscultation    Other findings: For further details of assessment, testing, and physical exam please see H and P completed on same date.  Patient has permanent cap #9           PAC Discussion and Assessment    ASA Classification: 3  Case is suitable for: [place]  Anesthetic techniques and relevant risks discussed: GA and GA with regional block for post-op pain control  Invasive monitoring and risk discussed: Yes  Types:   Possibility and Risk of blood transfusion discussed: Yes  NPO instructions given:   Additional anesthetic preparation and risks discussed:   Needs early admission to pre-op area:   Other:     PAC Resident/NP Anesthesia Assessment:  Yue Valenzuela is a 71 year old female scheduled to undergo flexible bronchoscopy, mediastinoscopy, right thoracoscopic wedge resection, possible lower lobectomy, possible mediastinal lymph node dissection with Dr. Hall  on 5/10/17. She has the following specific operative considerations:   - RCRI : 6.6 % risk of major adverse cardiac event.   - VTE risk: 3%  - Risk of PONV score =2.  If > 2, anti-emetic intervention recommended.    Past history of being slow to wake.     --RLL adenocarcinoma. Above procedure planned.   --CKD, stage 4, under evaluation for kidney transplant when lung mass was found. Cr 1.47. GFR 35. Will require close monitoring of renal function during periop period. Patient, of course, very concerned about protecting her kidneys.   --DIABETES MELLITUS I. Last A1C 7.0. On insulin pump and will come in at basal rate. Will require close monitoring of blood glucose during periop period.   --HLD. Pravastatin. HYPERTENSION. Will take Amlodipine and Coreg on DOS. Will hold Lisinopril and Lasix. CAD, s/p ANTHONY to proximal Circ in 2006. No cardiac symptoms. ASA 81 mg daily and will remain on. All testing above. PVD with mild diffuse bilateral carotid atherosclerosis by US. 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 Left side without significant stenoses.    --Former smoker. 7.5 pack years. Mild COPD. Asthma history. Has been using Albuterol only prn due to expense of Symbicort. Evaluated by respiratory therapy today with education for prehab, and will be followed in the hospital.   --Anxiety. Clonazepam prn   --Pain management. Discussed possibility of nerve block if appropriate for patient's procedure. Final counseling and decisions by regional team on DOS.   --Enhanced recovery pathway initiated. No DOS orders for Celebrex or Gabapentin due to cardiac and renal concerns.  Type and screen drawn today.      Patient was discussed with Dr Torrez.        Reviewed and Signed by PAC Mid-Level Provider/Resident  Mid-Level Provider/Resident: KATHERYN Bell, CNS  Date: 5/3/17  Time: 5:00pm    Attending Anesthesiologist Anesthesia Assessment:  Patient discussed with Dr. Torrez.  Agree  with PAC assessment above.  Yue Valenzuela is a 71 year old female with DM1 on insulin pump, CKD stage 4 not requiring dialysis, CAD with ANTHONY placement in 2006, stable L ICA stenosis, borderline COPD on albuterol, and RLL lung adenocarcinoma  who is scheduled for THORACOSCOPIC RESECTION LUNG on 5/10/2017 with Dr. Hall.  Cardiac history significant for ANTHONY placement in 2013 and stable left ICA stenosis of 50-65%.  4/16/17 EKG significant for NSR with short IN interval with fusion complexes.  3/24/2017 cardiac stress NM scan normal with regard to cardiac function.  Patient denies CP or SOB with exertion.  Feasible airway.  Borderline COPD, currently using albuterol inhaler.  Plan to have patient use albuterol daily prior to surgery.  Patient with DM1 on insulin pump and followed by endocrinology.  Insulin correction factor in 1 unit of insulin to lower blood sugar by 28.  Plan to obtain HgA1c today, however patient with good control, states prior hgA1c around 6.9 to 7.0.  History of stage 4 CKD currently being evaluated for renal transplant.  Patient makes urine and does not require dialysis at this time.   Of note, patient is very anxious with regard to protecting her kidneys during surgery.  Invited and answered patient questions and concerns.  Patient is medically optimized for the above stated procedure without additional work-up.  Final anesthesia plan will be determined by staff anesthesiologist on day of surgery.    Kirk Damico Jr., MD  Anesthesia Resident - [facility]  5/3/2017  12:35 PM      I have reviewed the chart and examined the patient.  I have discussed the patient with Dr. Damico and concur with his assessment.  No further testing is required at this time.  Final anesthetic plan per the attending the day of surgery.  Please note the left internal carotid stenosis.    Demario Torrez MD  Attending Anesthesiologist    Reviewed and Signed by PAC Anesthesiologist  Anesthesiologist: Demario Torrez MD  Date:  05/03/2017  Time: 1634  Pass/Fail:   Disposition:     PAC Pharmacist Assessment:        Pharmacist:   Date:   Time:      Anesthesia Plan      History & Physical Review  History and physical reviewed and following examination; no interval change.    ASA Status:  3 .    NPO Status:  > 8 hours    Plan for General, ETT and Periph. Nerve Block for postop pain with Intravenous induction. Maintenance will be Balanced.    PONV prophylaxis:  Ondansetron (or other 5HT-3)  Additional equipment: 2nd IV, Arterial Line, Double Lumen ETT and Fiberoptic bronchoscope I have reviewed the chart and examined the patient.  I concur with the assessment of the PAC and resident.  Plan GETA with DL ETT with FOB verification (tooth guard to be used during DL)  Arterial line and 2 PIV.  T&S  I have discussed the plan with the patient and she wishes to proceed.    Demario Torrez MD      Postoperative Care  Postoperative pain management:  IV analgesics.      Consents  Anesthetic plan, risks, benefits and alternatives discussed with:  Patient..          Tania Jade MD  CA-2/PGY-3  5/9/2017  3:06 PM                    .

## 2017-05-03 NOTE — H&P
Pre-Operative H & P     CC:  Preoperative exam to assess for increased cardiopulmonary risk while undergoing surgery and anesthesia.    Date of Encounter: 5/3/2017  Primary Care Physician:  Nory Elliott  Yue Valenzuela is a 71 year old female who presents for pre-operative H & P in preparation for flexible bronchoscopy, mediastinoscopy, right thoracoscopic wedge resection, possible lower lobectomy, possible mediastinal lymph node dissection with Dr. Hall on 5/10/17 at [facility]. History is obtained from the patient.     Patient with history of DIABETES MELLITUS I for many years and related stage 4 chronic kidney disease who was undergoing evaluation for kidney transplant. As part of her cardiac evaluation she underwent a stress test on 3/24/17 where a concerning right lower lobe mass was discovered. After imaging she proceeded to IR biopsy which confirmed adenocarcinoma. Unfortunately, her biopsy was complicated by pneumothorax requiring admission with CT placement. Consult with Dr. Hall with above procedure now planned.    Past Medical History  Past Medical History:   Diagnosis Date     Adenocarcinoma, lung (H)      Asthma      CAD (coronary artery disease) 9/17/2014 2006 PCI circumflex territory  Mid LAD lesion      Carotid artery stenosis      CKD (chronic kidney disease) stage 4, GFR 15-29 ml/min (H)      Diabetes mellitus type 1 (H)      H/O factor V Leiden mutation      Hypertension      Mixed hyperlipidemia (DYSLIPIDEMIA) 8/1/2014     Osteopenia      Pneumothorax 4/16/2017     PVD (peripheral vascular disease) (H)      Thyroid nodule 04/12/2012    Hurtle Cells, non malignant       Past Surgical History  Past Surgical History:   Procedure Laterality Date     ganglion cyst removal       hemithyroidectomy  4/13/2012    PATH report showed benign Hurtle Cell Adenoma     IR biopsy of lung  04/2017     lipoma removal       SHOULDER SURGERY Left       TONSILLECTOMY         Hx of Blood transfusions/reactions: Denies.     Hx of abnormal bleeding or anti-platelet use: ASA 81 mg daily.    Menstrual history: No LMP recorded. Patient is postmenopausal.    Steroid use in the last year: Denies.    Personal or FH with difficulty with Anesthesia:  Past history slow to wake.    Prior to Admission Medications  Current Outpatient Prescriptions   Medication Sig Dispense Refill     cetirizine (ZYRTEC) 10 MG tablet Take 5 mg by mouth daily       calcium 600 MG tablet Take 1 tablet by mouth every morning       Respiratory Therapy Supplies (AEROBIKA) GAGANDEEP 1 Device 6 times daily 1 each 0     Insulin Aspart (INSULIN PUMP - OUTPATIENT) Insulin pump base rate: 8220-1795 rate 0.5  0894-4427 rate 1.0  4588-2625 rate 1.95  1213-3771 rate 1.35       clonazePAM (KLONOPIN) 0.5 MG tablet Take 0.5 tablets (0.25 mg) by mouth 2 times daily as needed for anxiety 90 tablet 1     Acetaminophen (TYLENOL PO) Take 1,000 mg by mouth every 8 hours as needed for mild pain or fever        budesonide-formoterol (SYMBICORT) 80-4.5 MCG/ACT inhaler Inhale 2 puffs into the lungs 2 times daily 3 Inhaler 3     amLODIPine (NORVASC) 10 MG tablet Take 10 mg by mouth every evening        aspirin EC 81 MG tablet Take 81 mg by mouth every evening        blood glucose (ONE TOUCH ULTRA) test strip        calcium acetate (PHOSLO) 667 MG CAPS Take 667 mg by mouth 4 times daily (with meals and nightly)        carvedilol (COREG) 3.125 MG tablet Take 3.125 mg by mouth 2 times daily (with meals)        cholecalciferol (VITAMIN D) 1000 UNIT tablet Take 1,000 Units by mouth every morning        furosemide (LASIX) 20 MG tablet 20 mg every morning        ONETOUCH DELICA LANCETS 33G MISC        lisinopril (PRINIVIL,ZESTRIL) 20 MG tablet Take 10 mg by mouth every evening        pravastatin (PRAVACHOL) 40 MG tablet 40 mg every evening          Allergies  Allergies   Allergen Reactions     Codeine Other (See Comments)     Makes her  sleepless     Loratadine Other (See Comments)     Extreme sleepiness       Sulfa Drugs Unknown     Other reaction(s): Unknown     Terazosin Other (See Comments)     Swellling in legs, improved when went off     Adhesive Tape Rash     Foam tape used during surgery was what caused rash     Latex Rash     Liquid Adhesive Rash       Social History  Social History     Social History     Marital status:      Spouse name: N/A     Number of children: N/A     Years of education: N/A     Occupational History     retail Other     part-time     Social History Main Topics     Smoking status: Former Smoker     Packs/day: 0.50     Years: 15.00     Types: Cigarettes     Start date: 1/1/1970     Quit date: 12/31/1985     Smokeless tobacco: Never Used     Alcohol use 0.0 - 0.5 oz/week     0 - 1 Standard drinks or equivalent per week     Drug use: No     Sexual activity: Not on file     Other Topics Concern     Not on file     Social History Narrative    The patient has a 7 pk yr tobacco hx.  She has no active use since 1985.  Alcohol use is 0 alcoholic drinks per week.  She denies use of recreational drugs.          She is retired.  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           The patient is .  Has 2 children.        Hot Tub Exposure: NO    Recent Travel: NO     Hx of incarceration:  NO    Bird Exposure:   NO    Animal Exposure:  NO    Inhalation Exposure:  NO           Family History  Family History   Problem Relation Age of Onset     Cancer - colorectal Father      DIABETES Father      Scleroderma Mother      CEREBROVASCULAR DISEASE Brother      DIABETES Brother      Hypertension Brother      DIABETES Brother      2nd brother       Review of Systems  The complete review of systems is negative other than noted in the HPI or here.   Constitutional: Denies fever, chills. Always feels cold. Weight loss of 10#.   Skin: She is very sensitive to adhesive and has had irritation at the site of  "her right side chest tube site. She also developed what she calls a heat rash under her right breast and mid chest. She is trying to keep it clean and dry and is applying Cortaid. It is improving.   HEENT: Wears glasses for vision. Has developing cataracts. Sinus drainage. Occasional swallowing difficulty especially with dry foods.   Respiratory: Denies cough or shortness of breath. Has been using Albuterol inhaler but did not get her Symbicort inhaler because it was so expensive.   CV: Denies chest pain or irregular HR. BP range 135-145/50. Generally good exercise tolerance.   GI: Denies abdominal pain. Occasional diarrhea. Has been eating less.  : Denies dysuria. Some incontinence.   M/S: Right shoulder pain. Leg cramps, especially at night.   Endo: BS range 130-180. Had a 2 am low last night of 65 which is unusual for her.     Temp: 97.8  F (36.6  C) Temp src: Oral BP: 187/63 Pulse: 67   Resp: 16 SpO2: 100 %         139 lbs 4.8 oz  5' 4\"   Body mass index is 23.91 kg/(m^2).       Physical Exam  Constitutional: Awake, alert, cooperative, no apparent distress, and appears stated age. Accompanied by .  Eyes: Pupils equal, round and reactive to light, extra ocular muscles intact, sclera clear, conjunctiva normal.  HENT: Normocephalic, oral pharynx with moist mucus membranes, good dentition. Upper left tooth capped. No goiter appreciated.   Respiratory: Clear to auscultation bilaterally, no crackles or wheezing. No cough or obvious dyspnea.  Cardiovascular: Regular rate and rhythm, normal S1 and S2, and no murmur noted.  Carotids, no bruits. No edema. Palpable pulses to radial  DP and PT arteries.   GI: Normal bowel sounds, soft, non-distended, non-tender, no masses palpated, no hepatosplenomegaly.   Lymph/Hematologic: No cervical lymphadenopathy and no supraclavicular lymphadenopathy.  Genitourinary:  Deferred.  Skin: Warm and dry.  Flat scattered rash under right breast and mid chest. Dry, mild redness. "   Musculoskeletal: Full ROM of neck. There is no redness, warmth, or swelling of the joints. Gross motor strength is normal.    Neurologic: Awake, alert, oriented to name, place and time. Cranial nerves II-XII are grossly intact. Gait is normal.   Neuropsychiatric: Calm, cooperative. Normal affect.     Labs: (personally reviewed)  Lab Results   Component Value Date    WBC 8.6 04/18/2017     Lab Results   Component Value Date    RBC 3.78 04/18/2017     Lab Results   Component Value Date    HGB 11.1 04/18/2017     Lab Results   Component Value Date    HCT 35.5 04/18/2017     Lab Results   Component Value Date    MCV 94 04/18/2017     Lab Results   Component Value Date    MCH 29.4 04/18/2017     Lab Results   Component Value Date    MCHC 31.3 04/18/2017     Lab Results   Component Value Date    RDW 13.3 04/18/2017     Lab Results   Component Value Date     04/18/2017     Last Basic Metabolic Panel:  Lab Results   Component Value Date     04/21/2017      Lab Results   Component Value Date    POTASSIUM 4.3 04/21/2017     Lab Results   Component Value Date    CHLORIDE 105 04/21/2017     Lab Results   Component Value Date    ZAHEER 9.6 04/21/2017     Lab Results   Component Value Date    CO2 27 04/21/2017     Lab Results   Component Value Date    BUN 36 04/21/2017     Lab Results   Component Value Date    CR 1.47 04/21/2017     Lab Results   Component Value Date     04/21/2017     EKG: Personally reviewed: 4/18/17 Sinus rhythm, short MA, possible left atrial enlargement, nonspecific T wave abnormality.  Cardiac echo: 3/30/16  Interpretation Summary  Technically difficult study. Extremely difficult acoustic windows despite the  use of contrast for endcardial border definition.  Global and regional left ventricular function is normal with an EF of 55-60%.  Global right ventricular function is normal.  Moderate left atrial enlargement.  Mild pulmonary hypertension. Right ventricular systolic pressure is 35  mmHg  above the right atrial pressure.  The inferior vena cava was dilated at 2.3 cm without respiratory variability,  consistent with increased right atrial pressure.  Estimated mean right atrial pressure is >8 mmHg.  No pericardial effusion is present.  Stress test: Lexiscan 3/24/2017  Impression:  1. Normal myocardial SPECT study with a summed stress score of 0.  2. No evidence of ischemia or scar.  3. Normal LV ejection fraction wall motion.  4. No change from 3/30/2016 and the appearance of the heart.  5. Right lower lobe mass, this should be considered malignant until  proven otherwise. Based upon position the mass can be percutaneously  biopsied.     PET 4/20/17  IMPRESSION:   This patient with a history of recently diagnosed pulmonary  adenocarcinoma:  1. Mild hypermetabolism associated with the primary mass in the right  lower lobe is compatible with the pathologic diagnosis of malignancy.  The apparent mild interval increase in size and density of the lesion  is likely related to the recent biopsy.  2. Increasing patchy consolidative, groundglass, and tree-in-bud  nodularity in the right greater than left lung, some of which is  associated with mild hypermetabolism. Given the relatively abrupt  increase since 4/4/2017, these findings are favored to represent  worsening of infectious/inflammatory process. Metastatic disease is  considered less likely.  3. No other evidence of metastatic disease is identified.  4. Multinodular thyroid gland, with a large nodule on the right  measuring up to at least 2.1 cm. Consider thyroid ultrasound.  5. Hyperdense 2.0 cm mildly complex left renal cyst. This could  represent a proteinaceous or hemorrhagic cyst, although it is overall  indeterminate given the inability to administer intravenous contrast.  An ultrasound may be helpful for further characterization.    MRI Brain  4/20/17  Impression:  1. Study somewhat limited by lack of IV contrast, however there is  no  intracranial mass lesion.  2. Cerebral volume loss, with enlargement of the ventricular system  which may be slightly more than expected compared to the sulci.  Noncommunicating hydrocephalus is difficult to exclude. Recommend  correlation with outside images.  3. Moderate leukoaraiosis.    4/20/17 CHEST X-RAY      Impression:   1. Tiny residual right apical pneumothorax. Right chest tube with  sidehole projecting outside the pleural cavity, stable.  2. Stable minimal perihilar and bibasilar opacities. Infection versus  Atelectasis.    Outside records reviewed from: Care Everywhere    ASSESSMENT and PLAN  Yue Valenzuela is a 71 year old female scheduled to undergo flexible bronchoscopy, mediastinoscopy, right thoracoscopic wedge resection, possible lower lobectomy, possible mediastinal lymph node dissection with Dr. Hall on 5/10/17. She has the following specific operative considerations:   - RCRI : 6.6 % risk of major adverse cardiac event.   - Anesthesia considerations:  Refer to PAC assessment in anesthesia records  - VTE risk: 3%  - Risk of PONV score =2.  If > 2, anti-emetic intervention recommended.     --RLL adenocarcinoma. Above procedure planned.   --CKD, stage 4, under evaluation for kidney transplant when lung mass was found. Cr 1.47. GFR 35. Will require close monitoring of renal function during periop period. Patient, of course, very concerned about protecting her kidneys.   --DIABETES MELLITUS I. Last A1C 7.0. On insulin pump and will come in at basal rate. Will require close monitoring of blood glucose during periop period.   --HLD. Pravastatin. HYPERTENSION. Will take Amlodipine and Coreg on DOS. Will hold Lisinopril and Lasix. CAD, s/p ANTHONY to proximal Circ in 2006. No cardiac symptoms. ASA 81 mg daily and will remain on. All testing above. PVD with mild diffuse bilateral carotid atherosclerosis by US. 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 Left side  without significant stenoses.    --Former smoker. 7.5 pack years. Mild COPD. Asthma history. Has been using Albuterol only prn due to expense of Symbicort. Evaluated by respiratory therapy today with education for prehab, and will be followed in the hospital.   --Anxiety. Clonazepam prn   --Pain management. Discussed possibility of nerve block if appropriate for patient's procedure. Final counseling and decisions by regional team on DOS.   --Enhanced recovery pathway initiated. No DOS orders for Celebrex or Gabapentin due to cardiac and renal concerns.  Type and screen drawn today.    Arrival time, NPO, shower and medication instructions provided by nursing staff today. Preparing For Your Surgery handout given.      Patient was discussed with Dr Torrez.    KATHERYN Rowe CNS  Preoperative Assessment Center  [facility]  Clinic and Surgery Center  Phone: [phone]  Fax: [phone]

## 2017-05-03 NOTE — PATIENT INSTRUCTIONS
Preparing for Your Surgery      Name:  Yue Valenzuela   MRN:  [id number]   :  1945   Today's Date:  5/3/2017     Arriving for surgery:  Bronchoscopy, Right thoroscopic lung resection  Surgery date:  5/10/17  Surgery time:  9:25 am   Arrival time:    7:25 am   Please come to:       [facility] [address]  [address] [place]  [address]    -   parking is available in front of the hospital from 5:15 am to 8:00 pm    -  Stop at the Information Desk in the lobby    -   Inform the information person that you are here for surgery. An escort to 3c will be provided. If you would not like an escort, please proceed to 3C on the 3rd floor. [phone]     What can I eat or drink?  -  You may have solid food or milk products until 8 hours prior to your surgery.  Nothing after 11 pm   -  You may have water, apple juice or 7up/Sprite until 2 hours prior to your surgery.  Until 7:25 am arrival time     Which medicines can I take?  -  Do NOT take these medications in the morning, the day of surgery:      Furosemide (lasix)      Lisinopril       -  Please take these medications the day of surgery:      Aspirin (ok to stay on aspirin pre-op)      Zyrtec       Inhaler and bring to hospital       Amlodipine       Carvedilol         How do I prepare myself?  -  Take two showers: one the night before surgery; and one the morning of surgery.         Use Scrubcare or Hibiclens to wash from neck down.  You may use your own shampoo and conditioner. No other hair products.   -  Do NOT use lotion, powder, deodorant, or antiperspirant the day of your surgery.  -  Do NOT wear any makeup, fingernail polish or jewelry.  -  Begin using Incentive Spirometer 1 week prior to surgery.  Use 4 times per day, up to 5-10 breaths each time.  Bring Incentive Spirometer to hospital.  -Do not bring your own medications to the hospital, except for inhalers and eye drops.  -  Bring your ID and insurance  card.    Questions or Concerns:  If you have questions or concerns, please call the  Preoperative Assessment Center, Monday-Friday 7AM-7PM:  [phone]          AFTER YOUR SURGERY  Breathing exercises   Breathing exercises help you recover faster. Take deep breaths and let the air out slowly. This will:     Help you wake up after surgery.    Help prevent complications like pneumonia.  Preventing complications will help you go home sooner.   We may give you a breathing device (incentive spirometer) to encourage you to breathe deeply.   Nausea and vomiting   You may feel sick to your stomach after surgery; if so, let your nurse know.    Pain control:  After surgery, you may have pain. Our goal is to help you manage your pain. Pain medicine will help you feel comfortable enough to do activities that will help you heal.  These activities may include breathing exercises, walking and physical therapy.   To help your health care team treat your pain we will ask: 1) If you have pain  2) where it is located 3) describe your pain in your words  Methods of pain control include medications given by mouth, vein or by nerve block for some surgeries.  We may give you a pain control pump that will:  1) Deliver the medicine through a tube placed in your vein  2) Control the amount of medicine you receive  3) Allow you to push a button to deliver a dose of pain medicine  Sequential Compression Device (SCD) or Pneumo Boots:  You may need to wear SCD S on your legs or feet. These are wraps connected to a machine that pumps in air and releases it. The repeated pumping helps prevent blood clots from forming.   Enhanced Recovery After Surgery     This is a team effort, including you, to get you back on your feet, eating and drinking normally and out of the hospital as quickly as possible.  The goals are: 1) NO INFECTIONS and   2) RETURN TO NORMAL DIET    How can we achieve these goals?  1) STAY ACTIVE: Walk every day before your  surgery; try to increase the amount every day.  Walk after surgery as much as you can-the nurses will help you.  Walking speeds healing and gets you home quicker, you heal better at home and have less risk of infection.     2) INCENTIVE SPIROMETER:( as instructed by respiratory therapy ) Practice your incentive spirometer 4 times per day with 5-10 repetitions each time.  Using the incentive spirometer can strengthen your muscles between your ribs and help you have a strong cough after surgery.  A more effective cough can help prevent problems with your lungs.    3) STAY HYDRATED: Drink clear liquids up until 2 hours before your surgery. We would like you to purchase a drink such as Gatorade.  Drink this before bedtime and on the way into the hospital, drink between 8-12 ounces or until you feel hydrated.  Keeping well hydrated leads to your veins being plump, you wake up faster, and you are less likely to be nauseated. Start drinking water as soon as you can after surgery and advance to clear liquids and food as tolerated.  IV fluids contain salt, drinking fluids will minimize the amount of IV fluids you need and decrease the amount of salt you get.     4) PAIN MANAGEMENT: If we minimize the amount of opioids and narcotics, and use regional blocks (which numb the area where your surgery is) along with oral pain medications; you will have less side effects of nausea and constipation. Narcotics can slow down your bowels and cause you to stay in the hospital longer.     Our goal is to keep you comfortable; eating and drinking normally and back home safely.

## 2017-05-04 LAB — BLD GP AB SCN TITR SERPL: NORMAL {TITER}

## 2017-05-04 NOTE — PROGRESS NOTES
Chronic Pulmonary Disease Specialist Pre-Surgery Consult  HPI  Yue Valenzuela is a 71 year old female who presents for pre-operative H & P in preparation for flexible bronchoscopy, mediastinoscopy, right thoracoscopic wedge resection, possible lower lobectomy, possible mediastinal lymph node dissection with Dr. Hall on 5/10/17 at [facility].   Met with patient in PAC for pre-surgery optimization of her respiratory status and to provide education on her respiratory medications.   Patient has established care with Dr. John Plaza at [facility]. Last PFT on 5/3/14. Previous PFT from 8/13/14 shows Moderate Airflow Obstruction with significant improvement after bronchodilator  Patients respiratory medications include Albuterol Rescue inhaler which she use maybe once a week and she has a prescription for Symbicort which she has not filled yet due to cost.   I instructed patient on the use of Aerobika and explained the importance of using the device 4-6 times a day for 5-10 minutes each time. Also instructed patient on proper inhaler technique with the Aerochamber for better drug deposition. She displayed good technique on both. The patient was also given instructions on proper cleaning of both items.  Recommendations:  -Continued use of Aerobika 4-6 times daily for 5-10 minutes each time to open smaller airways, to improve exercise tolerance, and to improve mucus clearance every day before surgery  -Use of Aerochamber with Albuterol Rescue inhaler twice daily every day before surgery  -Will assess post surgery    Clarissa Wilcox, RRT  Chronic Pulmonary Disease Specialist  [phone]

## 2017-05-08 DIAGNOSIS — R91.1 LUNG NODULE: ICD-10-CM

## 2017-05-08 DIAGNOSIS — R91.1 LUNG NODULE: Primary | ICD-10-CM

## 2017-05-09 LAB
FUNGUS SPEC CULT: NORMAL
MICRO REPORT STATUS: NORMAL
SPECIMEN SOURCE: NORMAL

## 2017-05-10 ENCOUNTER — ANESTHESIA (OUTPATIENT)
Dept: SURGERY | Facility: CLINIC | Age: 72
DRG: 163 | End: 2017-05-10
Payer: MEDICARE

## 2017-05-10 ENCOUNTER — APPOINTMENT (OUTPATIENT)
Dept: GENERAL RADIOLOGY | Facility: CLINIC | Age: 72
DRG: 163 | End: 2017-05-10
Attending: STUDENT IN AN ORGANIZED HEALTH CARE EDUCATION/TRAINING PROGRAM
Payer: MEDICARE

## 2017-05-10 ENCOUNTER — HOSPITAL ENCOUNTER (INPATIENT)
Facility: CLINIC | Age: 72
LOS: 16 days | Discharge: NURSING, REHAB OR LONG-TERM CARE | DRG: 163 | End: 2017-05-26
Attending: STUDENT IN AN ORGANIZED HEALTH CARE EDUCATION/TRAINING PROGRAM | Admitting: STUDENT IN AN ORGANIZED HEALTH CARE EDUCATION/TRAINING PROGRAM
Payer: MEDICARE

## 2017-05-10 DIAGNOSIS — G89.18 ACUTE POST-OPERATIVE PAIN: ICD-10-CM

## 2017-05-10 DIAGNOSIS — C34.90 NON-SMALL CELL LUNG CANCER (NSCLC) (H): Primary | ICD-10-CM

## 2017-05-10 DIAGNOSIS — R19.4 BOWEL HABIT CHANGES: ICD-10-CM

## 2017-05-10 DIAGNOSIS — I48.92 ATRIAL FLUTTER, UNSPECIFIED TYPE (H): ICD-10-CM

## 2017-05-10 DIAGNOSIS — E10.21 TYPE 1 DIABETES MELLITUS WITH NEPHROPATHY (H): ICD-10-CM

## 2017-05-10 LAB
ABO + RH BLD: NORMAL
ABO + RH BLD: NORMAL
ANION GAP SERPL CALCULATED.3IONS-SCNC: 12 MMOL/L (ref 3–14)
BASE DEFICIT BLDA-SCNC: 4.3 MMOL/L
BASE DEFICIT BLDA-SCNC: 4.8 MMOL/L
BASE DEFICIT BLDA-SCNC: 5.8 MMOL/L
BASE DEFICIT BLDA-SCNC: 6 MMOL/L
BASE DEFICIT BLDA-SCNC: 6.2 MMOL/L
BLD GP AB SCN SERPL QL: NORMAL
BLOOD BANK CMNT PATIENT-IMP: NORMAL
BLOOD BANK CMNT PATIENT-IMP: NORMAL
BUN SERPL-MCNC: 49 MG/DL (ref 7–30)
CA-I BLD-MCNC: 4.9 MG/DL (ref 4.4–5.2)
CA-I BLD-MCNC: 5 MG/DL (ref 4.4–5.2)
CA-I BLD-MCNC: 5.1 MG/DL (ref 4.4–5.2)
CALCIUM SERPL-MCNC: 8.2 MG/DL (ref 8.5–10.1)
CHLORIDE SERPL-SCNC: 111 MMOL/L (ref 94–109)
CO2 SERPL-SCNC: 19 MMOL/L (ref 20–32)
CREAT SERPL-MCNC: 1.84 MG/DL (ref 0.52–1.04)
CREAT SERPL-MCNC: 1.91 MG/DL (ref 0.52–1.04)
ERYTHROCYTE [DISTWIDTH] IN BLOOD BY AUTOMATED COUNT: 12.8 % (ref 10–15)
GFR SERPL CREATININE-BSD FRML MDRD: 26 ML/MIN/1.7M2
GFR SERPL CREATININE-BSD FRML MDRD: 27 ML/MIN/1.7M2
GLUCOSE BLD-MCNC: 139 MG/DL (ref 70–99)
GLUCOSE BLD-MCNC: 142 MG/DL (ref 70–99)
GLUCOSE BLD-MCNC: 198 MG/DL (ref 70–99)
GLUCOSE BLD-MCNC: 218 MG/DL (ref 70–99)
GLUCOSE BLD-MCNC: 222 MG/DL (ref 70–99)
GLUCOSE BLDC GLUCOMTR-MCNC: 112 MG/DL (ref 70–99)
GLUCOSE BLDC GLUCOMTR-MCNC: 147 MG/DL (ref 70–99)
GLUCOSE BLDC GLUCOMTR-MCNC: 153 MG/DL (ref 70–99)
GLUCOSE BLDC GLUCOMTR-MCNC: 155 MG/DL (ref 70–99)
GLUCOSE BLDC GLUCOMTR-MCNC: 94 MG/DL (ref 70–99)
GLUCOSE SERPL-MCNC: 155 MG/DL (ref 70–99)
HCO3 BLD-SCNC: 19 MMOL/L (ref 21–28)
HCO3 BLD-SCNC: 20 MMOL/L (ref 21–28)
HCO3 BLD-SCNC: 21 MMOL/L (ref 21–28)
HCO3 BLD-SCNC: 21 MMOL/L (ref 21–28)
HCO3 BLD-SCNC: 22 MMOL/L (ref 21–28)
HCT VFR BLD AUTO: 32.5 % (ref 35–47)
HGB BLD-MCNC: 10.1 G/DL (ref 11.7–15.7)
HGB BLD-MCNC: 10.1 G/DL (ref 11.7–15.7)
HGB BLD-MCNC: 10.2 G/DL (ref 11.7–15.7)
HGB BLD-MCNC: 10.2 G/DL (ref 11.7–15.7)
HGB BLD-MCNC: 10.5 G/DL (ref 11.7–15.7)
HGB BLD-MCNC: 12.6 G/DL (ref 11.7–15.7)
HGB BLD-MCNC: 9.4 G/DL (ref 11.7–15.7)
MAGNESIUM SERPL-MCNC: 1.9 MG/DL (ref 1.6–2.3)
MCH RBC QN AUTO: 29.2 PG (ref 26.5–33)
MCHC RBC AUTO-ENTMCNC: 32.3 G/DL (ref 31.5–36.5)
MCV RBC AUTO: 91 FL (ref 78–100)
O2/TOTAL GAS SETTING VFR VENT: 100 %
O2/TOTAL GAS SETTING VFR VENT: 100 %
O2/TOTAL GAS SETTING VFR VENT: 50 %
O2/TOTAL GAS SETTING VFR VENT: 55 %
O2/TOTAL GAS SETTING VFR VENT: 57 %
PCO2 BLD: 35 MM HG (ref 35–45)
PCO2 BLD: 38 MM HG (ref 35–45)
PCO2 BLD: 38 MM HG (ref 35–45)
PCO2 BLD: 44 MM HG (ref 35–45)
PCO2 BLD: 47 MM HG (ref 35–45)
PH BLD: 7.28 PH (ref 7.35–7.45)
PH BLD: 7.28 PH (ref 7.35–7.45)
PH BLD: 7.32 PH (ref 7.35–7.45)
PH BLD: 7.34 PH (ref 7.35–7.45)
PH BLD: 7.35 PH (ref 7.35–7.45)
PLATELET # BLD AUTO: 237 10E9/L (ref 150–450)
PO2 BLD: 164 MM HG (ref 80–105)
PO2 BLD: 165 MM HG (ref 80–105)
PO2 BLD: 311 MM HG (ref 80–105)
PO2 BLD: 365 MM HG (ref 80–105)
PO2 BLD: 84 MM HG (ref 80–105)
POTASSIUM BLD-SCNC: 3.9 MMOL/L (ref 3.4–5.3)
POTASSIUM BLD-SCNC: 4.2 MMOL/L (ref 3.4–5.3)
POTASSIUM BLD-SCNC: 4.6 MMOL/L (ref 3.4–5.3)
POTASSIUM BLD-SCNC: 4.7 MMOL/L (ref 3.4–5.3)
POTASSIUM BLD-SCNC: 5.2 MMOL/L (ref 3.4–5.3)
POTASSIUM SERPL-SCNC: 4.4 MMOL/L (ref 3.4–5.3)
POTASSIUM SERPL-SCNC: 4.6 MMOL/L (ref 3.4–5.3)
RBC # BLD AUTO: 3.59 10E12/L (ref 3.8–5.2)
SODIUM BLD-SCNC: 136 MMOL/L (ref 133–144)
SODIUM BLD-SCNC: 137 MMOL/L (ref 133–144)
SODIUM BLD-SCNC: 138 MMOL/L (ref 133–144)
SODIUM SERPL-SCNC: 142 MMOL/L (ref 133–144)
SPECIMEN EXP DATE BLD: NORMAL
WBC # BLD AUTO: 16.2 10E9/L (ref 4–11)

## 2017-05-10 PROCEDURE — 37000009 ZZH ANESTHESIA TECHNICAL FEE, EACH ADDTL 15 MIN: Performed by: STUDENT IN AN ORGANIZED HEALTH CARE EDUCATION/TRAINING PROGRAM

## 2017-05-10 PROCEDURE — 84132 ASSAY OF SERUM POTASSIUM: CPT | Performed by: ANESTHESIOLOGY

## 2017-05-10 PROCEDURE — 82803 BLOOD GASES ANY COMBINATION: CPT | Performed by: STUDENT IN AN ORGANIZED HEALTH CARE EDUCATION/TRAINING PROGRAM

## 2017-05-10 PROCEDURE — 86850 RBC ANTIBODY SCREEN: CPT | Performed by: CLINICAL NURSE SPECIALIST

## 2017-05-10 PROCEDURE — 25000125 ZZHC RX 250

## 2017-05-10 PROCEDURE — 84295 ASSAY OF SERUM SODIUM: CPT | Performed by: ANESTHESIOLOGY

## 2017-05-10 PROCEDURE — 80048 BASIC METABOLIC PNL TOTAL CA: CPT | Performed by: THORACIC SURGERY (CARDIOTHORACIC VASCULAR SURGERY)

## 2017-05-10 PROCEDURE — 84132 ASSAY OF SERUM POTASSIUM: CPT | Performed by: STUDENT IN AN ORGANIZED HEALTH CARE EDUCATION/TRAINING PROGRAM

## 2017-05-10 PROCEDURE — 0BJ08ZZ INSPECTION OF TRACHEOBRONCHIAL TREE, VIA NATURAL OR ARTIFICIAL OPENING ENDOSCOPIC: ICD-10-PCS | Performed by: STUDENT IN AN ORGANIZED HEALTH CARE EDUCATION/TRAINING PROGRAM

## 2017-05-10 PROCEDURE — 71000016 ZZH RECOVERY PHASE 1 LEVEL 3 FIRST HR: Performed by: STUDENT IN AN ORGANIZED HEALTH CARE EDUCATION/TRAINING PROGRAM

## 2017-05-10 PROCEDURE — 00000159 ZZHCL STATISTIC H-SEND OUTS PREP: Performed by: STUDENT IN AN ORGANIZED HEALTH CARE EDUCATION/TRAINING PROGRAM

## 2017-05-10 PROCEDURE — 82947 ASSAY GLUCOSE BLOOD QUANT: CPT | Performed by: STUDENT IN AN ORGANIZED HEALTH CARE EDUCATION/TRAINING PROGRAM

## 2017-05-10 PROCEDURE — 25000128 H RX IP 250 OP 636: Performed by: ANESTHESIOLOGY

## 2017-05-10 PROCEDURE — 84132 ASSAY OF SERUM POTASSIUM: CPT | Performed by: CLINICAL NURSE SPECIALIST

## 2017-05-10 PROCEDURE — 25000128 H RX IP 250 OP 636: Performed by: CLINICAL NURSE SPECIALIST

## 2017-05-10 PROCEDURE — 82803 BLOOD GASES ANY COMBINATION: CPT | Performed by: ANESTHESIOLOGY

## 2017-05-10 PROCEDURE — 25000125 ZZHC RX 250: Performed by: STUDENT IN AN ORGANIZED HEALTH CARE EDUCATION/TRAINING PROGRAM

## 2017-05-10 PROCEDURE — 88307 TISSUE EXAM BY PATHOLOGIST: CPT | Performed by: STUDENT IN AN ORGANIZED HEALTH CARE EDUCATION/TRAINING PROGRAM

## 2017-05-10 PROCEDURE — 12000006 ZZH R&B IMCU INTERMEDIATE UMMC

## 2017-05-10 PROCEDURE — 86900 BLOOD TYPING SEROLOGIC ABO: CPT | Performed by: CLINICAL NURSE SPECIALIST

## 2017-05-10 PROCEDURE — A9270 NON-COVERED ITEM OR SERVICE: HCPCS | Mod: GY | Performed by: CLINICAL NURSE SPECIALIST

## 2017-05-10 PROCEDURE — 25800025 ZZH RX 258: Performed by: ANESTHESIOLOGY

## 2017-05-10 PROCEDURE — 25000128 H RX IP 250 OP 636: Performed by: THORACIC SURGERY (CARDIOTHORACIC VASCULAR SURGERY)

## 2017-05-10 PROCEDURE — 25000125 ZZHC RX 250: Performed by: ANESTHESIOLOGY

## 2017-05-10 PROCEDURE — 40000275 ZZH STATISTIC RCP TIME EA 10 MIN

## 2017-05-10 PROCEDURE — 27210794 ZZH OR GENERAL SUPPLY STERILE: Performed by: STUDENT IN AN ORGANIZED HEALTH CARE EDUCATION/TRAINING PROGRAM

## 2017-05-10 PROCEDURE — 0BBD4ZZ EXCISION OF RIGHT MIDDLE LUNG LOBE, PERCUTANEOUS ENDOSCOPIC APPROACH: ICD-10-PCS | Performed by: STUDENT IN AN ORGANIZED HEALTH CARE EDUCATION/TRAINING PROGRAM

## 2017-05-10 PROCEDURE — 36000064 ZZH SURGERY LEVEL 4 EA 15 ADDTL MIN - UMMC: Performed by: STUDENT IN AN ORGANIZED HEALTH CARE EDUCATION/TRAINING PROGRAM

## 2017-05-10 PROCEDURE — 71000017 ZZH RECOVERY PHASE 1 LEVEL 3 EA ADDTL HR: Performed by: STUDENT IN AN ORGANIZED HEALTH CARE EDUCATION/TRAINING PROGRAM

## 2017-05-10 PROCEDURE — 82330 ASSAY OF CALCIUM: CPT | Performed by: ANESTHESIOLOGY

## 2017-05-10 PROCEDURE — S0020 INJECTION, BUPIVICAINE HYDRO: HCPCS | Performed by: ANESTHESIOLOGY

## 2017-05-10 PROCEDURE — 71010 XR CHEST PORT 1 VW: CPT

## 2017-05-10 PROCEDURE — 88312 SPECIAL STAINS GROUP 1: CPT | Performed by: STUDENT IN AN ORGANIZED HEALTH CARE EDUCATION/TRAINING PROGRAM

## 2017-05-10 PROCEDURE — 82947 ASSAY GLUCOSE BLOOD QUANT: CPT | Performed by: ANESTHESIOLOGY

## 2017-05-10 PROCEDURE — 36000062 ZZH SURGERY LEVEL 4 1ST 30 MIN - UMMC: Performed by: STUDENT IN AN ORGANIZED HEALTH CARE EDUCATION/TRAINING PROGRAM

## 2017-05-10 PROCEDURE — 37000008 ZZH ANESTHESIA TECHNICAL FEE, 1ST 30 MIN: Performed by: STUDENT IN AN ORGANIZED HEALTH CARE EDUCATION/TRAINING PROGRAM

## 2017-05-10 PROCEDURE — 07B74ZX EXCISION OF THORAX LYMPHATIC, PERCUTANEOUS ENDOSCOPIC APPROACH, DIAGNOSTIC: ICD-10-PCS | Performed by: STUDENT IN AN ORGANIZED HEALTH CARE EDUCATION/TRAINING PROGRAM

## 2017-05-10 PROCEDURE — 27210995 ZZH RX 272: Performed by: STUDENT IN AN ORGANIZED HEALTH CARE EDUCATION/TRAINING PROGRAM

## 2017-05-10 PROCEDURE — 40000014 ZZH STATISTIC ARTERIAL MONITORING DAILY

## 2017-05-10 PROCEDURE — 25000565 ZZH ISOFLURANE, EA 15 MIN: Performed by: STUDENT IN AN ORGANIZED HEALTH CARE EDUCATION/TRAINING PROGRAM

## 2017-05-10 PROCEDURE — 88309 TISSUE EXAM BY PATHOLOGIST: CPT | Performed by: STUDENT IN AN ORGANIZED HEALTH CARE EDUCATION/TRAINING PROGRAM

## 2017-05-10 PROCEDURE — 83735 ASSAY OF MAGNESIUM: CPT | Performed by: THORACIC SURGERY (CARDIOTHORACIC VASCULAR SURGERY)

## 2017-05-10 PROCEDURE — 81445 SO NEO GSAP 5-50DNA/DNA&RNA: CPT | Performed by: PATHOLOGY

## 2017-05-10 PROCEDURE — 85027 COMPLETE CBC AUTOMATED: CPT | Performed by: THORACIC SURGERY (CARDIOTHORACIC VASCULAR SURGERY)

## 2017-05-10 PROCEDURE — 25000132 ZZH RX MED GY IP 250 OP 250 PS 637: Mod: GY | Performed by: CLINICAL NURSE SPECIALIST

## 2017-05-10 PROCEDURE — 88377 M/PHMTRC ALYS ISHQUANT/SEMIQ: CPT | Performed by: STUDENT IN AN ORGANIZED HEALTH CARE EDUCATION/TRAINING PROGRAM

## 2017-05-10 PROCEDURE — 40000170 ZZH STATISTIC PRE-PROCEDURE ASSESSMENT II: Performed by: STUDENT IN AN ORGANIZED HEALTH CARE EDUCATION/TRAINING PROGRAM

## 2017-05-10 PROCEDURE — 82330 ASSAY OF CALCIUM: CPT | Performed by: STUDENT IN AN ORGANIZED HEALTH CARE EDUCATION/TRAINING PROGRAM

## 2017-05-10 PROCEDURE — 85018 HEMOGLOBIN: CPT | Performed by: CLINICAL NURSE SPECIALIST

## 2017-05-10 PROCEDURE — 00000146 ZZHCL STATISTIC GLUCOSE BY METER IP

## 2017-05-10 PROCEDURE — S0020 INJECTION, BUPIVICAINE HYDRO: HCPCS

## 2017-05-10 PROCEDURE — 84295 ASSAY OF SERUM SODIUM: CPT | Performed by: STUDENT IN AN ORGANIZED HEALTH CARE EDUCATION/TRAINING PROGRAM

## 2017-05-10 PROCEDURE — 25000128 H RX IP 250 OP 636

## 2017-05-10 PROCEDURE — 0BTF4ZZ RESECTION OF RIGHT LOWER LUNG LOBE, PERCUTANEOUS ENDOSCOPIC APPROACH: ICD-10-PCS | Performed by: STUDENT IN AN ORGANIZED HEALTH CARE EDUCATION/TRAINING PROGRAM

## 2017-05-10 PROCEDURE — 88331 PATH CONSLTJ SURG 1 BLK 1SPC: CPT | Performed by: STUDENT IN AN ORGANIZED HEALTH CARE EDUCATION/TRAINING PROGRAM

## 2017-05-10 PROCEDURE — 25800025 ZZH RX 258

## 2017-05-10 PROCEDURE — 82565 ASSAY OF CREATININE: CPT | Performed by: CLINICAL NURSE SPECIALIST

## 2017-05-10 PROCEDURE — 88305 TISSUE EXAM BY PATHOLOGIST: CPT | Performed by: STUDENT IN AN ORGANIZED HEALTH CARE EDUCATION/TRAINING PROGRAM

## 2017-05-10 PROCEDURE — 86901 BLOOD TYPING SEROLOGIC RH(D): CPT | Performed by: CLINICAL NURSE SPECIALIST

## 2017-05-10 RX ORDER — SODIUM CHLORIDE, SODIUM LACTATE, POTASSIUM CHLORIDE, CALCIUM CHLORIDE 600; 310; 30; 20 MG/100ML; MG/100ML; MG/100ML; MG/100ML
INJECTION, SOLUTION INTRAVENOUS CONTINUOUS
Status: DISCONTINUED | OUTPATIENT
Start: 2017-05-10 | End: 2017-05-10 | Stop reason: HOSPADM

## 2017-05-10 RX ORDER — METOPROLOL TARTRATE 1 MG/ML
5 INJECTION, SOLUTION INTRAVENOUS EVERY 6 HOURS
Status: DISCONTINUED | OUTPATIENT
Start: 2017-05-10 | End: 2017-05-11

## 2017-05-10 RX ORDER — ACETAMINOPHEN 325 MG/1
650 TABLET ORAL EVERY 4 HOURS PRN
Status: DISCONTINUED | OUTPATIENT
Start: 2017-05-13 | End: 2017-05-26 | Stop reason: HOSPADM

## 2017-05-10 RX ORDER — OXYCODONE HYDROCHLORIDE 5 MG/1
5 TABLET ORAL
Status: DISCONTINUED | OUTPATIENT
Start: 2017-05-10 | End: 2017-05-13

## 2017-05-10 RX ORDER — POTASSIUM CHLORIDE 29.8 MG/ML
20 INJECTION INTRAVENOUS
Status: DISCONTINUED | OUTPATIENT
Start: 2017-05-10 | End: 2017-05-24

## 2017-05-10 RX ORDER — DEXTROSE MONOHYDRATE 25 G/50ML
25-50 INJECTION, SOLUTION INTRAVENOUS
Status: DISCONTINUED | OUTPATIENT
Start: 2017-05-10 | End: 2017-05-11

## 2017-05-10 RX ORDER — DEXTROSE MONOHYDRATE, SODIUM CHLORIDE, AND POTASSIUM CHLORIDE 50; 1.49; 4.5 G/1000ML; G/1000ML; G/1000ML
INJECTION, SOLUTION INTRAVENOUS CONTINUOUS
Status: DISCONTINUED | OUTPATIENT
Start: 2017-05-10 | End: 2017-05-11

## 2017-05-10 RX ORDER — ONDANSETRON 2 MG/ML
4 INJECTION INTRAMUSCULAR; INTRAVENOUS EVERY 30 MIN PRN
Status: DISCONTINUED | OUTPATIENT
Start: 2017-05-10 | End: 2017-05-10 | Stop reason: HOSPADM

## 2017-05-10 RX ORDER — CEFAZOLIN SODIUM 2 G/100ML
2 INJECTION, SOLUTION INTRAVENOUS
Status: COMPLETED | OUTPATIENT
Start: 2017-05-10 | End: 2017-05-10

## 2017-05-10 RX ORDER — IPRATROPIUM BROMIDE AND ALBUTEROL SULFATE 2.5; .5 MG/3ML; MG/3ML
3 SOLUTION RESPIRATORY (INHALATION) ONCE
Status: DISCONTINUED | OUTPATIENT
Start: 2017-05-10 | End: 2017-05-10 | Stop reason: HOSPADM

## 2017-05-10 RX ORDER — GLYCOPYRROLATE 0.2 MG/ML
INJECTION, SOLUTION INTRAMUSCULAR; INTRAVENOUS PRN
Status: DISCONTINUED | OUTPATIENT
Start: 2017-05-10 | End: 2017-05-10

## 2017-05-10 RX ORDER — IPRATROPIUM BROMIDE AND ALBUTEROL SULFATE 2.5; .5 MG/3ML; MG/3ML
3 SOLUTION RESPIRATORY (INHALATION) 4 TIMES DAILY
Status: DISCONTINUED | OUTPATIENT
Start: 2017-05-11 | End: 2017-05-12

## 2017-05-10 RX ORDER — ALBUTEROL SULFATE 0.83 MG/ML
2.5 SOLUTION RESPIRATORY (INHALATION) ONCE
Status: COMPLETED | OUTPATIENT
Start: 2017-05-10 | End: 2017-05-10

## 2017-05-10 RX ORDER — NALOXONE HYDROCHLORIDE 0.4 MG/ML
.1-.4 INJECTION, SOLUTION INTRAMUSCULAR; INTRAVENOUS; SUBCUTANEOUS
Status: DISCONTINUED | OUTPATIENT
Start: 2017-05-10 | End: 2017-05-26 | Stop reason: HOSPADM

## 2017-05-10 RX ORDER — NALOXONE HYDROCHLORIDE 0.4 MG/ML
.1-.4 INJECTION, SOLUTION INTRAMUSCULAR; INTRAVENOUS; SUBCUTANEOUS
Status: DISCONTINUED | OUTPATIENT
Start: 2017-05-10 | End: 2017-05-10 | Stop reason: HOSPADM

## 2017-05-10 RX ORDER — ALBUTEROL SULFATE 90 UG/1
4 AEROSOL, METERED RESPIRATORY (INHALATION)
Status: DISCONTINUED | OUTPATIENT
Start: 2017-05-10 | End: 2017-05-26 | Stop reason: HOSPADM

## 2017-05-10 RX ORDER — POTASSIUM CHLORIDE 1.5 G/1.58G
20-40 POWDER, FOR SOLUTION ORAL
Status: DISCONTINUED | OUTPATIENT
Start: 2017-05-10 | End: 2017-05-24

## 2017-05-10 RX ORDER — ALBUTEROL SULFATE 90 UG/1
2 AEROSOL, METERED RESPIRATORY (INHALATION) 2 TIMES DAILY
COMMUNITY

## 2017-05-10 RX ORDER — POTASSIUM CHLORIDE 7.45 MG/ML
10 INJECTION INTRAVENOUS
Status: DISCONTINUED | OUTPATIENT
Start: 2017-05-10 | End: 2017-05-24

## 2017-05-10 RX ORDER — FENTANYL CITRATE 50 UG/ML
25-50 INJECTION, SOLUTION INTRAMUSCULAR; INTRAVENOUS
Status: DISCONTINUED | OUTPATIENT
Start: 2017-05-10 | End: 2017-05-10 | Stop reason: HOSPADM

## 2017-05-10 RX ORDER — SODIUM CHLORIDE, SODIUM LACTATE, POTASSIUM CHLORIDE, CALCIUM CHLORIDE 600; 310; 30; 20 MG/100ML; MG/100ML; MG/100ML; MG/100ML
INJECTION, SOLUTION INTRAVENOUS CONTINUOUS PRN
Status: DISCONTINUED | OUTPATIENT
Start: 2017-05-10 | End: 2017-05-10

## 2017-05-10 RX ORDER — NICOTINE POLACRILEX 4 MG
15-30 LOZENGE BUCCAL
Status: DISCONTINUED | OUTPATIENT
Start: 2017-05-10 | End: 2017-05-11

## 2017-05-10 RX ORDER — FLUMAZENIL 0.1 MG/ML
0.2 INJECTION, SOLUTION INTRAVENOUS
Status: DISCONTINUED | OUTPATIENT
Start: 2017-05-10 | End: 2017-05-10 | Stop reason: HOSPADM

## 2017-05-10 RX ORDER — HYDROMORPHONE HYDROCHLORIDE 1 MG/ML
.3-.5 INJECTION, SOLUTION INTRAMUSCULAR; INTRAVENOUS; SUBCUTANEOUS EVERY 5 MIN PRN
Status: DISCONTINUED | OUTPATIENT
Start: 2017-05-10 | End: 2017-05-10 | Stop reason: HOSPADM

## 2017-05-10 RX ORDER — LABETALOL HYDROCHLORIDE 5 MG/ML
10-40 INJECTION, SOLUTION INTRAVENOUS EVERY 10 MIN PRN
Status: DISCONTINUED | OUTPATIENT
Start: 2017-05-10 | End: 2017-05-26 | Stop reason: HOSPADM

## 2017-05-10 RX ORDER — LIDOCAINE HYDROCHLORIDE 20 MG/ML
INJECTION, SOLUTION INFILTRATION; PERINEURAL PRN
Status: DISCONTINUED | OUTPATIENT
Start: 2017-05-10 | End: 2017-05-10

## 2017-05-10 RX ORDER — HEPARIN SODIUM 5000 [USP'U]/.5ML
5000 INJECTION, SOLUTION INTRAVENOUS; SUBCUTANEOUS EVERY 8 HOURS
Status: DISCONTINUED | OUTPATIENT
Start: 2017-05-11 | End: 2017-05-17

## 2017-05-10 RX ORDER — AMOXICILLIN 250 MG
1-2 CAPSULE ORAL 2 TIMES DAILY
Status: DISCONTINUED | OUTPATIENT
Start: 2017-05-10 | End: 2017-05-12

## 2017-05-10 RX ORDER — ACETAMINOPHEN 325 MG/1
975 TABLET ORAL ONCE
Status: DISCONTINUED | OUTPATIENT
Start: 2017-05-10 | End: 2017-05-10 | Stop reason: HOSPADM

## 2017-05-10 RX ORDER — ACETAMINOPHEN 325 MG/1
975 TABLET ORAL ONCE
Status: COMPLETED | OUTPATIENT
Start: 2017-05-10 | End: 2017-05-10

## 2017-05-10 RX ORDER — PROPOFOL 10 MG/ML
INJECTION, EMULSION INTRAVENOUS PRN
Status: DISCONTINUED | OUTPATIENT
Start: 2017-05-10 | End: 2017-05-10

## 2017-05-10 RX ORDER — POTASSIUM CL/LIDO/0.9 % NACL 10MEQ/0.1L
10 INTRAVENOUS SOLUTION, PIGGYBACK (ML) INTRAVENOUS
Status: DISCONTINUED | OUTPATIENT
Start: 2017-05-14 | End: 2017-05-24

## 2017-05-10 RX ORDER — CHLORHEXIDINE GLUCONATE ORAL RINSE 1.2 MG/ML
15 SOLUTION DENTAL ONCE
Status: COMPLETED | OUTPATIENT
Start: 2017-05-10 | End: 2017-05-10

## 2017-05-10 RX ORDER — ONDANSETRON 4 MG/1
4 TABLET, ORALLY DISINTEGRATING ORAL EVERY 6 HOURS PRN
Status: DISCONTINUED | OUTPATIENT
Start: 2017-05-10 | End: 2017-05-26 | Stop reason: HOSPADM

## 2017-05-10 RX ORDER — EPHEDRINE SULFATE 50 MG/ML
INJECTION, SOLUTION INTRAMUSCULAR; INTRAVENOUS; SUBCUTANEOUS PRN
Status: DISCONTINUED | OUTPATIENT
Start: 2017-05-10 | End: 2017-05-10

## 2017-05-10 RX ORDER — ONDANSETRON 4 MG/1
4 TABLET, ORALLY DISINTEGRATING ORAL EVERY 30 MIN PRN
Status: DISCONTINUED | OUTPATIENT
Start: 2017-05-10 | End: 2017-05-10 | Stop reason: HOSPADM

## 2017-05-10 RX ORDER — IPRATROPIUM BROMIDE AND ALBUTEROL SULFATE 2.5; .5 MG/3ML; MG/3ML
3 SOLUTION RESPIRATORY (INHALATION) ONCE
Status: COMPLETED | OUTPATIENT
Start: 2017-05-10 | End: 2017-05-10

## 2017-05-10 RX ORDER — NALOXONE HYDROCHLORIDE 0.4 MG/ML
.1-.4 INJECTION, SOLUTION INTRAMUSCULAR; INTRAVENOUS; SUBCUTANEOUS
Status: DISCONTINUED | OUTPATIENT
Start: 2017-05-10 | End: 2017-05-10

## 2017-05-10 RX ORDER — ACETAMINOPHEN 325 MG/1
975 TABLET ORAL EVERY 8 HOURS
Status: DISCONTINUED | OUTPATIENT
Start: 2017-05-10 | End: 2017-05-12

## 2017-05-10 RX ORDER — FENTANYL CITRATE 50 UG/ML
INJECTION, SOLUTION INTRAMUSCULAR; INTRAVENOUS PRN
Status: DISCONTINUED | OUTPATIENT
Start: 2017-05-10 | End: 2017-05-10

## 2017-05-10 RX ORDER — SODIUM CHLORIDE 9 MG/ML
INJECTION, SOLUTION INTRAVENOUS CONTINUOUS
Status: DISCONTINUED | OUTPATIENT
Start: 2017-05-10 | End: 2017-05-10 | Stop reason: HOSPADM

## 2017-05-10 RX ORDER — DEXAMETHASONE SODIUM PHOSPHATE 4 MG/ML
INJECTION, SOLUTION INTRA-ARTICULAR; INTRALESIONAL; INTRAMUSCULAR; INTRAVENOUS; SOFT TISSUE PRN
Status: DISCONTINUED | OUTPATIENT
Start: 2017-05-10 | End: 2017-05-10

## 2017-05-10 RX ORDER — MAGNESIUM SULFATE HEPTAHYDRATE 40 MG/ML
4 INJECTION, SOLUTION INTRAVENOUS EVERY 4 HOURS PRN
Status: DISCONTINUED | OUTPATIENT
Start: 2017-05-10 | End: 2017-05-24

## 2017-05-10 RX ORDER — ONDANSETRON 2 MG/ML
INJECTION INTRAMUSCULAR; INTRAVENOUS PRN
Status: DISCONTINUED | OUTPATIENT
Start: 2017-05-10 | End: 2017-05-10

## 2017-05-10 RX ORDER — PANTOPRAZOLE SODIUM 40 MG/1
40 TABLET, DELAYED RELEASE ORAL DAILY
Status: DISCONTINUED | OUTPATIENT
Start: 2017-05-11 | End: 2017-05-26 | Stop reason: HOSPADM

## 2017-05-10 RX ORDER — CEFAZOLIN SODIUM 1 G/3ML
1 INJECTION, POWDER, FOR SOLUTION INTRAMUSCULAR; INTRAVENOUS SEE ADMIN INSTRUCTIONS
Status: DISCONTINUED | OUTPATIENT
Start: 2017-05-10 | End: 2017-05-10 | Stop reason: HOSPADM

## 2017-05-10 RX ORDER — ONDANSETRON 2 MG/ML
4 INJECTION INTRAMUSCULAR; INTRAVENOUS EVERY 6 HOURS PRN
Status: DISCONTINUED | OUTPATIENT
Start: 2017-05-10 | End: 2017-05-26 | Stop reason: HOSPADM

## 2017-05-10 RX ORDER — POTASSIUM CHLORIDE 750 MG/1
20-40 TABLET, EXTENDED RELEASE ORAL
Status: DISCONTINUED | OUTPATIENT
Start: 2017-05-10 | End: 2017-05-24

## 2017-05-10 RX ADMIN — DEXAMETHASONE SODIUM PHOSPHATE 10 MG: 4 INJECTION, SOLUTION INTRA-ARTICULAR; INTRALESIONAL; INTRAMUSCULAR; INTRAVENOUS; SOFT TISSUE at 10:10

## 2017-05-10 RX ADMIN — BUPIVACAINE HYDROCHLORIDE 8 ML/HR: 7.5 INJECTION, SOLUTION EPIDURAL; RETROBULBAR at 12:37

## 2017-05-10 RX ADMIN — ALBUTEROL SULFATE 2.5 MG: 2.5 SOLUTION RESPIRATORY (INHALATION) at 18:48

## 2017-05-10 RX ADMIN — ONDANSETRON 4 MG: 2 INJECTION INTRAMUSCULAR; INTRAVENOUS at 18:52

## 2017-05-10 RX ADMIN — ACETAMINOPHEN 975 MG: 325 TABLET, FILM COATED ORAL at 08:37

## 2017-05-10 RX ADMIN — CISATRACURIUM BESYLATE 2 MG: 2 INJECTION INTRAVENOUS at 14:54

## 2017-05-10 RX ADMIN — Medication 5 MG: at 16:57

## 2017-05-10 RX ADMIN — CISATRACURIUM BESYLATE 2 MG: 2 INJECTION INTRAVENOUS at 17:31

## 2017-05-10 RX ADMIN — Medication 5 MG: at 17:10

## 2017-05-10 RX ADMIN — BUPIVACAINE HYDROCHLORIDE 5 ML: 2.5 INJECTION, SOLUTION EPIDURAL; INFILTRATION; INTRACAUDAL; PERINEURAL at 12:36

## 2017-05-10 RX ADMIN — IPRATROPIUM BROMIDE AND ALBUTEROL SULFATE 3 ML: .5; 3 SOLUTION RESPIRATORY (INHALATION) at 09:33

## 2017-05-10 RX ADMIN — PROPOFOL 140 MG: 10 INJECTION, EMULSION INTRAVENOUS at 10:07

## 2017-05-10 RX ADMIN — FENTANYL CITRATE 50 MCG: 50 INJECTION, SOLUTION INTRAMUSCULAR; INTRAVENOUS at 18:11

## 2017-05-10 RX ADMIN — GLYCOPYRROLATE 0.1 MG: 0.2 INJECTION, SOLUTION INTRAMUSCULAR; INTRAVENOUS at 10:00

## 2017-05-10 RX ADMIN — MIDAZOLAM 0.5 MG: 1 INJECTION INTRAMUSCULAR; INTRAVENOUS at 09:12

## 2017-05-10 RX ADMIN — SODIUM CHLORIDE, POTASSIUM CHLORIDE, SODIUM LACTATE AND CALCIUM CHLORIDE: 600; 310; 30; 20 INJECTION, SOLUTION INTRAVENOUS at 11:00

## 2017-05-10 RX ADMIN — FENTANYL CITRATE 50 MCG: 50 INJECTION, SOLUTION INTRAMUSCULAR; INTRAVENOUS at 09:07

## 2017-05-10 RX ADMIN — SODIUM CHLORIDE, POTASSIUM CHLORIDE, SODIUM LACTATE AND CALCIUM CHLORIDE: 600; 310; 30; 20 INJECTION, SOLUTION INTRAVENOUS at 09:47

## 2017-05-10 RX ADMIN — FENTANYL CITRATE 50 MCG: 50 INJECTION, SOLUTION INTRAMUSCULAR; INTRAVENOUS at 12:40

## 2017-05-10 RX ADMIN — CISATRACURIUM BESYLATE 8 MG: 2 INJECTION INTRAVENOUS at 10:55

## 2017-05-10 RX ADMIN — SODIUM CHLORIDE, POTASSIUM CHLORIDE, SODIUM LACTATE AND CALCIUM CHLORIDE: 600; 310; 30; 20 INJECTION, SOLUTION INTRAVENOUS at 15:22

## 2017-05-10 RX ADMIN — BUPIVACAINE HYDROCHLORIDE 8 ML: 2.5 INJECTION, SOLUTION EPIDURAL; INFILTRATION; INTRACAUDAL; PERINEURAL at 11:05

## 2017-05-10 RX ADMIN — HYDROMORPHONE HYDROCHLORIDE: 10 INJECTION, SOLUTION INTRAMUSCULAR; INTRAVENOUS; SUBCUTANEOUS at 19:33

## 2017-05-10 RX ADMIN — FENTANYL CITRATE 50 MCG: 50 INJECTION, SOLUTION INTRAMUSCULAR; INTRAVENOUS at 19:53

## 2017-05-10 RX ADMIN — CISATRACURIUM BESYLATE 4 MG: 2 INJECTION INTRAVENOUS at 12:18

## 2017-05-10 RX ADMIN — CISATRACURIUM BESYLATE 12 MG: 2 INJECTION INTRAVENOUS at 10:07

## 2017-05-10 RX ADMIN — MIDAZOLAM 1 MG: 1 INJECTION INTRAMUSCULAR; INTRAVENOUS at 18:58

## 2017-05-10 RX ADMIN — CEFAZOLIN SODIUM 1 G: 2 INJECTION, SOLUTION INTRAVENOUS at 16:41

## 2017-05-10 RX ADMIN — GLYCOPYRROLATE 0.2 MG: 0.2 INJECTION, SOLUTION INTRAMUSCULAR; INTRAVENOUS at 10:07

## 2017-05-10 RX ADMIN — Medication 5 MG: at 11:25

## 2017-05-10 RX ADMIN — FENTANYL CITRATE 75 MCG: 50 INJECTION, SOLUTION INTRAMUSCULAR; INTRAVENOUS at 10:07

## 2017-05-10 RX ADMIN — Medication 5 MG: at 10:12

## 2017-05-10 RX ADMIN — LIDOCAINE HYDROCHLORIDE 60 MG: 20 INJECTION, SOLUTION INFILTRATION; PERINEURAL at 10:07

## 2017-05-10 RX ADMIN — CEFAZOLIN SODIUM 1 G: 2 INJECTION, SOLUTION INTRAVENOUS at 14:40

## 2017-05-10 RX ADMIN — CHLORHEXIDINE GLUCONATE 15 ML: 1.2 RINSE ORAL at 08:38

## 2017-05-10 RX ADMIN — CISATRACURIUM BESYLATE 2 MG: 2 INJECTION INTRAVENOUS at 16:59

## 2017-05-10 RX ADMIN — HUMAN INSULIN 3 UNITS/HR: 100 INJECTION, SOLUTION SUBCUTANEOUS at 12:53

## 2017-05-10 RX ADMIN — ONDANSETRON 4 MG: 2 INJECTION INTRAMUSCULAR; INTRAVENOUS at 17:49

## 2017-05-10 RX ADMIN — CISATRACURIUM BESYLATE 2 MG: 2 INJECTION INTRAVENOUS at 15:59

## 2017-05-10 RX ADMIN — Medication 5 MG: at 11:22

## 2017-05-10 RX ADMIN — FENTANYL CITRATE 75 MCG: 50 INJECTION, SOLUTION INTRAMUSCULAR; INTRAVENOUS at 10:55

## 2017-05-10 RX ADMIN — CISATRACURIUM BESYLATE 3 MG: 2 INJECTION INTRAVENOUS at 13:38

## 2017-05-10 RX ADMIN — Medication 2.5 MG: at 17:32

## 2017-05-10 RX ADMIN — CEFAZOLIN SODIUM 2 G: 2 INJECTION, SOLUTION INTRAVENOUS at 10:40

## 2017-05-10 RX ADMIN — PROCHLORPERAZINE EDISYLATE 5 MG: 5 INJECTION INTRAMUSCULAR; INTRAVENOUS at 20:13

## 2017-05-10 RX ADMIN — FENTANYL CITRATE 50 MCG: 50 INJECTION, SOLUTION INTRAMUSCULAR; INTRAVENOUS at 19:33

## 2017-05-10 NOTE — ANESTHESIA PROCEDURE NOTES
Epidural Procedure Note    Staff:     Anesthesiologist:  RABIA MATOS    Resident/CRNA:  MAXIMINO PRUETT    Referred By:  randall Hall    Procedure performed by resident/CRNA in the presence of a teaching physician    Location: Pre-op     Procedure start time:  5/10/2017 9:00 AM     Procedure end time:  5/10/2017 9:20 AM   Pre-procedure checklist:   patient identified, IV checked, site marked, risks and benefits discussed, informed consent, monitors and equipment checked, pre-op evaluation, at physician/surgeon's request and post-op pain management      Correct Patient: Yes      Correct Position: Yes      Correct Site: Yes      Correct Procedure: Yes      Correct Laterality:  Yes    Site Marked:  Yes  Procedure:     Procedure:  Epidural catheter    Position:  Sitting    Sterile Prep: chloraprep, mask, sterile gloves and patient draped      Insertion site:  T5-6    Local skin infiltration:  1% lidocaine    amount (mL):  2    Approach:  Right paramedian    Needle gauge (G):  17    Needle Length (in):  3.5    Block Needle Type:  Touhy    Injection Technique:  LORT saline    RAZA at (cm):  5    Attempts:  2    Redirects:  1    Catheter gauge (G):  19    Catheter threaded easily: Yes      Threaded to cm at skin:  9    Threaded in epidural space (cm):  4    Paresthesias:  No    Aspiration negative for Heme or CSF: Yes      Test dose (mL):  3     Local anesthetic:  Lidocaine 1.5% w/ 1:200,000 epinephrine    Test dose time:  09:18    Test dose negative for signs of intravascular, subdural or intrathecal injection: Yes

## 2017-05-10 NOTE — PROGRESS NOTES
"Spiritual Health Services Progress Note  [facility] [address] [facility]      Non-referral visit with Yue and her  (Oscar). I was referred to provide chaplaincy care with Yue via oncology distress screen. During our phone call she indicated willingness for chaplaincy care prior to her surgery. She indicated how her Yarsani marlene and prayer have been important for her positive coping and that it is significant for that many \"in [facility] and [facility]\" are praying for her. [place] provided. I will inform unit chaplaincy member of Yue's openness to follow-up chaplaincy care.     nelli Blank   [phone]   "

## 2017-05-10 NOTE — ANESTHESIA CARE TRANSFER NOTE
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    Anesthesia Type:   General, ETT, Periph. Nerve Block for postop pain     Note:  Airway :Face Mask  Patient transferred to:PACU  Comments: VSS. Patient is in a mild amount of pain. She was complaining of shortness of breath. She was sat up and given some humidified air. An albuterol treatment was ordered. She was given another 25 mcg of fentanyl.       Vitals: (Last set prior to Anesthesia Care Transfer)    CRNA VITALS  5/10/2017 1810 - 5/10/2017 1847      5/10/2017             Resp Rate (set): 10                Electronically Signed By: Regan Mills MD  May 10, 2017  6:47 PM

## 2017-05-10 NOTE — IP AVS SNAPSHOT
MRN:[id number]                      After Visit Summary   5/10/2017    Yue Valenzuela    MRN: [id number]           Thank you!     Thank you for choosing [facility] for your care. Our goal is always to provide you with excellent care. Hearing back from our patients is one way we can continue to improve our services. Please take a few minutes to complete the written survey that you may receive in the mail after you visit with us. Thank you!        Patient Information     Date Of Birth          1945        About your hospital stay     You were admitted on:  May 10, 2017 You last received care in the:  [address] [facility]    You were discharged on:  May 26, 2017       Who to Call     For medical emergencies, please call 911.  For non-urgent questions about your medical care, please call your primary care provider or clinic, [phone]  For questions related to your surgery, please call your surgery clinic        Attending Provider     Provider Specialty    Glenna Hall MD Thoracic Diseases       Primary Care Provider Office Phone # Fax #    Nory J Lexi [phone]       [facility] [address]  [facility] [address]        After Care Instructions     Discharge Instructions       THORACIC SURGERY DISCHARGE INSTRUCTIONS    DIET: Regular diet - as prior to admission     If your plans upon discharge include prolonged periods of sitting (i.e a lengthy car or plane ride), it is highly beneficial to get up and walk at least once per hour to help prevent swelling and blood clots.     You may remove chest tube dressing 48 hours after tube removal and bandage the site at your own discretion thereafter.  Small amounts of leakage are normal for 2-3 days after removal.  Feel free to call with questions.    You may get incision wet 2 days after operation. Do not submerge, soak, or scrub incision or swim until seen in follow-up.    Take incentive spirometer home for  "continued frequent use    Activity as tolerated, no strenous activity until seen in follow-up, no lifting greater than 20 pounds for the next 1-2 weeks.    Stay hydrated. Take over the counter fiber (metamucil or benefiber) and stool softeners (Miralax, docusate or senna) if becoming constipated.     Call for fever greater than 101.5, chills, increased size of incision, red skin around incision, vision changes, muscle strength changes, sensation changes, shortness of breath, or other concerns.    No driving while taking narcotic pain medication.    Transition to ibuprofen or tylenol/acetaminophen for pain control. Do not take tylenol/acetaminophen and acetaminophen containing narcotic (e.g., percocet or vicodin) at the same time. If you have known ulcer problems, or kidney trouble (elevated creatinine) do not take the ibuprofen.    In emergencies, call 911    For other Questions or Concerns;   A.) During weekday working hours (Monday through Friday 8am to 4:30pm)   call [phone] and ask to speak to a clinical nurse specialist.     B.) At nights (after 4:30pm), on weekends, or if urgent call [phone] and   tell the  \"I would like to page job code [phone], the thoracic surgery   fellow on call, please.\"            General info for SNF       Length of Stay Estimate: Short Term Care: Estimated # of Days <30  Condition at Discharge: Improving  Level of care: Skilled  Rehabilitation Potential: Good  Admission H&P remains valid and up-to-date: Yes  Recent Chemotherapy: N/A  Use Nursing Home Standing Orders: Yes            Mantoux instructions       Give two-step Mantoux (PPD) Per Facility Policy Yes                  Follow-up Appointments     Follow Up and recommended labs and tests       1.) Follow up with primary care physician, Nory Elliott, in 1-2 weeks.  2.) Follow up with a thoracic surgery Clinical Nurse Specialist in Thoracic Surgery clinic in 1 month, prior to which a CXR should be performed.  "                 Your next 10 appointments already scheduled     Jul 11, 2017  9:20 AM CDT   (Arrive by 9:05 AM)   CT CHEST W/O CONTRAST with UCCT2   [facility] [place] [facility] ([facility] [place])    [address] [place] [address] [id number] [phone]           Please bring any scans or X-rays taken at other hospitals, if similar tests were done. Also bring a list of your medicines, including vitamins, minerals and over-the-counter drugs. It is safest to leave personal items at home.  Be sure to tell your doctor:   If you have any allergies.   If there s any chance you are pregnant.   If you are breastfeeding.   If you have any special needs.  You do not need to do anything special to prepare.  Please wear loose clothing, such as a sweat suit or jogging clothes. Avoid snaps, zippers and other metal. We may ask you to undress and put on a hospital gown.            Jul 11, 2017 10:30 AM CDT   (Arrive by 10:15 AM)   Return Visit with John Plaza MD   [facility] ([facility] [place])    [address] [place] [address] [id number] [phone]              Additional Services     Occupational Therapy Peds Consult       Evaluate and treat as clinically indicated.    Reason:  S/p thoracic surgery, deconditioning            Physical Therapy Peds Consult       Evaluate and treat as clinically indicated.    Reason:  S/p thoracic surgery, deconditioning                  Future tests that were ordered for you     XR Chest 2 Views                 Warfarin Instruction     You have started taking a medicine called warfarin. This is a blood-thinning medicine (anticoagulant). It helps prevent and treat blood clots.      Before leaving the hospital, make sure you know how much to take and how long to take it.      You will need regular blood tests to make sure your blood is clotting safely. It is very important to see your doctor for  "regular blood tests.    Talk to your doctor before taking any new medicine (this includes over-the-counter drugs and herbal products). Many medicines can interact with warfarin. This may cause more bleeding or too much clotting.     Eating a lot of vitamin K--found in green, leafy vegetables--can change the way warfarin works in your body. Do NOT avoid these foods. Instead, try to eat the same amount each day.     Bleeding is the most common side-effect of warfarin. You may notice bleeding gums, a bloody nose, bruises and bleeding longer when you cut yourself. See a doctor at once if:   o You cough up blood  o You find blood in your stool (poop)  o You have a deep cut, or a cut that bleeds longer than 10 minutes   o You have a bad cut, hard fall, accident or hit your head (go to urgent care or the emergency room).    For women who can get pregnant: This medicine can harm an unborn baby. Be very careful not to get pregnant while taking this medicine. If you think you might be pregnant, call your doctor right away.    For more information, read \"Guide to Warfarin Therapy,  the booklet you received in the hospital.        Pending Results     Date and Time Order Name Status Description    5/26/2017 0005 XR Chest 2 Views In process     5/25/2017 2330 Heparin Xa (10a) Level In process     5/18/2017 1621 Next Generation Sequencing Oncology: Lung Cancer Panel In process     5/17/2017 0959 FISH In process             Statement of Approval     Ordered          05/26/17 1026  I have reviewed and agree with all the recommendations and orders detailed in this document.  EFFECTIVE NOW     Approved and electronically signed by:  Emiliano Encarnacion PA-C             Admission Information     Date & Time Provider Department Dept. Phone    5/10/2017 Glenna Hall MD [address] [facility] [place] [phone]      Your Vitals Were     Blood Pressure Pulse Temperature Respirations Height Weight    163/58 (BP Location: Right arm) 68 98.4  F " "(36.9  C) (Oral) 16 1.626 m (5' 4\") 59.7 kg (131 lb 11.2 oz)    Pulse Oximetry BMI (Body Mass Index)                96% 22.61 kg/m2          [facility] Information     [facility] gives you secure access to your electronic health record. If you see a primary care provider, you can also send messages to your care team and make appointments. If you have questions, please call your primary care clinic.  If you do not have a primary care provider, please call [phone] and they will assist you.        Care EveryWhere ID     This is your Care EveryWhere ID. This could be used by other organizations to access your [place] medical records  [id number]           Review of your medicines      START taking        Dose / Directions    diltiazem 90 MG 12 hr capsule   Commonly known as:  CARDIZEM SR   Used for:  Atrial flutter, unspecified type (H)        Dose:  180 mg   Take 2 capsules (180 mg) by mouth 2 times daily   Refills:  0       * insulin aspart 100 UNIT/ML injection   Commonly known as:  NovoLOG PEN   Used for:  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)   Replaces:  INSULIN PUMP - OUTPATIENT        Dose:  1-7 Units   Inject 1-7 Units Subcutaneous 3 times daily (before meals) Correction Scale - MEDIUM  For Pre-Meal  - 189 give 1 unit For Pre-Meal  - 239 give 2 units For Pre-Meal  - 289 give 3 units For Pre-Meal  - 339 give 4 units For Pre-Meal - 399 give 5 units For Pre-Meal -449 give 6 units For Pre-Meal BG greater than or equal to 450 give 7 units. To be given with prandial insulin, and based on pre-meal glucose   Refills:  0       * insulin aspart 100 UNIT/ML injection   Commonly known as:  NovoLOG PEN   Used for:  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)        Dose:  1-5 Units   Inject 1-5 Units Subcutaneous At Bedtime MEDIUM INSULIN RESISTANCE DOSING   Do Not give Bedtime Correction Insulin if BG less than  200.  For  - 249 give 1 units.  For  - 299 give 2 " units.  For  - 349 give 3 units.  For  -399 give 4 units.  For BG greater than or equal to 400 give 5 units. Notify provider if glucose greater than or equal to 350 mg/dL after administration of correction dose.   Refills:  0       * insulin aspart 100 UNIT/ML injection   Commonly known as:  NovoLOG PEN   Used for:  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)        Carb counting: LUNCH: 1 unit per 11 grams of CHO   Refills:  0       * insulin aspart 100 UNIT/ML injection   Commonly known as:  NovoLOG PEN   Used for:  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)        Carb Counting: BREAKFAST: 1 unit per 11 grams of CHO   Refills:  0       * insulin aspart 100 UNIT/ML injection   Commonly known as:  NovoLOG PEN   Used for:  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)        Carb Counting: DINNER: 1 units per 10 grams of carbohydrate   Refills:  0       * insulin aspart 100 UNIT/ML injection   Commonly known as:  NovoLOG PEN   Used for:  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)        Carb Counting: Snacks and Supplements: 1 unit per 10 grams of carbohydrate   Refills:  0       insulin glargine 100 UNIT/ML injection   Commonly known as:  LANTUS   Used for:  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)        Dose:  17 Units   Inject 17 Units Subcutaneous every 24 hours   Refills:  0       metoprolol 25 MG tablet   Commonly known as:  LOPRESSOR   Used for:  Atrial flutter, unspecified type (H)        Dose:  25 mg   Take 1 tablet (25 mg) by mouth 2 times daily   Quantity:  60 tablet   Refills:  0       polyethylene glycol Packet   Commonly known as:  MIRALAX/GLYCOLAX   Used for:  Bowel habit changes        Dose:  17 g   Take 17 g by mouth daily   Quantity:  30 packet   Refills:  0       senna-docusate 8.6-50 MG per tablet   Commonly known as:  SENOKOT-S;PERICOLACE   Used for:  Bowel habit changes        Dose:  2 tablet   Take 2 tablets by mouth 2 times daily   Quantity:  100 tablet   Refills:  0        Warfarin Therapy Reminder   Used for:  Atrial flutter, unspecified type (H)        Dose:  1 each   1 each continuous prn   Refills:  0       * Notice:  This list has 6 medication(s) that are the same as other medications prescribed for you. Read the directions carefully, and ask your doctor or other care provider to review them with you.      CONTINUE these medicines which may have CHANGED, or have new prescriptions. If we are uncertain of the size of tablets/capsules you have at home, strength may be listed as something that might have changed.        Dose / Directions    * TYLENOL PO   This may have changed:  Another medication with the same name was added. Make sure you understand how and when to take each.        Dose:  1000 mg   Take 1,000 mg by mouth every 8 hours as needed for mild pain or fever   Refills:  0       * acetaminophen 325 MG tablet   Commonly known as:  TYLENOL   This may have changed:  You were already taking a medication with the same name, and this prescription was added. Make sure you understand how and when to take each.   Used for:  Acute post-operative pain        Dose:  975 mg   Take 3 tablets (975 mg) by mouth every 8 hours   Quantity:  100 tablet   Refills:  0       * Notice:  This list has 2 medication(s) that are the same as other medications prescribed for you. Read the directions carefully, and ask your doctor or other care provider to review them with you.      CONTINUE these medicines which have NOT CHANGED        Dose / Directions    AEROBIKA Sakshi   Used for:  Chronic obstructive pulmonary disease, unspecified COPD type (H)        Dose:  1 Device   1 Device 6 times daily   Quantity:  1 each   Refills:  0       albuterol 108 (90 BASE) MCG/ACT Inhaler   Commonly known as:  PROAIR HFA/PROVENTIL HFA/VENTOLIN HFA        Dose:  2 puff   Inhale 2 puffs into the lungs 2 times daily   Refills:  0       aspirin EC 81 MG EC tablet        Dose:  81 mg   Take 81 mg by mouth every  evening   Refills:  0       budesonide-formoterol 80-4.5 MCG/ACT Inhaler   Commonly known as:  SYMBICORT   Used for:  Chronic airway obstruction, not elsewhere classified        Dose:  2 puff   Inhale 2 puffs into the lungs 2 times daily   Quantity:  3 Inhaler   Refills:  3       calcium 600 MG tablet        Dose:  1 tablet   Take 1 tablet by mouth every morning   Refills:  0       calcium acetate 667 MG Caps capsule   Commonly known as:  PHOSLO        Dose:  667 mg   Take 667 mg by mouth 4 times daily (with meals and nightly)   Refills:  0       cetirizine 10 MG tablet   Commonly known as:  zyrTEC        Dose:  5 mg   Take 5 mg by mouth daily   Refills:  0       cholecalciferol 1000 UNIT tablet   Commonly known as:  vitamin D        Dose:  1000 Units   Take 1,000 Units by mouth every morning   Refills:  0       furosemide 20 MG tablet   Commonly known as:  LASIX        Dose:  20 mg   20 mg every morning   Refills:  0       ONE TOUCH ULTRA test strip   Generic drug:  blood glucose monitoring        Refills:  0       ONETOUCH DELICA LANCETS 33G Misc        Refills:  0       pravastatin 40 MG tablet   Commonly known as:  PRAVACHOL        Dose:  40 mg   40 mg every evening   Refills:  0         STOP taking     amLODIPine 10 MG tablet   Commonly known as:  NORVASC           carvedilol 3.125 MG tablet   Commonly known as:  COREG           clonazePAM 0.5 MG tablet   Commonly known as:  klonoPIN           INSULIN PUMP - OUTPATIENT   Replaced by:  insulin aspart 100 UNIT/ML injection           lisinopril 20 MG tablet   Commonly known as:  PRINIVIL/ZESTRIL                Where to get your medicines      Some of these will need a paper prescription and others can be bought over the counter. Ask your nurse if you have questions.     You don't need a prescription for these medications     acetaminophen 325 MG tablet    diltiazem 90 MG 12 hr capsule    insulin aspart 100 UNIT/ML injection    insulin aspart 100 UNIT/ML injection     insulin aspart 100 UNIT/ML injection    insulin aspart 100 UNIT/ML injection    insulin aspart 100 UNIT/ML injection    insulin aspart 100 UNIT/ML injection    insulin glargine 100 UNIT/ML injection    metoprolol 25 MG tablet    polyethylene glycol Packet    senna-docusate 8.6-50 MG per tablet    Warfarin Therapy Reminder                Protect others around you: Learn how to safely use, store and throw away your medicines at www.disposemymeds.org.             Medication List: This is a list of all your medications and when to take them. Check marks below indicate your daily home schedule. Keep this list as a reference.      Medications           Morning Afternoon Evening Bedtime As Needed    AEROBIKA Sakshi   1 Device 6 times daily                                albuterol 108 (90 BASE) MCG/ACT Inhaler   Commonly known as:  PROAIR HFA/PROVENTIL HFA/VENTOLIN HFA   Inhale 2 puffs into the lungs 2 times daily                                aspirin EC 81 MG EC tablet   Take 81 mg by mouth every evening   Last time this was given:  81 mg on 5/25/2017  8:37 PM                                budesonide-formoterol 80-4.5 MCG/ACT Inhaler   Commonly known as:  SYMBICORT   Inhale 2 puffs into the lungs 2 times daily                                calcium 600 MG tablet   Take 1 tablet by mouth every morning                                calcium acetate 667 MG Caps capsule   Commonly known as:  PHOSLO   Take 667 mg by mouth 4 times daily (with meals and nightly)                                cetirizine 10 MG tablet   Commonly known as:  zyrTEC   Take 5 mg by mouth daily   Last time this was given:  5 mg on 5/26/2017  9:28 AM                                cholecalciferol 1000 UNIT tablet   Commonly known as:  vitamin D   Take 1,000 Units by mouth every morning                                diltiazem 90 MG 12 hr capsule   Commonly known as:  CARDIZEM SR   Take 2 capsules (180 mg) by mouth 2 times daily   Last time this was  given:  180 mg on 5/26/2017  9:30 AM                                furosemide 20 MG tablet   Commonly known as:  LASIX   20 mg every morning   Last time this was given:  20 mg on 5/26/2017  9:28 AM                                * insulin aspart 100 UNIT/ML injection   Commonly known as:  NovoLOG PEN   Inject 1-7 Units Subcutaneous 3 times daily (before meals) Correction Scale - MEDIUM  For Pre-Meal  - 189 give 1 unit For Pre-Meal  - 239 give 2 units For Pre-Meal  - 289 give 3 units For Pre-Meal  - 339 give 4 units For Pre-Meal - 399 give 5 units For Pre-Meal -449 give 6 units For Pre-Meal BG greater than or equal to 450 give 7 units. To be given with prandial insulin, and based on pre-meal glucose   Last time this was given:  1 Units on 5/26/2017 12:12 AM                                * insulin aspart 100 UNIT/ML injection   Commonly known as:  NovoLOG PEN   Inject 1-5 Units Subcutaneous At Bedtime MEDIUM INSULIN RESISTANCE DOSING   Do Not give Bedtime Correction Insulin if BG less than  200.  For  - 249 give 1 units.  For  - 299 give 2 units.  For  - 349 give 3 units.  For  -399 give 4 units.  For BG greater than or equal to 400 give 5 units. Notify provider if glucose greater than or equal to 350 mg/dL after administration of correction dose.   Last time this was given:  1 Units on 5/26/2017 12:12 AM                                * insulin aspart 100 UNIT/ML injection   Commonly known as:  NovoLOG PEN   Carb counting: LUNCH: 1 unit per 11 grams of CHO   Last time this was given:  1 Units on 5/26/2017 12:12 AM                                * insulin aspart 100 UNIT/ML injection   Commonly known as:  NovoLOG PEN   Carb Counting: BREAKFAST: 1 unit per 11 grams of CHO   Last time this was given:  1 Units on 5/26/2017 12:12 AM                                * insulin aspart 100 UNIT/ML injection   Commonly known as:  NovoLOG PEN   Carb Counting: DINNER: 1  units per 10 grams of carbohydrate   Last time this was given:  1 Units on 5/26/2017 12:12 AM                                * insulin aspart 100 UNIT/ML injection   Commonly known as:  NovoLOG PEN   Carb Counting: Snacks and Supplements: 1 unit per 10 grams of carbohydrate   Last time this was given:  1 Units on 5/26/2017 12:12 AM                                insulin glargine 100 UNIT/ML injection   Commonly known as:  LANTUS   Inject 17 Units Subcutaneous every 24 hours   Last time this was given:  17 Units on 5/26/2017  9:42 AM                                metoprolol 25 MG tablet   Commonly known as:  LOPRESSOR   Take 1 tablet (25 mg) by mouth 2 times daily   Last time this was given:  25 mg on 5/26/2017  9:29 AM                                ONE TOUCH ULTRA test strip   Generic drug:  blood glucose monitoring                                ONETOUCH DELICA LANCETS 33G Misc                                polyethylene glycol Packet   Commonly known as:  MIRALAX/GLYCOLAX   Take 17 g by mouth daily   Last time this was given:  17 g on 5/21/2017  8:32 AM                                pravastatin 40 MG tablet   Commonly known as:  PRAVACHOL   40 mg every evening   Last time this was given:  40 mg on 5/25/2017  8:37 PM                                senna-docusate 8.6-50 MG per tablet   Commonly known as:  SENOKOT-S;PERICOLACE   Take 2 tablets by mouth 2 times daily   Last time this was given:  2 tablets on 5/22/2017  7:56 PM                                * TYLENOL PO   Take 1,000 mg by mouth every 8 hours as needed for mild pain or fever   Last time this was given:  975 mg on 5/26/2017  9:31 AM                                * acetaminophen 325 MG tablet   Commonly known as:  TYLENOL   Take 3 tablets (975 mg) by mouth every 8 hours   Last time this was given:  975 mg on 5/26/2017  9:31 AM                                Warfarin Therapy Reminder   1 each continuous prn                                * Notice:   This list has 8 medication(s) that are the same as other medications prescribed for you. Read the directions carefully, and ask your doctor or other care provider to review them with you.

## 2017-05-10 NOTE — IP AVS SNAPSHOT
CynkolbyYue #[id number] (CSN: [id number])  (71 year old F)  (Adm: 05/10/17)     [id number]               [address] [facility] BANK: [phone]            Patient Demographics     Patient Name Sex          Age SSN Address Phone    Yue Valenzuela Female 1945 (71 year old) [id number] [address] [facility] [address] [phone] (Home)  [phone] (Mobile) *Preferred*      Emergency Contact(s)     Name Relation Home Work Mobile    Oscar Valenzuela Spouse [phone] None [phone]    Ramos Valenzuela Son [phone] None [phone]    Spencer Valenzuela  Son [phone] None [phone]      Admission Information     Attending Provider Admitting Provider Admission Type Admission Date/Time    Glenna Hall MD Rao, Madhuri V, MD Elective 05/10/17  0713    Discharge Date Hospital Service Auth/Cert Status Service Area     Thoracic Surgery [facility]    Unit Room/Bed Admission Status        U6B [id number] Admission (Confirmed)       Admission     Complaint    Malignant Neoplasm Of Lower Lobe Of Right Lung , Primary lung adenocarcinoma (H)      Hospital Account     Name Acct ID Class Status Primary Coverage    Yue Valenzuela [id number] Inpatient Open MEDICARE - MEDICARE            Guarantor Account (for Hospital Account #[id number])     Name Relation to Pt Service Area Active? Acct Type    Yue Valenzuela Self FCS Yes Personal/Family    Address Phone          [address] [place] [address] [phone](H)              Coverage Information (for Hospital Account #[id number])     1. MEDICARE/MEDICARE     F/O Payor/Plan Precert #    MEDICARE/MEDICARE     Subscriber Subscriber #    Nicolas Yue B [id number]    Address Phone    ATTN CLAIMS  [address]  [place] [address] [phone]          2. [facility] [facility]     F/O Payor/Plan Precert #    [facility] [facility]  "SERVICES     Subscriber Subscriber #    Yue Valenzuela [id number]    Address Phone    [address]  [place] [address] [phone]                                                      INTERAGENCY TRANSFER FORM - PHYSICIAN ORDERS   5/10/2017                       UNIT [address]B [facility] BANK: [phone]            Attending Provider: Glenna Hall MD     Allergies:  Codeine, Loratadine, Sulfa Drugs, Terazosin, Adhesive Tape, Latex, Liquid Adhesive    Infection:  None   Service:  THORACIC SARMAD    Ht:  1.626 m (5' 4\")   Wt:  59.7 kg (131 lb 11.2 oz)   Admission Wt:  64.1 kg (141 lb 5 oz)    BMI:  22.61 kg/m 2   BSA:  1.64 m 2            ED Clinical Impression     Diagnosis Description Comment Added By Time Added    Non-small cell lung cancer (NSCLC) (H) [C34.90] Non-small cell lung cancer (NSCLC) (H) [C34.90]  Mariama Rosenthal 5/18/2017  4:19 PM    Acute post-operative pain [G89.18] Acute post-operative pain [G89.18]  Emiliano Encarnacion PA-C 5/26/2017  8:23 AM    Atrial flutter, unspecified type (H) [I48.92] Atrial flutter, unspecified type (H) [I48.92]  Emiliano Encarnacion PA-C 5/26/2017  8:26 AM    Bowel habit changes [R19.4] Bowel habit changes [R19.4]  Emiliano Encarnacion PA-C 5/26/2017  8:29 AM    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) [E10.21] 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) [E10.21]  Emiliano Encarnacion PA-C 5/26/2017  9:19 AM      Hospital Problems as of 5/26/2017              Priority Class Noted POA    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) Medium  8/1/2014 Yes    Primary lung adenocarcinoma (H) Medium  5/10/2017 Yes      Non-Hospital Problems as of 5/26/2017              Priority Class Noted    Chronic kidney disease Medium  8/1/2014    Essential hypertension Medium  8/1/2014    Mixed hyperlipidemia (DYSLIPIDEMIA) Medium  8/1/2014    Cramp of limb Medium  8/1/2014    Reactive airway disease Medium  9/17/2014    History of tonsillectomy Medium  9/17/2014    History of partial " thyroidectomy Medium  9/17/2014    CAD (coronary artery disease)   9/17/2014    Personal history of tobacco use, presenting hazards to health   9/17/2014    Pneumothorax Medium  4/16/2017      Code Status History     Date Active Date Inactive Code Status Order ID Comments User Context    4/21/2017  9:42 AM  Full Code [id number]  Antonietta Cortez PA-C Outpatient    4/16/2017 10:04 PM 4/21/2017  9:42 AM Full Code [id number]  Jena Munoz PA Inpatient      Current Code Status     Date Active Code Status Order ID Comments User Context       Prior         Medication Review      START taking        Dose / Directions Comments    diltiazem 90 MG 12 hr capsule   Commonly known as:  CARDIZEM SR   Used for:  Atrial flutter, unspecified type (H)        Dose:  180 mg   Take 2 capsules (180 mg) by mouth 2 times daily   Refills:  0        * insulin aspart 100 UNIT/ML injection   Commonly known as:  NovoLOG PEN   Used for:  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)   Replaces:  INSULIN PUMP - OUTPATIENT        Dose:  1-7 Units   Inject 1-7 Units Subcutaneous 3 times daily (before meals) Correction Scale - MEDIUM  For Pre-Meal  - 189 give 1 unit For Pre-Meal  - 239 give 2 units For Pre-Meal  - 289 give 3 units For Pre-Meal  - 339 give 4 units For Pre-Meal - 399 give 5 units For Pre-Meal -449 give 6 units For Pre-Meal BG greater than or equal to 450 give 7 units. To be given with prandial insulin, and based on pre-meal glucose   Refills:  0        * insulin aspart 100 UNIT/ML injection   Commonly known as:  NovoLOG PEN   Used for:  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)        Dose:  1-5 Units   Inject 1-5 Units Subcutaneous At Bedtime MEDIUM INSULIN RESISTANCE DOSING   Do Not give Bedtime Correction Insulin if BG less than  200.  For  - 249 give 1 units.  For  - 299 give 2 units.  For  - 349 give 3 units.  For  -399 give 4 units.  For BG greater  than or equal to 400 give 5 units. Notify provider if glucose greater than or equal to 350 mg/dL after administration of correction dose.   Refills:  0        * insulin aspart 100 UNIT/ML injection   Commonly known as:  NovoLOG PEN   Used for:  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)        Carb counting: LUNCH: 1 unit per 11 grams of CHO   Refills:  0        * insulin aspart 100 UNIT/ML injection   Commonly known as:  NovoLOG PEN   Used for:  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)        Carb Counting: BREAKFAST: 1 unit per 11 grams of CHO   Refills:  0        * insulin aspart 100 UNIT/ML injection   Commonly known as:  NovoLOG PEN   Used for:  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)        Carb Counting: DINNER: 1 units per 10 grams of carbohydrate   Refills:  0        * insulin aspart 100 UNIT/ML injection   Commonly known as:  NovoLOG PEN   Used for:  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)        Carb Counting: Snacks and Supplements: 1 unit per 10 grams of carbohydrate   Refills:  0        insulin glargine 100 UNIT/ML injection   Commonly known as:  LANTUS   Used for:  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)        Dose:  17 Units   Inject 17 Units Subcutaneous every 24 hours   Refills:  0        metoprolol 25 MG tablet   Commonly known as:  LOPRESSOR   Used for:  Atrial flutter, unspecified type (H)        Dose:  25 mg   Take 1 tablet (25 mg) by mouth 2 times daily   Quantity:  60 tablet   Refills:  0        polyethylene glycol Packet   Commonly known as:  MIRALAX/GLYCOLAX   Used for:  Bowel habit changes        Dose:  17 g   Take 17 g by mouth daily   Quantity:  30 packet   Refills:  0        senna-docusate 8.6-50 MG per tablet   Commonly known as:  SENOKOT-S;PERICOLACE   Used for:  Bowel habit changes        Dose:  2 tablet   Take 2 tablets by mouth 2 times daily   Quantity:  100 tablet   Refills:  0    Hold for loose stools.       Warfarin Therapy Reminder   Used for:  Atrial  flutter, unspecified type (H)        Dose:  1 each   1 each continuous prn   Refills:  0        * Notice:  This list has 6 medication(s) that are the same as other medications prescribed for you. Read the directions carefully, and ask your doctor or other care provider to review them with you.      CONTINUE these medications which may have CHANGED, or have new prescriptions. If we are uncertain of the size of tablets/capsules you have at home, strength may be listed as something that might have changed.        Dose / Directions Comments    * TYLENOL PO   This may have changed:  Another medication with the same name was added. Make sure you understand how and when to take each.        Dose:  1000 mg   Take 1,000 mg by mouth every 8 hours as needed for mild pain or fever   Refills:  0        * acetaminophen 325 MG tablet   Commonly known as:  TYLENOL   This may have changed:  You were already taking a medication with the same name, and this prescription was added. Make sure you understand how and when to take each.   Used for:  Acute post-operative pain        Dose:  975 mg   Take 3 tablets (975 mg) by mouth every 8 hours   Quantity:  100 tablet   Refills:  0        * Notice:  This list has 2 medication(s) that are the same as other medications prescribed for you. Read the directions carefully, and ask your doctor or other care provider to review them with you.      CONTINUE these medications which have NOT CHANGED        Dose / Directions Comments    TEODORA Cantor   Used for:  Chronic obstructive pulmonary disease, unspecified COPD type (H)        Dose:  1 Device   1 Device 6 times daily   Quantity:  1 each   Refills:  0        albuterol 108 (90 BASE) MCG/ACT Inhaler   Commonly known as:  PROAIR HFA/PROVENTIL HFA/VENTOLIN HFA        Dose:  2 puff   Inhale 2 puffs into the lungs 2 times daily   Refills:  0        aspirin EC 81 MG EC tablet        Dose:  81 mg   Take 81 mg by mouth every evening   Refills:  0         budesonide-formoterol 80-4.5 MCG/ACT Inhaler   Commonly known as:  SYMBICORT   Used for:  Chronic airway obstruction, not elsewhere classified        Dose:  2 puff   Inhale 2 puffs into the lungs 2 times daily   Quantity:  3 Inhaler   Refills:  3        calcium 600 MG tablet        Dose:  1 tablet   Take 1 tablet by mouth every morning   Refills:  0        calcium acetate 667 MG Caps capsule   Commonly known as:  PHOSLO        Dose:  667 mg   Take 667 mg by mouth 4 times daily (with meals and nightly)   Refills:  0        cetirizine 10 MG tablet   Commonly known as:  zyrTEC        Dose:  5 mg   Take 5 mg by mouth daily   Refills:  0        cholecalciferol 1000 UNIT tablet   Commonly known as:  vitamin D        Dose:  1000 Units   Take 1,000 Units by mouth every morning   Refills:  0        furosemide 20 MG tablet   Commonly known as:  LASIX        Dose:  20 mg   20 mg every morning   Refills:  0        ONE TOUCH ULTRA test strip   Generic drug:  blood glucose monitoring        Refills:  0        ONETOUCH DELICA LANCETS 33G Misc        Refills:  0        pravastatin 40 MG tablet   Commonly known as:  PRAVACHOL        Dose:  40 mg   40 mg every evening   Refills:  0          STOP taking     amLODIPine 10 MG tablet   Commonly known as:  NORVASC           carvedilol 3.125 MG tablet   Commonly known as:  COREG           clonazePAM 0.5 MG tablet   Commonly known as:  klonoPIN           INSULIN PUMP - OUTPATIENT   Replaced by:  insulin aspart 100 UNIT/ML injection           lisinopril 20 MG tablet   Commonly known as:  PRINIVIL/ZESTRIL                   After Care     Discharge Instructions       THORACIC SURGERY DISCHARGE INSTRUCTIONS    DIET: Regular diet - as prior to admission     If your plans upon discharge include prolonged periods of sitting (i.e a lengthy car or plane ride), it is highly beneficial to get up and walk at least once per hour to help prevent swelling and blood clots.     You may remove chest tube  "dressing 48 hours after tube removal and bandage the site at your own discretion thereafter.  Small amounts of leakage are normal for 2-3 days after removal.  Feel free to call with questions.    You may get incision wet 2 days after operation. Do not submerge, soak, or scrub incision or swim until seen in follow-up.    Take incentive spirometer home for continued frequent use    Activity as tolerated, no strenous activity until seen in follow-up, no lifting greater than 20 pounds for the next 1-2 weeks.    Stay hydrated. Take over the counter fiber (metamucil or benefiber) and stool softeners (Miralax, docusate or senna) if becoming constipated.     Call for fever greater than 101.5, chills, increased size of incision, red skin around incision, vision changes, muscle strength changes, sensation changes, shortness of breath, or other concerns.    No driving while taking narcotic pain medication.    Transition to ibuprofen or tylenol/acetaminophen for pain control. Do not take tylenol/acetaminophen and acetaminophen containing narcotic (e.g., percocet or vicodin) at the same time. If you have known ulcer problems, or kidney trouble (elevated creatinine) do not take the ibuprofen.    In emergencies, call 911    For other Questions or Concerns;   A.) During weekday working hours (Monday through Friday 8am to 4:30pm)   call [phone] and ask to speak to a clinical nurse specialist.     B.) At nights (after 4:30pm), on weekends, or if urgent call [phone] and   tell the  \"I would like to page job code [phone], the thoracic surgery   fellow on call, please.\"       General info for SNF       Length of Stay Estimate: Short Term Care: Estimated # of Days <30  Condition at Discharge: Improving  Level of care: Skilled  Rehabilitation Potential: Good  Admission H&P remains valid and up-to-date: Yes  Recent Chemotherapy: N/A  Use Nursing Home Standing Orders: Yes       Mantoux instructions       Give two-step " Alexa (PPD) Per Facility Policy Yes             Radiology & Cardiology Orders     XR Chest 2 Views                 Referrals     Occupational Therapy Peds Consult       Evaluate and treat as clinically indicated.    Reason:  S/p thoracic surgery, deconditioning       Physical Therapy Peds Consult       Evaluate and treat as clinically indicated.    Reason:  S/p thoracic surgery, deconditioning             Follow-Up Appointment Instructions     Follow Up and recommended labs and tests       1.) Follow up with primary care physician, Nory Elliott, in 1-2 weeks.  2.) Follow up with a thoracic surgery Clinical Nurse Specialist in Thoracic Surgery clinic in 1 month, prior to which a CXR should be performed.             Your next 10 appointments already scheduled     Jul 11, 2017  9:20 AM CDT   (Arrive by 9:05 AM)   CT CHEST W/O CONTRAST with UCCT2   [facility] ([facility])    [address] [place] [id number] [phone]           Please bring any scans or X-rays taken at other hospitals, if similar tests were done. Also bring a list of your medicines, including vitamins, minerals and over-the-counter drugs. It is safest to leave personal items at home.  Be sure to tell your doctor:   If you have any allergies.   If there s any chance you are pregnant.   If you are breastfeeding.   If you have any special needs.  You do not need to do anything special to prepare.  Please wear loose clothing, such as a sweat suit or jogging clothes. Avoid snaps, zippers and other metal. We may ask you to undress and put on a hospital gown.            Jul 11, 2017 10:30 AM CDT   (Arrive by 10:15 AM)   Return Visit with John Plaza MD   [facility] ([facility])    [address] [place] [address]  2nd [facility] [id number] [phone]              Statement of Approval     Ordered          05/26/17 1026  I have  "reviewed and agree with all the recommendations and orders detailed in this document.  EFFECTIVE NOW     Approved and electronically signed by:  Emiliano Encarnacion PA-C                                                 INTERAGENCY TRANSFER FORM - NURSING   5/10/2017                       UNIT [address]B [facility]: [phone]            Attending Provider: Glenna Hall MD     Allergies:  Codeine, Loratadine, Sulfa Drugs, Terazosin, Adhesive Tape, Latex, Liquid Adhesive    Infection:  None   Service:  THORACIC SARMAD    Ht:  1.626 m (5' 4\")   Wt:  59.7 kg (131 lb 11.2 oz)   Admission Wt:  64.1 kg (141 lb 5 oz)    BMI:  22.61 kg/m 2   BSA:  1.64 m 2            Advance Directives        Does patient have a scanned Advance Directive/ACP document in EPIC?           No        Immunizations     None      ASSESSMENT     Discharge Profile Flowsheet     EXPECTED DISCHARGE     Patient's primary language  English 05/03/17 1545    Expected Discharge Date  -- (TCU) 05/23/17 0822   SKIN      DISCHARGE NEEDS ASSESSMENT     Inspection  Full 05/26/17 0221    Equipment Currently Used at Home  none 05/13/17 1619   Skin areas NOT inspected  Buttock, left;Buttock, right;Sacrum;Coccyx 05/24/17 0123    Transportation Available  family or friend will provide 05/13/17 1619   Skin WDL  ex 05/26/17 0221    GASTROINTESTINAL (ADULT,PEDIATRIC,OB)     Skin Integrity  bruise(s);scab(s);incision(s);scar(s) 05/26/17 0221    GI WDL  WDL 05/26/17 0221   Skin Color/Characteristics  without discoloration 05/25/17 0645    Last Bowel Movement  05/25/17 05/26/17 0230   Skin Temperature  warm 05/26/17 0221    GI Signs/Symptoms  heartburn 05/21/17 1419   Skin Moisture  dry 05/26/17 0221    Passing flatus  yes 05/24/17 1728   SAFETY      COMMUNICATION ASSESSMENT     Safety WDL  [facility] 05/26/17 0221    Patient's communication style  spoken language (English or Bilingual) 05/03/17 1545   Safety Factors  bed in low position;wheels locked;call light in reach;upper side " "rails raised x 2;ID band on 05/26/17 0221                 Assessment WDL (Within Defined Limits) Definitions           Safety WDL     Effective: 09/28/15    Row Information: <b>WDL Definition:</b> Bed in low position, wheels locked; call light in reach; upper side rails up x 2; ID band on<br> <font color=\"gray\"><i>Item=AS safety wdl>>List=AS safety wdl>>Version=F14</i></font>      Skin WDL     Effective: 09/28/15    Row Information: <b>WDL Definition:</b> Warm; dry; intact; elastic; without discoloration; pressure points without redness<br> <font color=\"gray\"><i>Item=AS skin wdl>>List=AS skin wdl>>Version=F14</i></font>      Vitals     Vital Signs Flowsheet     QUICK ADDS     Response to Interventions  Decrease in pain 05/24/17 1557    Quick Adds  Comments 05/23/17 1614   CLINICALLY ALIGNED PAIN ASSESSMENT (CAPA) ([facility], [facility] AND [facility] ADULTS ONLY)      COMMENTS     Comfort  tolerable with discomfort 05/26/17 0216    Comments  PT post gait 05/23/17 1614   Change in Pain  getting worse 05/26/17 0216    VITAL SIGNS     Pain Control  partially effective 05/26/17 0216    Temp  98.4  F (36.9  C) 05/26/17 0925   Functioning  can do most things, but pain gets in the way of some 05/26/17 0216    Temp src  Oral 05/26/17 0925   Sleep  awake with occasional pain 05/26/17 0216    Resp  16 05/26/17 0925   ANALGESIA SIDE EFFECTS MONITORING      Pulse  68 05/21/17 0351   Side Effects Monitoring: Respiratory Quality  R 05/26/17 0216    Heart Rate  67 05/26/17 0925   Side Effects Monitoring: Respiratory Depth  N 05/26/17 0216    Pulse/Heart Rate Source  Monitor 05/26/17 0925   Side Effects Monitoring: Sedation Level  1 05/26/17 0216    BP  163/58 05/26/17 0925   HEIGHT AND WEIGHT      BP Location  Right arm 05/26/17 0925   Height  1.626 m (5' 4\") 05/10/17 0754    Patient Position  Lying 05/10/17 1857   Weight  59.7 kg (131 lb 11.2 oz) 05/26/17 0538    LYING ORTHOSTATIC BP     BSA (Calculated - sq m)  1.7 05/10/17 0754    Lying " Orthostatic BP  -- 05/13/17 0427   BMI (Calculated)  24.31 05/10/17 0754    Lying Orthostatic Pulse  -- 05/13/17 0427   POSITIONING      SITTING ORTHOSTATIC BP     Body Position  independently positioning 05/26/17 0221    Sitting Orthostatic BP  117/40 05/13/17 0427   Head of Bed (HOB)  HOB at 20-30 degrees 05/22/17 1632    Sitting Orthostatic Pulse  78 bpm 05/13/17 0427   Positioning/Transfer Devices  pillows;in use 05/25/17 1349    OXYGEN THERAPY     Chair  -- 05/26/17 0239    SpO2  96 % 05/26/17 0925   DAILY CARE      O2 Device  None (Room air) 05/26/17 0925   Activity Type  ambulated to bathroom 05/26/17 0239    FiO2 (%)  45 % 05/16/17 0449   Activity Level of Assistance  assistance, stand-by 05/26/17 0239    Oxygen Delivery  2 LPM 05/16/17 1650   ECG      RESPIRATORY MONITORING     ECG Rhythm  Sinus rhythm 05/26/17 0216    Respiratory Monitoring (EtCO2)  34 mmHg 05/11/17 1618   Ectopy  PAC 05/23/17 1537    Integrated Pulmonary Index (IPI)  10 05/11/17 1618   Ectopy Frequency  Rare 05/23/17 1537    PAIN/COMFORT     Lead Monitored  Lead II 05/21/17 1439    Patient Currently in Pain  yes 05/26/17 0216   POINT OF CARE TESTING      Preferred Pain Scale  CAPA (Clinically Aligned Pain Assessment) ([facility], [facility] and [facility] Adults Only) 05/26/17 0216   Puncture Site  fingertip 05/26/17 0216    Pain Location  Back 05/26/17 0216   Bedside Glucose (mg/dl )   229 mg/dl 05/26/17 0216    Pain Orientation  Right 05/26/17 0216   STANDING ORTHOSTATIC BP      Pain Descriptors  Aching 05/26/17 0216   Standing Orthostatic BP  148/123 05/13/17 0427    Pain Management Interventions  analgesia administered 05/26/17 0216   Standing Orthostatic Pulse  84 bpm 05/13/17 0427    Pain Intervention(s)  Medication (See eMAR) 05/26/17 0216                 Patient Lines/Drains/Airways Status    Active LINES/DRAINS/AIRWAYS     Name: Placement date: Placement time: Site: Days: Last dressing change:    Peripheral IV 05/16/17 Left Hand  05/16/17   2247   Hand   9     Wound 05/19/17 Right Chest old chest tube site 05/19/17   1422   Chest   6     Incision/Surgical Site 05/10/17 Right Chest 05/10/17   1741    15     Incision/Surgical Site 05/15/17 Neck 05/15/17   2300    10             Patient Lines/Drains/Airways Status    Active PICC/CVC     None            Intake/Output Detail Report     Date Intake       Output     Net    Shift P.O. I.V. Other IV Piggyback Total Urine Blood Chest Tube Total       Day 05/25/17 0000 - 05/25/17 0659 -- 60 -- -- 60 600 -- 0 600 -540    Sultana 05/25/17 0700 - 05/25/17 1459 240 60 -- -- 300 -- -- -- -- 300    Noc 05/25/17 1500 - 05/25/17 2359 200 60 -- -- 260 -- -- -- -- 260    Day 05/26/17 0000 - 05/26/17 0659 -- 30 -- -- 30 -- -- -- -- 30    Sultana 05/26/17 0700 - 05/26/17 1459 -- -- -- -- -- -- -- -- -- 0      Last Void/BM       Most Recent Value    Urine Occurrence 1 at 05/26/2017 0000    Stool Occurrence 1 at 05/25/2017 1700      Case Management/Discharge Planning     Case Management/Discharge Planning Flowsheet     REFERRAL INFORMATION     Transportation Available  family or friend will provide 05/13/17 1619    Arrived From  ED dismiss, diverted elsewhere;another healthcare institution, not defined 04/16/17 2216   FINAL RESOURCES      LIVING ENVIRONMENT     Equipment Currently Used at Home  none 05/13/17 1619    Lives With  spouse 05/13/17 1619   ABUSE RISK SCREEN      Living Arrangements  [place] 05/13/17 1619   QUESTION TO PATIENT:  Has a member of your family or a partner(now or in the past) intimidated, hurt, manipulated, or controlled you in any way?  no 05/10/17 0810    COPING/STRESS     QUESTION TO PATIENT: Do you feel safe going back to the place where you are living?  yes 05/10/17 0810    Major Change/Loss/Stressor  hospitalization;surgery/procedure;medical condition/diagnosis 05/03/17 1551   OBSERVATION: Is there reason to believe there has been maltreatment of a vulnerable adult (ie. Physical/Sexual/Emotional  abuse, self neglect, lack of adequate food, shelter, medical care, or financial exploitation)?  no 05/10/17 0810    EXPECTED DISCHARGE     (R) MENTAL HEALTH SUICIDE RISK      Expected Discharge Date  -- (TCU) 05/23/17 0822   Are you depressed or being treated for depression?  No 05/10/17 2242    DISCHARGE PLANNING                         UN[address] [facility]: [phone]            Medication Administration Report for Yue Valenzuela as of 05/26/17 1034   Legend:    Given Hold Not Given Due Canceled Entry Other Actions    Time Time (Time) Time  Time-Action       Inactive    Active    Linked        Medications 05/20/17 05/21/17 05/22/17 05/23/17 05/24/17 05/25/17 05/26/17    acetaminophen (TYLENOL) tablet 650 mg  Dose: 650 mg Freq: EVERY 4 HOURS PRN Route: PO  PRN Reason: other  PRN Comment: surgical pain  Start: 05/13/17 0000   Admin Instructions: May give first dose 4 hours after last scheduled dose of acetaminophen.  Maximum acetaminophen dose from all sources = 75 mg/kg/day not to exceed 4 grams/day.     1342 (650 mg)-Given       2029 (650 mg)-Given          1133 (650 mg)-Given        1951 (650 mg)-Given             acetaminophen (TYLENOL) tablet 975 mg  Dose: 975 mg Freq: EVERY 8 HOURS Route: PO  Start: 05/12/17 1600   Admin Instructions: Do not use if patient has an active opioid/acetaminophen analgesic order for pain  Maximum acetaminophen dose from all sources = 75 mg/kg/day not to exceed 4 grams/day.     (0018)-Not Given [C]       0820 (975 mg)-Given       1704 (975 mg)-Given       (2341)-Not Given [C]        0833 (975 mg)-Given       1548 (975 mg)-Given       2319 (975 mg)-Given        0857 (975 mg)-Given       1511 (975 mg)-Given        0040 (975 mg)-Given       0805 (975 mg)-Given       1540 (975 mg)-Given        0049 (975 mg)-Given       0825 (975 mg)-Given       1500 (975 mg)-Given       2354 (975 mg)-Given        0857 (975 mg)-Given       1655 (975 mg)-Given        0011 (975 mg)-Given       0931  (975 mg)-Given       [ ] 1600           albuterol (PROAIR HFA/PROVENTIL HFA/VENTOLIN HFA) Inhaler 4 puff  Dose: 4 puff Freq: EVERY 2 HOURS PRN Route: IN  PRN Reason: shortness of breath / dyspnea  Start: 05/10/17 2219              artificial saliva (BIOTENE DRY MOUTHWASH) liquid 10 mL  Dose: 10 mL Freq: 4 TIMES DAILY Route: SWISH & SPIT  Start: 05/16/17 2030    0826 (10 mL)-Given       (1131)-Not Given       (1629)-Not Given       1757 (10 mL)-Given       2122 (10 mL)-Given        0841 (10 mL)-Given       1336 (10 mL)-Given       1645 (10 mL)-Given       2013 (10 mL)-Given        0935 (10 mL)-Given       (1319)-Not Given       1513 (10 mL)-Given       2127 (10 mL)-Given        0806 (10 mL)-Given       (1134)-Not Given       1541 (10 mL)-Given       1922 (10 mL)-Given        0826 (10 mL)-Given       (1110)-Not Given       1527 (10 mL)-Given       1952 (10 mL)-Given        0901 (10 mL)-Given       1102 (10 mL)-Given              (2044)-Not Given        0935 (10 mL)-Given       [ ] 1200       [ ] 1600       [ ] 2000           aspirin EC EC tablet 81 mg  Dose: 81 mg Freq: EVERY EVENING Route: PO  Start: 05/11/17 2000   Admin Instructions: DO NOT CRUSH.     2015 (81 mg)-Given        2011 (81 mg)-Given        1956 (81 mg)-Given        1921 (81 mg)-Given        1951 (81 mg)-Given        2037 (81 mg)-Given        [ ] 2000           bisacodyl (DULCOLAX) Suppository 10 mg  Dose: 10 mg Freq: DAILY Route: RE  Start: 05/12/17 0845    0827 (10 mg)-Given        (1015)-Not Given [C]        (0913)-Not Given        (0804)-Not Given        (0827)-Not Given        (0901)-Not Given        (0932)-Not Given           calcium carbonate (TUMS) chewable tablet 500 mg  Dose: 500 mg Freq: 2 TIMES DAILY PRN Route: PO  PRN Reason: heartburn  Start: 05/21/17 1530     1757 (500 mg)-Given                cetirizine (zyrTEC) tablet 5 mg  Dose: 5 mg Freq: DAILY Route: PO  Start: 05/11/17 0800    0819 (5 mg)-Given        0832 (5 mg)-Given        0859  (5 mg)-Given        0805 (5 mg)-Given        0825 (5 mg)-Given        0858 (5 mg)-Given        0928 (5 mg)-Given           diltiazem (CARDIZEM SR) 12 hr capsule 180 mg  Dose: 180 mg Freq: 2 TIMES DAILY Route: PO  Start: 05/18/17 1300   Admin Instructions: DO NOT CRUSH.     0818 (180 mg)-Given       2014 (180 mg)-Given        0833 (180 mg)-Given       2011 (180 mg)-Given        0859 (180 mg)-Given       1956 (180 mg)-Given        0804 (180 mg)-Given       1921 (180 mg)-Given        0825 (180 mg)-Given       1951 (180 mg)-Given        0858 (180 mg)-Given       2041 (180 mg)-Given        0930 (180 mg)-Given       [ ] 2000           fluticasone-vilanterol (BREO ELLIPTA) 100-25 MCG/INH oral inhaler 1 puff  Dose: 1 puff Freq: DAILY Route: IN  Start: 05/11/17 0800   Admin Instructions: *Do not use more frequently than once daily.*  Rinse mouth after use.<br><br>Formulary alternate for:<br>budesonide-formoterol (SYMBICORT) 80-4.5 MCG/ACT inhaler (HOME MED)<br>   Inhale 2 puffs into the lungs 2 times daily     0821 (1 puff)-Given        0838 (1 puff)-Given        0930 (1 puff)-Given        0806 (1 puff)-Given        0826 (1 puff)-Given        (0824)-Not Given        0933 (1 puff)-Given           furosemide (LASIX) tablet 20 mg  Dose: 20 mg Freq: EVERY MORNING Route: PO  Start: 05/22/17 0800      0859 (20 mg)-Given        0805 (20 mg)-Given        0826 (20 mg)-Given        0858 (20 mg)-Given        0928 (20 mg)-Given           glucose 40 % gel 15-30 g  Dose: 15-30 g Freq: EVERY 15 MIN PRN Route: PO  PRN Reason: low blood sugar  Start: 05/11/17 1045   Admin Instructions: Give 15 g for BG 51 to 69 mg/dL IF patient is conscious and able to swallow. Give 30 g for BG less than or equal to 50 mg/dL IF patient is conscious and able to swallow. Do NOT give glucose gel via enteral tube.  IF patient has enteral tube: give apple juice 120 mL (4 oz or 15 g of CHO) via enteral tube for BG 51 to 69 mg/dL.  Give apple juice 240 mL (8 oz or  30 g of CHO) via enteral tube for BG less than or equal to 50 mg/dL.              Or  dextrose 50 % injection 25-50 mL  Dose: 25-50 mL Freq: EVERY 15 MIN PRN Route: IV  PRN Reason: low blood sugar  Start: 05/11/17 1045   Admin Instructions: Use if have IV access, BG less than 70 mg/dL and meet dose criteria below:  Dose if conscious and alert (or disorientated) and NPO = 25 mL  Dose if unconscious / not alert = 50 mL  Vesicant.              Or  glucagon injection 1 mg  Dose: 1 mg Freq: EVERY 15 MIN PRN Route: SC  PRN Reason: low blood sugar  PRN Comment: May repeat x 1 only  Start: 05/11/17 1045   Admin Instructions: May give SQ or IM. IF BG less than or equal to 50 mg/dL and no IV access.  ONLY use glucagon IF patient has NO IV access AND is UNABLE to swallow.               guaiFENesin (MUCINEX) 12 hr tablet 600 mg  Dose: 600 mg Freq: 2 TIMES DAILY Route: PO  Start: 05/14/17 0845   Admin Instructions: DO NOT CRUSH.     0821 (600 mg)-Given       2015 (600 mg)-Given        0832 (600 mg)-Given       2012 (600 mg)-Given        0859 (600 mg)-Given       1956 (600 mg)-Given        0805 (600 mg)-Given       1920 (600 mg)-Given        0833 (600 mg)-Given       1951 (600 mg)-Given        0858 (600 mg)-Given       2037 (600 mg)-Given        0930 (600 mg)-Given       [ ] 2000           heparin  drip 25,000 units in 0.45% NaCl 250 mL (see additional administration details for dose)  Rate: 0-35 mL/hr Freq: CONTINUOUS Route: IV  Last Dose: 750 Units/hr (05/25/17 2000)  Start: 05/24/17 1400   Admin Instructions: Starting infusion Rate = 750 Units/hr (actual weight) (12 units/kg/hr).  Low Intensity Heparin Treatment.  GOAL: Heparin Xa (10a) LEVEL = 0.15-0.35 (PTT = 45-65 seconds).  Max 1000 units/hr if patient getting TNK and greater than 70 kg.  Beginning with the Heparin Xa (10a) Level collected 6 hours after starting heparin, adjust heparin according to guidelines.    Release Heparin Xa (10a) Level order for blood draw 6 hours  after start of infusion and 6 hours after any dose change.    If Xa (10a) less than 0.1 see bolus orders and INCREASE by 300 units/hr     If Xa (10a) 0.1 - 0.14 see bolus orders and INCREASE by 150 units/hr     If Xa (10a) 0.15 - 0.35 then  NO CHANGE in rate    if Xa (10a) 0.36 - 0.48 then REDUCE by 150 units/hr     If Xa (10a) 0.49 - 0.66 then HOLD 30 mins and REDUCE by 200 units/hr     If Xa (10a) 0.67 - 1.31 then HOLD 60 mins and REDUCE by 300 units/hr     If Xa (10a) greater than 1.31 then HOLD 60 mins and REDUCE by 350 units/hr         1520 (750 Units/hr)-Rate/Dose Verify       2000 (750 Units/hr)-Rate/Dose Verify        0800 (750 Units/hr)-Rate/Dose Verify       2000 (750 Units/hr)-Rate/Dose Verify            heparin bolus from infusion pump  Freq: EVERY 6 HOURS PRN Route: IV  PRN Comment: to reach target Heparin Xa (10a) goal. GOAL: Heparin Xa (10a) LEVEL = 0.15-0.35 (PTT = 45-65 seconds).  Start: 05/24/17 1355   Admin Instructions: Beginning with the Heparin Xa (10a) level collected 6 hours AFTER starting heparin:  If Heparin Xa (10a) less than 0.1, then bolus dose = 3,100 Units (actual weight) (50 Units/kg x 62 kg (actual weight))    If Heparin Xa (10a) 0.1 to 0.14, then bolus dose = 1,850 Units (actual weight) (30 Units/kg x 62 kg (actual weight)).  Nurse to administer dose from existing infusion. If no infusion bag or syringe for this order is available, contact pharmacist to re-enter medication order.               HYDROmorphone (PF) (DILAUDID) injection 0.3-0.5 mg  Dose: 0.3-0.5 mg Freq: EVERY 3 HOURS PRN Route: IV  PRN Reason: moderate to severe pain  Start: 05/14/17 0858              insulin aspart (NovoLOG) inj (RAPID ACTING)  Freq: WITH SNACKS OR SUPPLEMENTS Route: SC  PRN Reason: high blood sugar  Start: 05/15/17 1339   Admin Instructions: Dose = 1 units per 10 grams of carbohydrate  If given at mealtime, must be administered 5 min before meal or immediately after.               insulin aspart  (NovoLOG) inj (RAPID ACTING)  Freq: DAILY WITH SUPPER Route: SC  Start: 05/15/17 1700   Admin Instructions: Dose = 1 units per 10 grams of carbohydrate  If given at mealtime, must be administered 5 min before meal or immediately after.     (1924)-Not Given        1754 (1 Units)-Given        1853 (2 Units)-Given        1720 (2 Units)-Given        1824 (3 Units)-Given        1918 (4 Units)-Given        [ ] 1700           insulin aspart (NovoLOG) inj (RAPID ACTING)  Freq: DAILY BEFORE LUNCH Route: SC  Start: 05/16/17 1130   Admin Instructions: Dose = 1 units per 11 grams of carbohydrate  If given at mealtime, must be administered 5 min before meal or immediately after.     (1335)-Not Given        (1332)-Not Given [C]        1100 (3 Units)-Given       1514 (4 Units)-Given        1234 (4 Units)-Given        1415 (3 Units)-Given               1408 (2 Units)-Given        [ ] 1130           insulin aspart (NovoLOG) inj (RAPID ACTING)  Freq: DAILY WITH BREAKFAST Route: SC  Start: 05/16/17 0800   Admin Instructions: Dose = 1 units per 11 grams of carbohydrate  If given at mealtime, must be administered 5 min before meal or immediately after.     (0956)-Not Given [C]        1019 (1 Units)-Given [C]        (1024)-Not Given        0806 (4 Units)-Given        1029 (2 Units)-Given                (1017)-Not Given           insulin aspart (NovoLOG) inj (RAPID ACTING)  Dose: 1-5 Units Freq: AT BEDTIME Route: SC  Start: 05/13/17 2200   Admin Instructions: MEDIUM INSULIN RESISTANCE DOSING    Do Not give Bedtime Correction Insulin if BG less than  200.   For  - 249 give 1 units.   For  - 299 give 2 units.   For  - 349 give 3 units.   For  -399 give 4 units.   For BG greater than or equal to 400 give 5 units.  Notify provider if glucose greater than or equal to 350 mg/dL after administration of correction dose.  If given at mealtime, must be administered 5 min before meal or immediately after.     (2120)-Not  Given        (2234)-Not Given [C]        (2130)-Not Given        1942 (1 Units)-Given               (2358)-Not Given [C]         0012 (1 Units)-Given       [ ] 2200           insulin aspart (NovoLOG) inj (RAPID ACTING)  Dose: 1-7 Units Freq: 3 TIMES DAILY BEFORE MEALS Route: SC  Start: 05/13/17 1700   Admin Instructions: Correction Scale - MEDIUM INSULIN RESISTANCE DOSING     Do Not give Correction Insulin if Pre-Meal BG less than 140.   For Pre-Meal  - 189 give 1 unit.   For Pre-Meal  - 239 give 2 units.   For Pre-Meal  - 289 give 3 units.   For Pre-Meal  - 339 give 4 units.   For Pre-Meal - 399 give 5 units.   For Pre-Meal -449 give 6 units  For Pre-Meal BG greater than or equal to 450 give 7 units.   To be given with prandial insulin, and based on pre-meal blood glucose.    Notify provider if glucose greater than or equal to 350 mg/dL after administration of correction dose.  If given at mealtime, must be administered 5 min before meal or immediately after.     (0839)-Not Given       1131 (1 Units)-Given       1800 (3 Units)-Given        0836 (1 Units)-Given       (1334)-Not Given [C]       1754 (4 Units)-Given        (0900)-Not Given       1329 (3 Units)-Given       1813 (1 Units)-Given        0817 (2 Units)-Given       1235 (1 Units)-Given       (1553)-Not Given        0830 (1 Units)-Given       1415 (2 Units)-Given       1824 (3 Units)-Given        0910 (2 Units)-Given       1408 (3 Units)-Given       1918 (2 Units)-Given        (1017)-Not Given       [ ] 1200       [ ] 1730           insulin glargine (LANTUS) injection 17 Units  Dose: 17 Units Freq: EVERY 24 HOURS Route: SC  Start: 05/23/17 0900       1017 (17 Units)-Given        0826 (17 Units)-Given        0901 (17 Units)-Given        0942 (17 Units)-Given           labetalol (NORMODYNE/TRANDATE) injection 10-40 mg  Dose: 10-40 mg Freq: EVERY 10 MIN PRN Route: IV  PRN Reason: high blood pressure  PRN Comment: for Systolic  Blood Pressure greater than 160 mmHg - SEE INSTRUCTIONS FOR DOSING, START WITH 10 MG FOR 1-2 MINS. Hold if Heart Rate less than 60 beats per minute.  Start: 05/10/17 2219   Admin Instructions: IF Systolic Blood Pressure greater than 160 mmHg, give 10 mg and wait 10 minutes.    If not effective then give 20 mg and wait 10 minutes.    If not effective then give 40 mg.    If still not effective, then change to hydrALAZINE (APRESOLINE) if ordered.  (If hydrALAZINE (APRESOLINE) not ordered, notify provider for further orders.)  MAX Dose: 300 mg/24 hours.               levalbuterol (XOPENEX) neb solution 0.63 mg  Dose: 0.63 mg Freq: EVERY 4 HOURS WHILE AWAKE Route: NEBULIZATION  Start: 05/15/17 1600    (0825)-Not Given       1254 (0.63 mg)-Given       (1630)-Not Given       2001 (0.63 mg)-Given        0812 (0.63 mg)-Given       1203 (0.63 mg)-Given       1621 (0.63 mg)-Given       2037 (0.63 mg)-Given        (0839)-Not Given       1154 (0.63 mg)-Given       1618 (0.63 mg)-Given       2049 (0.63 mg)-Given        0838 (0.63 mg)-Given       (1243)-Not Given [C]       1627 (0.63 mg)-Given       2020 (0.63 mg)-Given        (0808)-Not Given       1151 (0.63 mg)-Given       1711 (0.63 mg)-Given       2013 (0.63 mg)-Given        (0824)-Not Given       0917 (0.63 mg)-Given       1248 (0.63 mg)-Given       1604 (0.63 mg)-Given       1953 (0.63 mg)-Given        0952 (0.63 mg)-Given       [ ] 1200       [ ] 1600       [ ] 2000           lidocaine (LIDODERM) 5 % Patch 1 patch  Dose: 1 patch Freq: EVERY 24 HOURS 2000 Route: TD  Start: 05/16/17 2030   Admin Instructions: Apply patch(s) to chest wall. To prevent lidocaine toxicity, patient should be patch free for 12 hrs daily. Patches may be cut to smaller size prior to removing release liner.  NEVER APPLY HEAT OVER PATCH which will increase absorption and may lead to risk of local anesthetic toxicity. Do not apply over area where liposomal bupivacaine was injected for 96 hours post  "injection.     2018 (1 patch)-Given        2012 (1 patch)-Given [C]        1956 (1 patch)-Given [C]        1911 (1 patch)-Given        1952 (1 patch)-Given        2037 (1 patch)-Given        [ ] 2000          And  lidocaine (LIDODERM) patch REMOVAL  Freq: EVERY 24 HOURS 0800 Route: TD  Start: 05/17/17 0800   Admin Instructions: Remove lidocaine Patch.     0825 ( )-Patch Removed        0835 ( )-Patch Removed        0901 ( )-Patch Removed        0816 ( )-Patch Removed        0827 ( )-Patch Removed        0901 ( )-Patch Removed        0852 ( )-Patch Removed          And  lidocaine (LIDODERM) Patch in Place  Freq: EVERY 8 HOURS Route: TD  Start: 05/16/17 2030   Admin Instructions: Chart every shift, confirming that patch is still in place on patient (no barcode scan needed). See patch order for dose information.  NEVER APPLY HEAT OVER PATCH which will increase absorption and may lead to risk of local anesthetic toxicity. Do not apply over area where liposomal bupivacaine injected for 96 hours.     0343 ( )-Patch in Place              2021 ( )-Patch in Place        0421 ( )-Patch in Place       (1335)-Not Given [C]       2013 ( )-Patch in Place        0414 ( )-Patch in Place       (1214)-Not Given       1957 ( )-Patch in Place        0336 ( )-Patch in Place       (1159)-Not Given [C]       1944 ( )-Patch in Place        0400 ( )-Patch in Place              1952 ( )-Patch in Place        0309 ( )-Patch in Place              2044 ( )-Patch in Place        0852 ( )-Negative       [ ] 1600           lidocaine (LMX4) kit  Freq: EVERY 1 HOUR PRN Route: Top  PRN Reason: mild pain  PRN Comment: with VAD insertion or accessing implanted port,  Start: 05/16/17 2151   Admin Instructions: Do NOT give if patient has a history of allergy to any local anesthetic or any \"prasanna\" product.   Apply 30 min prior to VAD insertion or port access.  MAX Dose:  2.5 gm (  of 5 gm tube)               lidocaine 1 % 1 mL  Dose: 1 mL Freq: EVERY 1 " "HOUR PRN Route: OTHER  PRN Comment: mild pain with VAD insertion or accessing implanted port,  Start: 05/16/17 2151   Admin Instructions: Do NOT give if patient has a history of allergy to any local anesthetic or any \"prasanna\" product. MAX dose 1 mL subcutaneous OR intradermal in divided doses.               melatonin tablet 3 mg  Dose: 3 mg Freq: AT BEDTIME Route: PO  Start: 05/12/17 1800    2119 (3 mg)-Given        2012 (3 mg)-Given        1956 (3 mg)-Given        1920 (3 mg)-Given        1952 (3 mg)-Given        2037 (3 mg)-Given        [ ] 2000           menthol (ICY HOT) 5 % patch 2 patch  Dose: 2 patch Freq: EVERY 8 HOURS PRN Route: Top  PRN Reason: muscle soreness  Start: 05/12/17 2210   Admin Instructions: Apply to Skin.  Remove 'old patch' and chart on Medication Patch Removal order when new patch is applied. Avoid placing heating pad over the patch.          1100 (2 patch)-Given       2037 (2 patch)-Given        0939 (2 patch)-Given          And  menthol (ICY HOT) Patch in Place  Freq: EVERY 8 HOURS Route: TD  Start: 05/12/17 2315   Admin Instructions: Chart every shift, confirming that patch is still in place on patient (no barcode scan needed). See patch order for dose information.     (0044)-Not Given       (0825)-Not Given       (1617)-Not Given        (0105)-Not Given       (0835)-Not Given       (1508)-Not Given       (2324)-Not Given [C]        (0913)-Not Given [C]       (1512)-Not Given               (0816)-Not Given                                    (2331)-Not Given [C]               1635 ( )-Patch in Place       2357 ( )-Patch in Place        1018 (2 each)-Given       [ ] 1600          And  menthol (ICY HOT) patch REMOVAL  Freq: EVERY 8 HOURS PRN Route: TD  PRN Comment: for patch removal  Start: 05/12/17 2210   Admin Instructions: Remove patch when new patch is applied or patch is discontinued.          2037 ( )-Patch Removed            metoprolol (LOPRESSOR) injection 5 mg  Dose: 5 mg Freq: EVERY " 5 MIN PRN Route: IV  PRN Reason: other  PRN Comment: Atrial flutter or atrial fibrillation with HR > 110  Start: 05/20/17 0340    0356 (5 mg)-Given       0515 (5 mg)-Given                 metoprolol (LOPRESSOR) tablet 25 mg  Dose: 25 mg Freq: 2 TIMES DAILY Route: PO  Start: 05/17/17 2000    0819 (25 mg)-Given       2119 (25 mg)-Given        0833 (25 mg)-Given       2011 (25 mg)-Given        0859 (25 mg)-Given       1956 (25 mg)-Given        0806 (25 mg)-Given       1920 (25 mg)-Given        0825 (25 mg)-Given       1952 (25 mg)-Given        0858 (25 mg)-Given       2037 (25 mg)-Given        0929 (25 mg)-Given       [ ] 2000           naloxone (NARCAN) injection 0.1-0.4 mg  Dose: 0.1-0.4 mg Freq: EVERY 2 MIN PRN Route: IV  PRN Reason: opioid reversal  Start: 05/10/17 2219   Admin Instructions: For respiratory rate LESS than or EQUAL to 8.  Partial reversal dose:  0.1 mg titrated q 2 minutes for Analgesia Side Effects Monitoring Sedation Level of 3 (frequently drowsy, arousable, drifts to sleep during conversation).Full reversal dose:  0.4 mg bolus for Analgesia Side Effects Monitoring Sedation Level of 4 (somnolent, minimal or no response to stimulation).               ondansetron (ZOFRAN-ODT) ODT tab 4 mg  Dose: 4 mg Freq: EVERY 6 HOURS PRN Route: PO  PRN Reason: nausea  Start: 05/10/17 2219   Admin Instructions: This is Step 1 of nausea and vomiting management.  If nausea not resolved in 15 minutes, go to Step 2 prochlorperazine (COMPAZINE). Do not push through foil backing. Peel back foil and gently remove. Place on tongue immediately. Administration with liquid unnecessary              Or  ondansetron (ZOFRAN) injection 4 mg  Dose: 4 mg Freq: EVERY 6 HOURS PRN Route: IV  PRN Reasons: nausea,vomiting  Start: 05/10/17 2219   Admin Instructions: This is Step 1 of nausea and vomiting management.  If nausea not resolved in 15 minutes, go to Step 2 prochlorperazine (COMPAZINE).  Irritant.               pantoprazole  (PROTONIX) EC tablet 40 mg  Dose: 40 mg Freq: DAILY Route: PO  Start: 05/11/17 0800   Admin Instructions: May also be given IV.     0819 (40 mg)-Given        0833 (40 mg)-Given        0859 (40 mg)-Given        0805 (40 mg)-Given        0825 (40 mg)-Given        0858 (40 mg)-Given        0928 (40 mg)-Given           pink lady enema  Dose: 286 mL Freq: DAILY PRN Route: RE  PRN Reason: constipation  Start: 05/13/17 0852   Admin Instructions: Nurse to assemble enema with component products               polyethylene glycol (MIRALAX/GLYCOLAX) Packet 17 g  Dose: 17 g Freq: DAILY Route: PO  Start: 05/22/17 1515   Admin Instructions: 1 Packet = 17 grams. Mixed prescribed dose in 8 ounces of water. Follow with 8 oz. of water.               (0804)-Not Given        (0827)-Not Given        (0900)-Not Given        (0853)-Not Given           pravastatin (PRAVACHOL) tablet 40 mg  Dose: 40 mg Freq: EVERY EVENING Route: PO  Start: 05/11/17 2000 2015 (40 mg)-Given        2011 (40 mg)-Given        1956 (40 mg)-Given        1920 (40 mg)-Given        1951 (40 mg)-Given        2037 (40 mg)-Given        [ ] 2000           senna-docusate (SENOKOT-S;PERICOLACE) 8.6-50 MG per tablet 2 tablet  Dose: 2 tablet Freq: 2 TIMES DAILY Route: PO  Start: 05/12/17 2000    0819 (2 tablet)-Given       2015 (2 tablet)-Given        0836 (2 tablet)-Given       2013 (2 tablet)-Given        (0914)-Not Given       1956 (2 tablet)-Given        (0805)-Not Given       (1921)-Not Given        (0827)-Not Given       (1951)-Not Given               (2037)-Not Given        (0854)-Not Given       [ ] 2000           sodium chloride (PF) 0.9% PF flush 3 mL  Dose: 3 mL Freq: EVERY 8 HOURS Route: IK  Start: 05/16/17 2230   Admin Instructions: And Q1H PRN, to lock peripheral IV dormant line.     (0019)-Not Given       (0618)-Not Given       1343 (3 mL)-Given       (2142)-Not Given        (0603)-Not Given       (1336)-Not Given       (2234)-Not Given        (0546)-Not  Given       1513 (3 mL)-Given       (2131)-Not Given        (0546)-Not Given              (2142)-Not Given        (0700)-Not Given       (1520)-Not Given       (2331)-Not Given        0900 (3 mL)-Given              (2341)-Not Given               [ ] 1600           sodium chloride (PF) 0.9% PF flush 3 mL  Dose: 3 mL Freq: EVERY 1 HOUR PRN Route: IK  PRN Reasons: line flush,post meds or blood draw  Start: 05/16/17 2151   Admin Instructions: for peripheral IV flush post IV meds               sodium chloride 3 % neb solution 3 mL  Dose: 3 mL Freq: EVERY 4 HOURS WHILE AWAKE Route: NEBULIZATION  Start: 05/15/17 1500    (0825)-Not Given       1256 (4 mL)-Given       (1630)-Not Given       2001 (3 mL)-Given               0812 (3 mL)-Given       1203 (3 mL)-Given       1621 (3 mL)-Given       2037 (3 mL)-Given               (0840)-Not Given       1155 (3 mL)-Given       1618 (3 mL)-Given       2049 (3 mL)-Given               0838 (3 mL)-Given       (1246)-Not Given [C]       1627 (3 mL)-Given       2021 (3 mL)-Given               (0808)-Not Given       1151 (3 mL)-Given       1712 (3 mL)-Given       2014 (3 mL)-Given       (2100)-Not Given [C]        0920 (3 mL)-Given       1253 (3 mL)-Given       1605 (3 mL)-Given       1957 (3 mL)-Given        0017 (3 mL)-Given       0952 (3 mL)-Given       [ ] 1200       [ ] 1600       [ ] 2000       [ ] 2200           Warfarin Therapy Reminder (Check START DATE - warfarin may be starting in the FUTURE)  Freq: CONTINUOUS PRN Route: XX  Start: 05/24/17 1427   Admin Instructions: Patient is on Warfarin Therapy - check for daily order              Completed Medications  Medications 05/20/17 05/21/17 05/22/17 05/23/17 05/24/17 05/25/17 05/26/17         Dose: 3 mg Freq: ONCE AT 6PM Route: PO  Start: 05/25/17 1800   End: 05/25/17 1708         1708 (3 mg)-Given              Dose: 3 mg Freq: ONCE AT 6PM Route: PO  Start: 05/24/17 1800   End: 05/24/17 1824        1824 (3 mg)-Given             Discontinued Medications  Medications 05/20/17 05/21/17 05/22/17 05/23/17 05/24/17 05/25/17 05/26/17         Rate: 0-35 mL/hr Freq: CONTINUOUS Route: IV  Last Dose: Stopped (05/24/17 1520)  Start: 05/17/17 1330   End: 05/24/17 1345   Admin Instructions: Starting infusion Rate = 750 Units/hr (actual weight) (12 units/kg/hr).  Low Intensity Heparin Treatment.  GOAL: Heparin Xa (10a) LEVEL = 0.15-0.35 (PTT = 45-65 seconds).  Max 1000 units/hr if patient getting TNK and greater than 70 kg.  Beginning with the Heparin Xa (10a) Level collected 6 hours after starting heparin, adjust heparin according to guidelines.    Release Heparin Xa (10a) Level order for blood draw 6 hours after start of infusion and 6 hours after any dose change.    If Xa (10a) less than 0.1 see bolus orders and INCREASE by 300 units/hr     If Xa (10a) 0.1 - 0.14 see bolus orders and INCREASE by 150 units/hr     If Xa (10a) 0.15 - 0.35 then  NO CHANGE in rate    if Xa (10a) 0.36 - 0.48 then REDUCE by 150 units/hr     If Xa (10a) 0.49 - 0.66 then HOLD 30 mins and REDUCE by 200 units/hr     If Xa (10a) 0.67 - 1.31 then HOLD 60 mins and REDUCE by 300 units/hr     If Xa (10a) greater than 1.31 then HOLD 60 mins and REDUCE by 350 units/hr     0000 (750 Units/hr)-Rate/Dose Verify       0046 (900 Units/hr)-Rate/Dose Change [C]       0400 (900 Units/hr)-Rate/Dose Verify       0825 (900 Units/hr)-Rate/Dose Verify       1106 (900 Units/hr)-New Bag       2023 (900 Units/hr)-Rate/Dose Verify        0000 (900 Units/hr)-Rate/Dose Verify       0330 (900 Units/hr)-Rate/Dose Verify       0700 (900 Units/hr)-Rate/Dose Verify       0800 (900 Units/hr)-Rate/Dose Verify       0848 (900 Units/hr)-Rate/Dose Verify [C]       0900 (900 Units/hr)-Rate/Dose Verify       1000 (900 Units/hr)-Rate/Dose Verify       1100 (900 Units/hr)-Rate/Dose Verify       1200 (900 Units/hr)-Rate/Dose Verify       1300 (900 Units/hr)-Rate/Dose Verify       1400 (900 Units/hr)-Rate/Dose  Verify       1447 (900 Units/hr)-New Bag       1500 (900 Units/hr)-Rate/Dose Verify       2009 (900 Units/hr)-Rate/Dose Verify        0000 (900 Units/hr)-Rate/Dose Verify       0400 (900 Units/hr)-Rate/Dose Verify       0846 (750 Units/hr)-New Bag       1813 (900 Units/hr)-Rate/Dose Change       2004 (900 Units/hr)-New Bag        0129 (900 Units/hr)-Rate/Dose Verify       0814 (750 Units/hr)-Rate/Dose Change       1552 (750 Units/hr)-Rate/Dose Verify        0000 (750 Units/hr)-Rate/Dose Verify       0602 (750 Units/hr)-New Bag       1046 (750 Units/hr)-Rate/Dose Verify       1345-Med Discontinued  1520-Stopped               Freq: EVERY 6 HOURS PRN Route: IV  PRN Comment: to reach target Heparin Xa (10a) goal. GOAL: Heparin Xa (10a) LEVEL = 0.15-0.35 (PTT = 45-65 seconds).  Start: 05/17/17 1318   End: 05/24/17 1345   Admin Instructions: Beginning with the Heparin Xa (10a) level collected 6 hours AFTER starting heparin:  If Heparin Xa (10a) less than 0.1, then bolus dose = 3,150 Units (actual weight) (50 Units/kg x 63.4 kg (actual weight))    If Heparin Xa (10a) 0.1 to 0.14, then bolus dose = 1,900 Units (actual weight) (30 Units/kg x 63.4 kg (actual weight)).  Nurse to administer dose from existing infusion. If no infusion bag or syringe for this order is available, contact pharmacist to re-enter medication order.     0045 (1,900 Units)-Given [C]           1345-Med Discontinued           Dose: 250 mg Freq: DAILY Route: PO  Indications Comment: post-op leukocytosis, possible PNA  Start: 05/19/17 0800   End: 05/25/17 1338   Admin Instructions: Administer at least 2 hrs before or 4 hrs after aluminum, calcium, iron, zinc or magnesium containing products.     Dose adjusted per renal dosing policy.  Estimated CrCl = 20-49 mL/min.     0819 (250 mg)-Given        0833 (250 mg)-Given        0858 (250 mg)-Given        0805 (250 mg)-Given        0825 (250 mg)-Given        0858 (250 mg)-Given       1338-Med Discontinued           Dose: 30 mL Freq: DAILY PRN Route: PO  PRN Reason: constipation  Start: 05/18/17 0645   End: 05/24/17 1346   Admin Instructions: Shake well.         1346-Med Discontinued           Dose: 2 g Freq: DAILY PRN Route: IV  PRN Reason: magnesium supplementation  Start: 05/10/17 2219   End: 05/24/17 1346   Admin Instructions: For Serum Mg++ 1.6 - 2 mg/dL  Give 2 g and recheck magnesium level next AM.         1346-Med Discontinued           Dose: 4 g Freq: EVERY 4 HOURS PRN Route: IV  PRN Reason: magnesium supplementation  Start: 05/10/17 2219   End: 05/24/17 1346   Admin Instructions: For serum Mg++ less than 1.6 mg/dL  Give 4 g and recheck magnesium level 2 hours after dose, and next AM.         1346-Med Discontinued           Dose: 20-40 mEq Freq: EVERY 2 HOURS PRN Route: ORAL OR FEED  PRN Reason: potassium supplementation  Start: 05/10/17 2219   End: 05/24/17 1346   Admin Instructions: Use if unable to tolerate tablets.    If Serum K+ 3.4-4.0, dose = 20 mEq x1. Recheck K+ level the next AM.  If Serum K+ 3.0-3.3, dose = 60 mEq po total dose (40 mEq x 1 followed in 2 hours by 20 mEq X1). Recheck K+ level 4 hours after dose and the next AM.  If Serum K+ 2.5-2.9, dose = 80 mEq po total dose (40 mEq Q2H x2). Recheck K+ level 4 hours after dose and the next AM.  If Serum K+ less than 2.5, See IV order.  Dissolve packet contents in 4-8 ounces of cold water or juice.         1346-Med Discontinued           Dose: 10 mEq Freq: EVERY 1 HOUR PRN Route: IV  PRN Reason: potassium supplementation  Start: 05/10/17 2219   End: 05/24/17 1346   Admin Instructions: Infuse via PERIPHERAL LINE or CENTRAL LINE. Use for central line replacement if patient weight less than 65 kg, if patient is on TPN with high potassium content or if unit does not stock 20 mEq bags.  If Serum K+ 3.4-4.0, dose = 10 mEq/hr x2 doses. Recheck K+ level the next AM.  If Serum K+ 3.0-3.3, dose = 10 mEq/hr x4 doses (40 mEq IV total dose). Recheck K+ level 2 hours  after dose and the next AM.  If Serum K+ less than 3.0, dose = 10 mEq/hr x6 doses (60 mEq IV total dose). Recheck K+ level 2 hours after dose and the next AM.         1346-Med Discontinued           Dose: 10 mEq Freq: EVERY 1 HOUR PRN Route: IV  PRN Reason: potassium supplementation  Start: 05/14/17 2200   End: 05/24/17 1346   Admin Instructions: Infuse via PERIPHERAL LINE. Use potassium with lidocaine for pain with peripheral administration.  If Serum K+ 3.4-4.0, dose = 10 mEq/hr x2 doses. Recheck K+ level the next AM.  If Serum K+ 3.0-3.3, dose = 10 mEq/hr x4 doses (40 mEq IV total dose). Recheck K+ level 2 hours after dose and the next AM.  If Serum K+ less than 3.0, dose = 10 mEq/hr x6 doses (60 mEq IV total dose). Recheck K+ level 2 hours after dose and the next AM.         1346-Med Discontinued           Dose: 20 mEq Freq: EVERY 1 HOUR PRN Route: IV  PRN Reason: potassium supplementation  Start: 05/10/17 2219   End: 05/24/17 1346   Admin Instructions: Infuse via CENTRAL LINE Only.  May need EKG if less than 65 kg or on TPN - Max rate is 0.3 mEq/kg/hr for patients not on EKG monitoring.    If Serum K+ 3.4-4.0, dose = 20 mEq/hr x1 doses. Recheck K+ level the next AM.  If Serum K+ 3.0-3.3, dose = 20 mEq/hr x2 doses (40 mEq IV total dose).  Recheck K+ level 2 hours after dose and the next AM.  If Serum K+ less than 3.0, dose = 20 mEq/hr x3 doses (60 mEq IV total dose). Recheck K+ level 2 hours after dose and the next AM.         1346-Med Discontinued           Dose: 20-40 mEq Freq: EVERY 2 HOURS PRN Route: PO  PRN Reason: potassium supplementation  Start: 05/10/17 2219   End: 05/24/17 1346   Admin Instructions: Use if able to take PO.   If Serum K+ 3.4-4.0, dose = 20 mEq x1. Recheck K+ level the next AM.  If Serum K+ 3.0-3.3, dose = 60 mEq po total dose (40 mEq x1 followed in 2 hours by 20 mEq x1). Recheck K+ level 4 hours after dose and the next AM.  If Serum K+ 2.5-2.9, dose = 80 mEq po total dose (40 mEq Q2H  x2). Recheck K+ level 4 hours after dose and the next AM.  If Serum K+ less than 2.5, See IV order.  DO NOT CRUSH.       0934 (20 mEq)-Given         1346-Med Discontinued           Dose: 10 mmol Freq: DAILY PRN Route: IV  PRN Reason: phosphorous supplementation  Start: 05/10/17 2219   End: 05/24/17 1346   Admin Instructions: For serum phosphorus level 2.5-2.7  Do not infuse Phosphorus in the same line as TPN.   Give 10 mmol and recheck phosphorus level next AM.         1346-Med Discontinued           Dose: 15 mmol Freq: DAILY PRN Route: IV  PRN Reason: phosphorous supplementation  Start: 05/10/17 2219   End: 05/24/17 1346   Admin Instructions: For serum phosphorus level 2.0-2.4  Do not infuse Phosphorus in the same line as TPN.   Give 15 mmol and recheck phosphorus level next AM.         1346-Med Discontinued           Dose: 20 mmol Freq: EVERY 6 HOURS PRN Route: IV  PRN Reason: phosphorous supplementation  PRN Comment: serum phosphorus level 1.1-1.9  Start: 05/10/17 2219   End: 05/24/17 1346   Admin Instructions: For serum phosphorus level 1.1-1.9  Do not infuse Phosphorus in the same line as TPN.   Give 20 mmol and recheck phosphorus level 2 hours after last dose and next AM.         1346-Med Discontinued           Dose: 25 mmol Freq: EVERY 8 HOURS PRN Route: IV  PRN Reason: phosphorous supplementation  Start: 05/10/17 2219   End: 05/24/17 1346   Admin Instructions: For serum phosphorus level less than 1.1  Do not infuse Phosphorus in the same line as TPN.   Give 25 mmol and recheck phosphorus level 2 hours after last dose and next AM.         1346-Med Discontinued      Medications 05/20/17 05/21/17 05/22/17 05/23/17 05/24/17 05/25/17 05/26/17               INTERAGENCY TRANSFER FORM - NOTES (H&P, Discharge Summary, Consults, Procedures, Therapies)   5/10/2017                       [address] [facility] BANK: [phone]               History & Physicals      H&P signed by Pati Provider at 5/25/2017  8:06 AM       Author:  Scan, Provider Service:  (none) Author Type:  Physician    Filed:  5/25/2017  8:06 AM Date of Service:  5/25/2017  5:23 AM Creation Time:  5/25/2017  8:06 AM    Status:  Signed :  Pati, Provider (Physician)     Scan on 5/25/2017  8:06 AM by Scan, Provider : [facility], H/P, 5/3/2017 1          Revision History        User Key Date/Time User Provider Type Action    > [N/A] 5/25/2017  8:06 AM Scan, Provider Physician Sign            H&P by Christine Naylor APRN CNS at 5/3/2017  9:00 AM     Author:  Christine Naylor APRN CNS Service:  (none) Author Type:  Clinical Nurse Specialist    Filed:  5/3/2017 10:38 PM Encounter Date:  5/3/2017 Status:  Signed    :  Christine Naylor APRN CNS (Clinical Nurse Specialist)             Pre-Operative H & P     CC:  Preoperative exam to assess for increased cardiopulmonary risk while undergoing surgery and anesthesia.    Date of Encounter: 5/3/2017  Primary Care Physician:  Nory Elliott  Yue Valenzuela is a 71 year old female who presents for pre-operative H & P in preparation for[KS1.1] flexible bronchoscopy, med[KS1.2]iastinoscopy[KS1.3], right thoracoscopic wedge resection, possible lower lobectomy, possible mediastinal lymph node dissection[KS1.2] with [KS1.1] Rafael[KS1.2] on[KS1.1] 5/10/17[KS1.4] at[KS1.1] [facility][KS1.2].[KS1.1] History is obtained from the patient[KS1.2].[KS1.1]     Patient with history of DIABETES MELLITUS I for many years and related stage 4 chronic kidney disease who was undergoing evaluation for kidney transplant. As part of her cardiac evaluation she underwent a stress test on 3/24/17 where a concerning right lower lobe mass was discovered. After imaging she proceeded to IR biopsy which confirmed adenocarcinoma. Unfortunately, her biopsy was complicated by pneumothorax requiring admission with CT placement. Consult with Dr. Hall with above procedure  now planned.[KS1.3]    Past Medical History  Past Medical History:   Diagnosis Date     Adenocarcinoma, lung (H)      Asthma      CAD (coronary artery disease) 9/17/2014 2006 PCI circumflex territory  Mid LAD lesion      Carotid artery stenosis      CKD (chronic kidney disease) stage 4, GFR 15-29 ml/min (H)      Diabetes mellitus type 1 (H)      H/O factor V Leiden mutation      Hypertension      Mixed hyperlipidemia (DYSLIPIDEMIA) 8/1/2014     Osteopenia      Pneumothorax 4/16/2017     PVD (peripheral vascular disease) (H)      Thyroid nodule 04/12/2012    Hurtle Cells, 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 Patient is postmenopausal.[KS1.5]    Steroid use in the last year:[KS1.1] Denies.[KS1.2]    Personal or FH with difficulty with Anesthesia:[KS1.1]  Past history slow to wake.[KS1.2]    Prior to Admission Medications[KS1.1]  Current Outpatient Prescriptions   Medication Sig Dispense Refill     cetirizine (ZYRTEC) 10 MG tablet Take 5 mg by mouth daily       calcium 600 MG tablet Take 1 tablet by mouth every morning       Respiratory Therapy Supplies (AEROBIKA) GAGANDEEP 1 Device 6 times daily 1 each 0     Insulin Aspart (INSULIN PUMP - OUTPATIENT) Insulin pump base rate: 9007-1143 rate 0.5  7149-5931 rate 1.0  6163-9697 rate 1.95  2193-1478 rate 1.35       clonazePAM (KLONOPIN) 0.5 MG tablet Take 0.5 tablets (0.25 mg) by mouth 2 times daily as needed for anxiety 90 tablet 1     Acetaminophen (TYLENOL PO) Take 1,000 mg by mouth every 8 hours as  needed for mild pain or fever        budesonide-formoterol (SYMBICORT) 80-4.5 MCG/ACT inhaler Inhale 2 puffs into the lungs 2 times daily 3 Inhaler 3     amLODIPine (NORVASC) 10 MG tablet Take 10 mg by mouth every evening        aspirin EC 81 MG tablet Take 81 mg by mouth every evening        blood glucose (ONE TOUCH ULTRA) test strip        calcium acetate (PHOSLO) 667 MG CAPS Take 667 mg by mouth 4 times daily (with meals and nightly)        carvedilol (COREG) 3.125 MG tablet Take 3.125 mg by mouth 2 times daily (with meals)        cholecalciferol (VITAMIN D) 1000 UNIT tablet Take 1,000 Units by mouth every morning        furosemide (LASIX) 20 MG tablet 20 mg every morning        ONETOUCH DELICA LANCETS 33G MISC        lisinopril (PRINIVIL,ZESTRIL) 20 MG tablet Take 10 mg by mouth every evening        pravastatin (PRAVACHOL) 40 MG tablet 40 mg every evening[KS1.5]          Allergies[KS1.1]  Allergies   Allergen Reactions     Codeine Other (See Comments)     Makes her sleepless     Loratadine Other (See Comments)     Extreme sleepiness       Sulfa Drugs Unknown     Other reaction(s): Unknown     Terazosin Other (See Comments)     Swellling in legs, improved when went off     Adhesive Tape Rash     Foam tape used during surgery was what caused rash     Latex Rash     Liquid Adhesive Rash[KS1.5]       Social History[KS1.1]  Social History     Social History     Marital status:      Spouse name: N/A     Number of children: N/A     Years of education: N/A     Occupational History     retail Other     part-time     Social History Main Topics     Smoking status: Former Smoker     Packs/day: 0.50     Years: 15.00     Types: Cigarettes     Start date: 1/1/1970     Quit date: 12/31/1985     Smokeless tobacco: Never Used     Alcohol use 0.0 - 0.5 oz/week     0 - 1 Standard drinks or equivalent per week     Drug use: No     Sexual activity: Not on file     Other Topics Concern     Not on file     Social History  Narrative    The patient has a 7 pk yr tobacco hx.  She has no active use since 1985.  Alcohol use is 0 alcoholic drinks per week.  She denies use of recreational drugs.          She is retired.  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           The patient is .  Has 2 children.        Hot Tub Exposure: NO    Recent Travel: NO     Hx of incarceration:  NO    Bird Exposure:   NO    Animal Exposure:  NO    Inhalation Exposure:  NO[KS1.5]           Family History[KS1.1]  Family History   Problem Relation Age of Onset     Cancer - colorectal Father      DIABETES Father      Scleroderma Mother      CEREBROVASCULAR DISEASE Brother      DIABETES Brother      Hypertension Brother      DIABETES Brother      2nd brother[KS1.5]       Review of Systems  The complete review of systems is negative other than noted in the HPI or here.[KS1.1]   Constitutional: Denies fever, chills[KS1.2]. Always feels cold. W[KS1.3]eight loss[KS1.2] of 10#.[KS1.3]   Skin:[KS1.2] She is very sensitive to adhesive and has had irritation at the site of her right side chest tube site. She also developed what she calls a heat rash under her right breast and mid chest. She is trying to keep it clean and dry and is applying Cortaid. It is improving.[KS1.3]   HEENT: Wears glasses for vision.[KS1.2] Has developing cataracts. Sinus drainage. Occasional swallowing difficulty especially with dry foods.[KS1.3]   Respiratory: Denies cough or shortness of breath.[KS1.2] Has been using Albuterol inhaler but did not get her Symbicort inhaler because it was so expensive.[KS1.3]   CV: Denies chest pain or irregular HR.[KS1.2] BP range 135-145/50. Generally good exercise tolerance.[KS1.3]   GI: Denies abdominal pain[KS1.2]. Occasional diarrhea. Has been eating less.[KS1.3]  : Denies dysuria.[KS1.2] Some incontinence.[KS1.3]   M/S:[KS1.2] Right shoulder pain. Leg cramps, especially at night.   Endo: BS range 130-180. Had a 2 am low  "last night of 65 which is unusual for her.[KS1.3]     Temp: 97.8  F (36.6  C) Temp src: Oral BP: 187/63 Pulse: 67   Resp: 16 SpO2: 100 %         139 lbs 4.8 oz  5' 4\"   Body mass index is 23.91 kg/(m^2).[KS1.5]       Physical Exam  Constitutional: Awake, alert, cooperative, no apparent distress, and appears stated age.[KS1.1] Accompanied by .[KS1.3]  Eyes: Pupils equal, round and reactive to light, extra ocular muscles intact, sclera clear, conjunctiva normal.  HENT: Normocephalic, oral pharynx with moist mucus membranes, good dentition.[KS1.1] Upper left tooth capped.[KS1.3] No goiter appreciated.   Respiratory: Clear to auscultation bilaterally, no crackles or wheezing.[KS1.1] No cough or obvious dyspnea.[KS1.3]  Cardiovascular: Regular rate and rhythm, normal S1 and S2, and no murmur noted.  Carotids, no bruits. No edema. Palpable pulses to radial  DP and PT arteries.   GI: Normal bowel sounds, soft, non-distended, non-tender, no masses palpated, no hepatosplenomegaly.   Lymph/Hematologic: No cervical lymphadenopathy and no supraclavicular lymphadenopathy.  Genitourinary:[KS1.1]  Deferred.[KS1.3]  Skin: Warm and dry.[KS1.1]  Flat scattered rash under right breast and mid chest. Dry, mild redness.[KS1.3]   Musculoskeletal: Full ROM of neck. There is no redness, warmth, or swelling of the joints. Gross motor strength is normal.    Neurologic: Awake, alert, oriented to name, place and time. Cranial nerves II-XII are grossly intact. Gait is normal.   Neuropsychiatric: Calm, cooperative. Normal affect.     Labs: (personally reviewed)[KS1.1]  Lab Results   Component Value Date    WBC 8.6 04/18/2017     Lab Results   Component Value Date    RBC 3.78 04/18/2017     Lab Results   Component Value Date    HGB 11.1 04/18/2017     Lab Results   Component Value Date    HCT 35.5 04/18/2017     Lab Results   Component Value Date    MCV 94 04/18/2017     Lab Results   Component Value Date    [facility] 29.4 04/18/2017     Lab " Results   Component Value Date    MCHC 31.3 04/18/2017     Lab Results   Component Value Date    RDW 13.3 04/18/2017     Lab Results   Component Value Date     04/18/2017     Last Basic Metabolic Panel:  Lab Results   Component Value Date     04/21/2017      Lab Results   Component Value Date    POTASSIUM 4.3 04/21/2017     Lab Results   Component Value Date    CHLORIDE 105 04/21/2017     Lab Results   Component Value Date    ZAHEER 9.6 04/21/2017     Lab Results   Component Value Date    CO2 27 04/21/2017     Lab Results   Component Value Date    BUN 36 04/21/2017     Lab Results   Component Value Date    CR 1.47 04/21/2017     Lab Results   Component Value Date     04/21/2017[KS1.2]     EKG: Personally reviewed:[KS1.1] 4/18/17 Sinus rhythm, short ME, possible left atrial enlargement, nonspecific T wave abnormality.[KS1.2]  Cardiac echo:[KS1.1] 3/30/16  Interpretation Summary  Technically difficult study. Extremely difficult acoustic windows despite the  use of contrast for endcardial border definition.  Global and regional left ventricular function is normal with an EF of 55-60%.  Global right ventricular function is normal.  Moderate left atrial enlargement.  Mild pulmonary hypertension. Right ventricular systolic pressure is 35 mmHg  above the right atrial pressure.  The inferior vena cava was dilated at 2.3 cm without respiratory variability,  consistent with increased right atrial pressure.  Estimated mean right atrial pressure is >8 mmHg.  No pericardial effusion is present.[KS1.6]  Stress test:[KS1.1] Lexiscan 3[KS1.6]/24[KS1.7]/2017  Impression:  1. Normal myocardial SPECT study with a summed stress score of 0.  2. No evidence of ischemia or scar.  3. Normal LV ejection fraction wall motion.  4. No change from 3/30/2016 and the appearance of the heart.  5. Right lower lobe mass, this should be considered malignant until  proven otherwise. Based upon position the mass can be  percutaneously  biopsied.   [KS1.6]  PET[KS1.8] 4/20/17[KS1.9]  IMPRESSION:   This patient with a history of recently diagnosed pulmonary  adenocarcinoma:  1. Mild hypermetabolism associated with the primary mass in the right  lower lobe is compatible with the pathologic diagnosis of malignancy.  The apparent mild interval increase in size and density of the lesion  is likely related to the recent biopsy.  2. Increasing patchy consolidative, groundglass, and tree-in-bud  nodularity in the right greater than left lung, some of which is  associated with mild hypermetabolism. Given the relatively abrupt  increase since 4/4/2017, these findings are favored to represent  worsening of infectious/inflammatory process. Metastatic disease is  considered less likely.  3. No other evidence of metastatic disease is identified.  4. Multinodular thyroid gland, with a large nodule on the right  measuring up to at least 2.1 cm. Consider thyroid ultrasound.  5. Hyperdense 2.0 cm mildly complex left renal cyst. This could  represent a proteinaceous or hemorrhagic cyst, although it is overall  indeterminate given the inability to administer intravenous contrast.  An ultrasound may be helpful for further characterization.[KS1.8]    MRI Brain  4/20/17  Impression:  1. Study somewhat limited by lack of IV contrast, however there is no  intracranial mass lesion.  2. Cerebral volume loss, with enlargement of the ventricular system  which may be slightly more than expected compared to the sulci.  Noncommunicating hydrocephalus is difficult to exclude. Recommend  correlation with outside images.  3. Moderate leukoaraiosis.[KS1.9]    4/20/17 CHEST X-RAY      Impression:   1. Tiny residual right apical pneumothorax. Right chest tube with  sidehole projecting outside the pleural cavity, stable.  2. Stable minimal perihilar and bibasilar opacities. Infection versus  Atelectasis.[KS1.10]    Outside records reviewed from:[KS1.1] Care  Everywhere[KS1.10]    ASSESSMENT and PLAN  Yue Valenzuela is a 71 year old female scheduled to undergo[KS1.1] flexible bronchoscopy, med[KS1.2]iastinoscopy[KS1.3], right thoracoscopic wedge resection, possible lower lobectomy, possible mediastinal lymph node dissection[KS1.2] with [KS1.1] Rafael[KS1.2] on[KS1.1] 5/10/1[KS1.4]7[KS1.3].[KS1.1] She[KS1.3] has the following specific operative considerations:   - RCRI :[KS1.1] 6.6[KS1.11] %[KS1.3] risk of major adverse cardiac event.   - Anesthesia considerations:  Refer to PAC assessment in anesthesia records  - VTE risk:[KS1.1] 3%[KS1.3]  - Risk of PONV score =[KS1.1]2[KS1.11].  If > 2, anti-emetic intervention recommended.[KS1.1]     --RLL adenocarcinoma. Above procedure planned.   --CKD, stage 4, under evaluation for kidney transplant when lung mass was found. Cr[KS1.3] 1.47.[KS1.11] GFR[KS1.3] 35.[KS1.11] Will require close monitoring of renal function during periop period. Patient, of course, very concerned about protecting her kidney[KS1.3]s[KS1.11].   --DIABETES MELLITUS I. Last A1C[KS1.3] 7.0. On i[KS1.11]nsulin pump and will come in at basal rate. Will require close monitoring of blood glucose during periop period.   --HLD. Pravastatin. HYPERTENSION. Will take Amlodipine and Coreg on DOS. Will hold Lisinopril and Lasix. CAD, s/p[KS1.3] ANTHONY to proximal Circ in 2006.[KS1.12] No cardiac symptoms. ASA 81 mg daily and will remain on. All testing above.[KS1.3] PVD with m[KS1.11]ild diffuse bilateral carotid atherosclerosis by US.[KS1.12] S[KS1.11]tenosis of the right common iliac artery by lower extremity angiogram on 01/13/2017. Left side without significant stenoses.[KS1.13]    --Former smoker. 7.5 pack years.[KS1.3] Mild COPD.[KS1.14] Asthma history. Has been using Albuterol only prn due to expense of Symbicort. Evaluated by respiratory therapy[KS1.3] today[KS1.11] with education for prehab[KS1.3],[KS1.11] and will be followed in the hospital.   --Anxiety.  Clonazepam[KS1.3] prn[KS1.11]   --Pain management. Discussed possibility of nerve block if appropriate for patient's procedure. Final counseling and decisions by regional team on DOS.   --Enhanced recovery pathway initiated. No DOS orders for Celebrex or Gabapentin due to cardiac and renal concerns.  Type and screen drawn today.    Arrival time, NPO, shower and medication instructions provided by nursing staff today. Preparing For Your Surgery handout given.[KS1.3]      Patient was discussed with[KS1.1] Dr Torrez[KS1.3].    KATHERYN Rowe  Preoperative Assessment Center  [facility]  [facility] Center  Phone: [phone]  Fax: [phone][KS1.1]     Revision History        User Key Date/Time User Provider Type Action    > KS1.5 5/3/2017 10:38 PM Christine Naylor APRN CNS Clinical Nurse Specialist Sign     KS1.14 5/3/2017 10:31 PM Christine Naylor APRN CNS Clinical Nurse Specialist      KS1.11 5/3/2017 10:15 PM Christine Naylor APRN CNS Clinical Nurse Specialist      KS1.12 5/3/2017 10:09 PM Christine Naylor APRN CNS Clinical Nurse Specialist      KS1.13 5/3/2017 10:04 PM Christine Naylor APRN CNS Clinical Nurse Specialist      KS1.3 5/3/2017  9:18 PM Christine Naylor APRN CNS Clinical Nurse Specialist      KS1.10 5/3/2017  5:39 PM Christine Naylor APRN CNS Clinical Nurse Specialist      KS1.9 5/3/2017  5:38 PM Christine Naylor APRN CNS Clinical Nurse Specialist      KS1.7 5/3/2017  5:37 PM Christine Naylor APRN CNS Clinical Nurse Specialist      KS1.8 5/3/2017  5:36 PM Christine Naylor APRN CNS Clinical Nurse Specialist      KS1.6 5/3/2017  5:35 PM Christine Nayolr APRN CNS Clinical Nurse Specialist      KS1.4 5/3/2017  5:26 PM Christine Naylor APRN CNS Clinical Nurse Specialist      KS1.2 5/3/2017  5:24 PM Christine Naylor APRN CNS Clinical Nurse Specialist      KS1.1 5/3/2017 10:16 AM Christine Naylor APRN CNS Clinical Nurse Specialist                    Discharge Summaries     No notes of this type exist for this encounter.         Consult Notes      Consults by Duane Coates MD at 5/15/2017  3:58 PM     Author:  Duane Coates MD Service:  Electrophysiology Author Type:  Physician    Filed:  5/16/2017 12:05 AM Date of Service:  5/15/2017  3:58 PM Creation Time:  5/15/2017  3:58 PM    Status:  Signed :  Duane Coates MD (Physician)     Consult Orders:    1. Cardiology Electrophysiology (EP) IP Consult: Patient to be seen: ASAP within 4 hrs; Call back #: [phone]; status post lobectomy, now with arrhythmia, aflutter/fib; Consultant may enter orders: Yes [[id number]] ordered by Evelyn Liriano MD at 05/15/17 1555                  Electrophysiology Consultation Note   EP Attending: [LV1.1]Jaxon[LV1.2].   Reason for consultation:[LV1.1] AFL[LV1.2].   Provider requesting consultation: [LV1.1]Heri[LV1.2].  Date of Service: 5/15/2017      HPI:[LV1.1]   Ms. Valenzuela is a 71 year old female who has a past medical history significant for CAD s/p PCI LCX (2006), Factor V Leiden, HTN, HLD, DM1, PVD (known stenosis of right common iliac), carotid artery stenosis, CKD IV, asthma, anemia, and newly diagnosed lung adenocarcinoma s/p VATS RLL on 5/10/17. This morning (5/15/17), she had respiratory distress and was found to have right apical pneumothorax for which she had CT placed. She was noted to go into AFL this morning 100-150's around 1145am. She is asymptomatic in her AFL. She denies any prior history of cardiac arrhythmias. NM Lexiscan from 3/24/17 shows LVEF 74% with normal perfusion on rest and stress. She is having intermittent confusion today. SCr 1.78 GFR 28, Electrolytes stable. Telemetry shows -150's. She is hemodynamically stable. Current cardiac medications include: Norvasc, ASA, Coreg, and Pravastatin.[LV1.2]   Past Medical History:   Past Medical History:   Diagnosis Date     Adenocarcinoma, lung (H)      Asthma      CAD (coronary  artery disease) 9/17/2014    2006 PCI circumflex territory  Mid LAD lesion      Carotid artery stenosis      CKD (chronic kidney disease) stage 4, GFR 15-29 ml/min (H)      Diabetes mellitus type 1 (H)      H/O factor V Leiden mutation      Hypertension      Mixed hyperlipidemia (DYSLIPIDEMIA) 8/1/2014     Osteopenia      Pneumothorax 4/16/2017     PVD (peripheral vascular disease) (H)      Thyroid nodule 04/12/2012    Hurtle Cells, non malignant     Past Surgical History:   Past Surgical History:   Procedure Laterality Date     BRONCHOSCOPY FLEXIBLE Right 5/10/2017    Procedure: BRONCHOSCOPY FLEXIBLE;  Flexible Bronchoscopy, esaphogastroduodenoscopy, cervical Mediastinoscopy, Right  Thoracoscopic Surgery, right Lower Lobectomy, Mediastinal Lymph Node Dissection, Right middle lobe wedge resection*Latex Allergy* (ERAS Patient);  Surgeon: Glenna Hall MD;  Location: [facility] OR     ESOPHAGOSCOPY, GASTROSCOPY, DUODENOSCOPY (EGD), COMBINED N/A 5/10/2017    Procedure: COMBINED ESOPHAGOSCOPY, GASTROSCOPY, DUODENOSCOPY (EGD);;  Surgeon: Glenna Hall MD;  Location: [facility] OR     ganglion cyst removal       hemithyroidectomy  4/13/2012    PATH report showed benign Hurtle Cell Adenoma     IR biopsy of lung  04/2017     lipoma removal       MEDIASTINOSCOPY N/A 5/10/2017    Procedure: MEDIASTINOSCOPY;;  Surgeon: Glenna Hall MD;  Location: [facility] OR     SHOULDER SURGERY Left      THORACOSCOPIC EXCISE NODE MEDIASTINAL Right 5/10/2017    Procedure: THORACOSCOPIC EXCISE NODE MEDIASTINAL;;  Surgeon: Glenna Hall MD;  Location: [facility] OR     THORACOSCOPIC RESECTION LUNG Right 5/10/2017    Procedure: THORACOSCOPIC RESECTION LUNG;;  Surgeon: Glenna Hall MD;  Location: [facility] OR     TONSILLECTOMY       Allergies: Per MAR     Allergies   Allergen Reactions     Codeine Other (See Comments)     Makes her sleepless     Loratadine Other (See Comments)     Extreme sleepiness       Sulfa Drugs Unknown     Other reaction(s): Unknown     Terazosin  Other (See Comments)     Swellling in legs, improved when went off     Adhesive Tape Rash     Foam tape used during surgery was what caused rash     Latex Rash     Liquid Adhesive Rash     Medications:   Per MAR current outpatient cardiovascular medications include: Prescriptions Prior to Admission   Medication Sig Dispense Refill Last Dose     albuterol (PROAIR HFA/PROVENTIL HFA/VENTOLIN HFA) 108 (90 BASE) MCG/ACT Inhaler Inhale 2 puffs into the lungs 2 times daily   5/9/2017 at Unknown time     Respiratory Therapy Supplies (AEROBIKA) GAGANDEEP 1 Device 6 times daily 1 each 0 5/9/2017 at Unknown time     Insulin Aspart (INSULIN PUMP - OUTPATIENT) Insulin pump base rate: 5968-1712 rate 0.5  7329-2287 rate 1.0  2215-5806 rate 1.95  3342-7703 rate 1.35   5/10/2017 at Unknown time     Acetaminophen (TYLENOL PO) Take 1,000 mg by mouth every 8 hours as needed for mild pain or fever    Past Week at Unknown time     budesonide-formoterol (SYMBICORT) 80-4.5 MCG/ACT inhaler Inhale 2 puffs into the lungs 2 times daily 3 Inhaler 3 Past Week at Unknown time     amLODIPine (NORVASC) 10 MG tablet Take 10 mg by mouth every evening    5/9/2017 at Unknown time     aspirin EC 81 MG tablet Take 81 mg by mouth every evening    5/9/2017 at Unknown time     carvedilol (COREG) 3.125 MG tablet Take 3.125 mg by mouth 2 times daily (with meals)    5/9/2017 at Unknown time     furosemide (LASIX) 20 MG tablet 20 mg every morning    5/9/2017 at Unknown time     lisinopril (PRINIVIL,ZESTRIL) 20 MG tablet Take 10 mg by mouth every evening    5/9/2017 at Unknown time     pravastatin (PRAVACHOL) 40 MG tablet 40 mg every evening    5/9/2017 at Unknown time     cetirizine (ZYRTEC) 10 MG tablet Take 5 mg by mouth daily        calcium 600 MG tablet Take 1 tablet by mouth every morning        clonazePAM (KLONOPIN) 0.5 MG tablet Take 0.5 tablets (0.25 mg) by mouth 2 times daily as needed for anxiety 90 tablet 1 Taking     blood glucose (ONE TOUCH ULTRA) test  strip    Taking     calcium acetate (PHOSLO) 667 MG CAPS Take 667 mg by mouth 4 times daily (with meals and nightly)    Taking     cholecalciferol (VITAMIN D) 1000 UNIT tablet Take 1,000 Units by mouth every morning    Taking     ONETOUCH DELICA LANCETS 33G MISC    Taking     No current outpatient prescriptions on file.     Current Facility-Administered Medications   Medication Dose Route Frequency     [START ON 5/16/2017] insulin aspart   Subcutaneous Daily with breakfast     [START ON 5/16/2017] insulin aspart   Subcutaneous Daily before lunch     insulin aspart   Subcutaneous Daily with supper     levalbuterol  0.63 mg Nebulization Q4H WA     sodium chloride  3 mL Nebulization Q4H While awake     amLODIPine  10 mg Oral QPM     furosemide  20 mg Oral QAM     guaiFENesin  600 mg Oral BID     insulin glargine  18 Units Subcutaneous Q24H     insulin aspart  1-7 Units Subcutaneous TID AC     insulin aspart  1-5 Units Subcutaneous At Bedtime     bisacodyl  10 mg Rectal Daily     senna-docusate  2 tablet Oral BID     polyethylene glycol  17 g Oral BID     sodium chloride  3 mL Nebulization Q4H While awake     acetaminophen  975 mg Oral Q8H     melatonin  3 mg Oral At Bedtime     menthol   Transdermal Q8H     carvedilol  3.125 mg Oral BID w/meals     aspirin EC  81 mg Oral QPM     cetirizine  5 mg Oral Daily     pravastatin  40 mg Oral QPM     heparin  5,000 Units Subcutaneous Q8H     pantoprazole  40 mg Oral Daily     fluticasone-vilanterol  1 puff Inhalation Daily     Family History:   Family History   Problem Relation Age of Onset     Cancer - colorectal Father      DIABETES Father      Scleroderma Mother      CEREBROVASCULAR DISEASE Brother      DIABETES Brother      Hypertension Brother      DIABETES Brother      2nd brother     Social History:   Social History   Substance Use Topics     Smoking status: Former Smoker     Packs/day: 0.50     Years: 15.00     Types: Cigarettes     Start date: 1/1/1970     Quit date:  "12/31/1985     Smokeless tobacco: Never Used     Alcohol use 0.0 - 0.5 oz/week     0 - 1 Standard drinks or equivalent per week       ROS:   A comprehensive 10 point ROS was[LV1.1] negative[LV1.2] other than as mentioned in HPI.    Physical Examination:   VITALS: BP 92/61 (BP Location: Right arm)  Pulse 84  Temp 98.3  F (36.8  C) (Axillary)  Resp 12  Ht 1.626 m (5' 4\")  Wt 66.5 kg (146 lb 9.7 oz)  SpO2 100%  BMI 25.16 kg/m2  GENERAL APPEARANCE:[LV1.1] confused,[LV1.2] NAD   HEENT:  MMM.  NECK: Supple.    CHEST:[LV1.1] Absent RLL, diminished throughout[LV1.2]    CARDIOVASCULAR:[LV1.1] Regularly irregular, no murmur[LV1.2].   ABDOMEN:[LV1.1] NT, ND,[LV1.2] BS+, soft.   EXTREMITIES:[LV1.1] Trace[LV1.2] pedal edema.   NEURO:[LV1.1]Confused.[LV1.2]   SKIN: Warm and dry.   Data:   Labs:  BMP  Recent Labs  Lab 05/15/17  0545 05/14/17  0655 05/13/17  0548 05/12/17 2001    136 133 130*   POTASSIUM 4.9 4.9 5.0 5.2   CHLORIDE 107 108 104 100   ZAHEER 8.4* 8.2* 7.8* 7.7*   CO2 22 21 20 21   BUN 48* 53* 64* 65*   CR 1.83* 1.99* 2.14* 2.37*   * 90 157* 177*     CBC  Recent Labs  Lab 05/15/17  0545 05/14/17  0655 05/13/17  0548 05/12/17  0712   WBC 8.0 7.2 9.4 12.9*   RBC 3.22* 2.94* 2.98* 3.03*   HGB 9.5* 8.7* 8.9* 8.9*   HCT 29.3* 27.2* 27.5* 27.9*   MCV 91 93 92 92   MCH 29.5 29.6 29.9 29.4   MCHC 32.4 32.0 32.4 31.9   RDW 13.6 13.8 13.9 14.2    151 142* 167     Cholesterol (mg/dL)   Date Value   08/04/2014 133     Cholesterol/HDL Ratio (no units)   Date Value   08/04/2014 2.5     HDL Cholesterol (mg/dL)   Date Value   08/04/2014 53     LDL Cholesterol Calculated (mg/dL)   Date Value   08/04/2014 54     EKG:[LV1.1]     3/2016[LV1.2] ECHO:[LV1.1]   Interpretation Summary  Technically difficult study. Extremely difficult acoustic windows despite the  use of contrast for endcardial border definition.  Global and regional left ventricular function is normal with an EF of 55-60%.  Global right ventricular " function is normal.  Moderate left atrial enlargement.  Mild pulmonary hypertension. Right ventricular systolic pressure is 35 mmHg  above the right atrial pressure.  The inferior vena cava was dilated at 2.3 cm without respiratory variability,  consistent with increased right atrial pressure.  Estimated mean right atrial pressure is >8 mmHg.  No pericardial effusion is present.[LV1.2]  Assessment:[LV1.1]   Ms. Valenzuela is a 71 year old female who has a past medical history significant for CAD s/p PCI LCX (), Factor V Leiden, HTN, HLD, DM1, PVD (known stenosis of right common iliac), carotid artery stenosis, CKD IV, asthma, anemia, and newly diagnosed lung adenocarcinoma s/p VATS RLL on 5/10/17 now with post operative AFL with RVR which is asymptomatic and hemodynamically stable. AFL mostly likely due to high catecholamine and inflammatory state and AFL will often improve over next 6-8 weeks post operatively.[LV1.2]   EP Recommendations:[LV1.1]  We discussed in detail with the patient management/treatment options for AFL includin. Stroke Prophylaxis:  CHADSVASC=+age, +gender, +CAD, +HTN, +DM1  5, corresponding to a 6.7% annual stroke / systemic emolism event rate. indicating need for long term oral anticoagulation.  We would recommend anticoagulation when deemed possible from surgical standpoint. She is not a candidate for DOACs considering her renal function. Therefore, we would recommend anticoagulation with warfarin with goal INR 2-3. While chest tubes in place, certainly reasonable to do low intensity heparin infusion and can be used as bridge to therapeutic warfarin when CTs are able to be pulled. We can consider continuation vs cessation of anticoagulation as an outpatient.   2. Rate Control: Continue beta blockers, would up titrate. If needed, can switch to metoprolol for better HR control with less BP effect.  She is on CCB (Norvasc) for HTN management so we would avoid diltiazem.   3. Rhythm  Control: CAD and renal function limit AAT options (preclude use of Class ICs, Sotalol, and Dofetilide). Therefore, likely amiodarone is only AAT option. We would not recommend AATs at this stage and would focus on rate control. If sustained episodes lasting >48h, we would consider JENA/DCCV. Typical A.flutter is highly amenable to ablation procedure. An ablation can be considered for any recurrence after further post operative healing.   4. Risk Factor Management: Continue Statin, maintain good BP control, and IVIS evaluation as indicated as outpt.[LV1.2]      Thank you much for allowing us to participate in the care of this patient.     The patient was discussed w/ .[LV1.1] Jaxon[LV1.2].  The above note reflects our joint plan.    KATHERYN Combs CNP  Electrophysiology Consult Service  Pager: [phone][LV1.1]    EP STAFF NOTE  I have seen and examined the patient as part of a shared visit with DAPHNE Combs NP.  I agree with the note above. I reviewed today's vital signs and medications. I have reviewed and discussed with the advanced practice provider their physical examination, assessment, and plan   Briefly, postoperative AFL after lung adenocarcinoma resection, started today.  My key history/exam findings are: AFL with RVR.   The key management decisions made by me: rate control, coumadin when able, decide to continue or stop as outpatient based on monitoring. Ablation if refractory.    Duane Coates MD [facility]  Cardiology - Electrophysiology[HR1.1]       Revision History        User Key Date/Time User Provider Type Action    > HR1.1 5/16/2017 12:05 AM Duane Coates MD Physician Sign     LV1.2 5/15/2017 11:22 PM Nupur Villanueva APRN CNP Nurse Practitioner Sign     LV1.1 5/15/2017  3:58 PM Nupur Villanueva APRN CNP Nurse Practitioner             Consults by Sharron Mooney MD at 5/13/2017 10:20 AM     Author:  Sharron Mooney MD Service:  Nephrology Author Type:  Physician     Filed:  5/13/2017 10:30 AM Date of Service:  5/13/2017 10:20 AM Creation Time:  5/13/2017 10:20 AM    Status:  Signed :  Sharron Mooney MD (Physician)     Consult Orders:    1. Nephrology ESRD Adult IP Consult: 71F with CKD known to Dr. Ellington admitted s/p VATS and R lower lobectomy for lung cancer, now with SUPRIYA.; Consultant may enter orders: Yes [[id number]] ordered by David Rodgers MD at 05/12/17 1654                  Nephrology Initial Consult  May 13, 2017      Yue Valenzuela MRN:[id number] YOB: 1945  Date of Admission:5/10/2017  Primary care provider: Nory Elliott  Requesting physician: Glenna Hall MD    ASSESSMENT AND RECOMMENDATIONS:   71 year old female with long standing type I diabetes with presumed diabetic nephropathy (followed by Dr Sim of [facility]), CKD stage 4, Scr 1.8-2.2 mg/dL (eGFR 20) who is s/p VATS for lung adenocarcinoma. Nephrology is consulted for CKD and hyperkalemia  1. CKD with elevated creatinine- Scr as low as 1.5mg/dL reccently likely with being off lisinopril.    - Scr up to 2.4 but today is down to 2.1  - hope to see her trend down toward 1.8mg/dL over less  2. B;lood pressure/ volume- on furosemide 20mg daily at home, would use here prn but once eating well can resume it PO. Volume seems stable  3. Anemia- acute blood loss, monitor for now.  Can be managed as outpatient after discharge  4. Electrolytes/ acid base- had hyperkalemia in setting of hyperglycemia yesterday. Improved with insulin. Has good UOP. Continue to monitor daily potassium  5. Bone- phosphorus mildly high, if >6 with eating, can use phosphorus binder with meals (tums)    - will continue to monitor    Recommendations were communicated to primary team via this not[MB1.1]        Sharron Mooney MD[MB1.2]       REASON FOR CONSULT: SUPRIYA on CKD, hyperkalemia    HISTORY OF PRESENT ILLNESS:  Yue Valenzuela is a 71 year old female with type I diabetes, lung mass s/p  VATS and resection of mass, chest tube. She is now post op and doing fairly well. She has CKD stage 4 and has been seen at the [facility] and has been ealuated for kidney transplant. She also has a primary nephrologist at [facility].  Her SCr has been ear 2, though it seems with stopping lisinopril it ahs been lower (1.5-1.8mg/dL).  She had hyperkalemia yesterday, and blood sugar >300, which responded to insulin and furosemide.  She is usually on furosemide 20mg daily.   She currently feels well, having just completed a walk with PT. Her chest tube site is sore, but otherwise pain is controlled. She denies lightheadedness, chest pain or shortness of breath.      PAST MEDICAL HISTORY:  Reviewed with patient on[MB1.1] 05/13/2017     Past Medical History:   Diagnosis Date     Adenocarcinoma, lung (H)      Asthma      CAD (coronary artery disease) 9/17/2014 2006 PCI circumflex territory  Mid LAD lesion      Carotid artery stenosis      CKD (chronic kidney disease) stage 4, GFR 15-29 ml/min (H)      Diabetes mellitus type 1 (H)      H/O factor V Leiden mutation      Hypertension      Mixed hyperlipidemia (DYSLIPIDEMIA) 8/1/2014     Osteopenia      Pneumothorax 4/16/2017     PVD (peripheral vascular disease) (H)      Thyroid nodule 04/12/2012    Hurtle Cells, non malignant       Past Surgical History:   Procedure Laterality Date     BRONCHOSCOPY FLEXIBLE Right 5/10/2017    Procedure: BRONCHOSCOPY FLEXIBLE;  Flexible Bronchoscopy, esaphogastroduodenoscopy, cervical Mediastinoscopy, Right  Thoracoscopic Surgery, right Lower Lobectomy, Mediastinal Lymph Node Dissection, Right middle lobe wedge resection*Latex Allergy* (ERAS Patient);  Surgeon: Glenna Hall MD;  Location: [facility] OR     ESOPHAGOSCOPY, GASTROSCOPY, DUODENOSCOPY (EGD), COMBINED N/A 5/10/2017    Procedure: COMBINED ESOPHAGOSCOPY, GASTROSCOPY, DUODENOSCOPY (EGD);;  Surgeon: Glenna Hall MD;  Location: [facility] OR     ganglion cyst removal        hemithyroidectomy  4/13/2012    PATH report showed benign Hurtle Cell Adenoma     IR biopsy of lung  04/2017     lipoma removal       MEDIASTINOSCOPY N/A 5/10/2017    Procedure: MEDIASTINOSCOPY;;  Surgeon: Glenna Hall MD;  Location: [facility] OR     SHOULDER SURGERY Left      THORACOSCOPIC EXCISE NODE MEDIASTINAL Right 5/10/2017    Procedure: THORACOSCOPIC EXCISE NODE MEDIASTINAL;;  Surgeon: Glenna Hall MD;  Location: [facility] OR     THORACOSCOPIC RESECTION LUNG Right 5/10/2017    Procedure: THORACOSCOPIC RESECTION LUNG;;  Surgeon: Glenna Hall MD;  Location: [facility] OR     TONSILLECTOMY[MB1.2]          MEDICATIONS:  PTA Meds  Prior to Admission medications    Medication Sig Last Dose Taking? Auth Provider   albuterol (PROAIR HFA/PROVENTIL HFA/VENTOLIN HFA) 108 (90 BASE) MCG/ACT Inhaler Inhale 2 puffs into the lungs 2 times daily 5/9/2017 at Unknown time Yes Reported, Patient   Respiratory Therapy Supplies (AEROBIKA) GAGANDEEP 1 Device 6 times daily 5/9/2017 at Unknown time Yes Demario Torrez MD   Insulin Aspart (INSULIN PUMP - OUTPATIENT) Insulin pump base rate: 5653-5478 rate 0.5  5734-9413 rate 1.0  9197-3350 rate 1.95  2362-0332 rate 1.35 5/10/2017 at Unknown time Yes Reported, Patient   Acetaminophen (TYLENOL PO) Take 1,000 mg by mouth every 8 hours as needed for mild pain or fever  Past Week at Unknown time Yes Reported, Patient   budesonide-formoterol (SYMBICORT) 80-4.5 MCG/ACT inhaler Inhale 2 puffs into the lungs 2 times daily Past Week at Unknown time Yes John Plaza MD   amLODIPine (NORVASC) 10 MG tablet Take 10 mg by mouth every evening  5/9/2017 at Unknown time Yes Reported, Patient   aspirin EC 81 MG tablet Take 81 mg by mouth every evening  5/9/2017 at Unknown time Yes Reported, Patient   carvedilol (COREG) 3.125 MG tablet Take 3.125 mg by mouth 2 times daily (with meals)  5/9/2017 at Unknown time Yes Reported, Patient   furosemide (LASIX) 20 MG tablet 20 mg every morning  5/9/2017 at Unknown  time Yes Reported, Patient   lisinopril (PRINIVIL,ZESTRIL) 20 MG tablet Take 10 mg by mouth every evening  5/9/2017 at Unknown time Yes Reported, Patient   pravastatin (PRAVACHOL) 40 MG tablet 40 mg every evening  5/9/2017 at Unknown time Yes Reported, Patient   cetirizine (ZYRTEC) 10 MG tablet Take 5 mg by mouth daily   Reported, Patient   calcium 600 MG tablet Take 1 tablet by mouth every morning   Reported, Patient   clonazePAM (KLONOPIN) 0.5 MG tablet Take 0.5 tablets (0.25 mg) by mouth 2 times daily as needed for anxiety   Elisa Ellington MD   blood glucose (ONE TOUCH ULTRA) test strip    Reported, Patient   calcium acetate (PHOSLO) 667 MG CAPS Take 667 mg by mouth 4 times daily (with meals and nightly)    Reported, Patient   cholecalciferol (VITAMIN D) 1000 UNIT tablet Take 1,000 Units by mouth every morning    Reported, Patient   MIKITOUCH DELICA LANCETS 33G MISC    Reported, Patient      Current Meds[MB1.1]    bisacodyl  10 mg Rectal Daily     senna-docusate  2 tablet Oral BID     polyethylene glycol  17 g Oral BID     albuterol  2.5 mg Nebulization Q4H While awake     sodium chloride  3 mL Nebulization Q4H While awake     acetaminophen  975 mg Oral Q8H     melatonin  3 mg Oral At Bedtime     menthol   Transdermal Q8H     carvedilol  3.125 mg Oral BID w/meals     aspirin EC  81 mg Oral QPM     cetirizine  5 mg Oral Daily     pravastatin  40 mg Oral QPM     insulin aspart  1-20 Units Subcutaneous TID w/meals     heparin  5,000 Units Subcutaneous Q8H     pantoprazole  40 mg Oral Daily    Or     pantoprazole  40 mg Intravenous Daily     HYDROmorphone   Intravenous PCA     fluticasone-vilanterol  1 puff Inhalation Daily[MB1.2]     Infusion Meds[MB1.1]    dextrose 5% and 0.45% NaCl 30 mL/hr at 05/12/17 2047     insulin (regular) 2 Units/hr (05/13/17 1002)     - MEDICATION INSTRUCTIONS -       EPIDURAL DRIP 9 mL/hr at 05/13/17 0518[MB1.2]       ALLERGIES:[MB1.1]    Allergies   Allergen Reactions     Codeine Other  (See Comments)     Makes her sleepless     Loratadine Other (See Comments)     Extreme sleepiness       Sulfa Drugs Unknown     Other reaction(s): Unknown     Terazosin Other (See Comments)     Swellling in legs, improved when went off     Adhesive Tape Rash     Foam tape used during surgery was what caused rash     Latex Rash     Liquid Adhesive Rash[MB1.2]       REVIEW OF SYSTEMS:  A 10 point review of systems was negative except as noted above.    SOCIAL HISTORY:[MB1.1]   Social History     Social History     Marital status:      Spouse name: N/A     Number of children: N/A     Years of education: N/A     Occupational History     retail Other     part-time     Social History Main Topics     Smoking status: Former Smoker     Packs/day: 0.50     Years: 15.00     Types: Cigarettes     Start date: 1/1/1970     Quit date: 12/31/1985     Smokeless tobacco: Never Used     Alcohol use 0.0 - 0.5 oz/week     0 - 1 Standard drinks or equivalent per week     Drug use: No     Sexual activity: Not on file     Other Topics Concern     Not on file     Social History Narrative    The patient has a 7 pk yr tobacco hx.  She has no active use since 1985.  Alcohol use is 0 alcoholic drinks per week.  She denies use of recreational drugs.          She is retired.  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           The patient is .  Has 2 children.        Hot Tub Exposure: NO    Recent Travel: NO     Hx of incarceration:  NO    Bird Exposure:   NO    Animal Exposure:  NO    Inhalation Exposure:  NO[MB1.2]         Reviewed with patient   No one accompanies Yue Valenzuela in hospital room    FAMILY MEDICAL HISTORY:[MB1.1]   Family History   Problem Relation Age of Onset     Cancer - colorectal Father      DIABETES Father      Scleroderma Mother      CEREBROVASCULAR DISEASE Brother      DIABETES Brother      Hypertension Brother      DIABETES Brother      2nd brother[MB1.2]     Reviewed with  "patient     PHYSICAL EXAM:   Temp  Av.8  F (36.6  C)  Min: 96.5  F (35.8  C)  Max: 99.4  F (37.4  C)  Arterial Line MAP (mmHg)  Av mmHg  Min: 58 mmHg  Max: 88 mmHg  Arterial Line BP  Min: 109/36  Max: 147/49      Pulse  Av.3  Min: 60  Max: 88 Resp  Av.4  Min: 10  Max: 20  SpO2  Av.5 %  Min: 93 %  Max: 100 %[MB1.1]       /54 (BP Location: Left arm)  Pulse 76  Temp 98.1  F (36.7  C) (Oral)  Resp 16  Ht 1.626 m (5' 4\")  Wt 69.5 kg (153 lb 3.5 oz)  SpO2 98%  BMI 26.3 kg/m2[MB1.2]   Date 17 0700 - 17 0659   Shift 4095-7723 0609-5548 2088-5641 24 Hour Total   I  N  T  A  K  E   Shift Total  (mL/kg)       O  U  T  P  U  T   Urine 375   375    Chest Tube 150   150    Shift Total  (mL/kg) 525  (7.55)   525  (7.55)   Weight (kg) 69.5 69.5 69.5 69.5        Admit Weight: 64.1 kg (141 lb 5 oz)     GENERAL APPEARANCE: no distress,  awake  EYES: no scleral icterus, pupils equal  HENT: NC/AT,  mouth  without ulcers or lesions  Lymphatics: no cervical or supraclavicular LAD  Pulmonary: lungs clear to auscultation with equal breath sounds bilaterally, no clubbing  CV: regular rhythm, normal rate, no rub   - JVP not elevated   - Edema- not significant  GI: soft, nontender, normal bowel sounds, no HSM   MS: no evidence of inflammation in joints, no muscle tenderness  : + Hall  SKIN: no rash, warm, dry, no cyanosis  NEURO: mentation intact and speech normal    LABS:   CMP[MB1.1]  Recent Labs  Lab 17  0548 17  1547 17  0712    130* 134 138   POTASSIUM 5.0 5.2 5.0 4.6   CHLORIDE 104 100 100 105   CO2 20 21 22 23   ANIONGAP 9 9 11 10   * 177* 202* 62*   BUN 64* 65* 66* 64*   CR 2.14* 2.37* 2.39* 2.20*   GFRESTIMATED 23* 20* 20* 22*   GFRESTBLACK 28* 24* 24* 27*   ZAHEER 7.8* 7.7* 7.8* 7.8*   MAG 2.1 2.2 2.1 2.2   PHOS 4.2 4.8* 4.9* 5.2*[MB1.2]     CBC[MB1.1]  Recent Labs  Lab 17  0548 17  0712 17  0544 17  0439   HGB 8.9* " 8.9* 10.1* Unsatisfactory specimen - clottedDAKOBERTHA [address] 5/11/17 0458 KH.   WBC 9.4 12.9* 17.5* Unsatisfactory specimen - clottedDAKOBERTHA [address] 5/11/17 0458 KH.   RBC 2.98* 3.03* 3.39* Unsatisfactory specimen - clottedDAKOBERTHA [address] 5/11/17 0458 KH.   HCT 27.5* 27.9* 30.8* Unsatisfactory specimen - clottedDAKOBERTHA [address] 5/11/17 0458 KH.   MCV 92 92 91 Unsatisfactory specimen - clottedDAKOBERTHA [address] 5/11/17 0458 KH.   MCH 29.9 29.4 29.8 Unsatisfactory specimen - clottedDAKOBERTHA [address] 5/11/17 0458 KH.   MCHC 32.4 31.9 32.8 Unsatisfactory specimen - clottedDAALEX [address] 5/11/17 0458 KH.   RDW 13.9 14.2 13.4 Unsatisfactory specimen - clottedDAALEX [address] 5/11/17 0458 KH.   * 167 194 Unsatisfactory specimen - clottedDAALEX [address] 5/11/17 0458 KH.[MB1.2]     INR[MB1.1]No lab results found in last 7 days.[MB1.2]  ABG[MB1.1]  Recent Labs  Lab 05/10/17  1530 05/10/17  1420 05/10/17  1348 05/10/17  1245   PH 7.34* 7.32* 7.28* 7.28*   PCO2 35 38 44 47*   PO2 164* 165* 84 311*   HCO3 19* 20* 21 22   O2PER 55.0 57.0 50.0 100.0[MB1.2]      URINE STUDIES[MB1.1]  Recent Labs   Lab Test  05/12/17   2312  08/04/14   0832   COLOR  Light Yellow  Straw   APPEARANCE  Clear  Clear   URINEGLC  70*  300*   URINEBILI  Negative  Negative   URINEKETONE  Negative  Negative   SG  1.009  1.008   UBLD  Negative  Negative   URINEPH  5.0  5.5   PROTEIN  Negative  10*   NITRITE  Negative  Negative   LEUKEST  Negative  Negative   RBCU  <1  <1   WBCU  1  <1     No lab results found.[MB1.2]  PTH[MB1.1]  No lab results found.[MB1.2]  IRON STUDIES[MB1.1]  No lab results found.[MB1.2]    IMAGING:  All imaging studies reviewed by me.[MB1.1]     Sharron Mooney MD[MB1.2]       Revision History        User Key Date/Time User Provider Type Action    > MB1.2 5/13/2017 10:30 AM Sharron Mooney MD Physician Sign     MB1.1 5/13/2017 10:20 AM Sharron Mooney MD Physician             Consults by Miguel  KATHERYN Fernandez CNP at 5/10/2017  7:13 AM     Author:  Ira Rivera APRN CNP Service:  Endocrinology Author Type:  Nurse Practitioner    Filed:  5/11/2017  5:49 PM Date of Service:  5/10/2017  7:13 AM Creation Time:  5/11/2017 11:13 AM    Status:  Signed :  Ira Rivera APRN CNP (Nurse Practitioner)     Consult Orders:    1. Endocrinology IP Consult: Patient to be seen: Routine - within 24 hours; Call back #: [phone]; DM on insulin pump, s/p lobectomy, assistance with management of poorly controlled glucose post op.; Consultant may enter orders: Yes [[id number]] ordered by Evelyn Liriano MD at 05/11/17 0729                Diabetes/Hyperglycemia Management Consult[SG1.1]    Reason for Consult  Ambulatory pump management, glycemic management[SG1.2]  Consult requested by:[SG1.1] Thoracic surgery[SG1.2]  History of Present Illness[SG1.1] Yue Valenzuela is a 71 year old woman with a history of DM I, CKD IV, factor V Leiden mutation, HTN, HLD, CAD s/p stent, PVD, carotid artery stenosis, asthma, and newly diagnosed lung adenocarcinoma , Right lower lobectomy 05/11    Patient wants to use her Medtronic ambulatory pump in hospital. She has used this for 10 years and maintained good control the last few years.  also knows how to operate and can assist her.  Had difficulties this AM with pump not working correctly and is now hyperglycemic to 378.  Correction insulin was started by Thoracic surgery team    Currently post op pain is controlled with medications, afebrile, denies weakness, parasthesias, numbness, no cough, no SOB no N/V/D. Is eating clear liquids. No skin rashes, urinary difficulties, no visual changes, mood is stable, but stressed[SG1.2]      Recent Labs  Lab 05/11/17  0726 05/11/17  0640 05/11/17  0509 05/11/17  0440 05/11/17  0439 05/11/17  0404 05/11/17  0327  05/10/17  1850  05/10/17  1530 05/10/17  1420 05/10/17  1348 05/10/17  1245   GLC  --   --   --   --  302*  --   --   --   155*  --  142* 198* 218* 222*   * 289* 276* 300*  --  293* 310*  < >  --   < >  --   --   --   --    < > = values in this interval not displayed.      Diabetes Type:[SG1.1]  Type 1 Diabetes[SG1.2]  Diabetes Duration:    Usual Diabetes Regimen:[SG1.1] ambulatory insulin pump[SG1.2]   Ability to [place] Prescribed Regimen:[SG1.1]  Limited by recent surgery, otherwise none[SG1.2]  Diabetes Control:   Lab Results   Component Value Date    A1C 7.0 05/03/2017    A1C 6.0 08/04/2014     Diabetes Complications:[SG1.1] CKD, CAD,PVD[SG1.2]  History of DKA:[SG1.1] yes[SG1.2]  Able to Detect Hypoglycemia:[SG1.1] yes[SG1.2]  Usual Diabetes Care Provider:[SG1.1] Dr. Sprague at [facility][SG1.2]  Factors Impacting Glucose Control:[SG1.1] surgery, cancer, CKD[SG1.2]      Review of Systems  10 point ROS completed with pertinent positives and negatives noted in the HPI    Past medical, family and social histories are reviewed and updated.    Past Medical History  Past Medical History:   Diagnosis Date     Adenocarcinoma, lung (H)      Asthma      CAD (coronary artery disease) 9/17/2014 2006 PCI circumflex territory  Mid LAD lesion      Carotid artery stenosis      CKD (chronic kidney disease) stage 4, GFR 15-29 ml/min (H)      Diabetes mellitus type 1 (H)      H/O factor V Leiden mutation      Hypertension      Mixed hyperlipidemia (DYSLIPIDEMIA) 8/1/2014     Osteopenia      Pneumothorax 4/16/2017     PVD (peripheral vascular disease) (H)      Thyroid nodule 04/12/2012    Hurtle Cells, non malignant       Family History  Family History   Problem Relation Age of Onset     Cancer - colorectal Father      DIABETES Father      Scleroderma Mother      CEREBROVASCULAR DISEASE Brother      DIABETES Brother      Hypertension Brother      DIABETES Brother      2nd brother       Social History  Social History     Social History     Marital status:      Spouse name: N/A     Number of children: N/A     Years of education:  "N/A     Occupational History     retail Other     part-time     Social History Main Topics     Smoking status: Former Smoker     Packs/day: 0.50     Years: 15.00     Types: Cigarettes     Start date: 1/1/1970     Quit date: 12/31/1985     Smokeless tobacco: Never Used     Alcohol use 0.0 - 0.5 oz/week     0 - 1 Standard drinks or equivalent per week     Drug use: No     Sexual activity: Not Asked     Other Topics Concern     None     Social History Narrative    The patient has a 7 pk yr tobacco hx.  She has no active use since 1985.  Alcohol use is 0 alcoholic drinks per week.  She denies use of recreational drugs.          She is retired.  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           The patient is .  Has 2 children.        Hot Tub Exposure: NO    Recent Travel: NO     Hx of incarceration:  NO    Bird Exposure:   NO    Animal Exposure:  NO    Inhalation Exposure:  NO             Physical Exam[SG1.1]  Temp: 97.6  F (36.4  C) Temp  Min: 96.8  F (36  C)  Max: 98.4  F (36.9  C)[SG1.3] Vital signs:[SG1.2]  Temp: 97.6  F (36.4  C) Temp src: Oral BP: 118/44   Heart Rate: 63 Resp: 16 SpO2: 96 % O2 Device: None (Room air) Oxygen Delivery: 2 LPM Height: 162.6 cm (5' 4\") Weight: 68 kg (149 lb 14.6 oz)  Estimated body mass index is 25.73 kg/(m^2) as calculated from the following:    Height as of this encounter: 1.626 m (5' 4\").    Weight as of this encounter: 68 kg (149 lb 14.6 oz).[SG1.4]        General:  Pleasant[SG1.1],[SG1.2] resting in bed, in no distress.   HEENT: NC/AT, PER and anicteric, non-injected, oral mucous membranes moist.   Lungs:[SG1.1] nonlabored[SG1.2] respiration,[SG1.1] no[SG1.2] cough[SG1.1], CT in place[SG1.2]  ABD:[SG1.1] soft, nondistended[SG1.2]  Skin: warm and dry[SG1.1], surgical incision intact[SG1.2]  MSK:  fluid movement of all extremities  Lymp:  no LE edema   Mental status:  alert, oriented x3, communicating clearly  Psych:  calm, even " mood    Laboratory  Recent Labs   Lab Test  05/11/17   0544  05/11/17   0439   05/10/17   1850   NA   --   136   --   142   POTASSIUM  5.7*  5.9*   < >  4.6   CHLORIDE   --   106   --   111*   CO2   --   17*   --   19*   ANIONGAP   --   13   --   12   GLC   --   302*   --   155*   BUN   --   54*   --   49*   CR   --   2.07*   --   1.84*   ZAHEER   --   8.0*   --   8.2*    < > = values in this interval not displayed.     CBC RESULTS:   Recent Labs   Lab Test  05/11/17   0544   WBC  17.5*   RBC  3.39*   HGB  10.1*   HCT  30.8*   MCV  91   MCH  29.8   MCHC  32.8   RDW  13.4   PLT  194       Liver Function Studies -   Recent Labs   Lab Test  08/04/14   0712   PROTTOTAL  7.5   ALBUMIN  4.3   BILITOTAL  0.4   ALKPHOS  79   AST  29   ALT  44       Active Medications  Current Facility-Administered Medications   Medication     dextrose 5% and 0.45% NaCl infusion     carvedilol (COREG) tablet 3.125 mg     aspirin EC EC tablet 81 mg     cetirizine (zyrTEC) tablet 5 mg     pravastatin (PRAVACHOL) tablet 40 mg     insulin bolus from AMBULATORY PUMP     glucose 40 % gel 15-30 g    Or     dextrose 50 % injection 25-50 mL    Or     glucagon injection 1 mg     insulin aspart (NovoLOG) 100 UNIT/ML vial for filling pump reservoir     insulin bolus from AMBULATORY PUMP     glucose 40 % gel 15-30 g    Or     dextrose 50 % injection 25-50 mL    Or     glucagon injection 1 mg     insulin aspart (NovoLOG) 100 UNIT/ML vial for filling pump reservoir     insulin basal rate from AMBULATORY PUMP     [START ON 5/12/2017] insulin bolus from AMBULATORY PUMP     insulin bolus from AMBULATORY PUMP     insulin bolus from AMBULATORY PUMP     insulin bolus from AMBULATORY PUMP     medication instruction     bupivacaine (MARCAINE) 0.125 % in NaCl 0.9 % 250 mL EPIDURAL Drip     naloxone (NARCAN) injection 0.1-0.4 mg     heparin sodium PF injection 5,000 Units     labetalol (NORMODYNE/TRANDATE) injection 10-40 mg     albuterol (PROAIR HFA/PROVENTIL  HFA/VENTOLIN HFA) Inhaler 4 puff     ipratropium - albuterol 0.5 mg/2.5 mg/3 mL (DUONEB) neb solution 3 mL     pantoprazole (PROTONIX) EC tablet 40 mg    Or     pantoprazole (PROTONIX) 40 mg IV push injection     potassium chloride SA (K-DUR/KLOR-CON M) CR tablet 20-40 mEq     potassium chloride (KLOR-CON) Packet 20-40 mEq     potassium chloride 10 mEq in 100 mL intermittent infusion     [START ON 5/14/2017] potassium chloride 10 mEq in 100 mL intermittent infusion with 10 mg lidocaine     potassium chloride 20 mEq in 50 mL intermittent infusion     magnesium sulfate 2 g in NS intermittent infusion ([facility] or [facility] C[facility]d)     magnesium sulfate 4 g in 100 mL sterile water (premade)     sodium phosphate 10 mmol in D5W intermittent infusion     sodium phosphate 15 mmol in D5W intermittent infusion     sodium phosphate 20 mmol in D5W intermittent infusion     sodium phosphate 25 mmol in D5W intermittent infusion     acetaminophen (TYLENOL) tablet 975 mg     [START ON 5/13/2017] acetaminophen (TYLENOL) tablet 650 mg     oxyCODONE (ROXICODONE) IR tablet 5 mg     HYDROmorphone (DILAUDID) PCA 1 mg/mL     ondansetron (ZOFRAN-ODT) ODT tab 4 mg    Or     ondansetron (ZOFRAN) injection 4 mg     senna-docusate (SENOKOT-S;PERICOLACE) 8.6-50 MG per tablet 1-2 tablet     glucose 40 % gel 15-30 g    Or     dextrose 50 % injection 25-50 mL    Or     glucagon injection 1 mg     fluticasone-vilanterol (BREO ELLIPTA) 100-25 MCG/INH oral inhaler 1 puff     INSULIN PUMP - OUTPATIENT     No current outpatient prescriptions on file.       Current Diet    Active Diet Order      Clear Liquid Diet      Assessment  Yue is type 1 Diabetic using  Ambulatory pump for 10 years with excellent control   is proficient in using pump and @ bedside to assist if needed  Diabetes Educator has performed assessment and deemed patient to be capabable of using pump at this time (see consult note 05/11/17)[SG1.1]  Discussed with Yue and  that  if unable to manage the pump will need to remove and use SQ or IV insulin.  They acknowledge their understanding[SG1.2]      Plan  Patient to use ambulatory insulin pump, Medtronic, with the following settings;  Patient wears a Medtronic insulin pump, with insulin aspart (NovoLog). She uses the Quik-set infusion sets.  Pump settings are:  Basal rates:  #1: 0.5 units/hr from 8377-2161  #2: 1.0 units/hr from 3003-5277  #3: 1.75 units/hr from 1663-9961  #4: 1.35 units/hr from 4480-9836     Bolus settings:  Carb ratios:  1 unit/11 grams CHO from 2697-7901  1 unit/ 10 grams CHO from 0748-0875     Sensitivity/correction factor: 1 unit to lower BG 28 mg/dL  Blood glucose target 120-130 mg/dL  Active insulin time= 3 hours    Will follow EMERITA Rivera -0502    Diabetes Management Team job code: 0243[SG1.1]     Revision History        User Key Date/Time User Provider Type Action    > SG1.4 5/11/2017  5:49 PM Ira Rivera APRN CNP Nurse Practitioner Sign     SG1.3 5/11/2017  5:20 PM Ira Rivera APRN CNP Nurse Practitioner      SG1.2 5/11/2017  5:18 PM Ira Rivera APRN CNP Nurse Practitioner      SG1.1 5/11/2017 11:13 AM Ira Rivera APRN CNP Nurse Practitioner             Consults by Ashli Santos RN at 5/11/2017 10:19 AM     Author:  Ashli Santos RN Service:  Endocrinology Author Type:  Nurse    Filed:  5/11/2017 10:19 AM Date of Service:  5/11/2017 10:19 AM Creation Time:  5/11/2017 10:05 AM    Status:  Signed :  Ashli Santos RN (Nurse)     Consult Orders:    1. Diabetes Educator IP Consult [[id number]] ordered by Yudy Wilson APRN CNP at 05/11/17 0822                Ambulatory Insulin Pump Assessment  Received consult request to see this 71 year old female for ambulatory insulin pump assessment.  Patient with history of Type 1 diabetes, stage 4 CKD, admitted following Flexible bronchoscopy, cervical mediastinoscopy and lymph node biopsies followed by right  "video-assisted thoracoscopic surgery, right lower lobectomy, right middle lobe wedge resection and mediastinal lymph node dissection.     Patient wears a Medtronic insulin pump, with insulin aspart (NovoLog).  She uses the Quik-set infusion sets.  Pump settings are:  Basal rates:  #1:  0.5 units/hr from 9229-7377  #2:  1.0 units/hr from 8467-2320  #3:  1.75 units/hr from 9835-2493  #4:   1.35 units/hr from 4058-8971    Bolus settings:  Carb ratios:  1 unit/11 grams CHO from 2918-7611  1 unit/ 10 grams CHO from 7554-4300    Sensitivity/correction factor:  1 unit to lower BG 28 mg/dL  Blood glucose target 120-130 mg/dL  Active insulin time= 3 hours    Patient was on IV insulin infusion for procedure, and she disconnected her pump from insertion site.  Post-procedure, she reconnected her pump, but  noted the tape at infusion set looked \"brown\" instead of \"white\".  Patient also did not directly look at infusion site when reconnecting, so possibly it did not get connected securely.   did bring another infusion set which she inserted.   found the pulled infusion set in the trash and the cannula was bent; advised him the tape looked brown likely to staining from betadine scrub.  This likely is reason glucoses were elevated overnight.  Since infusion set changed, glucoses are improving.    Patient sees Dr. Mason Sprague at the [facility] every 6 months.  Notes her glucose control has historically been excellent, with A1c in 6.4-6.5% range.  States now with her kidney disease she has been advised to relax her control, with a goal of 7.0% for her A1c.    Patient oriented and oriented.  Able to manage pump at this point.  Will bring her another infusion set as back-up.    Ashli Santos MS RN CDE CD[facility]  [phone][AM1.1]         Revision History        User Key Date/Time User Provider Type Action    > AM1.1 5/11/2017 10:19 AM Ashli Santos, RN Nurse Sign                     Progress Notes - " Physician (Notes for yesterday and today)      Progress Notes by Nasir Acuna MD at 5/25/2017  1:07 PM     Author:  Nasir Acuna MD Service:  Thoracic Surgery Author Type:  Resident    Filed:  5/25/2017  1:18 PM Date of Service:  5/25/2017  1:07 PM Creation Time:  5/25/2017  1:07 PM    Status:  Signed :  Nasir Acuna MD (Resident)         Thoracic surgery progress note    No acute events overnight. Wants the chest tube out. Tolerating PO.[ST1.1]     Temp: 97.5  F (36.4  C) Temp src: Oral BP: 151/55   Heart Rate: 67 Resp: 16 SpO2: 98 % O2 Device: None (Room air) Oxygen Delivery: 2 LPM[ST1.2]     A&O, NAD  Unlabored breathing on RA  Chest tube clamped.  Abd soft, non tender    I&O  UOP 1675/600    Imaging  Exam: XR CHEST 2 VW, 5/25/2017 8:41 AM  Impression:   1. Grossly unchanged right pneumothorax with an apically directed chest tube.  2. Right lung base opacity, likely compressive atelectasis from the basilar portion of the pneumothorax.  3. Unchanged opacity in the left lateral midlung    A/P: 71F w/ CKD, DM1, CAD s/p PCI, PVD s/p VATS RLL for adenocarcinoma (5/10) c/b post op aflutter, mucus plugging, pneumothorax requiring pigtail (5/15), pseudomonas UTI    - Pull chest tube. CXR this PM and tomorrow AM  - C/w heparin gtt for AFib/AFlutter. Coumadin started  - Reg diet.  - Levaquin for UTI    Dispo: TCU, likely tomorrow    Seen w/ team    Nasir Acuna MD  Surgery PGY1  [phone][ST1.1]         Revision History        User Key Date/Time User Provider Type Action    > ST1.2 5/25/2017  1:18 PM Nasir Acuna MD Resident Sign     ST1.1 5/25/2017  1:07 PM Nasir Acuna MD Resident                      Procedure Notes      Procedures by Evelyn Liriano MD at 5/15/2017  3:43 PM     Author:  Evelyn Liriano MD Service:  Thoracic Surgery Author Type:  Resident    Filed:  5/15/2017  3:45 PM Date of Service:  5/15/2017  3:43 PM Creation Time:  5/15/2017  3:43 PM    Status:  Signed  :  Evelyn Liriano MD (Resident)         THORACIC SURGERY BEDSIDE PROCEDURE   NAME: Yue Valenzuela   MRN: [id number]  : 1945    Diagnosis:  Atelectasis    Procedure: Flexible bronchoscopy     Specimen: None sent    Premedication: .5m g ativan , Lidocaine nebulization,  Procedure Meds:  1 % topical lidocaine solution at the vocal folds, 1% topical lidocaine solution at the meredith    Procedure: A timeout was called to review the case and patient information. The patient was positioned supine. The vocal folds were passed without difficulty appeared to be achieving appropriate apposition.  Bilateral tracheobronchial trees were inspected closely to the level of the subsegmental bronchi.  Secretions were found to be thick and isolated to the right side.  These were lavaged and evacuated without difficulty. The procedure was completed and the patient tolerated the procedure well and without complications.      Findings:  Moderate thick clear secretions in the right upper airways, minimal edema.     Plan: CXR PRN for respiratory decompensation, plan to rebronch PRN. Aggressive pulmonary toilet.     Dr. Hall was available for assistance throughout the entire procedure.      Evelyn Liriano MD  General Surgery  Pager: [phone][AA1.1]                          Revision History        User Key Date/Time User Provider Type Action    > AA1.1 5/15/2017  3:45 PM Evelyn Liriano MD Resident Sign            Procedures by Eliecer Mendez MD at 5/15/2017 10:08 AM     Author:  Eliecer Mendez MD Service:  Radiology Author Type:  Resident    Filed:  5/15/2017 10:08 AM Date of Service:  5/15/2017 10:08 AM Creation Time:  5/15/2017 10:06 AM    Status:  Signed :  Eliecer Mendez MD (Resident)         Interventional Radiology Brief Post Procedure Note    Procedure: @FVRISFRMTLINK([id number])@    Proceduralist: Quin Ambrosio PA-C, Tiffanie Lewis MD and None    Assistant:[MR1.1] Eliecer Mendez  MD[MR1.2] and None    Time Out: Prior to the start of the procedure and with procedural staff participation, I verbally confirmed the patient s identity using two indicators, relevant allergies, that the procedure was appropriate and matched the consent or emergent situation, and that the correct equipment/implants were available. Immediately prior to starting the procedure I conducted the Time Out with the procedural staff and re-confirmed the patient s name, procedure, and site/side. (The Joint Commission universal protocol was followed.)  Yes    Sedation: None. Local Anesthestic used    Findings: Right apical pneumothorax.    Estimated Blood Loss: Minimal    Fluoroscopy Time: Less than 1 minute    SPECIMENS: None    Complications: 1. None     Condition: Stable    Plan: Return to storm.  -20 cm H20 wall suction chest tube.    Comments: See dictated procedure note for full details.[MR1.1]    Eliecer Mendez MD[MR1.2]           Revision History        User Key Date/Time User Provider Type Action    > MR1.2 5/15/2017 10:08 AM Eliecer Mendez MD Resident Sign     MR1.1 5/15/2017 10:06 AM Eliecer Mendez MD Resident                   Progress Notes - Therapies (Notes from 05/23/17 through 05/26/17)     No notes of this type exist for this encounter.                                          INTERAGENCY TRANSFER FORM - LAB / IMAGING / EKG / EMG RESULTS   5/10/2017                       [facility] [facility]: [phone]            Unresulted Labs (24h ago through future)    Start       Ordered    05/27/17 0600  CBC with platelets  (LOW intensity:  NO Loading Dose)  EVERY THREE DAYS,   Routine     Comments:  Every 3 days while on heparin    05/24/17 1355    05/25/17 0530  Heparin Xa (10a) Level  (LOW intensity:  NO Loading Dose)  DAILY,   Routine      05/24/17 1355    05/25/17 0530  INR  (warfarin (COUMADIN) Pharmacy Consult-INITIAL ORDER)  DAILY,   Routine      05/24/17 1428    05/22/17 0530  INR   (warfarin (COUMADIN) Pharmacy Consult-INITIAL ORDER)  DAILY,   Routine      05/21/17 0909    05/13/17 0600  Platelet count  (heparin- UU,UR,SH,RH,PH,WY)  EVERY THREE DAYS,   Routine     Comments:  Repeat every 3 days while on VTE prophylaxis. If no result is listed, this lab has not been done the past 365 days. LATEST LAB RESULT: Platelet Count (10e9/L)       Date                     Value                 04/18/2017               178              ----------      05/10/17 2219    Unscheduled  Glucose - Diabetes  CONDITIONAL X 1 STAT,   STAT     Comments:  for changes in mental status, diaphoresis, or unexplained tachycardia    05/11/17 1649    Unscheduled  Heparin Xa (10a) Level  (LOW intensity:  NO Loading Dose)  CONDITIONAL X 1 STAT,   STAT     Comments:  Release order 6 Hours after heparin is started    05/24/17 1355    Unscheduled  Heparin Xa (10a) Level  (LOW intensity:  NO Loading Dose)  CONDITIONAL (SPECIFY),   Routine     Comments:  Release order 6 hours after any heparin dose change    05/24/17 1355         Lab Results - 3 Days      Glucose by meter [[id number]] (Abnormal)  Resulted: 05/26/17 1001, Result status: Final result    Ordering provider: Glenna Hall MD  05/26/17 0951 Resulting lab: POINT OF CARE TEST, GLUCOSE    Specimen Information    Type Source Collected On     05/26/17 0955          Components       Value Reference Range Flag Lab   Glucose 267 70 - 99 mg/dL H 170            INR [[id number]] (Abnormal)  Resulted: 05/26/17 0626, Result status: Final result    Ordering provider: Evelyn Liriano MD  05/25/17 3657 Resulting lab: [facility]    Specimen Information    Type Source Collected On   Blood  05/26/17 0551          Components       Value Reference Range Flag Lab   INR 1.35 0.86 - 1.14 H 51            Heparin Xa (10a) Level [[id number]]  Resulted: 05/26/17 0553, Result status: In process    Ordering provider: Evelyn Liriano MD  05/25/17 0234 Resulting lab:  [facility]    Specimen Information    Type Source Collected On   Blood  05/26/17 0551            Glucose by meter [[id number]] (Abnormal)  Resulted: 05/25/17 2346, Result status: Final result    Ordering provider: Glenna Hall MD  05/25/17 2341 Resulting lab: POINT OF CARE TEST, GLUCOSE    Specimen Information    Type Source Collected On     05/25/17 2341          Components       Value Reference Range Flag Lab   Glucose 229 70 - 99 mg/dL H 170            Glucose by meter [[id number]] (Abnormal)  Resulted: 05/25/17 1811, Result status: Final result    Ordering provider: Glenna Hall MD  05/25/17 1805 Resulting lab: POINT OF CARE TEST, GLUCOSE    Specimen Information    Type Source Collected On     05/25/17 1805          Components       Value Reference Range Flag Lab   Glucose 205 70 - 99 mg/dL H 170            Glucose by meter [[id number]] (Abnormal)  Resulted: 05/25/17 1405, Result status: Final result    Ordering provider: Glenna Hall MD  05/25/17 1403 Resulting lab: POINT OF CARE TEST, GLUCOSE    Specimen Information    Type Source Collected On     05/25/17 1403          Components       Value Reference Range Flag Lab   Glucose 263 70 - 99 mg/dL H 170            Glucose by meter [[id number]] (Abnormal)  Resulted: 05/25/17 0855, Result status: Final result    Ordering provider: Glenna Hall MD  05/25/17 0853 Resulting lab: POINT OF CARE TEST, GLUCOSE    Specimen Information    Type Source Collected On     05/25/17 0853          Components       Value Reference Range Flag Lab   Glucose 194 70 - 99 mg/dL H 170            Magnesium [[id number]] (Abnormal)  Resulted: 05/25/17 0738, Result status: Final result    Ordering provider: Evelyn Liriano MD  05/24/17 2330 Resulting lab: [facility]    Specimen Information    Type Source Collected On   Blood  05/25/17 0648          Components       Value Reference Range Flag Lab   Magnesium 2.4 1.6 - 2.3 mg/dL H 51            Phosphorus  [[id number]]  Resulted: 05/25/17 0738, Result status: Final result    Ordering provider: Evelyn Liriano MD  05/24/17 2330 Resulting lab: [facility]    Specimen Information    Type Source Collected On   Blood  05/25/17 0648          Components       Value Reference Range Flag Lab   Phosphorus 2.5 2.5 - 4.5 mg/dL  51            Basic metabolic panel [[id number]] (Abnormal)  Resulted: 05/25/17 0738, Result status: Final result    Ordering provider: Nasir Acuna MD  05/24/17 2330 Resulting lab: [facility]    Specimen Information    Type Source Collected On   Blood  05/25/17 0648          Components       Value Reference Range Flag Lab   Sodium 136 133 - 144 mmol/L  51   Potassium 4.8 3.4 - 5.3 mmol/L  51   Chloride 107 94 - 109 mmol/L  51   Carbon Dioxide 19 20 - 32 mmol/L L 51   Anion Gap 9 3 - 14 mmol/L  51   Glucose 183 70 - 99 mg/dL H 51   Urea Nitrogen 42 7 - 30 mg/dL H 51   Creatinine 1.80 0.52 - 1.04 mg/dL H 51   GFR Estimate 28 >60 mL/min/1.7m2 L 51   Comment:  Non  GFR Calc   GFR Estimate If Black 34 >60 mL/min/1.7m2 L 51   Comment:  African American GFR Calc   Calcium 8.0 8.5 - 10.1 mg/dL L 51            INR [[id number]]  Resulted: 05/25/17 0711, Result status: Final result    Ordering provider: Evelyn Liriano MD  05/24/17 2330 Resulting lab: [facility]    Specimen Information    Type Source Collected On   Blood  05/25/17 0648          Components       Value Reference Range Flag Lab   INR 1.13 0.86 - 1.14  51            Heparin 10a Level [[id number]]  Resulted: 05/25/17 0711, Result status: Final result    Ordering provider: Glenna Hall MD  05/24/17 2330 Resulting lab: [facility]    Specimen Information    Type Source Collected On   Blood  05/25/17 0648          Components       Value Reference Range Flag Lab   Heparin 10A Level 0.20 IU/mL  51   Comment:          Therapeutic Range:     UFH:   0.15-0.35 IU/mL for low              intensity dosing            0.30-0.70 IU/mL for high              intensity dosing for              DVT and PE     Enoxaparin: If administered              once daily with dose              1.5mg/k.0-2.0 IU/mL                 If administered              twice daily with dose              1mg/k.60-1.0 IU/mL              Platelet count [[id number]]  Resulted: 17 0704, Result status: Final result    Ordering provider: Vinicius Diaz MD  17 0000 Resulting lab: [facility]    Specimen Information    Type Source Collected On   Blood  17 0648          Components       Value Reference Range Flag Lab   Platelet Count 327 150 - 450 10e9/L  51            Glucose by meter [[id number]]  Resulted: 17 0001, Result status: Final result    Ordering provider: Glenna Hall MD  17 2357 Resulting lab: POINT OF CARE TEST, GLUCOSE    Specimen Information    Type Source Collected On     17 2357          Components       Value Reference Range Flag Lab   Glucose 99 70 - 99 mg/dL  170            Glucose by meter [[id number]] (Abnormal)  Resulted: 17 1600, Result status: Final result    Ordering provider: Glenna Hall MD  17 1558 Resulting lab: POINT OF CARE TEST, GLUCOSE    Specimen Information    Type Source Collected On     17 1558          Components       Value Reference Range Flag Lab   Glucose 261 70 - 99 mg/dL H 170            CBC with platelets [[id number]] (Abnormal)  Resulted: 17 1438, Result status: Final result    Ordering provider: Evelyn Liriano MD  17 1355 Resulting lab: [facility]    Specimen Information    Type Source Collected On   Blood  17 1410          Components       Value Reference Range Flag Lab   WBC 16.2 4.0 - 11.0 10e9/L H 51   RBC Count 3.28 3.8 - 5.2 10e12/L L 51   Hemoglobin 9.5 11.7 -  15.7 g/dL L 51   Hematocrit 30.3 35.0 - 47.0 % L 51   MCV 92 78 - 100 fl  51   MCH 29.0 26.5 - 33.0 pg  51   MCHC 31.4 31.5 - 36.5 g/dL L 51   RDW 15.0 10.0 - 15.0 %  51   Platelet Count 387 150 - 450 10e9/L  51            Glucose by meter [[id number]] (Abnormal)  Resulted: 05/24/17 1246, Result status: Final result    Ordering provider: Glenna Hall MD  05/24/17 1239 Resulting lab: POINT OF CARE TEST, GLUCOSE    Specimen Information    Type Source Collected On     05/24/17 1239          Components       Value Reference Range Flag Lab   Glucose 203 70 - 99 mg/dL H 170            Glucose by meter [[id number]] (Abnormal)  Resulted: 05/24/17 0811, Result status: Final result    Ordering provider: Glenna Hall MD  05/24/17 0807 Resulting lab: POINT OF CARE TEST, GLUCOSE    Specimen Information    Type Source Collected On     05/24/17 0807          Components       Value Reference Range Flag Lab   Glucose 160 70 - 99 mg/dL H 170            Basic metabolic panel [[id number]] (Abnormal)  Resulted: 05/24/17 0643, Result status: Final result    Ordering provider: Nasir Acuna MD  05/23/17 2330 Resulting lab: [facility]    Specimen Information    Type Source Collected On   Blood  05/24/17 0550          Components       Value Reference Range Flag Lab   Sodium 136 133 - 144 mmol/L  51   Potassium 5.1 3.4 - 5.3 mmol/L  51   Chloride 108 94 - 109 mmol/L  51   Carbon Dioxide 19 20 - 32 mmol/L L 51   Anion Gap 8 3 - 14 mmol/L  51   Glucose 156 70 - 99 mg/dL H 51   Urea Nitrogen 42 7 - 30 mg/dL H 51   Creatinine 1.74 0.52 - 1.04 mg/dL H 51   GFR Estimate 29 >60 mL/min/1.7m2 L 51   Comment:  Non  GFR Calc   GFR Estimate If Black 35 >60 mL/min/1.7m2 L 51   Comment:  African American GFR Calc   Calcium 7.8 8.5 - 10.1 mg/dL L 51            Heparin Xa (10a) Level [[id number]]  Resulted: 05/24/17 0630, Result status: Final result    Ordering provider: Evelyn Liriano MD  05/23/17  2330 Resulting lab: [facility]    Specimen Information    Type Source Collected On   Blood  17 0550          Components       Value Reference Range Flag Lab   Heparin 10A Level 0.15 IU/mL  51   Comment:         Therapeutic Range:     UFH:   0.15-0.35 IU/mL for low              intensity dosing            0.30-0.70 IU/mL for high              intensity dosing for              DVT and PE     Enoxaparin: If administered              once daily with dose              1.5mg/k.0-2.0 IU/mL                 If administered              twice daily with dose              1mg/k.60-1.0 IU/mL              INR [[id number]] (Abnormal)  Resulted: 17 0624, Result status: Final result    Ordering provider: Evelyn Liriano MD  17 2330 Resulting lab: [facility]    Specimen Information    Type Source Collected On   Blood  17 0550          Components       Value Reference Range Flag Lab   INR 1.25 0.86 - 1.14 H 51            Blood culture [[id number]]  Resulted: 17, Result status: Final result    Ordering provider: Evelyn Liriano MD  17 0903 Resulting lab: [facility]    Specimen Information    Type Source Collected On   Blood  17 0936          Components       Value Reference Range Flag Lab   Specimen Description Blood Right Arm   75   Culture Micro No growth   226   Micro Report Status FINAL 2017   226            Blood culture [[id number]]  Resulted: 179, Result status: Final result    Ordering provider: Evelyn Liriano MD  17 0903 Resulting lab: [facility]    Specimen Information    Type Source Collected On   Blood  17 0931          Components       Value Reference Range Flag Lab   Specimen Description Blood Left Arm   75   Culture Micro No growth   226   Micro Report Status FINAL 2017   226            Glucose by meter [[id number]] (Abnormal)  Resulted:  17 194, Result status: Final result    Ordering provider: Glenna Hall MD  17 194 Resulting lab: POINT OF CARE TEST, GLUCOSE    Specimen Information    Type Source Collected On     17 194          Components       Value Reference Range Flag Lab   Glucose 213 70 - 99 mg/dL H 170            Heparin 10a Level [[id number]]  Resulted: 17 1552, Result status: Final result    Ordering provider: Glenna Hall MD  17 1026 Resulting lab: [facility]    Specimen Information    Type Source Collected On   Blood  17 1517          Components       Value Reference Range Flag Lab   Heparin 10A Level 0.21 IU/mL  51   Comment:         Therapeutic Range:     UFH:   0.15-0.35 IU/mL for low              intensity dosing            0.30-0.70 IU/mL for high              intensity dosing for              DVT and PE     Enoxaparin: If administered              once daily with dose              1.5mg/k.0-2.0 IU/mL                 If administered              twice daily with dose              1mg/k.60-1.0 IU/mL              Glucose by meter [[id number]] (Abnormal)  Resulted: 17 1546, Result status: Final result    Ordering provider: Glenna Hall MD  17 1543 Resulting lab: POINT OF CARE TEST, GLUCOSE    Specimen Information    Type Source Collected On     17 1543          Components       Value Reference Range Flag Lab   Glucose 112 70 - 99 mg/dL H 170            Glucose by meter [[id number]] (Abnormal)  Resulted: 17 1155, Result status: Final result    Ordering provider: Glenna Hall MD  17 1150 Resulting lab: POINT OF CARE TEST, GLUCOSE    Specimen Information    Type Source Collected On     17 1150          Components       Value Reference Range Flag Lab   Glucose 187 70 - 99 mg/dL H 170            Basic metabolic panel [[id number]] (Abnormal)  Resulted: 17 0720, Result status: Final result    Ordering provider:  Nasir Acuna MD  17 0625 Resulting lab: [facility]    Specimen Information    Type Source Collected On   Blood  17 0645          Components       Value Reference Range Flag Lab   Sodium 134 133 - 144 mmol/L  51   Potassium 5.1 3.4 - 5.3 mmol/L  51   Chloride 106 94 - 109 mmol/L  51   Carbon Dioxide 18 20 - 32 mmol/L L 51   Anion Gap 10 3 - 14 mmol/L  51   Glucose 216 70 - 99 mg/dL H 51   Urea Nitrogen 45 7 - 30 mg/dL H 51   Creatinine 1.80 0.52 - 1.04 mg/dL H 51   GFR Estimate 28 >60 mL/min/1.7m2 L 51   Comment:  Non  GFR Calc   GFR Estimate If Black 34 >60 mL/min/1.7m2 L 51   Comment:  African American GFR Calc   Calcium 7.7 8.5 - 10.1 mg/dL L 51            Heparin Xa (10a) Level [[id number]]  Resulted: 17, Result status: Final result    Ordering provider: Evelyn Liriano MD  17 601 Resulting lab: [facility]    Specimen Information    Type Source Collected On   Blood  17 0605          Components       Value Reference Range Flag Lab   Heparin 10A Level 0.37 IU/mL  51   Comment:         Therapeutic Range:     UFH:   0.15-0.35 IU/mL for low              intensity dosing            0.30-0.70 IU/mL for high              intensity dosing for              DVT and PE     Enoxaparin: If administered              once daily with dose              1.5mg/k.0-2.0 IU/mL                 If administered              twice daily with dose              1mg/k.60-1.0 IU/mL              INR [[id number]] (Abnormal)  Resulted: 17, Result status: Final result    Ordering provider: Evelyn Liriano MD  17 3614 Resulting lab: [facility]    Specimen Information    Type Source Collected On   Blood  17 0605          Components       Value Reference Range Flag Lab   INR 1.19 0.86 - 1.14 H 51            CBC with platelets [[id number]] (Abnormal)  Resulted:  17 0609, Result status: Final result    Ordering provider: Evelyn Liriano MD  17 0000 Resulting lab: [facility]    Specimen Information    Type Source Collected On   Blood  17 0606          Components       Value Reference Range Flag Lab   WBC 16.9 4.0 - 11.0 10e9/L H 51   RBC Count 3.01 3.8 - 5.2 10e12/L L 51   Hemoglobin 8.8 11.7 - 15.7 g/dL L 51   Hematocrit 27.3 35.0 - 47.0 % L 51   MCV 91 78 - 100 fl  51   MCH 29.2 26.5 - 33.0 pg  51   MCHC 32.2 31.5 - 36.5 g/dL  51   RDW 14.7 10.0 - 15.0 %  51   Platelet Count 301 150 - 450 10e9/L  51            Glucose by meter [[id number]] (Abnormal)  Resulted: 17, Result status: Final result    Ordering provider: Glenna Hall MD  17 041 Resulting lab: POINT OF CARE TEST, GLUCOSE    Specimen Information    Type Source Collected On     17 041          Components       Value Reference Range Flag Lab   Glucose 225 70 - 99 mg/dL H 170            Heparin 10a Level [[id number]]  Resulted: 17 0117, Result status: Final result    Ordering provider: Lauren Peng MD  17 1842 Resulting lab: [facility]    Specimen Information    Type Source Collected On   Blood  17 0022          Components       Value Reference Range Flag Lab   Heparin 10A Level 0.29 IU/mL  51   Comment:         Therapeutic Range:     UFH:   0.15-0.35 IU/mL for low              intensity dosing            0.30-0.70 IU/mL for high              intensity dosing for              DVT and PE     Enoxaparin: If administered              once daily with dose              1.5mg/k.0-2.0 IU/mL                 If administered              twice daily with dose              1mg/k.60-1.0 IU/mL              Glucose by meter [[id number]] (Abnormal)  Resulted: 17, Result status: Final result    Ordering provider: Glenna Hall MD  17 4638 Resulting lab: POINT OF CARE TEST,  GLUCOSE    Specimen Information    Type Source Collected On     05/22/17 2343          Components       Value Reference Range Flag Lab   Glucose 254 70 - 99 mg/dL H 170            Testing Performed By     Lab - Abbreviation Name Director Address Valid Date Range    45 - AAB772 [facility] Unknown Unknown 01/28/02 0000 - Present    51 - Unknown [facility] Unknown [address] [id number] 12/31/14 1010 - Present    75 - Unknown [facility] Unknown [address] [id number] 01/15/15 1019 - Present    170 - Unknown POINT OF CARE TEST, GLUCOSE Unknown Unknown 10/31/11 1114 - Present    226 - Unknown [facility] Unknown [address] [facility] [address] 12/19/14 0955 - Present               Imaging Results - 3 Days      XR Chest 2 Views [[id number]]  Resulted: 05/25/17 1713, Result status: Final result    Ordering provider: Emiliano Encarnacion PA-C  05/25/17 0953 Resulted by: Vashti Arora MD Murphy, Ryan, MD    Performed: 05/25/17 1308 - 05/25/17 1329 Resulting lab: RADIOLOGY RESULTS    Narrative:       Exam: XR CHEST 2 VW, 5/25/2017 1:29 PM    Indication: interval change    Comparison: Radiograph the chest 5/25/2017.    Findings:   Interval removal of right chest tube. Unchanged right apical  pneumothorax. Trachea is midline. Pulmonary vasculature is indistinct.  Cardiac silhouette is within normal limits. Bilateral lung base  opacities and round opacity in left lateral midlung. The upper abdomen  is unremarkable. Unchanged subcutaneous emphysema over the right  lateral chest and shoulder.      Impression:       Impression:   1. Interval removal of right chest tube with a stable small right  apical and basilar pneumothorax.  2. Unchanged opacity in the right lower lobe.  3. Unchanged round opacity in the left lateral midlung.    I have personally reviewed the examination and initial interpretation  and I agree  with the findings.    JOSE MANUEL LAUREANO MD      XR Chest 2 Views [[id number]]  Resulted: 05/25/17 1357, Result status: Final result    Ordering provider: Nasir Acuna MD  05/25/17 0005 Resulted by: Jose Maneul Laureano MD Murphy, Ryan, MD    Performed: 05/25/17 0835 - 05/25/17 0841 Resulting lab: RADIOLOGY RESULTS    Narrative:       Exam: XR CHEST 2 VW, 5/25/2017 8:41 AM    Indication: Internal change    Comparison: Radiograph the chest 5/24/2017    Findings:   Postsurgical changes of right lower lobe resection. Right apically  directed chest tube. Unchanged right pneumothorax. Trachea is midline.  Pulmonary vasculature is distinct. Cardiac silhouette is within normal  limits. There are opacities in the right lung base and left lateral  midlung. Costophrenic and cardiophrenic angles are sharp. Upper  abdomen is unremarkable. There is stable subcutaneous emphysema along  the right chest wall shoulder.      Impression:       Impression:   1. Grossly unchanged right pneumothorax with an apically directed  chest tube.  2. Right lung base opacity, likely compressive atelectasis from the  basilar portion of the pneumothorax.  3. Unchanged opacity in the left lateral midlung.    I have personally reviewed the examination and initial interpretation  and I agree with the findings.    JOSE MANUEL LAUREANO MD      XR Chest 2 Views [[id number]]  Resulted: 05/24/17 1704, Result status: Final result    Ordering provider: Emiliano Encarnacion PA-C  05/24/17 1121 Resulted by: Jose Manuel Laureano MD Craig, Paul Grant, MD    Performed: 05/24/17 1515 - 05/24/17 1522 Resulting lab: RADIOLOGY RESULTS    Narrative:       EXAM: XR CHEST 2 VW  5/24/2017 3:22 PM      HISTORY: interval change    COMPARISON: 5/24/2017, 5/16/2017    FINDINGS: PA and lateral radiographs of the chest. Postsurgical  changes of a right lower lobectomy. Stable right apically directed  chest tube. Cardiac silhouette is unremarkable. Rightward deviation of  the  trachea, unchanged. Stable left midlung opacity. Small right  pneumothorax along the anterior right chest wall, stable. Stable small  right pleural effusion with associated atelectasis/consolidation.  Subcutaneous emphysema is unchanged.      Impression:       IMPRESSION:   1. Postsurgical changes of a right lower lobe resection. Stable small  medial pneumothorax. Chest tube remains in place.  2. Stable small right pleural effusion with associated  atelectasis/consolidation.  3. Stable left midlung opacity, which may represent rounded  atelectasis or infection.    I have personally reviewed the examination and initial interpretation  and I agree with the findings.    JOSE MANUEL LAUREANO MD      XR Chest 2 Views [[id number]]  Resulted: 05/24/17 1531, Result status: Final result    Ordering provider: Emiliano Encarnacion PA-C  05/24/17 1120 Resulted by: Jose Manuel Laureano MD Murphy, Ryan, MD    Performed: 05/24/17 1209 - 05/24/17 1214 Resulting lab: RADIOLOGY RESULTS    Narrative:       Exam: XR CHEST 2 VW, 5/24/2017 12:14 PM    Indication: interval change    Comparison: Radiograph the chest 5/22/2017    Findings:   Postsurgical changes of right lower lobectomy. Right apically directed  chest tube in unchanged position. Trachea is deviated to the right,  also unchanged in the prior radiograph. Pulmonary vasculature is  indistinct. Cardiac silhouette is within normal limits. Right lung  base opacity and left lateral midlung opacity. Rounded opacity in the  left lateral midlung. There is a right pneumothorax seen in the lung  base and medially. Small right pleural effusion. The upper abdomen is  unremarkable.      Impression:       Impression:   1. Postsurgical changes of right lower lobectomy with a right  hydropneumothorax. In comparison to the prior radiograph the  pneumothorax is unchanged, but to the pleural effusion is decreased.  Right apically directed chest tube is in stable position.  2. Right lower lung  opacity likely due to atelectasis from the basilar  portion of the pneumothorax.  3. Unchanged rounded opacity in the lateral left midlung.    I have personally reviewed the examination and initial interpretation  and I agree with the findings.    JOSE MANUEL LAUREANO MD      Testing Performed By     Lab - Abbreviation Name Director Address Valid Date Range    104 - Rad Rslts RADIOLOGY RESULTS Unknown Unknown 05 1553 - Present               ECG/EMG Results      Echo Limited with Lumason [[id number]]  Resulted: 17 1239, Result status: Edited Result - FINAL    Ordering provider: Ashley Schmidt MD  17 1215 Resulted by: Herrera Farmer MD    Performed: 17 1304 - 17 1305 Resulting lab: RADIOLOGY RESULTS    Narrative:       [id number]  [id number]  [id number]  [id number]^BRAYAN^ASHLEY^DAVID           [facility] [place]  Echocardiography Laboratory  [address] [place] [address]     Name: FRANCIA BOYD  MRN: [id number]  : 1945  Study Date: 2017 12:39 PM  Age: 71 yrs  Gender: Female  Patient Location: [facility]  Reason For Study: Myocardial Infarction, assess for WMA  Ordering Physician: ASHLEY SCHMIDT  Performed By: Dillon Lam RDCS     BSA: 1.7 m2  Height: 64 in  Weight: 139 lb  HR: 100  _____________________________________________________________________________  __        Procedure  Limited Portable Echo Adult. Contrast Lumason. Lumason (NDC #8295-7454-71)  given intravenously. Patient was given 5ml mixture of Lumason. 0 ml wasted.  The patient's rhythm is atrial flutter.  _____________________________________________________________________________  __        Interpretation Summary  The patient's rhythm is atrial flutter. Limited study.  Global and regional left ventricular function is normal with an EF of 60-65%.  Global right ventricular function is normal.  Trivial pericardial effusion is present.  The inferior vena cava was normal in size  with preserved respiratory  variability.  No change from prior.  _____________________________________________________________________________  __        Left Ventricle  Global and regional left ventricular function is normal with an EF of 60-65%.     Right Ventricle  The right ventricle is normal size. Global right ventricular function is  normal.     Mitral Valve  The mitral valve is normal.        Tricuspid Valve  Trace tricuspid insufficiency is present. The peak velocity of the tricuspid  regurgitant jet is not obtainable.     Vessels  The inferior vena cava was normal in size with preserved respiratory  variability.     Pericardium  Trivial pericardial effusion is present.  _____________________________________________________________________________  __                                Report approved by: Renee Kohler 05/17/2017 01:14 PM                    _____________________________________________________________________________  __       1    Type Source Collected On     05/17/17 1239          View Image (below)        Echo limited (Adults Only) [[id number]]  Resulted: 05/17/17 1305, Result status: In process    Ordering provider: Donn Wilcox MD  05/17/17 1215 Performed: 05/17/17 1304 - 05/17/17 1304    Resulting lab: RADIANT                   Encounter-Level Documents:     There are no encounter-level documents.      Order-Level Documents:     There are no order-level documents.

## 2017-05-10 NOTE — IP AVS SNAPSHOT
[address] [address]    Phone:  [phone]                                       After Visit Summary   5/10/2017    Yue Valenzuela    MRN: [id number]           After Visit Summary Signature Page     I have received my discharge instructions, and my questions have been answered. I have discussed any challenges I see with this plan with the nurse or doctor.    ..........................................................................................................................................  Patient/Patient Representative Signature      ..........................................................................................................................................  Patient Representative Print Name and Relationship to Patient    ..................................................               ................................................  Date                                            Time    ..........................................................................................................................................  Reviewed by Signature/Title    ...................................................              ..............................................  Date                                                            Time

## 2017-05-10 NOTE — IP AVS SNAPSHOT
` `       Patient Information     Patient Name Sex Yue Rebollar ([id number]) Female 1945       Room Bed    [facility] 6234-01      Patient Demographics     Address Phone E-mail Address    [address] [address] [facility] [address] [phone] (Home)  [phone] (Mobile) *Preferred* [email or web address]      Patient Ethnicity & Race     Ethnic Group Patient Race    American White      Emergency Contact(s)     Name Relation Home Work Mobile    Oscar Valenzuela [phone] None [phone]    Ramos Valenzuela Son [phone] None [phone]    Spencer Valenzuela  Son [phone] None [phone]      Documents on File        Status Date Received Description       Documents for the Patient    Consent for Services - Hospital/Clinic Received () 14     Consent for EHR Access Received 14     Privacy Notice - [place] Received 14     Insurance Card       External Medication Information Consent       Patient ID Received 14     [facility] Specified Other       Consent to Communicate Received () 14     Insurance Card Received 14 Medicare    Insurance Card Received 14 WPS    Consent for Services - [facility]       Consent for Services - [facility] Received 14 Imaging ctr general consent    HIM ALYSA Authorization - File Only  14 MYCHART ACCESS    Consent for Services - Hospital/Clinic  () 14 CONSENT FOR SERVICE    Consent for EHR Access  14 [facility] WITH NON[facility] PROVIDERS    Privacy Notice - [place]  14 ACKNOWLEDGMENT OF RECEIPT OF NOTICE OF PRIVACY PRACTICE    Consent to Communicate  14 AUTHORIZATION TO DISCUSS PROTECTED HEALTH INFORMATION    Consent for Services - [facility]  14 GENERAL CONSENT FORM: SHARED EHR - ENGLISH    Consent for Services - Hospital/Clinic  () 14 CONSENT FOR SERVICE    HIM ALYSA Authorization - File Only  01/05/15 MYCHART ACCESS    Consent for Services -  Hospital/Clinic  () 12/22/15 CONSENT FOR SERVICE    Consent for Services/Privacy Notice - Hospital/Clinic       Consent for Services/Privacy Notice - Hospital/Clinic-[place] Received () 16     Consent for Services/Privacy Notice - Hospital/Clinic Received 17     HIM ALYSA Authorization - File Only  17 [facility]       Documents for the Encounter    CMS IM for Patient Signature Received 05/10/17     H/P - History and Physical  17 [facility], H/P, 5/3/2017      Admission Information     Attending Provider Admitting Provider Admission Type Admission Date/Time    Glenna Hall MD Rao, Madhuri V, MD Elective 05/10/17  0713    Discharge Date Hospital Service Auth/Cert Status Service Area     Thoracic Surgery Incomplete [facility]    Unit Room/Bed Admission Status       UU U6B 6234/6234-01 Admission (Confirmed)       Admission     Complaint    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 Acct Type    Yue Valenzuela Self FCS Yes Personal/Family    Address Phone          [address] [place] [address] [phone](H)              Coverage Information (for Hospital Account #[id number])     1. MEDICARE/MEDICARE     F/O Payor/Plan Precert #    MEDICARE/MEDICARE     Subscriber Subscriber #    Yue Valenzuela [id number]    Address Phone    [employer]  [address]  [place] [address] [phone]          2. [facility]     F/O Payor/Plan Precert #    [facility]     Subscriber Subscriber #    Yue Valenzuela [id number]    Address Phone    [address]  [place] [address] [phone]

## 2017-05-10 NOTE — ANESTHESIA PROCEDURE NOTES
Arterial Line Procedure Note  Staff:     Anesthesiologist:  LUCINDA SALAZAR    Resident/CRNA:  RENETTA SOTELO    Arterial line performed by resident/CRNA in presence of a teaching physician    Location: In OR After Induction  Procedure Start/Stop Times:     patient identified, IV checked, risks and benefits discussed, informed consent, monitors and equipment checked, pre-op evaluation and at physician/surgeon's request      Correct Patient: Yes      Correct Position: Yes      Correct Site: Yes      Correct Procedure: Yes      Correct Laterality:  N/A    Site Marked:  N/A  Line Placement:     Procedure:  Arterial Line    Insertion Site:  Radial    Insertion laterality:  Right    Skin Prep: Chloraprep      Patient Prep: patient draped, mask, sterile gloves, sterile gown, hat and hand hygiene      Local skin infiltration:  None    Ultrasound Guided?: No      Catheter size:  20 gauge, 12 cm    Cath secured with: suture      Dressing:  Tegaderm    Complications:  None obvious    Arterial waveform: Yes      IBP within 10% of NIBP: Yes

## 2017-05-11 ENCOUNTER — TELEPHONE (OUTPATIENT)
Dept: TRANSPLANT | Facility: CLINIC | Age: 72
End: 2017-05-11

## 2017-05-11 ENCOUNTER — APPOINTMENT (OUTPATIENT)
Dept: SPEECH THERAPY | Facility: CLINIC | Age: 72
DRG: 163 | End: 2017-05-11
Attending: STUDENT IN AN ORGANIZED HEALTH CARE EDUCATION/TRAINING PROGRAM
Payer: MEDICARE

## 2017-05-11 ENCOUNTER — APPOINTMENT (OUTPATIENT)
Dept: GENERAL RADIOLOGY | Facility: CLINIC | Age: 72
DRG: 163 | End: 2017-05-11
Attending: STUDENT IN AN ORGANIZED HEALTH CARE EDUCATION/TRAINING PROGRAM
Payer: MEDICARE

## 2017-05-11 LAB
ANION GAP SERPL CALCULATED.3IONS-SCNC: 13 MMOL/L (ref 3–14)
ANION GAP SERPL CALCULATED.3IONS-SCNC: 13 MMOL/L (ref 3–14)
BUN SERPL-MCNC: 54 MG/DL (ref 7–30)
BUN SERPL-MCNC: 62 MG/DL (ref 7–30)
CALCIUM SERPL-MCNC: 8 MG/DL (ref 8.5–10.1)
CALCIUM SERPL-MCNC: 8.4 MG/DL (ref 8.5–10.1)
CHLORIDE SERPL-SCNC: 106 MMOL/L (ref 94–109)
CHLORIDE SERPL-SCNC: 107 MMOL/L (ref 94–109)
CO2 SERPL-SCNC: 17 MMOL/L (ref 20–32)
CO2 SERPL-SCNC: 18 MMOL/L (ref 20–32)
CREAT SERPL-MCNC: 2.07 MG/DL (ref 0.52–1.04)
CREAT SERPL-MCNC: 2.33 MG/DL (ref 0.52–1.04)
ERYTHROCYTE [DISTWIDTH] IN BLOOD BY AUTOMATED COUNT: 13.4 % (ref 10–15)
ERYTHROCYTE [DISTWIDTH] IN BLOOD BY AUTOMATED COUNT: NORMAL % (ref 10–15)
GFR SERPL CREATININE-BSD FRML MDRD: 21 ML/MIN/1.7M2
GFR SERPL CREATININE-BSD FRML MDRD: 24 ML/MIN/1.7M2
GLUCOSE BLDC GLUCOMTR-MCNC: 150 MG/DL (ref 70–99)
GLUCOSE BLDC GLUCOMTR-MCNC: 153 MG/DL (ref 70–99)
GLUCOSE BLDC GLUCOMTR-MCNC: 156 MG/DL (ref 70–99)
GLUCOSE BLDC GLUCOMTR-MCNC: 176 MG/DL (ref 70–99)
GLUCOSE BLDC GLUCOMTR-MCNC: 189 MG/DL (ref 70–99)
GLUCOSE BLDC GLUCOMTR-MCNC: 220 MG/DL (ref 70–99)
GLUCOSE BLDC GLUCOMTR-MCNC: 230 MG/DL (ref 70–99)
GLUCOSE BLDC GLUCOMTR-MCNC: 235 MG/DL (ref 70–99)
GLUCOSE BLDC GLUCOMTR-MCNC: 239 MG/DL (ref 70–99)
GLUCOSE BLDC GLUCOMTR-MCNC: 276 MG/DL (ref 70–99)
GLUCOSE BLDC GLUCOMTR-MCNC: 289 MG/DL (ref 70–99)
GLUCOSE BLDC GLUCOMTR-MCNC: 293 MG/DL (ref 70–99)
GLUCOSE BLDC GLUCOMTR-MCNC: 300 MG/DL (ref 70–99)
GLUCOSE BLDC GLUCOMTR-MCNC: 310 MG/DL (ref 70–99)
GLUCOSE BLDC GLUCOMTR-MCNC: 323 MG/DL (ref 70–99)
GLUCOSE BLDC GLUCOMTR-MCNC: 361 MG/DL (ref 70–99)
GLUCOSE BLDC GLUCOMTR-MCNC: 378 MG/DL (ref 70–99)
GLUCOSE SERPL-MCNC: 152 MG/DL (ref 70–99)
GLUCOSE SERPL-MCNC: 302 MG/DL (ref 70–99)
HCT VFR BLD AUTO: 30.8 % (ref 35–47)
HCT VFR BLD AUTO: NORMAL % (ref 35–47)
HGB BLD-MCNC: 10.1 G/DL (ref 11.7–15.7)
HGB BLD-MCNC: NORMAL G/DL (ref 11.7–15.7)
INTERPRETATION ECG - MUSE: NORMAL
MAGNESIUM SERPL-MCNC: 1.9 MG/DL (ref 1.6–2.3)
MCH RBC QN AUTO: 29.8 PG (ref 26.5–33)
MCH RBC QN AUTO: NORMAL PG (ref 26.5–33)
MCHC RBC AUTO-ENTMCNC: 32.8 G/DL (ref 31.5–36.5)
MCHC RBC AUTO-ENTMCNC: NORMAL G/DL (ref 31.5–36.5)
MCV RBC AUTO: 91 FL (ref 78–100)
MCV RBC AUTO: NORMAL FL (ref 78–100)
PHOSPHATE SERPL-MCNC: 5.3 MG/DL (ref 2.5–4.5)
PLATELET # BLD AUTO: 194 10E9/L (ref 150–450)
PLATELET # BLD AUTO: NORMAL 10E9/L (ref 150–450)
POTASSIUM SERPL-SCNC: 5.1 MMOL/L (ref 3.4–5.3)
POTASSIUM SERPL-SCNC: 5.7 MMOL/L (ref 3.4–5.3)
POTASSIUM SERPL-SCNC: 5.9 MMOL/L (ref 3.4–5.3)
POTASSIUM SERPL-SCNC: 6.2 MMOL/L (ref 3.4–5.3)
RADIOLOGIST FLAGS: ABNORMAL
RBC # BLD AUTO: 3.39 10E12/L (ref 3.8–5.2)
RBC # BLD AUTO: NORMAL 10E12/L (ref 3.8–5.2)
SODIUM SERPL-SCNC: 136 MMOL/L (ref 133–144)
SODIUM SERPL-SCNC: 137 MMOL/L (ref 133–144)
WBC # BLD AUTO: 17.5 10E9/L (ref 4–11)
WBC # BLD AUTO: NORMAL 10E9/L (ref 4–11)

## 2017-05-11 PROCEDURE — 25000132 ZZH RX MED GY IP 250 OP 250 PS 637: Mod: GY | Performed by: SURGERY

## 2017-05-11 PROCEDURE — 25000132 ZZH RX MED GY IP 250 OP 250 PS 637: Mod: GY | Performed by: THORACIC SURGERY (CARDIOTHORACIC VASCULAR SURGERY)

## 2017-05-11 PROCEDURE — 84132 ASSAY OF SERUM POTASSIUM: CPT | Performed by: STUDENT IN AN ORGANIZED HEALTH CARE EDUCATION/TRAINING PROGRAM

## 2017-05-11 PROCEDURE — 25000132 ZZH RX MED GY IP 250 OP 250 PS 637: Mod: GY | Performed by: NURSE PRACTITIONER

## 2017-05-11 PROCEDURE — 25000128 H RX IP 250 OP 636: Performed by: THORACIC SURGERY (CARDIOTHORACIC VASCULAR SURGERY)

## 2017-05-11 PROCEDURE — A9270 NON-COVERED ITEM OR SERVICE: HCPCS | Mod: GY | Performed by: THORACIC SURGERY (CARDIOTHORACIC VASCULAR SURGERY)

## 2017-05-11 PROCEDURE — 12000006 ZZH R&B IMCU INTERMEDIATE UMMC

## 2017-05-11 PROCEDURE — 00000146 ZZHCL STATISTIC GLUCOSE BY METER IP

## 2017-05-11 PROCEDURE — 40000225 ZZH STATISTIC SLP WARD VISIT

## 2017-05-11 PROCEDURE — 25800025 ZZH RX 258: Performed by: STUDENT IN AN ORGANIZED HEALTH CARE EDUCATION/TRAINING PROGRAM

## 2017-05-11 PROCEDURE — S5010 5% DEXTROSE AND 0.45% SALINE: HCPCS | Performed by: STUDENT IN AN ORGANIZED HEALTH CARE EDUCATION/TRAINING PROGRAM

## 2017-05-11 PROCEDURE — 25000131 ZZH RX MED GY IP 250 OP 636 PS 637: Mod: GY | Performed by: THORACIC SURGERY (CARDIOTHORACIC VASCULAR SURGERY)

## 2017-05-11 PROCEDURE — 25000125 ZZHC RX 250: Performed by: THORACIC SURGERY (CARDIOTHORACIC VASCULAR SURGERY)

## 2017-05-11 PROCEDURE — 25000125 ZZHC RX 250: Performed by: NURSE PRACTITIONER

## 2017-05-11 PROCEDURE — A9270 NON-COVERED ITEM OR SERVICE: HCPCS | Mod: GY | Performed by: SURGERY

## 2017-05-11 PROCEDURE — 83735 ASSAY OF MAGNESIUM: CPT | Performed by: STUDENT IN AN ORGANIZED HEALTH CARE EDUCATION/TRAINING PROGRAM

## 2017-05-11 PROCEDURE — 93010 ELECTROCARDIOGRAM REPORT: CPT | Performed by: INTERNAL MEDICINE

## 2017-05-11 PROCEDURE — 25000128 H RX IP 250 OP 636: Performed by: SURGERY

## 2017-05-11 PROCEDURE — 85027 COMPLETE CBC AUTOMATED: CPT | Performed by: STUDENT IN AN ORGANIZED HEALTH CARE EDUCATION/TRAINING PROGRAM

## 2017-05-11 PROCEDURE — 40000275 ZZH STATISTIC RCP TIME EA 10 MIN

## 2017-05-11 PROCEDURE — 80048 BASIC METABOLIC PNL TOTAL CA: CPT | Performed by: SURGERY

## 2017-05-11 PROCEDURE — 92610 EVALUATE SWALLOWING FUNCTION: CPT | Mod: GN

## 2017-05-11 PROCEDURE — 36415 COLL VENOUS BLD VENIPUNCTURE: CPT | Performed by: STUDENT IN AN ORGANIZED HEALTH CARE EDUCATION/TRAINING PROGRAM

## 2017-05-11 PROCEDURE — 92526 ORAL FUNCTION THERAPY: CPT | Mod: GN

## 2017-05-11 PROCEDURE — 71020 XR CHEST 2 VW: CPT

## 2017-05-11 PROCEDURE — 80048 BASIC METABOLIC PNL TOTAL CA: CPT | Performed by: STUDENT IN AN ORGANIZED HEALTH CARE EDUCATION/TRAINING PROGRAM

## 2017-05-11 PROCEDURE — A9270 NON-COVERED ITEM OR SERVICE: HCPCS | Mod: GY | Performed by: NURSE PRACTITIONER

## 2017-05-11 PROCEDURE — 93005 ELECTROCARDIOGRAM TRACING: CPT

## 2017-05-11 PROCEDURE — 84100 ASSAY OF PHOSPHORUS: CPT | Performed by: STUDENT IN AN ORGANIZED HEALTH CARE EDUCATION/TRAINING PROGRAM

## 2017-05-11 PROCEDURE — 94640 AIRWAY INHALATION TREATMENT: CPT | Mod: 76

## 2017-05-11 RX ORDER — FUROSEMIDE 10 MG/ML
20 INJECTION INTRAMUSCULAR; INTRAVENOUS ONCE
Status: DISCONTINUED | OUTPATIENT
Start: 2017-05-11 | End: 2017-05-12

## 2017-05-11 RX ORDER — FUROSEMIDE 10 MG/ML
20 INJECTION INTRAMUSCULAR; INTRAVENOUS ONCE
Status: COMPLETED | OUTPATIENT
Start: 2017-05-11 | End: 2017-05-11

## 2017-05-11 RX ORDER — NICOTINE POLACRILEX 4 MG
15-30 LOZENGE BUCCAL
Status: DISCONTINUED | OUTPATIENT
Start: 2017-05-11 | End: 2017-05-11

## 2017-05-11 RX ORDER — ASPIRIN 81 MG/1
81 TABLET ORAL EVERY EVENING
Status: DISCONTINUED | OUTPATIENT
Start: 2017-05-11 | End: 2017-05-26 | Stop reason: HOSPADM

## 2017-05-11 RX ORDER — CARVEDILOL 3.12 MG/1
3.12 TABLET ORAL 2 TIMES DAILY WITH MEALS
Status: DISCONTINUED | OUTPATIENT
Start: 2017-05-11 | End: 2017-05-17

## 2017-05-11 RX ORDER — DEXTROSE MONOHYDRATE 25 G/50ML
25-50 INJECTION, SOLUTION INTRAVENOUS
Status: DISCONTINUED | OUTPATIENT
Start: 2017-05-11 | End: 2017-05-11

## 2017-05-11 RX ORDER — PRAVASTATIN SODIUM 40 MG
40 TABLET ORAL EVERY EVENING
Status: DISCONTINUED | OUTPATIENT
Start: 2017-05-11 | End: 2017-05-26 | Stop reason: HOSPADM

## 2017-05-11 RX ORDER — DEXTROSE MONOHYDRATE 25 G/50ML
25-50 INJECTION, SOLUTION INTRAVENOUS
Status: DISCONTINUED | OUTPATIENT
Start: 2017-05-11 | End: 2017-05-26 | Stop reason: HOSPADM

## 2017-05-11 RX ORDER — CETIRIZINE HYDROCHLORIDE 5 MG/1
5 TABLET ORAL DAILY
Status: DISCONTINUED | OUTPATIENT
Start: 2017-05-11 | End: 2017-05-26 | Stop reason: HOSPADM

## 2017-05-11 RX ORDER — NICOTINE POLACRILEX 4 MG
15-30 LOZENGE BUCCAL
Status: DISCONTINUED | OUTPATIENT
Start: 2017-05-11 | End: 2017-05-26 | Stop reason: HOSPADM

## 2017-05-11 RX ADMIN — ASPIRIN 81 MG CHEWABLE TABLET 81 MG: 81 TABLET CHEWABLE at 20:29

## 2017-05-11 RX ADMIN — IPRATROPIUM BROMIDE AND ALBUTEROL SULFATE 3 ML: .5; 3 SOLUTION RESPIRATORY (INHALATION) at 20:38

## 2017-05-11 RX ADMIN — DEXTROSE AND SODIUM CHLORIDE: 5; 450 INJECTION, SOLUTION INTRAVENOUS at 02:36

## 2017-05-11 RX ADMIN — ACETAMINOPHEN 975 MG: 325 TABLET, FILM COATED ORAL at 00:34

## 2017-05-11 RX ADMIN — SENNOSIDES AND DOCUSATE SODIUM 1 TABLET: 8.6; 5 TABLET ORAL at 20:29

## 2017-05-11 RX ADMIN — IPRATROPIUM BROMIDE AND ALBUTEROL SULFATE 3 ML: .5; 3 SOLUTION RESPIRATORY (INHALATION) at 09:10

## 2017-05-11 RX ADMIN — PRAVASTATIN SODIUM 40 MG: 40 TABLET ORAL at 20:29

## 2017-05-11 RX ADMIN — METOROPROLOL TARTRATE 5 MG: 5 INJECTION, SOLUTION INTRAVENOUS at 00:34

## 2017-05-11 RX ADMIN — ACETAMINOPHEN 975 MG: 325 TABLET, FILM COATED ORAL at 08:32

## 2017-05-11 RX ADMIN — IPRATROPIUM BROMIDE AND ALBUTEROL SULFATE 3 ML: .5; 3 SOLUTION RESPIRATORY (INHALATION) at 12:24

## 2017-05-11 RX ADMIN — FLUTICASONE FUROATE AND VILANTEROL TRIFENATATE 1 PUFF: 100; 25 POWDER RESPIRATORY (INHALATION) at 08:32

## 2017-05-11 RX ADMIN — HYDROMORPHONE HYDROCHLORIDE: 10 INJECTION, SOLUTION INTRAMUSCULAR; INTRAVENOUS; SUBCUTANEOUS at 05:06

## 2017-05-11 RX ADMIN — FUROSEMIDE 20 MG: 10 INJECTION, SOLUTION INTRAVENOUS at 08:31

## 2017-05-11 RX ADMIN — HYDROMORPHONE HYDROCHLORIDE: 10 INJECTION, SOLUTION INTRAMUSCULAR; INTRAVENOUS; SUBCUTANEOUS at 21:49

## 2017-05-11 RX ADMIN — DEXTROSE 36 G: 15 GEL ORAL at 16:02

## 2017-05-11 RX ADMIN — INSULIN ASPART 5 UNITS: 100 INJECTION, SOLUTION INTRAVENOUS; SUBCUTANEOUS at 00:33

## 2017-05-11 RX ADMIN — ACETAMINOPHEN 975 MG: 325 TABLET, FILM COATED ORAL at 15:30

## 2017-05-11 RX ADMIN — HEPARIN SODIUM 5000 UNITS: 5000 INJECTION, SOLUTION INTRAVENOUS; SUBCUTANEOUS at 13:08

## 2017-05-11 RX ADMIN — HUMAN INSULIN 1.5 UNITS/HR: 100 INJECTION, SOLUTION SUBCUTANEOUS at 17:44

## 2017-05-11 RX ADMIN — PANTOPRAZOLE SODIUM 40 MG: 40 TABLET, DELAYED RELEASE ORAL at 08:32

## 2017-05-11 RX ADMIN — HEPARIN SODIUM 5000 UNITS: 5000 INJECTION, SOLUTION INTRAVENOUS; SUBCUTANEOUS at 20:29

## 2017-05-11 RX ADMIN — SENNOSIDES AND DOCUSATE SODIUM 1 TABLET: 8.6; 5 TABLET ORAL at 08:32

## 2017-05-11 ASSESSMENT — PAIN DESCRIPTION - DESCRIPTORS
DESCRIPTORS: ACHING

## 2017-05-11 NOTE — PROGRESS NOTES
"CLINICAL NUTRITION SERVICES - BRIEF NOTE      Received inappropriate admission risk assessment \"new/uncontrolled diabetes.\"     Per Endocrinology note this morning, \"likely reason for elevated BG overnight due to insulin pump malfunction with bent cannula. Patient sees Dr. Mason Sprague at the [facility] every 6 months. Notes her glucose control has historically been excellent, with A1c in 6.4-6.5% range. States now with her kidney disease she has been advised to relax her control, with a goal of 7.0% for her A1c.\"    Noted last A1c on 5/3/17 was 7.0%.    RD will follow per LOS protocol or if re-consulted.     Victorina Baker RD, LD  6B pager: [phone]          "

## 2017-05-11 NOTE — PROGRESS NOTES
Brief event note for insulin overdosing from ambulatory pump    Received call  from Staff RN that after patient  Took pain medication she gave bolus correction insulin from ambulatory pump using her glucometer.  Both she and her  read the number as 316, which was used for bolus of 7 units from pump. Then gave herself 5 units for carb coverage.and ate clear liquids.    10 minutes later RN did BG check and it was 150's.  Concerned had overdosed on insulin RN contacted Diabetes and primary team.     Ambulatory pump was removed  BG check 10 min later showed   had given 36 gram of glucose tabs to cover insulin while RN contacting teams    I Calculate that this should cover 4 units of insulin per patients carb ratio of 1 unit/10 grams    Plan  Re-check BG 30 min, then in 1 hour if BG stable  Active insulin time is 3 hours for this patient  If BG dropping give additional glucose gel per protocol    Considered  SQ basal /bolus insulin However due to the wide variability of basal insulin needs and kidney disease IV insulin safer    Start IV insulin    SQ aspart Carb coverage 1 unit/11 grams midnight to 1530 and 1/10 6443-6170    Informed thoracic team of above plan    Ira Rivera, CNP  Diabetes team

## 2017-05-11 NOTE — OP NOTE
DATE OF PROCEDURE:  05/10/2017      PREOPERATIVE DIAGNOSIS:  Right lower lobe lung cancer.      POSTOPERATIVE DIAGNOSIS:  Right lower lobe lung cancer.       SURGEON:  Glenna Hall MD      ASSISTING SURGEON:  Vinicius Diaz MD      PROCEDURES PERFORMED:  Flexible bronchoscopy, cervical mediastinoscopy and lymph node biopsies followed by right video-assisted thoracoscopic surgery, right lower lobectomy, right middle lobe wedge resection and mediastinal lymph node dissection.      DESCRIPTION OF PROCEDURE:  After obtaining informed consent, Yue Valenzuela was brought to the operating room and laid supine on the operating table.  After induction of general anesthesia and introduction of a double-lumen endotracheal tube, a flexible bronchoscopy was performed.  There were no significant endobronchial findings.  After this, the patient was positioned supine with the neck extended.  The neck and chest were prepped and draped in a sterile manner.  A collar incision was made and dissection was carried out to the pretracheal plane.  The mediastinoscope was then inserted in this plane and with blunt dissection, lymph node level 7 and subcarinal area and the 4R nodes were biopsied.  Pretracheal nodes were also biopsied.  These nodes came back as negative for tumor on frozen section pathology.  The neck wound was then closed after ensuring hemostasis, and the patient was then positioned in the left lateral decubitus with the right side up.  The right chest was prepped and draped in a sterile manner.  A standard 3 port VATS approach was used.  Upon entering the chest, there were some flimsy adhesions which were taken down.  We then proceeded to take down the inferior pulmonary ligament and identified the inferior pulmonary vein.  This was then transected using a vascular load on the Endo-CHARITO stapler.  We then proceeded to dissect out the pulmonary artery to the right lower lobe and the right lower lobe bronchus.  The fissure was  incomplete and quite dense.  We first took down the anterior fissure with the Endo-CHARITO stapler.  We were then able to proceed with further dissection of the artery and the bronchus.  The posterior fissure was then completed.  The right lower lobe pulmonary artery was then taken using vascular loads on the Endo-CHARITO stapler.  The superior segmental artery was taken separately.  After this we proceeded to identify the bronchus.  The right middle lobe bronchus appeared to have a very distal takeoff.  We isolated the right middle lobe and the right lower lobe bronchus to the left lower lobe, then to the superior segmental lobe separately, then with bronchoscopic visualization to the bronchus superior segment and the basilar segment separately with purple loads on the Endo-CHARITO stapler.  The specimen was then delivered out using a LapSac and sent off to pathology.  The stump was tested underwater.  We also proceeded to take level 8 lymph nodes.  Following this there was some nodularity identified in the right middle lobe which corresponded to the CAT scan.  We took a small wedge of these lymph nodes and sent them off for frozen section pathology, which returned as noncaseating granuloma, and there was a small focus of potential atypical cells.  We decided not to proceed with any further resection at this point.  A 28-Georgian straight chest tube was left in the chest cavity, secured to the skin.  The port sites were then closed in layers after confirming hemostasis.  Of note, level 4 and level 7 nodes were completely dissected during the latter part of the procedure.  The patient was then recovered and transferred to the recovery room in stable condition.         ZEKE WEAVER MD             D: 05/10/2017 18:06   T: 2017 03:52   MT: nancy      Name:     FRANCIA BOYD   MRN:      [id number]        Account:        [id number]   :      1945           Procedure Date: 05/10/2017      Document: [id number]

## 2017-05-11 NOTE — CONSULTS
Diabetes/Hyperglycemia Management Consult    Reason for Consult  Ambulatory pump management, glycemic management  Consult requested by: Thoracic surgery  History of Present Illness Yue Valenzuela is a 71 year old woman with a history of DM I, CKD IV, factor V Leiden mutation, HTN, HLD, CAD s/p stent, PVD, carotid artery stenosis, asthma, and newly diagnosed lung adenocarcinoma , Right lower lobectomy 05/11    Patient wants to use her Medtronic ambulatory pump in hospital. She has used this for 10 years and maintained good control the last few years.  also knows how to operate and can assist her.  Had difficulties this AM with pump not working correctly and is now hyperglycemic to 378.  Correction insulin was started by Thoracic surgery team    Currently post op pain is controlled with medications, afebrile, denies weakness, parasthesias, numbness, no cough, no SOB no N/V/D. Is eating clear liquids. No skin rashes, urinary difficulties, no visual changes, mood is stable, but stressed      Recent Labs  Lab 05/11/17  0726 05/11/17  0640 05/11/17  0509 05/11/17  0440 05/11/17  0439 05/11/17  0404 05/11/17  0327  05/10/17  1850  05/10/17  1530 05/10/17  1420 05/10/17  1348 05/10/17  1245   GLC  --   --   --   --  302*  --   --   --  155*  --  142* 198* 218* 222*   * 289* 276* 300*  --  293* 310*  < >  --   < >  --   --   --   --    < > = values in this interval not displayed.      Diabetes Type:  Type 1 Diabetes  Diabetes Duration:    Usual Diabetes Regimen: ambulatory insulin pump   Ability to [place] Prescribed Regimen:  Limited by recent surgery, otherwise none  Diabetes Control:   Lab Results   Component Value Date    A1C 7.0 05/03/2017    A1C 6.0 08/04/2014     Diabetes Complications: CKD, CAD,PVD  History of DKA: yes  Able to Detect Hypoglycemia: yes  Usual Diabetes Care Provider: Dr. Sprague at [facility]  Factors Impacting Glucose Control: surgery, cancer, CKD      Review of Systems  10 point ROS  completed with pertinent positives and negatives noted in the HPI    Past medical, family and social histories are reviewed and updated.    Past Medical History  Past Medical History:   Diagnosis Date     Adenocarcinoma, lung (H)      Asthma      CAD (coronary artery disease) 9/17/2014 2006 PCI circumflex territory  Mid LAD lesion      Carotid artery stenosis      CKD (chronic kidney disease) stage 4, GFR 15-29 ml/min (H)      Diabetes mellitus type 1 (H)      H/O factor V Leiden mutation      Hypertension      Mixed hyperlipidemia (DYSLIPIDEMIA) 8/1/2014     Osteopenia      Pneumothorax 4/16/2017     PVD (peripheral vascular disease) (H)      Thyroid nodule 04/12/2012    Hurtle Cells, non malignant       Family History  Family History   Problem Relation Age of Onset     Cancer - colorectal Father      DIABETES Father      Scleroderma Mother      CEREBROVASCULAR DISEASE Brother      DIABETES Brother      Hypertension Brother      DIABETES Brother      2nd brother       Social History  Social History     Social History     Marital status:      Spouse name: N/A     Number of children: N/A     Years of education: N/A     Occupational History     retail Other     part-time     Social History Main Topics     Smoking status: Former Smoker     Packs/day: 0.50     Years: 15.00     Types: Cigarettes     Start date: 1/1/1970     Quit date: 12/31/1985     Smokeless tobacco: Never Used     Alcohol use 0.0 - 0.5 oz/week     0 - 1 Standard drinks or equivalent per week     Drug use: No     Sexual activity: Not Asked     Other Topics Concern     None     Social History Narrative    The patient has a 7 pk yr tobacco hx.  She has no active use since 1985.  Alcohol use is 0 alcoholic drinks per week.  She denies use of recreational drugs.          She is retired.  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           The patient is .  Has 2 children.        Hot Tub Exposure: NO    Recent  "Travel: NO     Hx of incarceration:  NO    Bird Exposure:   NO    Animal Exposure:  NO    Inhalation Exposure:  NO             Physical Exam  Temp: 97.6  F (36.4  C) Temp  Min: 96.8  F (36  C)  Max: 98.4  F (36.9  C) Vital signs:  Temp: 97.6  F (36.4  C) Temp src: Oral BP: 118/44   Heart Rate: 63 Resp: 16 SpO2: 96 % O2 Device: None (Room air) Oxygen Delivery: 2 LPM Height: 162.6 cm (5' 4\") Weight: 68 kg (149 lb 14.6 oz)  Estimated body mass index is 25.73 kg/(m^2) as calculated from the following:    Height as of this encounter: 1.626 m (5' 4\").    Weight as of this encounter: 68 kg (149 lb 14.6 oz).        General:  Pleasant, resting in bed, in no distress.   HEENT: NC/AT, PER and anicteric, non-injected, oral mucous membranes moist.   Lungs: nonlabored respiration, no cough, CT in place  ABD: soft, nondistended  Skin: warm and dry, surgical incision intact  MSK:  fluid movement of all extremities  Lymp:  no LE edema   Mental status:  alert, oriented x3, communicating clearly  Psych:  calm, even mood    Laboratory  Recent Labs   Lab Test  05/11/17   0544  05/11/17   0439   05/10/17   1850   NA   --   136   --   142   POTASSIUM  5.7*  5.9*   < >  4.6   CHLORIDE   --   106   --   111*   CO2   --   17*   --   19*   ANIONGAP   --   13   --   12   GLC   --   302*   --   155*   BUN   --   54*   --   49*   CR   --   2.07*   --   1.84*   ZAHEER   --   8.0*   --   8.2*    < > = values in this interval not displayed.     CBC RESULTS:   Recent Labs   Lab Test  05/11/17   0544   WBC  17.5*   RBC  3.39*   HGB  10.1*   HCT  30.8*   MCV  91   MCH  29.8   MCHC  32.8   RDW  13.4   PLT  194       Liver Function Studies -   Recent Labs   Lab Test  08/04/14   0712   PROTTOTAL  7.5   ALBUMIN  4.3   BILITOTAL  0.4   ALKPHOS  79   AST  29   ALT  44       Active Medications  Current Facility-Administered Medications   Medication     dextrose 5% and 0.45% NaCl infusion     carvedilol (COREG) tablet 3.125 mg     aspirin EC EC tablet 81 mg     " cetirizine (zyrTEC) tablet 5 mg     pravastatin (PRAVACHOL) tablet 40 mg     insulin bolus from AMBULATORY PUMP     glucose 40 % gel 15-30 g    Or     dextrose 50 % injection 25-50 mL    Or     glucagon injection 1 mg     insulin aspart (NovoLOG) 100 UNIT/ML vial for filling pump reservoir     insulin bolus from AMBULATORY PUMP     glucose 40 % gel 15-30 g    Or     dextrose 50 % injection 25-50 mL    Or     glucagon injection 1 mg     insulin aspart (NovoLOG) 100 UNIT/ML vial for filling pump reservoir     insulin basal rate from AMBULATORY PUMP     [START ON 5/12/2017] insulin bolus from AMBULATORY PUMP     insulin bolus from AMBULATORY PUMP     insulin bolus from AMBULATORY PUMP     insulin bolus from AMBULATORY PUMP     medication instruction     bupivacaine (MARCAINE) 0.125 % in NaCl 0.9 % 250 mL EPIDURAL Drip     naloxone (NARCAN) injection 0.1-0.4 mg     heparin sodium PF injection 5,000 Units     labetalol (NORMODYNE/TRANDATE) injection 10-40 mg     albuterol (PROAIR HFA/PROVENTIL HFA/VENTOLIN HFA) Inhaler 4 puff     ipratropium - albuterol 0.5 mg/2.5 mg/3 mL (DUONEB) neb solution 3 mL     pantoprazole (PROTONIX) EC tablet 40 mg    Or     pantoprazole (PROTONIX) 40 mg IV push injection     potassium chloride SA (K-DUR/KLOR-CON M) CR tablet 20-40 mEq     potassium chloride (KLOR-CON) Packet 20-40 mEq     potassium chloride 10 mEq in 100 mL intermittent infusion     [START ON 5/14/2017] potassium chloride 10 mEq in 100 mL intermittent infusion with 10 mg lidocaine     potassium chloride 20 mEq in 50 mL intermittent infusion     magnesium sulfate 2 g in NS intermittent infusion (Phar[facility]ium or FV Cmpd)     magnesium sulfate 4 g in 100 mL sterile water (premade)     sodium phosphate 10 mmol in D5W intermittent infusion     sodium phosphate 15 mmol in D5W intermittent infusion     sodium phosphate 20 mmol in D5W intermittent infusion     sodium phosphate 25 mmol in D5W intermittent infusion     acetaminophen  (TYLENOL) tablet 975 mg     [START ON 5/13/2017] acetaminophen (TYLENOL) tablet 650 mg     oxyCODONE (ROXICODONE) IR tablet 5 mg     HYDROmorphone (DILAUDID) PCA 1 mg/mL     ondansetron (ZOFRAN-ODT) ODT tab 4 mg    Or     ondansetron (ZOFRAN) injection 4 mg     senna-docusate (SENOKOT-S;PERICOLACE) 8.6-50 MG per tablet 1-2 tablet     glucose 40 % gel 15-30 g    Or     dextrose 50 % injection 25-50 mL    Or     glucagon injection 1 mg     fluticasone-vilanterol (BREO ELLIPTA) 100-25 MCG/INH oral inhaler 1 puff     INSULIN PUMP - OUTPATIENT     No current outpatient prescriptions on file.       Current Diet    Active Diet Order      Clear Liquid Diet      Assessment  Yue is type 1 Diabetic using  Ambulatory pump for 10 years with excellent control   is proficient in using pump and @ bedside to assist if needed  Diabetes Educator has performed assessment and deemed patient to be capabable of using pump at this time (see consult note 05/11/17)  Discussed with Yue and  that if unable to manage the pump will need to remove and use SQ or IV insulin.  They acknowledge their understanding      Plan  Patient to use ambulatory insulin pump, Medtronic, with the following settings;  Patient wears a Medtronic insulin pump, with insulin aspart (NovoLog). She uses the Quik-set infusion sets.  Pump settings are:  Basal rates:  #1: 0.5 units/hr from 6322-7938  #2: 1.0 units/hr from 1723-1772  #3: 1.75 units/hr from 4774-6081  #4: 1.35 units/hr from 5658-7984     Bolus settings:  Carb ratios:  1 unit/11 grams CHO from 5243-8355  1 unit/ 10 grams CHO from 0014-8031     Sensitivity/correction factor: 1 unit to lower BG 28 mg/dL  Blood glucose target 120-130 mg/dL  Active insulin time= 3 hours    Will follow EMERITA Rivera, [phone]    Diabetes Management Team job code: [id number]

## 2017-05-11 NOTE — PROVIDER NOTIFICATION
spoke with Dr. Liriano via telephone. Not giving this evening's one time dose of Lasix as creatinine up on BMP check.  Dr. Liriano will discuss with cross-cover who will watch urine output and reorder if indicated.

## 2017-05-11 NOTE — PROVIDER NOTIFICATION
Spoke with Dr. Rodgers this AM and held Coreg as patient was bradycardic and BP 100s systolic.  Patient remains 50s-60s for heart rate and blood pressure 118/44 this evening.  Per Dr. Rodgers, also holding this evenings dose.  Call cross-cover if patient is hypertensive or tachycardic.

## 2017-05-11 NOTE — OR NURSING
Writer updated Dr. Nascimento (thoracic surgery) that patient's labs were back. He is OK with them and her transferring to 6B. Also verified that patient is to remain NPO overnight.

## 2017-05-11 NOTE — PROGRESS NOTES
"Surgery Brief Post-Op Check  Yue Valenzuela MRN: [id number]    S: Patient reports that she is very uncomfortable in bed; says she normally stretches more and that her arm is positioned funny. Nurses actively arranging patient lines and repositioning. Says she is getting hungry. Pain controlled. Denies headache, chest pain, shortness of breath, nausea or emesis. Asks about using her home insulin pump.    O: /44 (BP Location: Left arm)  Pulse 65  Temp 97.7  F (36.5  C) (Oral)  Resp 16  Ht 1.626 m (5' 4\")  Wt 64.1 kg (141 lb 5 oz)  SpO2 97%  BMI 24.26 kg/m2    UOP: ~ 50 cc / hr    No acute distress, interactive  Non labored respirations on room air  - CTs to suction, significant air leak  Incision with sterile dressing in place, no erythema  Regular rate, regular rhythm, peripheral pulses 2+  Abdomen soft, appropriately tender, non distended  Extremities warm, well perfused  Alert, oriented    Post-op labs:   - K 6.2  - Glucose 361      A/P: 71 year old female POD#0 s/p right thoracoscopy with right lower lobectomy and right middle lobe wedge resection.    - Continue with current pain regimen  - Monitor vital signs per protocol  - Watch respiratory status carefully  - Wean O2 as able, encourage IS  - Stat EKG for hyperkalemia (no changes, will hold Ca)  - Insulin administration by patient's home pump  - Change IVF to D5 + 1/2 NS (without K)  - Recheck glucose q30 min  - Recheck K in two hours  - NPO  - Monitor UOP  - SCDs      - - - - - - - - - - - -  Zoran Nascimento MD  PGY-1, General Surgery  [facility]  Pager: [phone]  Please contact primary team after 6am.  "

## 2017-05-11 NOTE — PROGRESS NOTES
REGIONAL ANESTHESIA PAIN SERVICE EPIDURAL NOTE  SUBJECTIVE:   Interval History: Pt reports minimal postop pain, and pain well controlled with epidural and PCA Dilaudid. Patient denies any weakness, paresthesias, circumoral numbness, metallic taste or tinnitus. Patient is currently tolerating clear liquid diet without nausea.      Clinically Aligned Pain Assessment (CAPA):  Comfort (How is your pain?): Tolerable with discomfort  Change in Pain (Since your last medication/intervention?): About the same  Pain Control (How are your pain treatments working?): Partially effective pain control  Functioning (Are you able to do activities to get better?) : Can do most things, but pain gets in the way of some             Anticoagulation:   heparin sodium PF injection 5,000 Units 5,000 Units, SC, Q8H Ordered         OBJECTIVE:  Diagnostics:  CBC RESULTS:       Recent Labs   Lab Test 05/11/17  0544   WBC 17.5*   RBC 3.39*   HGB 10.1*   HCT 30.8*   MCV 91   MCH 29.8   MCHC 32.8   RDW 13.4                  Lab Results   Component Value Date     INR 1.06 04/18/2017     INR 1.08 04/04/2017     INR 0.99 01/13/2017         Vitals:  Temp: [96.8  F (36  C)-98.4  F (36.9  C)] 97.6  F (36.4  C)  Heart Rate: [56-76] 58  Resp: [16] 16  BP: (102-149)/(36-91) 118/40  MAP: [58 mmHg-88 mmHg] 58 mmHg  Arterial Line BP: (109-147)/(35-52) 109/36  SpO2: [94 %-100 %] 95 %     Exam:   Strength 5/5 and symmetric grossly in bilateral LE  Epidural site with loosened dressing changed, no tenderness, erythema, heme, edema at insertion site        ASSESSMENT/PLAN:   Yue Valenzuela is a 71 year old female POD #1 s/p BRONCHOSCOPY FLEXIBLE MEDIASTINOSCOPY  THORACOSCOPIC RESECTION LUNG THORACOSCOPIC EXCISE NODE MEDIASTINAL and placement of T5-6 epidural for analgesia. Pt receiving adequate analgesia with epidural infusion Bupivacaine 0.125% at 8mL/hour and PCA Dilaudid doses available PRN. Pt has not been out of bed yet. No weakness or paresthesias,  adequate sensory block. No evidence of adverse side effects related to local anesthetic.     - continue current epidural Bupivacaine infusion at 8mL/hour   - will continue to follow and adjust as needed    Sy Wheeler MD  05/11/2017 1:28 PM

## 2017-05-11 NOTE — PROGRESS NOTES
"   05/11/17 0858   General Information   Onset Date 05/10/17   Start of Care Date 05/11/17   Referring Physician Evelyn Liriano MD   Patient Profile Review/OT: Additional Occupational Profile Info See Profile for full history and prior level of function   Patient/Family Goals Statement Pt is thirsty   Swallowing Evaluation Bedside swallow evaluation   Behaviorial Observations Alert  (appears [place])   Mode of current nutrition NPO  (pending evaluation)   Respiratory Status O2 Supply   Type of O2 supply Nasal cannula   Comments Yue Valenzuela is a 71 year old female now post op day 1 from a right VATS, lower lobectomy and middle lobe wedge. Clinical swallow evaluation orders received.   Clinical Swallow Evaluation   Oral Musculature generally intact   Structural Abnormalities none present   Dentition present and adequate   Mucosal Quality dry   Mandibular Strength and Mobility intact   Oral Labial Strength and Mobility WFL   Lingual Strength and Mobility WFL   Buccal Strength and Mobility intact   Laryngeal Function Cough;Throat clear;Swallow;Voicing initiated;Dry swallow palpated  (weak cough)   Additional Documentation Yes   Clinical Swallow Eval: Thin Liquid Texture Trial   Mode of Presentation, Thin Liquids spoon;cup   Volume of Liquid or Food Presented x1 ice chip, x6 oz via cup sips   Oral Phase of Swallow WFL   Pharyngeal Phase of Swallow (no overt s/s of aspiration)   Diagnostic Statement no overt s/s of aspiration, adequate swallow function for thin liquids. Observed medication administration with whole pills and thin liquid, intermittent sensation of \"pill stuck,\" utilized chin tuck with liquid wash - reports cleared.   Clinical Swallow Eval: Puree Solid Texture Trial   Mode of Presentation, Puree spoon;self-fed   Volume of Puree Presented 4 tsp   Oral Phase, Puree WFL   Pharyngeal Phase, Puree feeling of something stuck in throat   Diagnostic Statement No overt s/s of aspiration observed, endorsed globus " sesnsation at level of sternal notch, but cleared with additional liquid washes with no reported sensation of retention on additional trials   Clinical Swallow Eval: Semisolid Texture Trial   Mode of Presentation, Semisolid spoon;self-fed   Volume of Semisolid Food Presented 2 tsp   Oral Phase, Semisolid WFL   Pharyngeal Phase, Semisolid (no overt s/s of aspiration)   Diagnostic Statement no overt s/s of aspiration, adequte for semi-solids   Clinical Swallow Eval: Solid Food Texture Trial   Mode of Presentation, Solid self-fed   Volume of Solid Food Presented 1 rebel cracker   Oral Phase, Solid WFL   Pharyngeal Phase, Solid (no overt s/s of aspiration)   Successful Strategies Trialed During Procedure, Solid (small bites (self implemented))   Diagnostic Statement no overt s/s of aspiration, adequate for solid textures   VFSS Evaluation   VFSS Additional Documentation No   FEES Evaluation   Additional Documentation No   Swallow Compensations   Swallow Compensations Pacing;Multiple swallow;Chin tuck;Alternate viscosity of consistencies   Esophageal Phase of Swallow   Patient reports or presents with symptoms of esophageal dysphagia No   General Therapy Interventions   Planned Therapy Interventions Dysphagia Treatment   Dysphagia treatment Instruction of safe swallow strategies   Swallow Eval: Clinical Impressions   Skilled Criteria for Therapy Intervention Skilled criteria met.  Treatment indicated.   Functional Assessment Scale (FAS) 5   Treatment Diagnosis mild oropharyngeal dysphagia   Diet texture recommendations Regular diet;Thin liquids   Recommended Feeding/Eating Techniques small sips/bites;alternate between small bites and sips of food/liquid;maintain upright posture during/after eating for 30 mins   Demonstrates Need for Referral to Another Service occupational therapy;physical therapy;respiratory therapy   Therapy Frequency 5 times/wk   Predicted Duration of Therapy Intervention (days/wks) 1 week    Anticipated Discharge Disposition (will defer to OT/PT)   Risks and Benefits of Treatment have been explained. Yes   Patient, family and/or staff in agreement with Plan of Care Yes   Clinical Impression Comments Clinical swallow evaluation completed per MD order. Pt presents with mild oropharyngeal dysphagia in the setting of generalized weakness. Pt demonstrated adequate tolerance of all textures presented, no s/s of aspiration observed. Pt endorsed sensation of pharyngeal retention x2; once with medications and once with puree trial. Successfully cleared per pt report with thin liquids and chin tuck. No further sensation of retention on additional trials. Recommend regular diet and thin liquids. Encouraged small meal portions, often throughout the day in order to reduce respiratory demands during PO. Pt already implementing safe swallow strategies adequately, but may benefit from continued encouragement/reminders to implement them. SLP to follow for po tolerance and education.   Total Evaluation Time   Total Evaluation Time (Minutes) 15

## 2017-05-11 NOTE — TELEPHONE ENCOUNTER
Call to Yue, left message at home and on cell phone. Transplant is aware of her diagnosis, she can call back if she has questions for transplant. Marked for follow up in 1 year.

## 2017-05-11 NOTE — PLAN OF CARE
"Problem: Goal Outcome Summary  Goal: Goal Outcome Summary  Outcome: No Change  /42  Pulse 65  Temp 97.7  F (36.5  C) (Oral)  Resp 16  Ht 1.626 m (5' 4\")  Wt 68 kg (149 lb 14.6 oz)  SpO2 99%  BMI 25.73 kg/m2     Cognitive: A&Ox4  Cardiac:Sinus rhythm sustains 60's. -130.  Resp: RA. Capnography IPI 9-10. CO2 >30  GI/: Hall with adequate output.  Diet/Appetite: NPO  Skin: Incisions dermabonded and WDL  Access: R PIV infusing D5 1/2NS at 30mL/hr. L PIV pulled d/t occlusion.   Drains: R chest tube to -20 suction with airleak (team aware)  Activity: up with assist of 1  Pain: controlled with Bupivacaine at 8mL/hr, dilaudid 0.2mg every 10 minutes.      Blood sugars q4hrs, pt requests to wear home pump and administer own insulin accordingly. Surgery cross cover aware and states this will be fine. Pt's insulin pump not working correctly, multiple BG in 300's with frequent BG ordered, MD aware of the situation. Requests to continue to closely monitor BG. Potassium back at 6.2.  MD  Notified D10 1/2NS plus K DC, replaced with D5 1/2NS. EKG ordered and came back normal per MD. 0630 dose of Metoprolol not given d/t hr sustaining low 60's and low BP's with MAP 59.     Will continue with plan of care and notify MD of any changes.           "

## 2017-05-11 NOTE — ANESTHESIA POSTPROCEDURE EVALUATION
Patient: Yue Valenzuela    Procedure(s):  Flexible Bronchoscopy, esaphogastroduodenoscopy, cervical Mediastinoscopy, Right  Thoracoscopic Surgery, right Lower Lobectomy, Mediastinal Lymph Node Dissection, Right middle lobe wedge resection*Latex Allergy* (ERAS Patient) - Wound Class: II-Clean Contaminated   - Wound Class: I-Clean   - Wound Class: I-Clean   - Wound Class: I-Clean   - Wound Class: II-Clean Contaminated    Diagnosis:Malignant Neoplasm Of Lower Lobe Of Right Lung   Diagnosis Additional Information: No value filed.    Anesthesia Type:  General, ETT, Periph. Nerve Block for postop pain    Note:  Anesthesia Post Evaluation    Patient location during evaluation: PACU  Patient participation: Able to fully participate in evaluation  Level of consciousness: awake and alert  Pain management: adequate  Airway patency: patent  Cardiovascular status: acceptable  Respiratory status: acceptable  Hydration status: acceptable  PONV: controlled     Anesthetic complications: None          Last vitals:  Vitals:    05/10/17 1845 05/10/17 1900 05/10/17 1915   BP: 102/69 (!) 126/91 135/57   Pulse:      Resp: 16 16 16   Temp:   36.3  C (97.4  F)   SpO2: 100% 100% 98%         Electronically Signed By: Sweta Ingram MD  May 10, 2017  7:29 PM

## 2017-05-11 NOTE — PLAN OF CARE
Problem: Goal Outcome Summary  Goal: Goal Outcome Summary     SLP 6B: Clinical swallow evaluation completed per MD order. Pt presents with mild oropharyngeal dysphagia in the setting of generalized weakness. Pt demonstrated adequate tolerance of all textures presented, no s/s of aspiration observed. Pt endorsed sensation of pharyngeal retention x2; once with medications and once with puree trial. Successfully cleared per pt report with thin liquids and chin tuck. No further sensation of retention on additional trials. Recommend regular diet and thin liquids. Encouraged small meal portions, often throughout the day in order to reduce respiratory demands during PO. Pt already implementing safe swallow strategies adequately, but may benefit from continued encouragement/reminders to implement them. SLP to follow for po tolerance and education.

## 2017-05-11 NOTE — PROVIDER NOTIFICATION
Patient and  reported that they checked patient's blood sugar on her home meter and thought it was 300.  Patient gave herself 7 units of insulin for this value.  20 minutes later patient and  bolused 5.6 units for her meal but it is unclear what she actually ate and if they counted the carbs right.  When checked on hospital glucometer blood sugar was 156.  Called endocrine team.  Per MD, will come speak with patient and discontinue ambulatory insulin pump and switch to either subcutaneous or insulin drip.  Check blood sugar q30min until blood sugar stabilizes.  Initiate hypoglycemia protocol if indicated.

## 2017-05-11 NOTE — PLAN OF CARE
Problem: Goal Outcome Summary  Goal: Goal Outcome Summary  Transfer  Transferred from: PACU  Via: stretcher  Reason for transfer:Pt appropriate for 6B- improved/worsened patient condition  Family: Aware of transfer  Belongings: Received with pt  Chart: Received with pt  Medications: Meds received from old unit with pt  2 RN Skin Assessment Completed By:  Mary Lou CAMPOS RN and Emiliano NOLAND RN  Report received from: QUIQUE Mishra  Pt status: stable

## 2017-05-11 NOTE — PROVIDER NOTIFICATION
Paged Surgery cross cover about BG continuing to sustain high 200-300's not seeming to be affected by insulin. pt requests to wear home pump and administer own insulin accordingly. Surgery cross cover aware and states this will be fine. Pt's insulin pump not working correctly, multiple BG in 300's with frequent BG ordered, MD aware of the situation. Requests to continue to closely monitor BG.

## 2017-05-11 NOTE — PHARMACY-CONSULT NOTE
Patient is being treated for hyperkalemia. A pharmacy consult was initiated to review the patient's medication list for possible causes of hyperkalemia.  No medications on this patient's profile are likely to have the side effect of hyperkalemia.     Continue current medication regimen.    Christopher Mckinnon, VitoD

## 2017-05-11 NOTE — PLAN OF CARE
Problem: Goal Outcome Summary  Goal: Goal Outcome Summary  Outcome: Improving  Afebrile, VSS.  Chest tube with large air leak but unchanged, team aware.  Chest tube to suction.  Dilaudid PCA helping to control pain but patient states it makes her feel loopy.  Attempted ambulatory insulin pump but patient and  unable to manage this today (see note) so ambulatory pump removed and insulin drip and carb coverage with meals started.  Hall in place.  One dose Lasix this AM, PM dose held due to rise in creatinine, team aware.  Passing gas.  Continue to monitor closely.

## 2017-05-11 NOTE — CONSULTS
"Ambulatory Insulin Pump Assessment  Received consult request to see this 71 year old female for ambulatory insulin pump assessment.  Patient with history of Type 1 diabetes, stage 4 CKD, admitted following Flexible bronchoscopy, cervical mediastinoscopy and lymph node biopsies followed by right video-assisted thoracoscopic surgery, right lower lobectomy, right middle lobe wedge resection and mediastinal lymph node dissection.     Patient wears a Medtronic insulin pump, with insulin aspart (NovoLog).  She uses the Quik-set infusion sets.  Pump settings are:  Basal rates:  #1:  0.5 units/hr from 1134-9021  #2:  1.0 units/hr from 6320-4686  #3:  1.75 units/hr from 2670-6332  #4:   1.35 units/hr from 8440-1309    Bolus settings:  Carb ratios:  1 unit/11 grams CHO from 6496-4136  1 unit/ 10 grams CHO from 0084-8506    Sensitivity/correction factor:  1 unit to lower BG 28 mg/dL  Blood glucose target 120-130 mg/dL  Active insulin time= 3 hours    Patient was on IV insulin infusion for procedure, and she disconnected her pump from insertion site.  Post-procedure, she reconnected her pump, but  noted the tape at infusion set looked \"brown\" instead of \"white\".  Patient also did not directly look at infusion site when reconnecting, so possibly it did not get connected securely.   did bring another infusion set which she inserted.   found the pulled infusion set in the trash and the cannula was bent; advised him the tape looked brown likely to staining from betadine scrub.  This likely is reason glucoses were elevated overnight.  Since infusion set changed, glucoses are improving.    Patient sees Dr. Mason Sprague at the [facility] every 6 months.  Notes her glucose control has historically been excellent, with A1c in 6.4-6.5% range.  States now with her kidney disease she has been advised to relax her control, with a goal of 7.0% for her A1c.    Patient oriented and oriented.  Able to " manage pump at this point.  Will bring her another infusion set as back-up.    Ashli Santos MS RN CDE CDTC  [phone]

## 2017-05-11 NOTE — PROGRESS NOTES
REGIONAL ANESTHESIA PAIN SERVICE EPIDURAL NOTE  SUBJECTIVE:   Interval History: Pt reports minimal postop pain, and pain well controlled with epidural and PCA Dilaudid.  Patient denies any weakness, paresthesias, circumoral numbness, metallic taste or tinnitus.   Patient is currently tolerating clear liquid diet without  nausea.       Clinically Aligned Pain Assessment (CAPA):  Comfort (How is your pain?): Tolerable with discomfort  Change in Pain (Since your last medication/intervention?): About the same  Pain Control (How are your pain treatments working?):  Partially effective pain control  Functioning (Are you able to do activities to get better?) : Can do most things, but pain gets in the way of some          Anticoagulation:    heparin sodium PF injection 5,000 Units 5,000 Units, SC, Q8H Ordered       OBJECTIVE:  Diagnostics:  CBC RESULTS:   Recent Labs   Lab Test  05/11/17   0544   WBC  17.5*   RBC  3.39*   HGB  10.1*   HCT  30.8*   MCV  91   MCH  29.8   MCHC  32.8   RDW  13.4   PLT  194       Lab Results   Component Value Date    INR 1.06 04/18/2017    INR 1.08 04/04/2017    INR 0.99 01/13/2017       Vitals:    Temp:  [96.8  F (36  C)-98.4  F (36.9  C)] 97.6  F (36.4  C)  Heart Rate:  [56-76] 58  Resp:  [16] 16  BP: (102-149)/(36-91) 118/40  MAP:  [58 mmHg-88 mmHg] 58 mmHg  Arterial Line BP: (109-147)/(35-52) 109/36  SpO2:  [94 %-100 %] 95 %    Exam:    Strength 5/5 and symmetric grossly in bilateral LE   Epidural site with loosened dressing changed, no tenderness, erythema, heme, edema at insertion site      ASSESSMENT/PLAN:    Yue Valenzuela is a 71 year old female POD #1 s/p BRONCHOSCOPY FLEXIBLE MEDIASTINOSCOPY  THORACOSCOPIC RESECTION LUNG THORACOSCOPIC EXCISE NODE MEDIASTINAL and placement of T5-6 epidural for analgesia.  Pt receiving adequate analgesia with epidural infusion Bupivacaine 0.125% at 8mL/hour and PCA Dilaudid doses available PRN.  Pt has not been out of bed yet.  No weakness or  paresthesias, adequate sensory block.  No evidence of adverse side effects related to local anesthetic.    - continue current epidural Bupivacaine infusion at 8mL/hour   - will continue to follow and adjust as needed  - discussed plan with attending anesthesiologist         KATHERYN Huang [facility]  Regional Anesthesia Pain Service  5/11/2017 11:25 AM    24 hour Job Code Pager.  For in-house use only.     Dial * * [phone] and  [place]:  [phone]  [place]: [phone]  Peds:  [phone]  Then enter call-back number  May text page using [employer], but NOT [employer].

## 2017-05-11 NOTE — PROGRESS NOTES
Care Coordinator Progress Note     Admission Date/Time:  5/10/2017  Attending MD:  Glenna Hall MD     Data  Chart reviewed, discussed with interdisciplinary team.   Patient was admitted for: Lung cancer    Concerns with insurance coverage for discharge needs: None identified  Current Living Situation: Patient lives with   Support System: Oscar is Primary Caregiver  Services Involved: none  Transportation:   Barriers to Discharge: None identified         Assessment  Pt with Right lower  Lobe lung cancer, s/p right lower lobectomy, middle lobe wedge resection and mediastinal lymph node dissection 5/1017, anticipated discharge 3-4 days.  I have met with Pt and , Oscar to introduce myself and care coordinator role. Pt was independent prior to admission, living with her  in [place]. Pt has a chest tube and PCA,  states he predicts Pt will be here a week. Pts  is available 24/7 and can assist her as needed post discharge. Home Care follow up discussed which Pt does not anticipate she will need.  CC will follow.     Plan  Anticipated Discharge Date:  TBD  Anticipated Discharge Plan:  D/C to home with Outpt follow up    Ginette Becker RN   6B care coordinator #[phone]

## 2017-05-11 NOTE — PROVIDER NOTIFICATION
MD notified of critical lab value: Potassium back at 6.2.  D10 1/2NS plus K DCd, replaced with D5 1/2NS. EKG ordered.

## 2017-05-11 NOTE — PROGRESS NOTES
"Thoracic Surgery Daily Progress Note  Yue Valenzuela  05/11/2017    Subjective:   Hyperkalemia overnight, improving this morning.   Poor glucose control- patient was managing with own insulin pump though reportedly there were some issues.   Otherwise, pain is well controlled. No chest pain/chest pressure or shortness of breath. No nausea/emesis    Objective:    /42  Pulse 65  Temp 97.7  F (36.5  C) (Oral)  Resp 16  Ht 1.626 m (5' 4\")  Wt 68 kg (149 lb 14.6 oz)  SpO2 99%  BMI 25.73 kg/m2  Laying comfortably in bed in no acute distress  Non-labored breathing on nasal cannula  Regular rate and rhythm  Abdomen soft, nontender and not distended  Incisions c/d/i.   Extremities warm, well perfused and not edematous.   CT in place with serosanguinous output- continuous air leak.     Ins/Outs:   I/O last 3 completed shifts:  In: 1953.91 [I.V.:1953.91]  Out: 1475 [Urine:825; Blood:100; Chest Tube:550]    Labs:   BS 200s-300s  K 5.7 (6.2)  WBC 17.5, Hgb 10.1 Plt 194    Recent Imaging:   Post op CXR reviewed, AM pending    Assessment:  Yue Valenzuela is a 71 year old female now post op day 1 from a right VATS, lower lobectomy and middle lobe wedge.       Plan:       Neuro: Pain control with epidural, dPCA, acetaminophen    Cardiovascular/Heme: Hemodynamically stable. Resume home coreg today. Will add on remainder BP meds as pressure tolerates.     Respiratory/Pulm: Aggressive pulmonary toilet. Continue nebs. Continue CT to suction.     FEN/Gastrointestinal: Continue NPO this morning. Bedside swallow eval given aspiration risk. If passes, can start on clear liquid diet.     Renal/Genitourinary: Continue swift this morning. Lasix 20mg x1, follow up response. CKD, hyperkalemia overnight. Repeat afternoon BMP.     Infectious: Perioperative antibiotics completed.     Endo: Diabetes, poor glycemic control post-op. Will consult endocrinology for assistance with management.     Prophy: Heparin for DVT prophylaxis, " protonix for GI prophylaxis    Activity: Up to chair, out of bed, increase activity.     Dispo: 6B, pending acute recovery from surgery    Patient was seen and discussed with Dr. Diaz, Thoracic Surgery Fellow, who agrees with the assessment and plan and will discuss with staff.     Evelyn Liriano MD  General Surgery  Pager: [phone]

## 2017-05-12 ENCOUNTER — APPOINTMENT (OUTPATIENT)
Dept: OCCUPATIONAL THERAPY | Facility: CLINIC | Age: 72
DRG: 163 | End: 2017-05-12
Attending: STUDENT IN AN ORGANIZED HEALTH CARE EDUCATION/TRAINING PROGRAM
Payer: MEDICARE

## 2017-05-12 ENCOUNTER — APPOINTMENT (OUTPATIENT)
Dept: ULTRASOUND IMAGING | Facility: CLINIC | Age: 72
DRG: 163 | End: 2017-05-12
Attending: STUDENT IN AN ORGANIZED HEALTH CARE EDUCATION/TRAINING PROGRAM
Payer: MEDICARE

## 2017-05-12 ENCOUNTER — APPOINTMENT (OUTPATIENT)
Dept: GENERAL RADIOLOGY | Facility: CLINIC | Age: 72
DRG: 163 | End: 2017-05-12
Attending: STUDENT IN AN ORGANIZED HEALTH CARE EDUCATION/TRAINING PROGRAM
Payer: MEDICARE

## 2017-05-12 LAB
ALBUMIN UR-MCNC: NEGATIVE MG/DL
ANION GAP SERPL CALCULATED.3IONS-SCNC: 10 MMOL/L (ref 3–14)
ANION GAP SERPL CALCULATED.3IONS-SCNC: 11 MMOL/L (ref 3–14)
ANION GAP SERPL CALCULATED.3IONS-SCNC: 9 MMOL/L (ref 3–14)
APPEARANCE UR: CLEAR
BILIRUB UR QL STRIP: NEGATIVE
BUN SERPL-MCNC: 64 MG/DL (ref 7–30)
BUN SERPL-MCNC: 65 MG/DL (ref 7–30)
BUN SERPL-MCNC: 66 MG/DL (ref 7–30)
CALCIUM SERPL-MCNC: 7.7 MG/DL (ref 8.5–10.1)
CALCIUM SERPL-MCNC: 7.8 MG/DL (ref 8.5–10.1)
CALCIUM SERPL-MCNC: 7.8 MG/DL (ref 8.5–10.1)
CHLORIDE SERPL-SCNC: 100 MMOL/L (ref 94–109)
CHLORIDE SERPL-SCNC: 100 MMOL/L (ref 94–109)
CHLORIDE SERPL-SCNC: 105 MMOL/L (ref 94–109)
CO2 SERPL-SCNC: 21 MMOL/L (ref 20–32)
CO2 SERPL-SCNC: 22 MMOL/L (ref 20–32)
CO2 SERPL-SCNC: 23 MMOL/L (ref 20–32)
COLOR UR AUTO: ABNORMAL
CREAT SERPL-MCNC: 2.2 MG/DL (ref 0.52–1.04)
CREAT SERPL-MCNC: 2.37 MG/DL (ref 0.52–1.04)
CREAT SERPL-MCNC: 2.39 MG/DL (ref 0.52–1.04)
ERYTHROCYTE [DISTWIDTH] IN BLOOD BY AUTOMATED COUNT: 14.2 % (ref 10–15)
GFR SERPL CREATININE-BSD FRML MDRD: 20 ML/MIN/1.7M2
GFR SERPL CREATININE-BSD FRML MDRD: 20 ML/MIN/1.7M2
GFR SERPL CREATININE-BSD FRML MDRD: 22 ML/MIN/1.7M2
GLUCOSE BLDC GLUCOMTR-MCNC: 130 MG/DL (ref 70–99)
GLUCOSE BLDC GLUCOMTR-MCNC: 136 MG/DL (ref 70–99)
GLUCOSE BLDC GLUCOMTR-MCNC: 138 MG/DL (ref 70–99)
GLUCOSE BLDC GLUCOMTR-MCNC: 146 MG/DL (ref 70–99)
GLUCOSE BLDC GLUCOMTR-MCNC: 177 MG/DL (ref 70–99)
GLUCOSE BLDC GLUCOMTR-MCNC: 181 MG/DL (ref 70–99)
GLUCOSE BLDC GLUCOMTR-MCNC: 184 MG/DL (ref 70–99)
GLUCOSE BLDC GLUCOMTR-MCNC: 186 MG/DL (ref 70–99)
GLUCOSE BLDC GLUCOMTR-MCNC: 187 MG/DL (ref 70–99)
GLUCOSE BLDC GLUCOMTR-MCNC: 188 MG/DL (ref 70–99)
GLUCOSE BLDC GLUCOMTR-MCNC: 188 MG/DL (ref 70–99)
GLUCOSE BLDC GLUCOMTR-MCNC: 193 MG/DL (ref 70–99)
GLUCOSE BLDC GLUCOMTR-MCNC: 197 MG/DL (ref 70–99)
GLUCOSE BLDC GLUCOMTR-MCNC: 199 MG/DL (ref 70–99)
GLUCOSE BLDC GLUCOMTR-MCNC: 202 MG/DL (ref 70–99)
GLUCOSE BLDC GLUCOMTR-MCNC: 206 MG/DL (ref 70–99)
GLUCOSE BLDC GLUCOMTR-MCNC: 207 MG/DL (ref 70–99)
GLUCOSE BLDC GLUCOMTR-MCNC: 212 MG/DL (ref 70–99)
GLUCOSE BLDC GLUCOMTR-MCNC: 213 MG/DL (ref 70–99)
GLUCOSE BLDC GLUCOMTR-MCNC: 214 MG/DL (ref 70–99)
GLUCOSE BLDC GLUCOMTR-MCNC: 224 MG/DL (ref 70–99)
GLUCOSE BLDC GLUCOMTR-MCNC: 236 MG/DL (ref 70–99)
GLUCOSE BLDC GLUCOMTR-MCNC: 72 MG/DL (ref 70–99)
GLUCOSE BLDC GLUCOMTR-MCNC: 74 MG/DL (ref 70–99)
GLUCOSE BLDC GLUCOMTR-MCNC: 80 MG/DL (ref 70–99)
GLUCOSE SERPL-MCNC: 177 MG/DL (ref 70–99)
GLUCOSE SERPL-MCNC: 202 MG/DL (ref 70–99)
GLUCOSE SERPL-MCNC: 62 MG/DL (ref 70–99)
GLUCOSE UR STRIP-MCNC: 70 MG/DL
HCT VFR BLD AUTO: 27.9 % (ref 35–47)
HGB BLD-MCNC: 8.9 G/DL (ref 11.7–15.7)
HGB UR QL STRIP: NEGATIVE
KETONES UR STRIP-MCNC: NEGATIVE MG/DL
LEUKOCYTE ESTERASE UR QL STRIP: NEGATIVE
MAGNESIUM SERPL-MCNC: 2.1 MG/DL (ref 1.6–2.3)
MAGNESIUM SERPL-MCNC: 2.2 MG/DL (ref 1.6–2.3)
MAGNESIUM SERPL-MCNC: 2.2 MG/DL (ref 1.6–2.3)
MCH RBC QN AUTO: 29.4 PG (ref 26.5–33)
MCHC RBC AUTO-ENTMCNC: 31.9 G/DL (ref 31.5–36.5)
MCV RBC AUTO: 92 FL (ref 78–100)
NITRATE UR QL: NEGATIVE
PH UR STRIP: 5 PH (ref 5–7)
PHOSPHATE SERPL-MCNC: 4.8 MG/DL (ref 2.5–4.5)
PHOSPHATE SERPL-MCNC: 4.9 MG/DL (ref 2.5–4.5)
PHOSPHATE SERPL-MCNC: 5.2 MG/DL (ref 2.5–4.5)
PLATELET # BLD AUTO: 167 10E9/L (ref 150–450)
POTASSIUM SERPL-SCNC: 4.6 MMOL/L (ref 3.4–5.3)
POTASSIUM SERPL-SCNC: 5 MMOL/L (ref 3.4–5.3)
POTASSIUM SERPL-SCNC: 5.2 MMOL/L (ref 3.4–5.3)
RBC # BLD AUTO: 3.03 10E12/L (ref 3.8–5.2)
RBC #/AREA URNS AUTO: <1 /HPF (ref 0–2)
SODIUM SERPL-SCNC: 130 MMOL/L (ref 133–144)
SODIUM SERPL-SCNC: 134 MMOL/L (ref 133–144)
SODIUM SERPL-SCNC: 138 MMOL/L (ref 133–144)
SP GR UR STRIP: 1.01 (ref 1–1.03)
SQUAMOUS #/AREA URNS AUTO: <1 /HPF (ref 0–1)
URN SPEC COLLECT METH UR: ABNORMAL
UROBILINOGEN UR STRIP-MCNC: NORMAL MG/DL (ref 0–2)
WBC # BLD AUTO: 12.9 10E9/L (ref 4–11)
WBC #/AREA URNS AUTO: 1 /HPF (ref 0–2)

## 2017-05-12 PROCEDURE — 84100 ASSAY OF PHOSPHORUS: CPT | Performed by: STUDENT IN AN ORGANIZED HEALTH CARE EDUCATION/TRAINING PROGRAM

## 2017-05-12 PROCEDURE — 25000132 ZZH RX MED GY IP 250 OP 250 PS 637: Mod: GY | Performed by: THORACIC SURGERY (CARDIOTHORACIC VASCULAR SURGERY)

## 2017-05-12 PROCEDURE — 93005 ELECTROCARDIOGRAM TRACING: CPT

## 2017-05-12 PROCEDURE — 25000125 ZZHC RX 250: Performed by: PHYSICIAN ASSISTANT

## 2017-05-12 PROCEDURE — 36415 COLL VENOUS BLD VENIPUNCTURE: CPT | Performed by: STUDENT IN AN ORGANIZED HEALTH CARE EDUCATION/TRAINING PROGRAM

## 2017-05-12 PROCEDURE — 85027 COMPLETE CBC AUTOMATED: CPT | Performed by: STUDENT IN AN ORGANIZED HEALTH CARE EDUCATION/TRAINING PROGRAM

## 2017-05-12 PROCEDURE — 84300 ASSAY OF URINE SODIUM: CPT | Performed by: STUDENT IN AN ORGANIZED HEALTH CARE EDUCATION/TRAINING PROGRAM

## 2017-05-12 PROCEDURE — A9270 NON-COVERED ITEM OR SERVICE: HCPCS | Mod: GY | Performed by: SURGERY

## 2017-05-12 PROCEDURE — A9270 NON-COVERED ITEM OR SERVICE: HCPCS | Mod: GY | Performed by: STUDENT IN AN ORGANIZED HEALTH CARE EDUCATION/TRAINING PROGRAM

## 2017-05-12 PROCEDURE — 25000125 ZZHC RX 250: Performed by: NURSE PRACTITIONER

## 2017-05-12 PROCEDURE — A9270 NON-COVERED ITEM OR SERVICE: HCPCS | Mod: GY | Performed by: THORACIC SURGERY (CARDIOTHORACIC VASCULAR SURGERY)

## 2017-05-12 PROCEDURE — 25000128 H RX IP 250 OP 636: Performed by: STUDENT IN AN ORGANIZED HEALTH CARE EDUCATION/TRAINING PROGRAM

## 2017-05-12 PROCEDURE — 25000132 ZZH RX MED GY IP 250 OP 250 PS 637: Mod: GY | Performed by: STUDENT IN AN ORGANIZED HEALTH CARE EDUCATION/TRAINING PROGRAM

## 2017-05-12 PROCEDURE — 80048 BASIC METABOLIC PNL TOTAL CA: CPT | Performed by: STUDENT IN AN ORGANIZED HEALTH CARE EDUCATION/TRAINING PROGRAM

## 2017-05-12 PROCEDURE — 25000132 ZZH RX MED GY IP 250 OP 250 PS 637: Mod: GY | Performed by: PHYSICIAN ASSISTANT

## 2017-05-12 PROCEDURE — 83935 ASSAY OF URINE OSMOLALITY: CPT | Performed by: STUDENT IN AN ORGANIZED HEALTH CARE EDUCATION/TRAINING PROGRAM

## 2017-05-12 PROCEDURE — 71020 XR CHEST 2 VW: CPT | Mod: 76

## 2017-05-12 PROCEDURE — 83735 ASSAY OF MAGNESIUM: CPT | Performed by: STUDENT IN AN ORGANIZED HEALTH CARE EDUCATION/TRAINING PROGRAM

## 2017-05-12 PROCEDURE — 12000006 ZZH R&B IMCU INTERMEDIATE UMMC

## 2017-05-12 PROCEDURE — 76770 US EXAM ABDO BACK WALL COMP: CPT

## 2017-05-12 PROCEDURE — 71020 XR CHEST 2 VW: CPT

## 2017-05-12 PROCEDURE — 93010 ELECTROCARDIOGRAM REPORT: CPT | Performed by: INTERNAL MEDICINE

## 2017-05-12 PROCEDURE — 25000128 H RX IP 250 OP 636: Performed by: THORACIC SURGERY (CARDIOTHORACIC VASCULAR SURGERY)

## 2017-05-12 PROCEDURE — 94640 AIRWAY INHALATION TREATMENT: CPT | Mod: 76

## 2017-05-12 PROCEDURE — A9270 NON-COVERED ITEM OR SERVICE: HCPCS | Mod: GY | Performed by: PHYSICIAN ASSISTANT

## 2017-05-12 PROCEDURE — 81001 URINALYSIS AUTO W/SCOPE: CPT | Performed by: STUDENT IN AN ORGANIZED HEALTH CARE EDUCATION/TRAINING PROGRAM

## 2017-05-12 PROCEDURE — 83930 ASSAY OF BLOOD OSMOLALITY: CPT | Performed by: STUDENT IN AN ORGANIZED HEALTH CARE EDUCATION/TRAINING PROGRAM

## 2017-05-12 PROCEDURE — 00000146 ZZHCL STATISTIC GLUCOSE BY METER IP

## 2017-05-12 PROCEDURE — 25000132 ZZH RX MED GY IP 250 OP 250 PS 637: Mod: GY | Performed by: SURGERY

## 2017-05-12 RX ORDER — SODIUM CHLORIDE FOR INHALATION 3 %
3 VIAL, NEBULIZER (ML) INHALATION
Status: DISCONTINUED | OUTPATIENT
Start: 2017-05-12 | End: 2017-05-15

## 2017-05-12 RX ORDER — POLYETHYLENE GLYCOL 3350 17 G/17G
17 POWDER, FOR SOLUTION ORAL DAILY
Status: DISCONTINUED | OUTPATIENT
Start: 2017-05-12 | End: 2017-05-12

## 2017-05-12 RX ORDER — BISACODYL 10 MG
10 SUPPOSITORY, RECTAL RECTAL DAILY
Status: DISCONTINUED | OUTPATIENT
Start: 2017-05-12 | End: 2017-05-26 | Stop reason: HOSPADM

## 2017-05-12 RX ORDER — POLYETHYLENE GLYCOL 3350 17 G/17G
17 POWDER, FOR SOLUTION ORAL 2 TIMES DAILY
Status: DISCONTINUED | OUTPATIENT
Start: 2017-05-12 | End: 2017-05-23 | Stop reason: DRUGHIGH

## 2017-05-12 RX ORDER — FUROSEMIDE 10 MG/ML
20 INJECTION INTRAMUSCULAR; INTRAVENOUS ONCE
Status: COMPLETED | OUTPATIENT
Start: 2017-05-12 | End: 2017-05-12

## 2017-05-12 RX ORDER — LANOLIN ALCOHOL/MO/W.PET/CERES
3 CREAM (GRAM) TOPICAL AT BEDTIME
Status: DISCONTINUED | OUTPATIENT
Start: 2017-05-12 | End: 2017-05-26 | Stop reason: HOSPADM

## 2017-05-12 RX ORDER — AMOXICILLIN 250 MG
2 CAPSULE ORAL 2 TIMES DAILY
Status: DISCONTINUED | OUTPATIENT
Start: 2017-05-12 | End: 2017-05-26 | Stop reason: HOSPADM

## 2017-05-12 RX ORDER — CALCIUM CARBONATE 500 MG/1
500 TABLET, CHEWABLE ORAL DAILY PRN
Status: DISCONTINUED | OUTPATIENT
Start: 2017-05-12 | End: 2017-05-21

## 2017-05-12 RX ORDER — LANOLIN ALCOHOL/MO/W.PET/CERES
3 CREAM (GRAM) TOPICAL AT BEDTIME
Status: DISCONTINUED | OUTPATIENT
Start: 2017-05-12 | End: 2017-05-12

## 2017-05-12 RX ORDER — ALBUTEROL SULFATE 0.83 MG/ML
2.5 SOLUTION RESPIRATORY (INHALATION)
Status: DISCONTINUED | OUTPATIENT
Start: 2017-05-12 | End: 2017-05-15

## 2017-05-12 RX ORDER — ACETAMINOPHEN 325 MG/1
975 TABLET ORAL EVERY 8 HOURS
Status: DISCONTINUED | OUTPATIENT
Start: 2017-05-12 | End: 2017-05-26 | Stop reason: HOSPADM

## 2017-05-12 RX ADMIN — CALCIUM CARBONATE (ANTACID) CHEW TAB 500 MG 500 MG: 500 CHEW TAB at 19:03

## 2017-05-12 RX ADMIN — HEPARIN SODIUM 5000 UNITS: 5000 INJECTION, SOLUTION INTRAVENOUS; SUBCUTANEOUS at 04:14

## 2017-05-12 RX ADMIN — CARVEDILOL 3.12 MG: 3.12 TABLET, FILM COATED ORAL at 20:58

## 2017-05-12 RX ADMIN — ACETAMINOPHEN 975 MG: 325 TABLET, FILM COATED ORAL at 08:20

## 2017-05-12 RX ADMIN — ASPIRIN 81 MG CHEWABLE TABLET 81 MG: 81 TABLET CHEWABLE at 20:58

## 2017-05-12 RX ADMIN — ACETAMINOPHEN 975 MG: 325 TABLET, FILM COATED ORAL at 00:02

## 2017-05-12 RX ADMIN — MELATONIN TAB 3 MG 3 MG: 3 TAB at 18:32

## 2017-05-12 RX ADMIN — HEPARIN SODIUM 5000 UNITS: 5000 INJECTION, SOLUTION INTRAVENOUS; SUBCUTANEOUS at 11:26

## 2017-05-12 RX ADMIN — PRAVASTATIN SODIUM 40 MG: 40 TABLET ORAL at 20:58

## 2017-05-12 RX ADMIN — CETIRIZINE HYDROCHLORIDE 5 MG: 5 TABLET ORAL at 08:19

## 2017-05-12 RX ADMIN — HUMAN INSULIN 2 UNITS/HR: 100 INJECTION, SOLUTION SUBCUTANEOUS at 15:39

## 2017-05-12 RX ADMIN — ACETAMINOPHEN 975 MG: 325 TABLET, FILM COATED ORAL at 23:27

## 2017-05-12 RX ADMIN — CARVEDILOL 3.12 MG: 3.12 TABLET, FILM COATED ORAL at 14:08

## 2017-05-12 RX ADMIN — ACETAMINOPHEN 975 MG: 325 TABLET, FILM COATED ORAL at 15:39

## 2017-05-12 RX ADMIN — FUROSEMIDE 20 MG: 10 INJECTION, SOLUTION INTRAVENOUS at 10:19

## 2017-05-12 RX ADMIN — SENNOSIDES AND DOCUSATE SODIUM 2 TABLET: 8.6; 5 TABLET ORAL at 08:19

## 2017-05-12 RX ADMIN — SENNOSIDES AND DOCUSATE SODIUM 2 TABLET: 8.6; 5 TABLET ORAL at 20:58

## 2017-05-12 RX ADMIN — ALBUTEROL SULFATE 2.5 MG: 2.5 SOLUTION RESPIRATORY (INHALATION) at 16:15

## 2017-05-12 RX ADMIN — FLUTICASONE FUROATE AND VILANTEROL TRIFENATATE 1 PUFF: 100; 25 POWDER RESPIRATORY (INHALATION) at 08:25

## 2017-05-12 RX ADMIN — SODIUM CHLORIDE SOLN NEBU 3% 3 ML: 3 NEBU SOLN at 16:15

## 2017-05-12 RX ADMIN — PANTOPRAZOLE SODIUM 40 MG: 40 TABLET, DELAYED RELEASE ORAL at 08:24

## 2017-05-12 RX ADMIN — POLYETHYLENE GLYCOL 3350 17 G: 17 POWDER, FOR SOLUTION ORAL at 21:01

## 2017-05-12 RX ADMIN — SODIUM CHLORIDE 500 ML: 9 INJECTION, SOLUTION INTRAVENOUS at 03:14

## 2017-05-12 RX ADMIN — HEPARIN SODIUM 5000 UNITS: 5000 INJECTION, SOLUTION INTRAVENOUS; SUBCUTANEOUS at 21:02

## 2017-05-12 ASSESSMENT — ACTIVITIES OF DAILY LIVING (ADL): PREVIOUS_RESPONSIBILITIES: MEAL PREP;HOUSEKEEPING;LAUNDRY;SHOPPING;MEDICATION MANAGEMENT;FINANCES;DRIVING;WORK

## 2017-05-12 NOTE — PLAN OF CARE
Problem: Goal Outcome Summary  Goal: Goal Outcome Summary  Attempted to see pt for swallow tx in the afternoon- pt and spouse requesting that tx be canceled for the afternoon and reattempted tomorrow-- per pt and spouse it has not been a good day, pt trying to sleep and not interested in trying any po. Will reattempt tomorrow- pt currently remains on regular diet and thin liquids.

## 2017-05-12 NOTE — PROGRESS NOTES
" 05/12/17 1100   Quick Adds   Type of Visit Initial Occupational Therapy Evaluation   Living Environment   Lives With spouse   Living Arrangements house   Number of Stairs to Enter Home 2   Number of Stairs Within Home 0   Transportation Available family or friend will provide   Living Environment Comment  reports they have a bsement but she can stay on one level.    Self-Care   Dominant Hand right   Usual Activity Tolerance good   Current Activity Tolerance poor   Regular Exercise yes   Activity/Exercise Type walking   Exercise Amount/Frequency daily   Equipment Currently Used at Home none   Activity/Exercise/Self-Care Comment Per  they day before admission pt walked just under a mile without fatigue.    Functional Level Prior   Ambulation 0-->independent   Transferring 0-->independent   Toileting 0-->independent   Bathing 0-->independent   Dressing 0-->independent   Eating 0-->independent   Communication 0-->understands/communicates without difficulty   Swallowing 0-->swallows foods/liquids without difficulty   Cognition 0 - no cognition issues reported   Fall history within last six months no   General Information   Onset of Illness/Injury or Date of Surgery - Date 05/12/17   Referring Physician Dr Diaz   Patient/Family Goals Statement none stated   Additional Occupational Profile Info/Pertinent History of Current Problem 71 year old female now POD 2 from a right VATS, lower lobectomy and middle lobe wedge.    Precautions/Limitations (VATS thoracotomy x2 weeks)   General Observations Pt sitting EOB,  present. Pt c/o numb bottom and pain in back.  Feeling \"Im worse today, I think Im going downhill\"   General Info Comments activity: ambulate TID   Cognitive Status Examination   Orientation orientation to person, place and time   Level of Consciousness lethargic/somnolent  (falling asleep when not engaged)   Able to Follow Commands mild impairment   Personal Safety (Cognitive) mild impairment "   Cognitive Comment Pt BNL, with PCA pain pump.    Visual Perception   Visual Perception Comments reports intact   Pain Assessment   Patient Currently in Pain Yes, see Vital Sign flowsheet   Integumentary/Edema   Integumentary/Edema no deficits were identifed   Posture   Posture Comments pt with poor posture sitting and standing, able to get to fully upright with cue but hunching forward   Range of Motion (ROM)   ROM Comment WFL, some limits due to pain   Strength   Strength Comments NT   Coordination   Gross Motor Coordination BNL   Fine Motor Coordination BNL   Mobility   Bed Mobility Comments min A    Transfer Skills   Transfer Comments min A   Transfer Skill: Bed to Chair/Chair to Bed   Level of [place]: Bed to Chair minimum assist (75% patients effort)   Physical Assist/Nonphysical Assist: Bed to Chair 1 person + 1 person to manage equipment   Transfer Skill: Sit to Stand   Level of [place]: Sit/Stand contact guard   Toilet Transfer   Toilet Transfer Comments Unable to ambulate to bathroom   Balance   Balance Comments poor standing balance, below baseline   Upper Body Dressing   Level of [place]: Dress Upper Body minimum assist (75% patients effort)   Lower Body Dressing   Level of [place]: Dress Lower Body maximum assist (25% patients effort)   Grooming   Level of [place]: Grooming minimum assist (75% patients effort)   Instrumental Activities of Daily Living (IADL)   Previous Responsibilities meal prep;housekeeping;laundry;shopping;medication management;finances;driving;work   Activities of Daily Living Analysis   Impairments Contributing to Impaired Activities of Daily Living balance impaired;cognition impaired;coordination impaired;pain;post surgical precautions;strength decreased   General Therapy Interventions   Planned Therapy Interventions ADL retraining;IADL retraining;strengthening;progressive activity/exercise;home program guidelines;cognition   Clinical Impression   Criteria  "for Skilled Therapeutic Interventions Met yes, treatment indicated   OT Diagnosis post op thoracic sx   Influenced by the following impairments cognitive decline, pain, posture, weakness   Assessment of Occupational Performance 3-5 Performance Deficits   Identified Performance Deficits dressing, bathroom transfers, home management, cognition   Clinical Decision Making (Complexity) Low complexity   Therapy Frequency daily   Predicted Duration of Therapy Intervention (days/wks) 1 week   Anticipated Discharge Disposition Transitional Care Facility   Risks and Benefits of Treatment have been explained. Yes   Patient, Family & other staff in agreement with plan of care Yes   Clinical Impression Comments Pending progress may be able to dc home with .    [facility] [employer] \"6 Clicks\"   2016, Trustees of [facility], under license to [employer].  All rights reserved.   6 Clicks Short Forms Daily Activity Inpatient Short Form   [facility]  \"6 Clicks\" Daily Activity Inpatient Short Form   1. Putting on and taking off regular lower body clothing? 2 - A Lot   2. Bathing (including washing, rinsing, drying)? 3 - A Little   3. Toileting, which includes using toilet, bedpan or urinal? 1 - Total   4. Putting on and taking off regular upper body clothing? 3 - A Little   5. Taking care of personal grooming such as brushing teeth? 3 - A Little   6. Eating meals? 3 - A Little   Daily Activity Raw Score (Score out of 24.Lower scores equate to lower levels of function) 15   Total Evaluation Time   Total Evaluation Time (Minutes) 4     "

## 2017-05-12 NOTE — PROVIDER NOTIFICATION
Pt reporting seeing black spots floating. Grasping at them and tracking them. Spinal block dressing remains intact, no new drainage, nothing new observed at site. Anesthesia and Surgery team informed. Will continue to monitor.

## 2017-05-12 NOTE — PROGRESS NOTES
"SPIRITUAL HEALTH SERVICES  SPIRITUAL ASSESSMENT Progress Note  [facility] 6B [facility]    Follow-up visit with Yue via connection with her through outpatient oncology. Short conversation as she, along with her  (Oscar), talked about how \"it's not going so good\" in her surgery recovery. Yue indicated openness to follow-up chaplaincy care tomorrow. I'll inform unit chaplain resident of care provided.    Phil Blank M.Div., [facility]  Staff   Pager [phone]     "

## 2017-05-12 NOTE — PLAN OF CARE
Problem: Goal Outcome Summary  Goal: Goal Outcome Summary  Outcome: No Change  Afebrile, VSS. Chest tube with large air leak but unchanged. Chest tube to suction -20. Dilaudid PCA helping to control pain but patient needs frequent reminders to use it. Insulin pump off as last BG 70's to 90's. Hall in place. Continue to monitor.

## 2017-05-12 NOTE — PROGRESS NOTES
Diabetes Consult Daily  Progress Note          Assessment/Plan:   Yue Valenzuela is a 71 year old woman with a history of DM I, CKD IV, factor V Leiden mutation, HTN, HLD, CAD s/p stent, PVD, carotid artery stenosis, asthma, and newly diagnosed lung adenocarcinoma , Right lower lobectomy 05/11    Insulin needs have been higher than expected while on IV insulin.  She is having minimal po intake.    Plan:  -continue IV insulin infusion, once rates are coming down and/or more stable we will transition to SC insulin.  -continue meal aspart 1unit/11g CHO from MN-1530, 1unit/10g CHO from 0112-0177      We will continue to follow.           Interval History:   The last 24 hours progress and nursing notes reviewed.  Yue requested she be left to sleep when I visited.  I spoke with her .  He reports that there have been 2 incidences now (1 on their glucometer and 1 on hospital glucometer) where he glucose was significantly different minutes apart.  Today she is requiring about double the insulin she usually requires on her ambulatory pump, which her  says is typical when she is sick.  She is very tired.  Appetite is poor to fair.          Recent Labs  Lab 05/12/17  1411 05/12/17  1311 05/12/17  1218 05/12/17  1113 05/12/17  1024 05/12/17  0922  05/12/17  0712  05/11/17  1611  05/11/17  0439  05/10/17  1850  05/10/17  1530 05/10/17  1420   GLC  --   --   --   --   --   --   --  62*  --  152*  --  302*  --  155*  --  142* 198*   * 224* 206* 207* 193* 213*  < >  --   < >  --   < >  --   < >  --   < >  --   --    < > = values in this interval not displayed.            Review of Systems:   See interval hx          Medications:       Active Diet Order      Regular Diet Adult     Physical Exam:  Gen: Sleeping, NAD>  is at bedside.  HEENT: NC/AT, mucous membranes are moist  Resp: Unlabored  Neuro:sleeping  /53 (BP Location: Left arm)  Pulse 65  Temp 97.5  F (36.4  C)  "(Oral)  Resp 16  Ht 1.626 m (5' 4\")  Wt 69.7 kg (153 lb 10.6 oz)  SpO2 97%  BMI 26.38 kg/m2           Data:     Lab Results   Component Value Date    A1C 7.0 05/03/2017    A1C 6.0 08/04/2014              CBC RESULTS:   Recent Labs   Lab Test  05/12/17   0712   WBC  12.9*   RBC  3.03*   HGB  8.9*   HCT  27.9*   MCV  92   MCH  29.4   MCHC  31.9   RDW  14.2   PLT  167     Recent Labs   Lab Test  05/12/17   0712  05/11/17   1611   NA  138  137   POTASSIUM  4.6  5.1   CHLORIDE  105  107   CO2  23  18*   ANIONGAP  10  13   GLC  62*  152*   BUN  64*  62*   CR  2.20*  2.33*   ZAHEER  7.8*  8.4*     Liver Function Studies -   Recent Labs   Lab Test  08/04/14   0712   PROTTOTAL  7.5   ALBUMIN  4.3   BILITOTAL  0.4   ALKPHOS  79   AST  29   ALT  44       Naty Campo PA-C [phone]  Diabetes Management job code [id number]          "

## 2017-05-12 NOTE — PLAN OF CARE
Problem: Goal Outcome Summary  Goal: Goal Outcome Summary  PT 6B: Physical therapy orders received, per RN pt with run of SVT and not currently appropriate for mobility, PT eval will be rescheduled.

## 2017-05-12 NOTE — PLAN OF CARE
Problem: Goal Outcome Summary  Goal: Goal Outcome Summary  Outcome: Improving  VSS. Irregular rhythm with frequent PACs. Rate controled. Chest incision intact. Chest tube placed back to -20 following xray, +airleak. CT site is CDI. Right lung sounds are coarse. Pt is very anxious and somewhat confused today. Able to answer orientation questions, but responses are delayed and pt is confused as to sequence of events throughout stay. Endorsed seeing black floaters, was observed to be grabbing at them and tracking them. Spinal block in place, dose adjusted by anesthesia following hallucinations. Hall discontinued per orders. Insulin gtt continues, sugars have been 188-213.  at bedside throughout the day. Will continue to monitor.

## 2017-05-12 NOTE — PROGRESS NOTES
REGIONAL ANESTHESIA PAIN SERVICE EPIDURAL NOTE  SUBJECTIVE:   Interval History: Pt sitting on the edge of bed, states she is hungry and wants to get up to sit in chair because her buttocks is numb and painful.  Denies postop surgical incision pain and denies pain at CT insertion site.  CT to waterseal today.  Pt denies any weakness, paresthesias, circumoral numbness, metallic taste or tinnitus.  Pt ambulating with assistance.  Patient is currently tolerating diet as ordered, denies  nausea.         Clinically Aligned Pain Assessment (CAPA):  Comfort (How is your pain?): Comfortably manageable  Change in Pain (Since your last medication/intervention?): About the same  Pain Control (How are your pain treatments working?):  Partially effective pain control  Functioning (Are you able to do activities to get better?) : Can do most things, but pain gets in the way of some         Anticoagulation:   heparin sodium PF injection 5,000 Units 5,000 Units, SC, Q8H Given: 05/12 0414       OBJECTIVE:  Diagnostics:  CBC RESULTS:   Recent Labs   Lab Test  05/12/17   0712   WBC  12.9*   RBC  3.03*   HGB  8.9*   HCT  27.9*   MCV  92   MCH  29.4   MCHC  31.9   RDW  14.2   PLT  167         Lab Results   Component Value Date    INR 1.06 04/18/2017    INR 1.08 04/04/2017    INR 0.99 01/13/2017       Vitals:    Temp:  [97.6  F (36.4  C)-98.8  F (37.1  C)] 97.8  F (36.6  C)  Heart Rate:  [59-68] 63  Resp:  [10-16] 16  BP: (103-138)/(44-51) 103/45  SpO2:  [95 %-98 %] 95 %    Exam:    Strength 5/5 and symmetric grossly in bilateral LE   Epidural site with dressing c/d/i, no tenderness, erythema, heme, edema      ASSESSMENT/PLAN:    Yue Valenzuela is a 71 year old female POD #2 s/p BRONCHOSCOPY FLEXIBLE MEDIASTINOSCOPY THORACOSCOPIC RESECTION LUNG THORACOSCOPIC EXCISE NODE MEDIASTINAL and placement of T5-6 epidural for analgesia.  Postop surgical incision and CT site pain adequately controlled with epidural infusion Bupivacaine 0.125% at 8  mL/hour.  Pt ambulating without difficulty.  No weakness or paresthesias, adequate sensory block.  No evidence of adverse side effects related to local anesthetic.    - continue current epidural infusion at 8mL/hour   - plan to continue epidural a maximum of 5 days or until CT removed, must be a minimum of 4 hrs after last dose of Heparin SC, then can resume heparin 1 hr after catheter removal  - will continue to follow and adjust as needed  - discussed plan with attending anesthesiologist        KATHERYN Huang [facility]  Regional Anesthesia Pain Service  5/12/2017 11:06 AM    24 hour Job Code Pager.  For in-house use only.     Dial * * [phone] and  [place]:  [phone]  [place]: [phone]  Peds:  [phone]  Then enter call-back number  May text page using [employer], but NOT [employer].

## 2017-05-12 NOTE — PLAN OF CARE
"Problem: Goal Outcome Summary  Goal: Goal Outcome Summary  OT 6B Orders received, evaluation complete, treatment initiated.  Pt limited by pain, numbness in bottom, dizziness. Pt completed bed mobility and in room transfer with min A and A to avoid fall due to LOB backwards 3-4x using walker.  Pt with shuffling gait, small steps needing cues for safety with walker and transfer in room.   Educated re precautions post op. Pt reports feeling \"worse today, Im going downhill\".   Rec:  At this time TCU to improve pain management, strength and safety although anticipate with improvement in pain control pt will dc to home w A          "

## 2017-05-12 NOTE — PROGRESS NOTES
"Thoracic Surgery Daily Progress Note  Yue Valenzuela  05/12/2017    Subjective:   No acute events overnight. Hyperkalemia improved this AM, glucose better controlled on insulin gtt per endocrinology. Pain is well controlled. She reports feeling tired.     Objective:    /45 (BP Location: Left arm)  Pulse 65  Temp 97.8  F (36.6  C) (Oral)  Resp 16  Ht 1.626 m (5' 4\")  Wt 69.7 kg (153 lb 10.6 oz)  SpO2 95%  BMI 26.38 kg/m2  NAD, comfortable, responsive  RRR  NLB on RA, CT to suction, air leak noted, serosanguinous output. Incisions C/D/I  Abd soft, NTND  WWP    Ins/Outs:   I/O last 3 completed shifts:  In: 1230 [P.O.:540; I.V.:690]  Out: 1265 [Urine:875; Chest Tube:390]    Labs:   K 4.6  Cr 2.2  WBC 12.9, Hgb 8.9, Plt 167    CXR pending.    Assessment/Plan:  Yue Valenzuela is a 71 year old female now POD 2 from a right VATS, lower lobectomy and middle lobe wedge.     Neuro: Pain control with epidural, dPCA, acetaminophen  CV: Hemodynamically stable. home coreg. Will add on remainder BP meds as pressure tolerates.   Pulm: Aggressive pulmonary toilet. Continue nebs. CT to water seal today, f/u CXR.   FEN/GI: Passed Bedside swallow eval. Advance to regular diet. Trend electrolytes. Bowel regimen.  : DC Hall. Lasix x1, follow up response. trend Cr.   ID: no abx  Endo: Insulin gtt, appreciate endocrinology recs.  PPx: SQH, Protonix  Dispo: 6B      Patient was seen and discussed with Dr. Diaz, Thoracic Surgery Fellow    David Rodgers MD  PGY-1 Thoracic Surgery                    "

## 2017-05-13 ENCOUNTER — APPOINTMENT (OUTPATIENT)
Dept: GENERAL RADIOLOGY | Facility: CLINIC | Age: 72
DRG: 163 | End: 2017-05-13
Attending: STUDENT IN AN ORGANIZED HEALTH CARE EDUCATION/TRAINING PROGRAM
Payer: MEDICARE

## 2017-05-13 ENCOUNTER — APPOINTMENT (OUTPATIENT)
Dept: PHYSICAL THERAPY | Facility: CLINIC | Age: 72
DRG: 163 | End: 2017-05-13
Attending: STUDENT IN AN ORGANIZED HEALTH CARE EDUCATION/TRAINING PROGRAM
Payer: MEDICARE

## 2017-05-13 ENCOUNTER — APPOINTMENT (OUTPATIENT)
Dept: OCCUPATIONAL THERAPY | Facility: CLINIC | Age: 72
DRG: 163 | End: 2017-05-13
Attending: STUDENT IN AN ORGANIZED HEALTH CARE EDUCATION/TRAINING PROGRAM
Payer: MEDICARE

## 2017-05-13 ENCOUNTER — APPOINTMENT (OUTPATIENT)
Dept: SPEECH THERAPY | Facility: CLINIC | Age: 72
DRG: 163 | End: 2017-05-13
Attending: STUDENT IN AN ORGANIZED HEALTH CARE EDUCATION/TRAINING PROGRAM
Payer: MEDICARE

## 2017-05-13 LAB
ALBUMIN UR-MCNC: NEGATIVE MG/DL
ANION GAP SERPL CALCULATED.3IONS-SCNC: 9 MMOL/L (ref 3–14)
APPEARANCE UR: CLEAR
BILIRUB UR QL STRIP: NEGATIVE
BUN SERPL-MCNC: 64 MG/DL (ref 7–30)
CALCIUM SERPL-MCNC: 7.8 MG/DL (ref 8.5–10.1)
CHLORIDE SERPL-SCNC: 104 MMOL/L (ref 94–109)
CO2 SERPL-SCNC: 20 MMOL/L (ref 20–32)
COLOR UR AUTO: ABNORMAL
CREAT SERPL-MCNC: 2.14 MG/DL (ref 0.52–1.04)
DLCOCOR-%PRED-PRE: 80 %
DLCOCOR-PRE: 16.55 ML/MIN/MMHG
DLCOUNC-%PRED-PRE: 74 %
DLCOUNC-PRE: 15.25 ML/MIN/MMHG
DLCOUNC-PRED: 20.45 ML/MIN/MMHG
ERV-%PRED-PRE: 67 %
ERV-PRE: 0.51 L
ERV-PRED: 0.76 L
ERYTHROCYTE [DISTWIDTH] IN BLOOD BY AUTOMATED COUNT: 13.9 % (ref 10–15)
GFR SERPL CREATININE-BSD FRML MDRD: 23 ML/MIN/1.7M2
GLUCOSE BLDC GLUCOMTR-MCNC: 116 MG/DL (ref 70–99)
GLUCOSE BLDC GLUCOMTR-MCNC: 123 MG/DL (ref 70–99)
GLUCOSE BLDC GLUCOMTR-MCNC: 129 MG/DL (ref 70–99)
GLUCOSE BLDC GLUCOMTR-MCNC: 137 MG/DL (ref 70–99)
GLUCOSE BLDC GLUCOMTR-MCNC: 141 MG/DL (ref 70–99)
GLUCOSE BLDC GLUCOMTR-MCNC: 143 MG/DL (ref 70–99)
GLUCOSE BLDC GLUCOMTR-MCNC: 143 MG/DL (ref 70–99)
GLUCOSE BLDC GLUCOMTR-MCNC: 157 MG/DL (ref 70–99)
GLUCOSE BLDC GLUCOMTR-MCNC: 158 MG/DL (ref 70–99)
GLUCOSE BLDC GLUCOMTR-MCNC: 159 MG/DL (ref 70–99)
GLUCOSE BLDC GLUCOMTR-MCNC: 160 MG/DL (ref 70–99)
GLUCOSE BLDC GLUCOMTR-MCNC: 163 MG/DL (ref 70–99)
GLUCOSE BLDC GLUCOMTR-MCNC: 169 MG/DL (ref 70–99)
GLUCOSE BLDC GLUCOMTR-MCNC: 194 MG/DL (ref 70–99)
GLUCOSE BLDC GLUCOMTR-MCNC: 197 MG/DL (ref 70–99)
GLUCOSE BLDC GLUCOMTR-MCNC: 221 MG/DL (ref 70–99)
GLUCOSE BLDC GLUCOMTR-MCNC: 221 MG/DL (ref 70–99)
GLUCOSE BLDC GLUCOMTR-MCNC: 226 MG/DL (ref 70–99)
GLUCOSE BLDC GLUCOMTR-MCNC: 270 MG/DL (ref 70–99)
GLUCOSE SERPL-MCNC: 157 MG/DL (ref 70–99)
GLUCOSE UR STRIP-MCNC: 300 MG/DL
HCT VFR BLD AUTO: 27.5 % (ref 35–47)
HGB BLD-MCNC: 8.9 G/DL (ref 11.7–15.7)
HGB UR QL STRIP: NEGATIVE
IC-%PRED-PRE: 65 %
IC-PRE: 1.51 L
IC-PRED: 2.29 L
KETONES UR STRIP-MCNC: NEGATIVE MG/DL
LEUKOCYTE ESTERASE UR QL STRIP: NEGATIVE
MAGNESIUM SERPL-MCNC: 2.1 MG/DL (ref 1.6–2.3)
MCH RBC QN AUTO: 29.9 PG (ref 26.5–33)
MCHC RBC AUTO-ENTMCNC: 32.4 G/DL (ref 31.5–36.5)
MCV RBC AUTO: 92 FL (ref 78–100)
NITRATE UR QL: NEGATIVE
OSMOLALITY SERPL: 303 MMOL/KG (ref 280–301)
OSMOLALITY UR: 333 MMOL/KG (ref 100–1200)
PH UR STRIP: 5 PH (ref 5–7)
PHOSPHATE SERPL-MCNC: 4.2 MG/DL (ref 2.5–4.5)
PLATELET # BLD AUTO: 142 10E9/L (ref 150–450)
POTASSIUM SERPL-SCNC: 5 MMOL/L (ref 3.4–5.3)
RADIOLOGIST FLAGS: ABNORMAL
RBC # BLD AUTO: 2.98 10E12/L (ref 3.8–5.2)
RBC #/AREA URNS AUTO: 0 /HPF (ref 0–2)
SODIUM SERPL-SCNC: 133 MMOL/L (ref 133–144)
SODIUM UR-SCNC: 27 MMOL/L
SP GR UR STRIP: 1.01 (ref 1–1.03)
SQUAMOUS #/AREA URNS AUTO: <1 /HPF (ref 0–1)
TRANS CELLS #/AREA URNS HPF: <1 /HPF (ref 0–1)
URN SPEC COLLECT METH UR: ABNORMAL
UROBILINOGEN UR STRIP-MCNC: NORMAL MG/DL (ref 0–2)
VA-%PRED-PRE: 67 %
VA-PRE: 3.43 L
VC-%PRED-PRE: 66 %
VC-PRE: 2.02 L
VC-PRED: 3.05 L
WBC # BLD AUTO: 9.4 10E9/L (ref 4–11)
WBC #/AREA URNS AUTO: 1 /HPF (ref 0–2)

## 2017-05-13 PROCEDURE — 94640 AIRWAY INHALATION TREATMENT: CPT | Mod: 76

## 2017-05-13 PROCEDURE — 25000131 ZZH RX MED GY IP 250 OP 636 PS 637: Mod: GY | Performed by: STUDENT IN AN ORGANIZED HEALTH CARE EDUCATION/TRAINING PROGRAM

## 2017-05-13 PROCEDURE — 25000132 ZZH RX MED GY IP 250 OP 250 PS 637: Mod: GY | Performed by: SURGERY

## 2017-05-13 PROCEDURE — 00000146 ZZHCL STATISTIC GLUCOSE BY METER IP

## 2017-05-13 PROCEDURE — 40000275 ZZH STATISTIC RCP TIME EA 10 MIN

## 2017-05-13 PROCEDURE — 25000132 ZZH RX MED GY IP 250 OP 250 PS 637: Mod: GY | Performed by: THORACIC SURGERY (CARDIOTHORACIC VASCULAR SURGERY)

## 2017-05-13 PROCEDURE — 40000556 ZZH STATISTIC PERIPHERAL IV START W US GUIDANCE

## 2017-05-13 PROCEDURE — 71020 XR CHEST 2 VW: CPT

## 2017-05-13 PROCEDURE — 84100 ASSAY OF PHOSPHORUS: CPT | Performed by: STUDENT IN AN ORGANIZED HEALTH CARE EDUCATION/TRAINING PROGRAM

## 2017-05-13 PROCEDURE — A9270 NON-COVERED ITEM OR SERVICE: HCPCS | Mod: GY | Performed by: STUDENT IN AN ORGANIZED HEALTH CARE EDUCATION/TRAINING PROGRAM

## 2017-05-13 PROCEDURE — 25000128 H RX IP 250 OP 636: Performed by: ANESTHESIOLOGY

## 2017-05-13 PROCEDURE — 25000125 ZZHC RX 250: Performed by: NURSE PRACTITIONER

## 2017-05-13 PROCEDURE — 97535 SELF CARE MNGMENT TRAINING: CPT | Mod: GO | Performed by: OCCUPATIONAL THERAPIST

## 2017-05-13 PROCEDURE — 40000133 ZZH STATISTIC OT WARD VISIT: Performed by: OCCUPATIONAL THERAPIST

## 2017-05-13 PROCEDURE — 25000132 ZZH RX MED GY IP 250 OP 250 PS 637: Mod: GY | Performed by: STUDENT IN AN ORGANIZED HEALTH CARE EDUCATION/TRAINING PROGRAM

## 2017-05-13 PROCEDURE — 40000275 ZZH STATISTIC RCP TIME EA 10 MIN: Performed by: OPTOMETRIST

## 2017-05-13 PROCEDURE — 80048 BASIC METABOLIC PNL TOTAL CA: CPT | Performed by: STUDENT IN AN ORGANIZED HEALTH CARE EDUCATION/TRAINING PROGRAM

## 2017-05-13 PROCEDURE — 83735 ASSAY OF MAGNESIUM: CPT | Performed by: STUDENT IN AN ORGANIZED HEALTH CARE EDUCATION/TRAINING PROGRAM

## 2017-05-13 PROCEDURE — 85027 COMPLETE CBC AUTOMATED: CPT | Performed by: STUDENT IN AN ORGANIZED HEALTH CARE EDUCATION/TRAINING PROGRAM

## 2017-05-13 PROCEDURE — 40000193 ZZH STATISTIC PT WARD VISIT: Performed by: REHABILITATION PRACTITIONER

## 2017-05-13 PROCEDURE — 97162 PT EVAL MOD COMPLEX 30 MIN: CPT | Mod: GP | Performed by: REHABILITATION PRACTITIONER

## 2017-05-13 PROCEDURE — 40000225 ZZH STATISTIC SLP WARD VISIT: Performed by: SPEECH-LANGUAGE PATHOLOGIST

## 2017-05-13 PROCEDURE — 92526 ORAL FUNCTION THERAPY: CPT | Mod: GN | Performed by: SPEECH-LANGUAGE PATHOLOGIST

## 2017-05-13 PROCEDURE — 25000125 ZZHC RX 250: Performed by: ANESTHESIOLOGY

## 2017-05-13 PROCEDURE — 25000125 ZZHC RX 250: Performed by: PHYSICIAN ASSISTANT

## 2017-05-13 PROCEDURE — 12000006 ZZH R&B IMCU INTERMEDIATE UMMC

## 2017-05-13 PROCEDURE — 25000132 ZZH RX MED GY IP 250 OP 250 PS 637: Mod: GY | Performed by: PHYSICIAN ASSISTANT

## 2017-05-13 PROCEDURE — S0020 INJECTION, BUPIVICAINE HYDRO: HCPCS | Performed by: ANESTHESIOLOGY

## 2017-05-13 PROCEDURE — 94640 AIRWAY INHALATION TREATMENT: CPT | Performed by: OPTOMETRIST

## 2017-05-13 PROCEDURE — A9270 NON-COVERED ITEM OR SERVICE: HCPCS | Mod: GY | Performed by: SURGERY

## 2017-05-13 PROCEDURE — A9270 NON-COVERED ITEM OR SERVICE: HCPCS | Mod: GY | Performed by: PHYSICIAN ASSISTANT

## 2017-05-13 PROCEDURE — A9270 NON-COVERED ITEM OR SERVICE: HCPCS | Mod: GY | Performed by: THORACIC SURGERY (CARDIOTHORACIC VASCULAR SURGERY)

## 2017-05-13 PROCEDURE — 81001 URINALYSIS AUTO W/SCOPE: CPT | Performed by: STUDENT IN AN ORGANIZED HEALTH CARE EDUCATION/TRAINING PROGRAM

## 2017-05-13 PROCEDURE — 25000128 H RX IP 250 OP 636: Performed by: THORACIC SURGERY (CARDIOTHORACIC VASCULAR SURGERY)

## 2017-05-13 PROCEDURE — 97530 THERAPEUTIC ACTIVITIES: CPT | Mod: GP | Performed by: REHABILITATION PRACTITIONER

## 2017-05-13 PROCEDURE — 36415 COLL VENOUS BLD VENIPUNCTURE: CPT | Performed by: STUDENT IN AN ORGANIZED HEALTH CARE EDUCATION/TRAINING PROGRAM

## 2017-05-13 RX ORDER — OXYCODONE HYDROCHLORIDE 5 MG/1
5-10 TABLET ORAL
Status: DISCONTINUED | OUTPATIENT
Start: 2017-05-13 | End: 2017-05-16

## 2017-05-13 RX ADMIN — MENTHOL 2 PATCH: 205.5 PATCH TOPICAL at 21:15

## 2017-05-13 RX ADMIN — OXYCODONE HYDROCHLORIDE 5 MG: 5 TABLET ORAL at 23:45

## 2017-05-13 RX ADMIN — SODIUM CHLORIDE SOLN NEBU 3% 3 ML: 3 NEBU SOLN at 17:01

## 2017-05-13 RX ADMIN — ALBUTEROL SULFATE 2.5 MG: 2.5 SOLUTION RESPIRATORY (INHALATION) at 05:55

## 2017-05-13 RX ADMIN — BUPIVACAINE HYDROCHLORIDE: 7.5 INJECTION, SOLUTION EPIDURAL; RETROBULBAR at 00:42

## 2017-05-13 RX ADMIN — SODIUM CHLORIDE SOLN NEBU 3% 3 ML: 3 NEBU SOLN at 20:25

## 2017-05-13 RX ADMIN — INSULIN GLARGINE 20 UNITS: 100 INJECTION, SOLUTION SUBCUTANEOUS at 21:12

## 2017-05-13 RX ADMIN — FLUTICASONE FUROATE AND VILANTEROL TRIFENATATE 1 PUFF: 100; 25 POWDER RESPIRATORY (INHALATION) at 08:39

## 2017-05-13 RX ADMIN — ACETAMINOPHEN 975 MG: 325 TABLET, FILM COATED ORAL at 08:34

## 2017-05-13 RX ADMIN — HEPARIN SODIUM 5000 UNITS: 5000 INJECTION, SOLUTION INTRAVENOUS; SUBCUTANEOUS at 16:33

## 2017-05-13 RX ADMIN — OXYCODONE HYDROCHLORIDE 10 MG: 5 TABLET ORAL at 16:33

## 2017-05-13 RX ADMIN — ALBUTEROL SULFATE 2.5 MG: 2.5 SOLUTION RESPIRATORY (INHALATION) at 12:42

## 2017-05-13 RX ADMIN — HEPARIN SODIUM 5000 UNITS: 5000 INJECTION, SOLUTION INTRAVENOUS; SUBCUTANEOUS at 23:42

## 2017-05-13 RX ADMIN — CARVEDILOL 3.12 MG: 3.12 TABLET, FILM COATED ORAL at 08:35

## 2017-05-13 RX ADMIN — ACETAMINOPHEN 975 MG: 325 TABLET, FILM COATED ORAL at 23:42

## 2017-05-13 RX ADMIN — HEPARIN SODIUM 5000 UNITS: 5000 INJECTION, SOLUTION INTRAVENOUS; SUBCUTANEOUS at 08:35

## 2017-05-13 RX ADMIN — POLYETHYLENE GLYCOL 3350 17 G: 17 POWDER, FOR SOLUTION ORAL at 08:35

## 2017-05-13 RX ADMIN — PRAVASTATIN SODIUM 40 MG: 40 TABLET ORAL at 21:22

## 2017-05-13 RX ADMIN — SODIUM CHLORIDE SOLN NEBU 3% 3 ML: 3 NEBU SOLN at 12:42

## 2017-05-13 RX ADMIN — ALBUTEROL SULFATE 2.5 MG: 2.5 SOLUTION RESPIRATORY (INHALATION) at 20:25

## 2017-05-13 RX ADMIN — ALBUTEROL SULFATE 2.5 MG: 2.5 SOLUTION RESPIRATORY (INHALATION) at 17:01

## 2017-05-13 RX ADMIN — SODIUM CHLORIDE SOLN NEBU 3% 3 ML: 3 NEBU SOLN at 05:55

## 2017-05-13 RX ADMIN — Medication: at 19:26

## 2017-05-13 RX ADMIN — CETIRIZINE HYDROCHLORIDE 5 MG: 5 TABLET ORAL at 08:34

## 2017-05-13 RX ADMIN — MELATONIN TAB 3 MG 3 MG: 3 TAB at 18:22

## 2017-05-13 RX ADMIN — BISACODYL 10 MG: 10 SUPPOSITORY RECTAL at 10:05

## 2017-05-13 RX ADMIN — MENTHOL 2 PATCH: 205.5 PATCH TOPICAL at 08:39

## 2017-05-13 RX ADMIN — HYDROMORPHONE HYDROCHLORIDE: 10 INJECTION, SOLUTION INTRAMUSCULAR; INTRAVENOUS; SUBCUTANEOUS at 07:36

## 2017-05-13 RX ADMIN — SENNOSIDES AND DOCUSATE SODIUM 2 TABLET: 8.6; 5 TABLET ORAL at 08:33

## 2017-05-13 RX ADMIN — CARVEDILOL 3.12 MG: 3.12 TABLET, FILM COATED ORAL at 18:22

## 2017-05-13 RX ADMIN — ACETAMINOPHEN 975 MG: 325 TABLET, FILM COATED ORAL at 16:33

## 2017-05-13 RX ADMIN — PANTOPRAZOLE SODIUM 40 MG: 40 TABLET, DELAYED RELEASE ORAL at 08:34

## 2017-05-13 RX ADMIN — OXYCODONE HYDROCHLORIDE 10 MG: 5 TABLET ORAL at 10:05

## 2017-05-13 RX ADMIN — ASPIRIN 81 MG CHEWABLE TABLET 81 MG: 81 TABLET CHEWABLE at 21:16

## 2017-05-13 ASSESSMENT — PAIN DESCRIPTION - DESCRIPTORS: DESCRIPTORS: ACHING;CONSTANT;DISCOMFORT

## 2017-05-13 NOTE — PLAN OF CARE
Problem: Goal Outcome Summary  Goal: Goal Outcome Summary  Outcome: Therapy, progress towards functional goals is fair  SLP: Patient seen for swallow treatment. Patient up in chair. RN reports patient tolerating current regular diet. Patient consumed 1/4 rebel cracker, 2 oz. pudding and 2-3 oz. thin liquid. No overt aspiration signs occurred after p.o. trials. Note slow but functional mastication of solid textures and no oral residue. Note weak cough later in session away from p.o. while patient talking. Recommend cautiously continue regular diet with thin liquids with patient to self-select softer menu items and implement swallow precautions (up in chair, small single bites/sips, alternate liquids/solids, rest breaks as needed, smaller meals more often). Will f/u for diet tolerance and swallow strategy training.

## 2017-05-13 NOTE — PLAN OF CARE
Problem: Goal Outcome Summary  Goal: Goal Outcome Summary  PT-6B- PT Evaluation Completed, interventions initiated, recommend TCU at discharge. Pt tx sit->stand with Min A, Dep A for pericares. Pt reports she is limited by sciatic nerve pain.

## 2017-05-13 NOTE — ANESTHESIA POST-OP FOLLOW-UP NOTE
REGIONAL ANESTHESIA PAIN SERVICE EPIDURAL NOTE  SUBJECTIVE:   Interval History: Pt reports adequate pain control via epidural. Denies any weakness, paresthesias, circumoral numbness, metallic taste or tinnitus. Pt is ambulating with assistance. Patient is currently without nausea; without pruritis. Currently tolerating a regular diet.      Clinically Aligned Pain Assessment (CAPA):  Comfort (How is your pain?): Comfortably manageable  Change in Pain (Since your last medication/intervention?): About the same  Pain Control (How are your pain treatments working?): Partially effective pain control  Functioning (Are you able to do activities to get better?) : Can do most things, but pain gets in the way of some   Sleep (Does your pain management allow you to sleep or rest?): Normal sleep     Numerical Rating Scale:   Reports pain 5/10 at rest and 7/10 with movement.            Anticoagulation: Heparin 5000u q8h        OBJECTIVE:  Diagnostics:        Lab Results   Component Value Date     WBC 9.4 05/13/2017            Lab Results   Component Value Date     RBC 2.98 05/13/2017            Lab Results   Component Value Date     HGB 8.9 05/13/2017            Lab Results   Component Value Date     HCT 27.5 05/13/2017            Lab Results   Component Value Date      05/13/2017               Lab Results   Component Value Date     INR 1.06 04/18/2017     INR 1.08 04/04/2017     INR 0.99 01/13/2017         Vitals:  Temp: [97.5  F (36.4  C)-98.1  F (36.7  C)] 98.1  F (36.7  C)  Pulse: [76] 76  Heart Rate: [60-84] 60  Resp: [16] 16  BP: ()/() 116/54  SpO2: [95 %-100 %] 98 %     Exam:   Strength 5/5 and symmetric grossly in bilateral LE  Epidural site with dressing c/d/i, no tenderness, erythema, heme, edema         ASSESSMENT/PLAN:   Yue Valenzuela is a 71 year old female POD #3 s/p BRONCHOSCOPY FLEXIBLE MEDIASTINOSCOPY THORACOSCOPIC RESECTION LUNG THORACOSCOPIC EXCISE NODE MEDIASTINAL and placement of T5-6  epidural for analgesia. Postop surgical incision and CT site pain adequately controlled with epidural infusion Bupivacaine 0.125% at 8 mL/hour. Pt ambulating without difficulty. No weakness or paresthesias, adequate sensory block. No evidence of adverse side effects related to local anesthetic.      - continue current epidural infusion at 8mL/hour   - plan to continue epidural a maximum of 5 days or until CT removed, must be a minimum of 4 hrs after last dose of Heparin SC, then can resume heparin 1 hr after catheter removal  - will continue to follow and adjust as needed  Shekhar Hi MD  May 13, 2017

## 2017-05-13 NOTE — PROVIDER NOTIFICATION
"Dr. Nascimento notified that pt hallucinating at times, seeing \"Bugs\" and states there is a \"person on the floor\", Pt quite anxious at times. MD aware no new changes.     Anesthesia 0545 notified that order for bupivacaine states 8ml/hour but it is running at 9ml/hour. Md states this is correct and will change the order. He was also updated to hallucinations.   "

## 2017-05-13 NOTE — PROVIDER NOTIFICATION
"Pt awoke with am vs and very anxious, very negative. Everyting is \"No, No\" and when repositioning pt she states \"your breaking my back\". Confusion continues alert x 2-3. Off to situation, time, plan. C/o wheezing. Slight wheezing on right lung. Ct in place. RT called for albuterol neb. Reassurance given. Pt on left side.     "

## 2017-05-13 NOTE — PLAN OF CARE
Problem: Goal Outcome Summary  Goal: Goal Outcome Summary  OT 6B Pt with improved activity tolerance today although still well below her baseline for transfers, ADLs and mobility. Using walker and had 4 LOB backwards needing staff assist and cues for posture.   Rec TCU to improve strength, activity tolerance and cognitive compensation for safe ADLs and transfers

## 2017-05-13 NOTE — PLAN OF CARE
Problem: Goal Outcome Summary  Goal: Goal Outcome Summary  VSS. HR&R stable. Good pain control with prn oxycodone, used PCA very little. Pt is much more alert, calm, and oriented today. Right lung sounds diminished. Chest tube in place to -20. Incisions are CDI. Hall placed for urine retention, good output. Fair appetite. Insulin gtt infusing, waiting to lantus from pharmacy to transition to SS with CC. Sugars stable. Large BMs x 3 following suppository.  at bedside.

## 2017-05-13 NOTE — PROGRESS NOTES
SPIRITUAL HEALTH SERVICES  SPIRITUAL ASSESSMENT Progress Note  [facility] 6B [facility]    Short follow-up interaction with Yue's  (Oscar) as she was sleeping at the time. I'll follow-up 2x/week as she remains admitted. I'll inform unit chaplain resident of visit attempt.    Phil Blank M.Div., [facility]  Staff   Pager [phone]

## 2017-05-13 NOTE — PROGRESS NOTES
"Thoracic Surgery Progress Note    Subjective:  Urinary retention since Hall d/c'd yesterday afternoon. Thoracic pain from chest tube and incisions is well-controlled. Patient complains of low back pain which had been present prior to admission.     Objective:  /54 (BP Location: Left arm)  Pulse 76  Temp 98.1  F (36.7  C) (Oral)  Resp 16  Ht 1.626 m (5' 4\")  Wt 69.5 kg (153 lb 3.5 oz)  SpO2 98%  BMI 26.3 kg/m2    I/O last 3 completed shifts:  In: 1050 [P.O.:300; I.V.:750]  Out: 1585 [Urine:1025; Chest Tube:560]    Non-labored breathing. Incisions C/D/I.    Labs:   Na+ 133  K+ 5.0  Cr 2.14  WBC 9.4, Hgb 8.9, Plt 142    Imaging:  CXR 5/12 1650: \"Marked improvement in tiny right apical pneumothorax. Stable chest tube.\"     Assessment/Plan:  Yue Valenzuela is a 71 year old female POD 3 from a right VATS, lower lobectomy and middle lobe wedge.    Neuro: Continue pain control with epidural, dPCA, acetaminophen, oxycodone PRN  CV: Hemodynamically stable. home coreg. Will add on remainder BP meds as pressure tolerates.   Pulm: Aggressive pulmonary toilet. Continue nebs. CT on suction.   FEN/GI: Passed bedside swallow eval. Advance to regular diet. Trend electrolytes. Bowel regimen and suppository.  : Reinsert Hall. UA.  ID: no abx  Endo: Insulin aspart SQ with meals.  PPx: SQH, Protonix  Dispo: 6B    Discussed with Fellow, Dr. Joya.    Lachelle Sorensen   Medical Student Year 3, rotating with Thoracic Surgery      David Rodgers MD  PGY-1 General Surgery          "

## 2017-05-13 NOTE — PLAN OF CARE
Problem: Goal Outcome Summary  Goal: Goal Outcome Summary  Outcome: No Change  S/B: Pt quite anxious and negative with any cares. Refused lab draw, agreed after lots of encouragement. Hopeless and confused at times. Off to date and situation. Alert and awake until approximately 2200, slept well. Anxious with any cares. C/o increased pain this am after awakening and being asleep for awhile. See previous notes. Urine osmolarity sent. Renal US done. EKG done.      A: A&O 2-3. VSS. SR with lots of pac's/pvs's at the beginning of the shift, now more stable SR 60's.  Afebrile. Denies nausea. Tolerating 3 GM NA diet poorly, but did have popsicle and cx noodle soup; carb counting. Electrolytes WNL. Insulin gtt at 0.5-1 unit with every hour checks. Tylenol as ordered. Iv infusing per order.      I/O this shift:  In: 150 [I.V.:150]  Out: 160 [Chest Tube:160]     Temp:  [97.5  F (36.4  C)-98.1  F (36.7  C)] 98.1  F (36.7  C)  Pulse:  [76] 76  Heart Rate:  [60-84] 60  Resp:  [16] 16  BP: ()/() 116/54  SpO2:  [95 %-100 %] 98 %      R: Recommend changing to oxycodone for longer acting pain relief, dc pca. Antianxiolytics would be helpful. Nephrology and palliative to consult today. Continue with POC. Notify primary team with changes.

## 2017-05-13 NOTE — PROGRESS NOTES
REGIONAL ANESTHESIA PAIN SERVICE EPIDURAL NOTE  SUBJECTIVE:   Interval History: Pt reports adequate pain control via epidural.  Denies any weakness, paresthesias, circumoral numbness, metallic taste or tinnitus.  Pt is ambulating with assistance.  Patient is currently without nausea; without pruritis.  Currently tolerating a regular diet.      Clinically Aligned Pain Assessment (CAPA):  Comfort (How is your pain?): Comfortably manageable  Change in Pain (Since your last medication/intervention?): About the same  Pain Control (How are your pain treatments working?):  Partially effective pain control  Functioning (Are you able to do activities to get better?) : Can do most things, but pain gets in the way of some   Sleep (Does your pain management allow you to sleep or rest?): Normal sleep     Numerical Rating Scale:    Reports pain 5/10 at rest and 7/10 with movement.          Anticoagulation:  Heparin 5000u q8h      OBJECTIVE:  Diagnostics:  Lab Results   Component Value Date    WBC 9.4 05/13/2017     Lab Results   Component Value Date    RBC 2.98 05/13/2017     Lab Results   Component Value Date    HGB 8.9 05/13/2017     Lab Results   Component Value Date    HCT 27.5 05/13/2017     Lab Results   Component Value Date     05/13/2017       Lab Results   Component Value Date    INR 1.06 04/18/2017    INR 1.08 04/04/2017    INR 0.99 01/13/2017       Vitals:    Temp:  [97.5  F (36.4  C)-98.1  F (36.7  C)] 98.1  F (36.7  C)  Pulse:  [76] 76  Heart Rate:  [60-84] 60  Resp:  [16] 16  BP: ()/() 116/54  SpO2:  [95 %-100 %] 98 %    Exam:    Strength 5/5 and symmetric grossly in bilateral LE   Epidural site with dressing c/d/i, no tenderness, erythema, heme, edema        ASSESSMENT/PLAN:    Yue Valenzuela is a 71 year old female POD #3 s/p BRONCHOSCOPY FLEXIBLE MEDIASTINOSCOPY THORACOSCOPIC RESECTION LUNG THORACOSCOPIC EXCISE NODE MEDIASTINAL and placement of T5-6 epidural for analgesia. Postop surgical  incision and CT site pain adequately controlled with epidural infusion Bupivacaine 0.125% at 8 mL/hour. Pt ambulating without difficulty. No weakness or paresthesias, adequate sensory block. No evidence of adverse side effects related to local anesthetic.     - continue current epidural infusion at 8mL/hour   - plan to continue epidural a maximum of 5 days or until CT removed, must be a minimum of 4 hrs after last dose of Heparin SC, then can resume heparin 1 hr after catheter removal  - will continue to follow and adjust as needed  - discussed plan with attending anesthesiologist        Domenica Matias MD  [facility]  5/13/2017 11:03 AM    24 hour Job Code Pager.  For in-house use only.     Dial * [phone] and  [place]:  [phone]  [place]: [phone]  Peds:  [phone]  Then enter call-back number  May text page using [employer], but NOT [employer].

## 2017-05-13 NOTE — CONSULTS
Nephrology Initial Consult  May 13, 2017      Yue Valenzuela MRN:[id number] YOB: 1945  Date of Admission:5/10/2017  Primary care provider: Nory Elliott  Requesting physician: Glenna Hall MD    ASSESSMENT AND RECOMMENDATIONS:   71 year old female with long standing type I diabetes with presumed diabetic nephropathy (followed by Dr Sim of [facility]), CKD stage 4, Scr 1.8-2.2 mg/dL (eGFR 20) who is s/p VATS for lung adenocarcinoma. Nephrology is consulted for CKD and hyperkalemia  1. CKD with elevated creatinine- Scr as low as 1.5mg/dL reccently likely with being off lisinopril.    - Scr up to 2.4 but today is down to 2.1  - hope to see her trend down toward 1.8mg/dL over less  2. B;lood pressure/ volume- on furosemide 20mg daily at home, would use here prn but once eating well can resume it PO. Volume seems stable  3. Anemia- acute blood loss, monitor for now.  Can be managed as outpatient after discharge  4. Electrolytes/ acid base- had hyperkalemia in setting of hyperglycemia yesterday. Improved with insulin. Has good UOP. Continue to monitor daily potassium  5. Bone- phosphorus mildly high, if >6 with eating, can use phosphorus binder with meals (tums)    - will continue to monitor    Recommendations were communicated to primary team via this not        Sharron Rajinder Mooney MD       REASON FOR CONSULT: SUPRIYA on CKD, hyperkalemia    HISTORY OF PRESENT ILLNESS:  Yue Valenzuela is a 71 year old female with type I diabetes, lung mass s/p VATS and resection of mass, chest tube. She is now post op and doing fairly well. She has CKD stage 4 and has been seen at the [facility] and has been ealuated for kidney transplant. She also has a primary nephrologist at [facility].  Her SCr has been ear 2, though it seems with stopping lisinopril it ahs been lower (1.5-1.8mg/dL).  She had hyperkalemia yesterday, and blood sugar >300, which responded to insulin and furosemide.  She is usually on  furosemide 20mg daily.   She currently feels well, having just completed a walk with PT. Her chest tube site is sore, but otherwise pain is controlled. She denies lightheadedness, chest pain or shortness of breath.      PAST MEDICAL HISTORY:  Reviewed with patient on 05/13/2017     Past Medical History:   Diagnosis Date     Adenocarcinoma, lung (H)      Asthma      CAD (coronary artery disease) 9/17/2014 2006 PCI circumflex territory  Mid LAD lesion      Carotid artery stenosis      CKD (chronic kidney disease) stage 4, GFR 15-29 ml/min (H)      Diabetes mellitus type 1 (H)      H/O factor V Leiden mutation      Hypertension      Mixed hyperlipidemia (DYSLIPIDEMIA) 8/1/2014     Osteopenia      Pneumothorax 4/16/2017     PVD (peripheral vascular disease) (H)      Thyroid nodule 04/12/2012    Hurtle Cells, non malignant       Past Surgical History:   Procedure Laterality Date     BRONCHOSCOPY FLEXIBLE Right 5/10/2017    Procedure: BRONCHOSCOPY FLEXIBLE;  Flexible Bronchoscopy, esaphogastroduodenoscopy, cervical Mediastinoscopy, Right  Thoracoscopic Surgery, right Lower Lobectomy, Mediastinal Lymph Node Dissection, Right middle lobe wedge resection*Latex Allergy* (ERAS Patient);  Surgeon: Glenna Hall MD;  Location: [facility] OR     ESOPHAGOSCOPY, GASTROSCOPY, DUODENOSCOPY (EGD), COMBINED N/A 5/10/2017    Procedure: COMBINED ESOPHAGOSCOPY, GASTROSCOPY, DUODENOSCOPY (EGD);;  Surgeon: Glenna Hall MD;  Location: [facility] OR     ganglion cyst removal       hemithyroidectomy  4/13/2012    PATH report showed benign Hurtle Cell Adenoma     IR biopsy of lung  04/2017     lipoma removal       MEDIASTINOSCOPY N/A 5/10/2017    Procedure: MEDIASTINOSCOPY;;  Surgeon: Glenna Hall MD;  Location: [facility] OR     SHOULDER SURGERY Left      THORACOSCOPIC EXCISE NODE MEDIASTINAL Right 5/10/2017    Procedure: THORACOSCOPIC EXCISE NODE MEDIASTINAL;;  Surgeon: Glenna Hall MD;  Location: [facility] OR     THORACOSCOPIC RESECTION LUNG Right 5/10/2017     Procedure: THORACOSCOPIC RESECTION LUNG;;  Surgeon: Glenna Hall MD;  Location: [facility] OR     TONSILLECTOMY          MEDICATIONS:  PTA Meds  Prior to Admission medications    Medication Sig Last Dose Taking? Auth Provider   albuterol (PROAIR HFA/PROVENTIL HFA/VENTOLIN HFA) 108 (90 BASE) MCG/ACT Inhaler Inhale 2 puffs into the lungs 2 times daily 5/9/2017 at Unknown time Yes Reported, Patient   Respiratory Therapy Supplies (AEROBIKA) GAGANDEEP 1 Device 6 times daily 5/9/2017 at Unknown time Yes Demario Torrez MD   Insulin Aspart (INSULIN PUMP - OUTPATIENT) Insulin pump base rate: 6579-6363 rate 0.5  1155-5567 rate 1.0  1516-1074 rate 1.95  0494-3130 rate 1.35 5/10/2017 at Unknown time Yes Reported, Patient   Acetaminophen (TYLENOL PO) Take 1,000 mg by mouth every 8 hours as needed for mild pain or fever  Past Week at Unknown time Yes Reported, Patient   budesonide-formoterol (SYMBICORT) 80-4.5 MCG/ACT inhaler Inhale 2 puffs into the lungs 2 times daily Past Week at Unknown time Yes John Plaza MD   amLODIPine (NORVASC) 10 MG tablet Take 10 mg by mouth every evening  5/9/2017 at Unknown time Yes Reported, Patient   aspirin EC 81 MG tablet Take 81 mg by mouth every evening  5/9/2017 at Unknown time Yes Reported, Patient   carvedilol (COREG) 3.125 MG tablet Take 3.125 mg by mouth 2 times daily (with meals)  5/9/2017 at Unknown time Yes Reported, Patient   furosemide (LASIX) 20 MG tablet 20 mg every morning  5/9/2017 at Unknown time Yes Reported, Patient   lisinopril (PRINIVIL,ZESTRIL) 20 MG tablet Take 10 mg by mouth every evening  5/9/2017 at Unknown time Yes Reported, Patient   pravastatin (PRAVACHOL) 40 MG tablet 40 mg every evening  5/9/2017 at Unknown time Yes Reported, Patient   cetirizine (ZYRTEC) 10 MG tablet Take 5 mg by mouth daily   Reported, Patient   calcium 600 MG tablet Take 1 tablet by mouth every morning   Reported, Patient   clonazePAM (KLONOPIN) 0.5 MG tablet Take 0.5 tablets (0.25 mg)  by mouth 2 times daily as needed for anxiety   Elisa Ellington MD   blood glucose (ONE TOUCH ULTRA) test strip    Reported, Patient   calcium acetate (PHOSLO) 667 MG CAPS Take 667 mg by mouth 4 times daily (with meals and nightly)    Reported, Patient   cholecalciferol (VITAMIN D) 1000 UNIT tablet Take 1,000 Units by mouth every morning    Reported, Patient   MIKITOUCH DELICA LANCETS 33G MISC    Reported, Patient      Current Meds    bisacodyl  10 mg Rectal Daily     senna-docusate  2 tablet Oral BID     polyethylene glycol  17 g Oral BID     albuterol  2.5 mg Nebulization Q4H While awake     sodium chloride  3 mL Nebulization Q4H While awake     acetaminophen  975 mg Oral Q8H     melatonin  3 mg Oral At Bedtime     menthol   Transdermal Q8H     carvedilol  3.125 mg Oral BID w/meals     aspirin EC  81 mg Oral QPM     cetirizine  5 mg Oral Daily     pravastatin  40 mg Oral QPM     insulin aspart  1-20 Units Subcutaneous TID w/meals     heparin  5,000 Units Subcutaneous Q8H     pantoprazole  40 mg Oral Daily    Or     pantoprazole  40 mg Intravenous Daily     HYDROmorphone   Intravenous PCA     fluticasone-vilanterol  1 puff Inhalation Daily     Infusion Meds    dextrose 5% and 0.45% NaCl 30 mL/hr at 05/12/17 2047     insulin (regular) 2 Units/hr (05/13/17 1002)     - MEDICATION INSTRUCTIONS -       EPIDURAL DRIP 9 mL/hr at 05/13/17 0518       ALLERGIES:    Allergies   Allergen Reactions     Codeine Other (See Comments)     Makes her sleepless     Loratadine Other (See Comments)     Extreme sleepiness       Sulfa Drugs Unknown     Other reaction(s): Unknown     Terazosin Other (See Comments)     Swellling in legs, improved when went off     Adhesive Tape Rash     Foam tape used during surgery was what caused rash     Latex Rash     Liquid Adhesive Rash       REVIEW OF SYSTEMS:  A 10 point review of systems was negative except as noted above.    SOCIAL HISTORY:   Social History     Social History     Marital status:  "     Spouse name: N/A     Number of children: N/A     Years of education: N/A     Occupational History     retail Other     part-time     Social History Main Topics     Smoking status: Former Smoker     Packs/day: 0.50     Years: 15.00     Types: Cigarettes     Start date: 1970     Quit date: 1985     Smokeless tobacco: Never Used     Alcohol use 0.0 - 0.5 oz/week     0 - 1 Standard drinks or equivalent per week     Drug use: No     Sexual activity: Not on file     Other Topics Concern     Not on file     Social History Narrative    The patient has a 7 pk yr tobacco hx.  She has no active use since .  Alcohol use is 0 alcoholic drinks per week.  She denies use of recreational drugs.          She is retired.  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           The patient is .  Has 2 children.        Hot Tub Exposure: NO    Recent Travel: NO     Hx of incarceration:  NO    Bird Exposure:   NO    Animal Exposure:  NO    Inhalation Exposure:  NO         Reviewed with patient   No one accompanies Yue Valenzuela in hospital room    FAMILY MEDICAL HISTORY:   Family History   Problem Relation Age of Onset     Cancer - colorectal Father      DIABETES Father      Scleroderma Mother      CEREBROVASCULAR DISEASE Brother      DIABETES Brother      Hypertension Brother      DIABETES Brother      2nd brother     Reviewed with patient     PHYSICAL EXAM:   Temp  Av.8  F (36.6  C)  Min: 96.5  F (35.8  C)  Max: 99.4  F (37.4  C)  Arterial Line MAP (mmHg)  Av mmHg  Min: 58 mmHg  Max: 88 mmHg  Arterial Line BP  Min: 109/36  Max: 147/49      Pulse  Av.3  Min: 60  Max: 88 Resp  Av.4  Min: 10  Max: 20  SpO2  Av.5 %  Min: 93 %  Max: 100 %       /54 (BP Location: Left arm)  Pulse 76  Temp 98.1  F (36.7  C) (Oral)  Resp 16  Ht 1.626 m (5' 4\")  Wt 69.5 kg (153 lb 3.5 oz)  SpO2 98%  BMI 26.3 kg/m2   Date 17 0700 - 17 0659   Shift 3624-8387 " 9547-7330 8006-9806 24 Hour Total   I  N  T  A  K  E   Shift Total  (mL/kg)       O  U  T  P  U  T   Urine 375   375    Chest Tube 150   150    Shift Total  (mL/kg) 525  (7.55)   525  (7.55)   Weight (kg) 69.5 69.5 69.5 69.5        Admit Weight: 64.1 kg (141 lb 5 oz)     GENERAL APPEARANCE: no distress,  awake  EYES: no scleral icterus, pupils equal  HENT: NC/AT,  mouth  without ulcers or lesions  Lymphatics: no cervical or supraclavicular LAD  Pulmonary: lungs clear to auscultation with equal breath sounds bilaterally, no clubbing  CV: regular rhythm, normal rate, no rub   - JVP not elevated   - Edema- not significant  GI: soft, nontender, normal bowel sounds, no HSM   MS: no evidence of inflammation in joints, no muscle tenderness  : + Hall  SKIN: no rash, warm, dry, no cyanosis  NEURO: mentation intact and speech normal    LABS:   CMP  Recent Labs  Lab 05/13/17  0548 05/12/17 2001 05/12/17  1547 05/12/17  0712    130* 134 138   POTASSIUM 5.0 5.2 5.0 4.6   CHLORIDE 104 100 100 105   CO2 20 21 22 23   ANIONGAP 9 9 11 10   * 177* 202* 62*   BUN 64* 65* 66* 64*   CR 2.14* 2.37* 2.39* 2.20*   GFRESTIMATED 23* 20* 20* 22*   GFRESTBLACK 28* 24* 24* 27*   ZAHEER 7.8* 7.7* 7.8* 7.8*   MAG 2.1 2.2 2.1 2.2   PHOS 4.2 4.8* 4.9* 5.2*     CBC  Recent Labs  Lab 05/13/17  0548 05/12/17  0712 05/11/17  0544 05/11/17  0439   HGB 8.9* 8.9* 10.1* Unsatisfactory specimen - clottedDAKOTA [address] 5/11/17 0458 KH.   WBC 9.4 12.9* 17.5* Unsatisfactory specimen - clottedDAKOTA [address] 5/11/17 0458 KH.   RBC 2.98* 3.03* 3.39* Unsatisfactory specimen - clottedDAKOTA MURALI 6B 5/11/17 0458 KH.   HCT 27.5* 27.9* 30.8* Unsatisfactory specimen - clottedDAKOTA MURALI 6B 5/11/17 0458 KH.   MCV 92 92 91 Unsatisfactory specimen - clottedDAKOTA MURALI 6B 5/11/17 0458 KH.   MCH 29.9 29.4 29.8 Unsatisfactory specimen - clottedDAKOTA MURALI 6B 5/11/17 0458 KH.   MCHC 32.4 31.9 32.8 Unsatisfactory specimen - clottedDAKOTA MURALI 6B  5/11/17 0458 KH.   RDW 13.9 14.2 13.4 Unsatisfactory specimen - clottedDAALEX CARRION 6B 5/11/17 0458 KH.   * 167 194 Unsatisfactory specimen - clottedDADELORESTA MURALI 6B 5/11/17 0458 KH.     INRNo lab results found in last 7 days.  ABG  Recent Labs  Lab 05/10/17  1530 05/10/17  1420 05/10/17  1348 05/10/17  1245   PH 7.34* 7.32* 7.28* 7.28*   PCO2 35 38 44 47*   PO2 164* 165* 84 311*   HCO3 19* 20* 21 22   O2PER 55.0 57.0 50.0 100.0      URINE STUDIES  Recent Labs   Lab Test  05/12/17   2312  08/04/14   0832   COLOR  Light Yellow  Straw   APPEARANCE  Clear  Clear   URINEGLC  70*  300*   URINEBILI  Negative  Negative   URINEKETONE  Negative  Negative   SG  1.009  1.008   UBLD  Negative  Negative   URINEPH  5.0  5.5   PROTEIN  Negative  10*   NITRITE  Negative  Negative   LEUKEST  Negative  Negative   RBCU  <1  <1   WBCU  1  <1     No lab results found.  PTH  No lab results found.  IRON STUDIES  No lab results found.    IMAGING:  All imaging studies reviewed by me.     Sharron Mooney MD

## 2017-05-14 ENCOUNTER — APPOINTMENT (OUTPATIENT)
Dept: GENERAL RADIOLOGY | Facility: CLINIC | Age: 72
DRG: 163 | End: 2017-05-14
Attending: STUDENT IN AN ORGANIZED HEALTH CARE EDUCATION/TRAINING PROGRAM
Payer: MEDICARE

## 2017-05-14 ENCOUNTER — APPOINTMENT (OUTPATIENT)
Dept: SPEECH THERAPY | Facility: CLINIC | Age: 72
DRG: 163 | End: 2017-05-14
Attending: STUDENT IN AN ORGANIZED HEALTH CARE EDUCATION/TRAINING PROGRAM
Payer: MEDICARE

## 2017-05-14 ENCOUNTER — APPOINTMENT (OUTPATIENT)
Dept: OCCUPATIONAL THERAPY | Facility: CLINIC | Age: 72
DRG: 163 | End: 2017-05-14
Attending: STUDENT IN AN ORGANIZED HEALTH CARE EDUCATION/TRAINING PROGRAM
Payer: MEDICARE

## 2017-05-14 ENCOUNTER — APPOINTMENT (OUTPATIENT)
Dept: PHYSICAL THERAPY | Facility: CLINIC | Age: 72
DRG: 163 | End: 2017-05-14
Attending: STUDENT IN AN ORGANIZED HEALTH CARE EDUCATION/TRAINING PROGRAM
Payer: MEDICARE

## 2017-05-14 LAB
ANION GAP SERPL CALCULATED.3IONS-SCNC: 7 MMOL/L (ref 3–14)
BUN SERPL-MCNC: 53 MG/DL (ref 7–30)
CALCIUM SERPL-MCNC: 8.2 MG/DL (ref 8.5–10.1)
CHLORIDE SERPL-SCNC: 108 MMOL/L (ref 94–109)
CO2 SERPL-SCNC: 21 MMOL/L (ref 20–32)
CREAT SERPL-MCNC: 1.99 MG/DL (ref 0.52–1.04)
ERYTHROCYTE [DISTWIDTH] IN BLOOD BY AUTOMATED COUNT: 13.8 % (ref 10–15)
GFR SERPL CREATININE-BSD FRML MDRD: 25 ML/MIN/1.7M2
GLUCOSE BLDC GLUCOMTR-MCNC: 109 MG/DL (ref 70–99)
GLUCOSE BLDC GLUCOMTR-MCNC: 117 MG/DL (ref 70–99)
GLUCOSE BLDC GLUCOMTR-MCNC: 147 MG/DL (ref 70–99)
GLUCOSE BLDC GLUCOMTR-MCNC: 201 MG/DL (ref 70–99)
GLUCOSE BLDC GLUCOMTR-MCNC: 210 MG/DL (ref 70–99)
GLUCOSE BLDC GLUCOMTR-MCNC: 291 MG/DL (ref 70–99)
GLUCOSE BLDC GLUCOMTR-MCNC: 293 MG/DL (ref 70–99)
GLUCOSE SERPL-MCNC: 90 MG/DL (ref 70–99)
HCT VFR BLD AUTO: 27.2 % (ref 35–47)
HGB BLD-MCNC: 8.7 G/DL (ref 11.7–15.7)
MAGNESIUM SERPL-MCNC: 2.3 MG/DL (ref 1.6–2.3)
MCH RBC QN AUTO: 29.6 PG (ref 26.5–33)
MCHC RBC AUTO-ENTMCNC: 32 G/DL (ref 31.5–36.5)
MCV RBC AUTO: 93 FL (ref 78–100)
PHOSPHATE SERPL-MCNC: 3.7 MG/DL (ref 2.5–4.5)
PLATELET # BLD AUTO: 151 10E9/L (ref 150–450)
POTASSIUM SERPL-SCNC: 4.9 MMOL/L (ref 3.4–5.3)
RBC # BLD AUTO: 2.94 10E12/L (ref 3.8–5.2)
SODIUM SERPL-SCNC: 136 MMOL/L (ref 133–144)
WBC # BLD AUTO: 7.2 10E9/L (ref 4–11)

## 2017-05-14 PROCEDURE — A9270 NON-COVERED ITEM OR SERVICE: HCPCS | Mod: GY | Performed by: PHYSICIAN ASSISTANT

## 2017-05-14 PROCEDURE — 25000132 ZZH RX MED GY IP 250 OP 250 PS 637: Mod: GY | Performed by: THORACIC SURGERY (CARDIOTHORACIC VASCULAR SURGERY)

## 2017-05-14 PROCEDURE — 40000133 ZZH STATISTIC OT WARD VISIT: Performed by: OCCUPATIONAL THERAPIST

## 2017-05-14 PROCEDURE — 25000125 ZZHC RX 250: Performed by: ANESTHESIOLOGY

## 2017-05-14 PROCEDURE — 40000225 ZZH STATISTIC SLP WARD VISIT: Performed by: SPEECH-LANGUAGE PATHOLOGIST

## 2017-05-14 PROCEDURE — 25000128 H RX IP 250 OP 636: Performed by: ANESTHESIOLOGY

## 2017-05-14 PROCEDURE — 94640 AIRWAY INHALATION TREATMENT: CPT | Mod: 76

## 2017-05-14 PROCEDURE — 92526 ORAL FUNCTION THERAPY: CPT | Mod: GN | Performed by: SPEECH-LANGUAGE PATHOLOGIST

## 2017-05-14 PROCEDURE — 25000132 ZZH RX MED GY IP 250 OP 250 PS 637: Mod: GY | Performed by: PHYSICIAN ASSISTANT

## 2017-05-14 PROCEDURE — 80048 BASIC METABOLIC PNL TOTAL CA: CPT | Performed by: STUDENT IN AN ORGANIZED HEALTH CARE EDUCATION/TRAINING PROGRAM

## 2017-05-14 PROCEDURE — 94640 AIRWAY INHALATION TREATMENT: CPT

## 2017-05-14 PROCEDURE — 25000128 H RX IP 250 OP 636: Performed by: THORACIC SURGERY (CARDIOTHORACIC VASCULAR SURGERY)

## 2017-05-14 PROCEDURE — 25000128 H RX IP 250 OP 636: Performed by: SURGERY

## 2017-05-14 PROCEDURE — 40000193 ZZH STATISTIC PT WARD VISIT: Performed by: REHABILITATION PRACTITIONER

## 2017-05-14 PROCEDURE — 97116 GAIT TRAINING THERAPY: CPT | Mod: GP | Performed by: REHABILITATION PRACTITIONER

## 2017-05-14 PROCEDURE — 85027 COMPLETE CBC AUTOMATED: CPT | Performed by: STUDENT IN AN ORGANIZED HEALTH CARE EDUCATION/TRAINING PROGRAM

## 2017-05-14 PROCEDURE — S0020 INJECTION, BUPIVICAINE HYDRO: HCPCS | Performed by: ANESTHESIOLOGY

## 2017-05-14 PROCEDURE — A9270 NON-COVERED ITEM OR SERVICE: HCPCS | Mod: GY | Performed by: THORACIC SURGERY (CARDIOTHORACIC VASCULAR SURGERY)

## 2017-05-14 PROCEDURE — 00000146 ZZHCL STATISTIC GLUCOSE BY METER IP

## 2017-05-14 PROCEDURE — 84100 ASSAY OF PHOSPHORUS: CPT | Performed by: STUDENT IN AN ORGANIZED HEALTH CARE EDUCATION/TRAINING PROGRAM

## 2017-05-14 PROCEDURE — S5010 5% DEXTROSE AND 0.45% SALINE: HCPCS | Performed by: STUDENT IN AN ORGANIZED HEALTH CARE EDUCATION/TRAINING PROGRAM

## 2017-05-14 PROCEDURE — 83735 ASSAY OF MAGNESIUM: CPT | Performed by: STUDENT IN AN ORGANIZED HEALTH CARE EDUCATION/TRAINING PROGRAM

## 2017-05-14 PROCEDURE — 12000006 ZZH R&B IMCU INTERMEDIATE UMMC

## 2017-05-14 PROCEDURE — 25000132 ZZH RX MED GY IP 250 OP 250 PS 637: Mod: GY | Performed by: STUDENT IN AN ORGANIZED HEALTH CARE EDUCATION/TRAINING PROGRAM

## 2017-05-14 PROCEDURE — 40000275 ZZH STATISTIC RCP TIME EA 10 MIN

## 2017-05-14 PROCEDURE — 25000132 ZZH RX MED GY IP 250 OP 250 PS 637: Mod: GY | Performed by: SURGERY

## 2017-05-14 PROCEDURE — 97530 THERAPEUTIC ACTIVITIES: CPT | Mod: GP | Performed by: REHABILITATION PRACTITIONER

## 2017-05-14 PROCEDURE — 25800025 ZZH RX 258: Performed by: STUDENT IN AN ORGANIZED HEALTH CARE EDUCATION/TRAINING PROGRAM

## 2017-05-14 PROCEDURE — 25000125 ZZHC RX 250: Performed by: PHYSICIAN ASSISTANT

## 2017-05-14 PROCEDURE — A9270 NON-COVERED ITEM OR SERVICE: HCPCS | Mod: GY | Performed by: SURGERY

## 2017-05-14 PROCEDURE — A9270 NON-COVERED ITEM OR SERVICE: HCPCS | Mod: GY | Performed by: STUDENT IN AN ORGANIZED HEALTH CARE EDUCATION/TRAINING PROGRAM

## 2017-05-14 PROCEDURE — 36415 COLL VENOUS BLD VENIPUNCTURE: CPT | Performed by: STUDENT IN AN ORGANIZED HEALTH CARE EDUCATION/TRAINING PROGRAM

## 2017-05-14 PROCEDURE — 97535 SELF CARE MNGMENT TRAINING: CPT | Mod: GO | Performed by: OCCUPATIONAL THERAPIST

## 2017-05-14 PROCEDURE — 71020 XR CHEST 2 VW: CPT

## 2017-05-14 RX ORDER — GUAIFENESIN 600 MG/1
600 TABLET, EXTENDED RELEASE ORAL 2 TIMES DAILY
Status: DISCONTINUED | OUTPATIENT
Start: 2017-05-14 | End: 2017-05-26 | Stop reason: HOSPADM

## 2017-05-14 RX ORDER — HYDROMORPHONE HYDROCHLORIDE 1 MG/ML
.3-.5 INJECTION, SOLUTION INTRAMUSCULAR; INTRAVENOUS; SUBCUTANEOUS
Status: DISCONTINUED | OUTPATIENT
Start: 2017-05-14 | End: 2017-05-26 | Stop reason: HOSPADM

## 2017-05-14 RX ORDER — AMLODIPINE BESYLATE 10 MG/1
10 TABLET ORAL EVERY EVENING
Status: DISCONTINUED | OUTPATIENT
Start: 2017-05-14 | End: 2017-05-16

## 2017-05-14 RX ORDER — FUROSEMIDE 10 MG/ML
20 INJECTION INTRAMUSCULAR; INTRAVENOUS ONCE
Status: COMPLETED | OUTPATIENT
Start: 2017-05-14 | End: 2017-05-14

## 2017-05-14 RX ORDER — FUROSEMIDE 20 MG
20 TABLET ORAL EVERY MORNING
Status: DISCONTINUED | OUTPATIENT
Start: 2017-05-14 | End: 2017-05-18

## 2017-05-14 RX ADMIN — FUROSEMIDE 20 MG: 10 INJECTION, SOLUTION INTRAVENOUS at 10:32

## 2017-05-14 RX ADMIN — FLUTICASONE FUROATE AND VILANTEROL TRIFENATATE 1 PUFF: 100; 25 POWDER RESPIRATORY (INHALATION) at 07:50

## 2017-05-14 RX ADMIN — DEXTROSE AND SODIUM CHLORIDE: 5; 450 INJECTION, SOLUTION INTRAVENOUS at 00:09

## 2017-05-14 RX ADMIN — GUAIFENESIN 600 MG: 600 TABLET, EXTENDED RELEASE ORAL at 08:56

## 2017-05-14 RX ADMIN — BUPIVACAINE HYDROCHLORIDE: 7.5 INJECTION, SOLUTION EPIDURAL; RETROBULBAR at 04:05

## 2017-05-14 RX ADMIN — AMLODIPINE BESYLATE 10 MG: 10 TABLET ORAL at 21:25

## 2017-05-14 RX ADMIN — Medication: at 06:25

## 2017-05-14 RX ADMIN — ALBUTEROL SULFATE 2.5 MG: 2.5 SOLUTION RESPIRATORY (INHALATION) at 16:55

## 2017-05-14 RX ADMIN — MENTHOL 2 PATCH: 205.5 PATCH TOPICAL at 07:49

## 2017-05-14 RX ADMIN — ALBUTEROL SULFATE 2.5 MG: 2.5 SOLUTION RESPIRATORY (INHALATION) at 20:56

## 2017-05-14 RX ADMIN — HEPARIN SODIUM 5000 UNITS: 5000 INJECTION, SOLUTION INTRAVENOUS; SUBCUTANEOUS at 07:54

## 2017-05-14 RX ADMIN — ACETAMINOPHEN 975 MG: 325 TABLET, FILM COATED ORAL at 07:50

## 2017-05-14 RX ADMIN — ALBUTEROL SULFATE 2.5 MG: 2.5 SOLUTION RESPIRATORY (INHALATION) at 08:32

## 2017-05-14 RX ADMIN — CETIRIZINE HYDROCHLORIDE 5 MG: 5 TABLET ORAL at 07:50

## 2017-05-14 RX ADMIN — SENNOSIDES AND DOCUSATE SODIUM 2 TABLET: 8.6; 5 TABLET ORAL at 21:31

## 2017-05-14 RX ADMIN — SODIUM CHLORIDE SOLN NEBU 3%: 3 NEBU SOLN at 20:54

## 2017-05-14 RX ADMIN — ACETAMINOPHEN 975 MG: 325 TABLET, FILM COATED ORAL at 16:28

## 2017-05-14 RX ADMIN — HEPARIN SODIUM 5000 UNITS: 5000 INJECTION, SOLUTION INTRAVENOUS; SUBCUTANEOUS at 16:28

## 2017-05-14 RX ADMIN — ALBUTEROL SULFATE 2.5 MG: 2.5 SOLUTION RESPIRATORY (INHALATION) at 13:08

## 2017-05-14 RX ADMIN — GUAIFENESIN 600 MG: 600 TABLET, EXTENDED RELEASE ORAL at 21:24

## 2017-05-14 RX ADMIN — PANTOPRAZOLE SODIUM 40 MG: 40 TABLET, DELAYED RELEASE ORAL at 07:50

## 2017-05-14 RX ADMIN — CARVEDILOL 3.12 MG: 3.12 TABLET, FILM COATED ORAL at 07:50

## 2017-05-14 RX ADMIN — PRAVASTATIN SODIUM 40 MG: 40 TABLET ORAL at 21:24

## 2017-05-14 RX ADMIN — MELATONIN TAB 3 MG 3 MG: 3 TAB at 18:40

## 2017-05-14 RX ADMIN — FUROSEMIDE 20 MG: 20 TABLET ORAL at 08:56

## 2017-05-14 RX ADMIN — ASPIRIN 81 MG CHEWABLE TABLET 81 MG: 81 TABLET CHEWABLE at 21:24

## 2017-05-14 RX ADMIN — SODIUM CHLORIDE SOLN NEBU 3%: 3 NEBU SOLN at 16:55

## 2017-05-14 RX ADMIN — CARVEDILOL 3.12 MG: 3.12 TABLET, FILM COATED ORAL at 18:40

## 2017-05-14 NOTE — PLAN OF CARE
Problem: Goal Outcome Summary  Goal: Goal Outcome Summary  VSS. HR&R stable. Good pain control with prn oxycodone and PCA. Alert, oriented, calm, and cooperative . Bright affect. Right lung sounds are clear and diminished in the bases. Chest tube in place to -20, output has slowed. Incisions are CDI. Hall placed for urine retention, good output. Appetite improving. Blood sugars 109-210, SS and CC. Ambulated halls x 2 with therapy today, and up in chair the rest of the shift, tolerated well.

## 2017-05-14 NOTE — PLAN OF CARE
Problem: Goal Outcome Summary  Goal: Goal Outcome Summary  Pt seen for dysphagia therapy due to continued dysphagia. Pt tolerating po adequately at this time however continues to report globus sensation at times. Recommend continue regular consistency diet with thin liquids, pt to self select softer items. Sit pt upright for po intake. Encourage small bites/sips and slow rate. Alternate bites/sips. SLP to follow per POC.

## 2017-05-14 NOTE — PROGRESS NOTES
[facility] EPIDURAL NOTE  SUBJECTIVE:   Interval History: Pt reports good pain control via epidural.  Denies any weakness, paresthesias, circumoral numbness, metallic taste or tinnitus.  Pt is ambulating with assistance.  Patient is currently without nausea; without pruritis.  Currently tolerating a general diet.  She is very sensitive to narcotics, and hallucinates when taking dilaudid.      Clinically Aligned Pain Assessment (CAPA):  Comfort (How is your pain?): Comfortably manageable  Change in Pain (Since your last medication/intervention?): Getting better  Pain Control (How are your pain treatments working?):  Partially effective pain control  Functioning (Are you able to do activities to get better?) : Can do most things, but pain gets in the way of some   Sleep (Does your pain management allow you to sleep or rest?): Normal sleep     Numerical Rating Scale:    Reports pain 2/10 at rest and 5-6/10 with movement.          Anticoagulation:  Heparin 5000 U sq q8      OBJECTIVE:  Diagnostics:  Lab Results   Component Value Date    WBC 7.2 05/14/2017     Lab Results   Component Value Date    RBC 2.94 05/14/2017     Lab Results   Component Value Date    HGB 8.7 05/14/2017     Lab Results   Component Value Date    HCT 27.2 05/14/2017     Lab Results   Component Value Date     05/14/2017       Lab Results   Component Value Date    INR 1.06 04/18/2017    INR 1.08 04/04/2017    INR 0.99 01/13/2017       Vitals:    Temp:  [36.2  C (97.2  F)-37.5  C (99.5  F)] 36.7  C (98  F)  Pulse:  [61-65] 65  Heart Rate:  [65-82] 82  Resp:  [16-18] 18  BP: (127-143)/(41-56) 127/45  SpO2:  [93 %-98 %] 96 %    Exam:    Strength 5/5 and symmetric grossly in bilateral LE Epidural site with dressing c/d/i, no tenderness, erythema, heme, edema      ASSESSMENT/PLAN:    Yue Valenzuela is a 71 year old female POD #4 s/p BRONCHOSCOPY FLEXIBLE  MEDIASTINOSCOPY  THORACOSCOPIC RESECTION LUNG  THORACOSCOPIC EXCISE NODE  MEDIASTINAL and placement of T5-6 epidural for analgesia.  Pt receiving adequate analgesia with epidural infusion Bupivacaine 0.125% at 9 mL/hr.  Pt is ambulating without difficulty.  No weakness or paresthesias, adequate sensory block.  No evidence of adverse side effects related to local anesthetic.  Hallucinates when taking Dilaudid.  I encouraged the nurse to page us if patient is having significant pain, so we may bolus the epidural rather than giving narcotics.      - continue current epidural infusion at 9mL/hour   - will continue to follow and adjust as needed  - discussed plan with attending anesthesiologist        Timothy De León MD  [facility]  5/14/2017 12:07 PM    24 hour Job Code Pager.  For in-house use only.     Dial * * [phone] and  [place]:  [phone]  [place]: [phone]  Peds:  [phone]  Then enter call-back number  May text page using [email or web address], but NOT [employer].

## 2017-05-14 NOTE — PLAN OF CARE
Problem: Goal Outcome Summary  Goal: Goal Outcome Summary  Outcome: Therapy, progress towards functional goals is fair  PT-6B- Recommend TCU at discharge. Pt amb ~ 100 feet x 2 with WW and CGA, standing rest break between bouts. Pt performs basic transfers with Min A, min cues for thoracotomy precautions.

## 2017-05-14 NOTE — PROGRESS NOTES
Diabetes Consult Daily Progress Note    Assessment/Plan:    70 y/o female with DM-1, CKD IV, factor V Leiden mutation, HTN, HLD, CAD s/p stent, PVD, carotid artery stenosis, asthma, and newly diagnosed lung adenocarcinoma, s/ p right lower lobectomy 05/11. On an insulin pump as outpatient, transitioned off insulin drip to SQ injections on 5/13, doing well since.  Confusion improving.       Plan:   -Reduce Lantus to 18 units q24 hrs  -Novolog medium correction scale QID  -Continue current carb coverage  -Check BG's before meals, before bed, and 2am     Our inpatient diabetes team will continue to follow, we will likely transition her back to her own insulin pump the day prior to discharge     Patient was discussed with Dr. Meggan Teague MD  PGY-6, Endocrinology Fellow  Pager [phone]    Attending addendum:  Pt was evaluated with endocrinology fellow & plan is as outlined in this note.  GUY BENITES MD, MPH      Interval History:      Feeling well today,  at the bedside.  Eating well.  Discussed above plan which they are in agreement with.          Active Diet Order      3 Gram K Diet      Exam:      B/P: 129/52, T: 98.1, P: 65, R: 16    General: NAD.   Resp: Unlabored breathing.   Neuro: Alert and oriented, communicating clearly.   Ext: no swelling or edema  Data:        Recent Labs  Lab 05/14/17  1245 05/14/17  0847 05/14/17  0655 05/14/17  0414 05/14/17  0143 05/13/17  2338 05/13/17  2225  05/13/17  0548  05/12/17 2001 05/12/17  1547  05/12/17  0712  05/11/17  1611   GLC  --   --  90  --   --   --   --   --  157*  --  177*  --  202*  --  62*  --  152*   * 201*  --  117* 147* 137* 169*  < >  --   < >  --   < >  --   < >  --   < >  --    < > = values in this interval not displayed.  Lab Results   Component Value Date    A1C 7.0 05/03/2017    A1C 6.0 08/04/2014

## 2017-05-14 NOTE — PLAN OF CARE
Problem: Goal Outcome Summary  Goal: Goal Outcome Summary  Outcome: Improving  S/B:      A: A&Ox4. VSS. SR. Afebrile. Denies pain and nausea. Tolerating * diet. Electrolytes WNL.           Temp:  [97.2  F (36.2  C)-99.5  F (37.5  C)] 99.5  F (37.5  C)  Pulse:  [61-65] 65  Heart Rate:  [64-68] 65  Resp:  [16-18] 16  BP: (127-143)/(41-56) 132/56  SpO2:  [93 %-98 %] 96 %      R: Continue with POC. Notify primary team with changes.

## 2017-05-14 NOTE — PLAN OF CARE
Problem: Goal Outcome Summary  Goal: Goal Outcome Summary  OT 6B Pt able to stand at the sink x5' for oral care with walker and no LOB.  Pt completed functional mobility to  in the hallway after and had 3 LOB backwards.  Pt feels she does have a decrease in balance with fatigue but tends to push herself vs sitting to rest.   Pt demonstrating some deficits in cognition/problem solving.   Rec TCU to improve strength balance and cognitive compensation prior to dc to home.

## 2017-05-14 NOTE — PROGRESS NOTES
"Thoracic Surgery Daily Progress Note  Yue Valenzuela  05/14/2017    Subjective:   No acute events overnight. Pain controlled. Tolerating diet.     Objective:    /45  Pulse 65  Temp 98  F (36.7  C) (Oral)  Resp 18  Ht 1.626 m (5' 4\")  Wt 66.5 kg (146 lb 9.7 oz)  SpO2 96%  BMI 25.16 kg/m2  Sitting up comfortably in bed in no acute distress  Awake, alert and appropriate  Non-labored breathing on room air  Regular rate and rhythm  Incision c/d/i. CT in place with small air leak. SS drainage  Extremities warm, well perfused.     Ins/Outs:   I/O last 3 completed shifts:  In: 808.81 [I.V.:808.81]  Out: 3590 [Urine:2365; Chest Tube:1225]    Labs:   Labs reviewed. Cr improving to 1.99. WBC 7.2. Hgb 8.7    CXR pending this morning    Assessment:  Yue Valenzuela is a 71 year old female now post operative day  4 from a right VATS, lower lobectomy and middle wedge.   Plan:       Neuro:  Transition to oral pain medications and off of PCA    Cardiovascular/Heme: Hemodynamically stable, add on home amlodipine today.     Respiratory/Pulm: Aggressive pulmonary toilet. Continue CT to suction.     FEN/Gastrointestinal: Regular diet. Bowel regimen.     Renal/Genitourinary: SUPRIYA on CKD. Continue swift. Appreciate nephrology assistance. Restarted home dose of lasix, will give additional diuresis x1 this morning.     Infectious: No acute concerns.     Prophy: Heparin, protonix.    Activity:  Up to chair, out of bed, increase activity.     Endo: DM, on insulin, appreciate endocrinology assistance.     Dispo: Continue care on 6B    Patient was seen and discussed with Dr. Joya, Thoracic Surgery Fellow, who agrees with the assessment and plan and will discuss with staff.     Evelyn Liriano MD  PGY-1, General Surgery  Pager: [phone]                       "

## 2017-05-15 ENCOUNTER — APPOINTMENT (OUTPATIENT)
Dept: GENERAL RADIOLOGY | Facility: CLINIC | Age: 72
DRG: 163 | End: 2017-05-15
Attending: STUDENT IN AN ORGANIZED HEALTH CARE EDUCATION/TRAINING PROGRAM
Payer: MEDICARE

## 2017-05-15 ENCOUNTER — APPOINTMENT (OUTPATIENT)
Dept: INTERVENTIONAL RADIOLOGY/VASCULAR | Facility: CLINIC | Age: 72
DRG: 163 | End: 2017-05-15
Attending: STUDENT IN AN ORGANIZED HEALTH CARE EDUCATION/TRAINING PROGRAM
Payer: MEDICARE

## 2017-05-15 ENCOUNTER — APPOINTMENT (OUTPATIENT)
Dept: GENERAL RADIOLOGY | Facility: CLINIC | Age: 72
DRG: 163 | End: 2017-05-15
Attending: PHYSICIAN ASSISTANT
Payer: MEDICARE

## 2017-05-15 LAB
ANION GAP SERPL CALCULATED.3IONS-SCNC: 7 MMOL/L (ref 3–14)
ANION GAP SERPL CALCULATED.3IONS-SCNC: 8 MMOL/L (ref 3–14)
BUN SERPL-MCNC: 46 MG/DL (ref 7–30)
BUN SERPL-MCNC: 48 MG/DL (ref 7–30)
CALCIUM SERPL-MCNC: 8.3 MG/DL (ref 8.5–10.1)
CALCIUM SERPL-MCNC: 8.4 MG/DL (ref 8.5–10.1)
CHLORIDE SERPL-SCNC: 103 MMOL/L (ref 94–109)
CHLORIDE SERPL-SCNC: 107 MMOL/L (ref 94–109)
CO2 SERPL-SCNC: 22 MMOL/L (ref 20–32)
CO2 SERPL-SCNC: 23 MMOL/L (ref 20–32)
CREAT SERPL-MCNC: 1.78 MG/DL (ref 0.52–1.04)
CREAT SERPL-MCNC: 1.83 MG/DL (ref 0.52–1.04)
ERYTHROCYTE [DISTWIDTH] IN BLOOD BY AUTOMATED COUNT: 13.6 % (ref 10–15)
GFR SERPL CREATININE-BSD FRML MDRD: 27 ML/MIN/1.7M2
GFR SERPL CREATININE-BSD FRML MDRD: 28 ML/MIN/1.7M2
GLUCOSE BLDC GLUCOMTR-MCNC: 138 MG/DL (ref 70–99)
GLUCOSE BLDC GLUCOMTR-MCNC: 160 MG/DL (ref 70–99)
GLUCOSE BLDC GLUCOMTR-MCNC: 164 MG/DL (ref 70–99)
GLUCOSE BLDC GLUCOMTR-MCNC: 176 MG/DL (ref 70–99)
GLUCOSE BLDC GLUCOMTR-MCNC: 189 MG/DL (ref 70–99)
GLUCOSE BLDC GLUCOMTR-MCNC: 281 MG/DL (ref 70–99)
GLUCOSE SERPL-MCNC: 142 MG/DL (ref 70–99)
GLUCOSE SERPL-MCNC: 198 MG/DL (ref 70–99)
HCT VFR BLD AUTO: 29.3 % (ref 35–47)
HGB BLD-MCNC: 9.5 G/DL (ref 11.7–15.7)
INTERPRETATION ECG - MUSE: NORMAL
MAGNESIUM SERPL-MCNC: 2.2 MG/DL (ref 1.6–2.3)
MAGNESIUM SERPL-MCNC: 2.3 MG/DL (ref 1.6–2.3)
MCH RBC QN AUTO: 29.5 PG (ref 26.5–33)
MCHC RBC AUTO-ENTMCNC: 32.4 G/DL (ref 31.5–36.5)
MCV RBC AUTO: 91 FL (ref 78–100)
PHOSPHATE SERPL-MCNC: 3.8 MG/DL (ref 2.5–4.5)
PHOSPHATE SERPL-MCNC: 4.1 MG/DL (ref 2.5–4.5)
PLATELET # BLD AUTO: 155 10E9/L (ref 150–450)
POTASSIUM SERPL-SCNC: 4.8 MMOL/L (ref 3.4–5.3)
POTASSIUM SERPL-SCNC: 4.9 MMOL/L (ref 3.4–5.3)
RADIOLOGIST FLAGS: ABNORMAL
RADIOLOGIST FLAGS: ABNORMAL
RBC # BLD AUTO: 3.22 10E12/L (ref 3.8–5.2)
SODIUM SERPL-SCNC: 133 MMOL/L (ref 133–144)
SODIUM SERPL-SCNC: 137 MMOL/L (ref 133–144)
WBC # BLD AUTO: 8 10E9/L (ref 4–11)

## 2017-05-15 PROCEDURE — 93010 ELECTROCARDIOGRAM REPORT: CPT | Mod: 77 | Performed by: INTERNAL MEDICINE

## 2017-05-15 PROCEDURE — 25800025 ZZH RX 258: Performed by: STUDENT IN AN ORGANIZED HEALTH CARE EDUCATION/TRAINING PROGRAM

## 2017-05-15 PROCEDURE — 0W9930Z DRAINAGE OF RIGHT PLEURAL CAVITY WITH DRAINAGE DEVICE, PERCUTANEOUS APPROACH: ICD-10-PCS | Performed by: RADIOLOGY

## 2017-05-15 PROCEDURE — 40000962 ZZH STATISTIC CHRONIC DISEASE SPECIALIST RT CONSULT

## 2017-05-15 PROCEDURE — 71010 XR CHEST PORT 1 VW: CPT

## 2017-05-15 PROCEDURE — 83735 ASSAY OF MAGNESIUM: CPT | Performed by: SURGERY

## 2017-05-15 PROCEDURE — 25000132 ZZH RX MED GY IP 250 OP 250 PS 637: Mod: GY | Performed by: STUDENT IN AN ORGANIZED HEALTH CARE EDUCATION/TRAINING PROGRAM

## 2017-05-15 PROCEDURE — 27210886 ZZH ACCESSORY CR5

## 2017-05-15 PROCEDURE — S0020 INJECTION, BUPIVICAINE HYDRO: HCPCS | Performed by: ANESTHESIOLOGY

## 2017-05-15 PROCEDURE — 25000132 ZZH RX MED GY IP 250 OP 250 PS 637: Mod: GY | Performed by: SURGERY

## 2017-05-15 PROCEDURE — 99222 1ST HOSP IP/OBS MODERATE 55: CPT | Performed by: INTERNAL MEDICINE

## 2017-05-15 PROCEDURE — 80048 BASIC METABOLIC PNL TOTAL CA: CPT | Performed by: SURGERY

## 2017-05-15 PROCEDURE — C1729 CATH, DRAINAGE: HCPCS

## 2017-05-15 PROCEDURE — 25000125 ZZHC RX 250: Performed by: PHYSICIAN ASSISTANT

## 2017-05-15 PROCEDURE — A9270 NON-COVERED ITEM OR SERVICE: HCPCS | Mod: GY | Performed by: SURGERY

## 2017-05-15 PROCEDURE — 25000128 H RX IP 250 OP 636: Performed by: THORACIC SURGERY (CARDIOTHORACIC VASCULAR SURGERY)

## 2017-05-15 PROCEDURE — 84100 ASSAY OF PHOSPHORUS: CPT | Performed by: SURGERY

## 2017-05-15 PROCEDURE — 36415 COLL VENOUS BLD VENIPUNCTURE: CPT | Performed by: SURGERY

## 2017-05-15 PROCEDURE — 25000128 H RX IP 250 OP 636

## 2017-05-15 PROCEDURE — 31622 DX BRONCHOSCOPE/WASH: CPT

## 2017-05-15 PROCEDURE — 25000132 ZZH RX MED GY IP 250 OP 250 PS 637: Mod: GY | Performed by: PHYSICIAN ASSISTANT

## 2017-05-15 PROCEDURE — 25000132 ZZH RX MED GY IP 250 OP 250 PS 637: Mod: GY | Performed by: THORACIC SURGERY (CARDIOTHORACIC VASCULAR SURGERY)

## 2017-05-15 PROCEDURE — 25000128 H RX IP 250 OP 636: Performed by: ANESTHESIOLOGY

## 2017-05-15 PROCEDURE — S5010 5% DEXTROSE AND 0.45% SALINE: HCPCS | Performed by: STUDENT IN AN ORGANIZED HEALTH CARE EDUCATION/TRAINING PROGRAM

## 2017-05-15 PROCEDURE — 93005 ELECTROCARDIOGRAM TRACING: CPT

## 2017-05-15 PROCEDURE — 85027 COMPLETE CBC AUTOMATED: CPT | Performed by: SURGERY

## 2017-05-15 PROCEDURE — A9270 NON-COVERED ITEM OR SERVICE: HCPCS | Mod: GY | Performed by: PHYSICIAN ASSISTANT

## 2017-05-15 PROCEDURE — 94640 AIRWAY INHALATION TREATMENT: CPT | Mod: 76

## 2017-05-15 PROCEDURE — 12000006 ZZH R&B IMCU INTERMEDIATE UMMC

## 2017-05-15 PROCEDURE — 27211039 ZZH NEEDLE CR2

## 2017-05-15 PROCEDURE — 27210738 ZZH ACCESSORY CR2

## 2017-05-15 PROCEDURE — A9270 NON-COVERED ITEM OR SERVICE: HCPCS | Mod: GY | Performed by: STUDENT IN AN ORGANIZED HEALTH CARE EDUCATION/TRAINING PROGRAM

## 2017-05-15 PROCEDURE — 71020 XR CHEST 2 VW: CPT

## 2017-05-15 PROCEDURE — 40000989 ZZH STATISTIC CHRONIC PULMONARY DISEASE SPECIALIST

## 2017-05-15 PROCEDURE — 0BJ08ZZ INSPECTION OF TRACHEOBRONCHIAL TREE, VIA NATURAL OR ARTIFICIAL OPENING ENDOSCOPIC: ICD-10-PCS | Performed by: STUDENT IN AN ORGANIZED HEALTH CARE EDUCATION/TRAINING PROGRAM

## 2017-05-15 PROCEDURE — 93010 ELECTROCARDIOGRAM REPORT: CPT | Performed by: INTERNAL MEDICINE

## 2017-05-15 PROCEDURE — 40000275 ZZH STATISTIC RCP TIME EA 10 MIN

## 2017-05-15 PROCEDURE — 32557 INSERT CATH PLEURA W/ IMAGE: CPT | Mod: RT

## 2017-05-15 PROCEDURE — 99212 OFFICE O/P EST SF 10 MIN: CPT

## 2017-05-15 PROCEDURE — 00000146 ZZHCL STATISTIC GLUCOSE BY METER IP

## 2017-05-15 PROCEDURE — 27210732 ZZH ACCESSORY CR1

## 2017-05-15 PROCEDURE — 25000128 H RX IP 250 OP 636: Performed by: SURGERY

## 2017-05-15 PROCEDURE — 40000983 ZZH STATISTIC HFNC ADULT NON-CPAP

## 2017-05-15 PROCEDURE — A9270 NON-COVERED ITEM OR SERVICE: HCPCS | Mod: GY | Performed by: THORACIC SURGERY (CARDIOTHORACIC VASCULAR SURGERY)

## 2017-05-15 PROCEDURE — 25000125 ZZHC RX 250: Performed by: ANESTHESIOLOGY

## 2017-05-15 PROCEDURE — 71010 XR CHEST PORT 1 VW: CPT | Mod: 77

## 2017-05-15 RX ORDER — LEVALBUTEROL INHALATION SOLUTION 0.63 MG/3ML
0.63 SOLUTION RESPIRATORY (INHALATION)
Status: DISCONTINUED | OUTPATIENT
Start: 2017-05-15 | End: 2017-05-26 | Stop reason: HOSPADM

## 2017-05-15 RX ORDER — LORAZEPAM 2 MG/ML
0.5 INJECTION INTRAMUSCULAR ONCE
Status: COMPLETED | OUTPATIENT
Start: 2017-05-15 | End: 2017-05-15

## 2017-05-15 RX ORDER — METOPROLOL TARTRATE 1 MG/ML
5 INJECTION, SOLUTION INTRAVENOUS EVERY 5 MIN PRN
Status: DISCONTINUED | OUTPATIENT
Start: 2017-05-15 | End: 2017-05-18

## 2017-05-15 RX ORDER — LORAZEPAM 2 MG/ML
INJECTION INTRAMUSCULAR
Status: COMPLETED
Start: 2017-05-15 | End: 2017-05-15

## 2017-05-15 RX ORDER — SODIUM CHLORIDE FOR INHALATION 3 %
3 VIAL, NEBULIZER (ML) INHALATION
Status: DISCONTINUED | OUTPATIENT
Start: 2017-05-15 | End: 2017-05-26 | Stop reason: HOSPADM

## 2017-05-15 RX ORDER — HYDROMORPHONE HYDROCHLORIDE 1 MG/ML
.3-.5 INJECTION, SOLUTION INTRAMUSCULAR; INTRAVENOUS; SUBCUTANEOUS ONCE
Status: COMPLETED | OUTPATIENT
Start: 2017-05-15 | End: 2017-05-15

## 2017-05-15 RX ORDER — DILTIAZEM HYDROCHLORIDE 5 MG/ML
10 INJECTION INTRAVENOUS ONCE
Status: DISCONTINUED | OUTPATIENT
Start: 2017-05-16 | End: 2017-05-16

## 2017-05-15 RX ADMIN — ACETAMINOPHEN 975 MG: 325 TABLET, FILM COATED ORAL at 08:44

## 2017-05-15 RX ADMIN — ALBUTEROL SULFATE 2.5 MG: 2.5 SOLUTION RESPIRATORY (INHALATION) at 12:36

## 2017-05-15 RX ADMIN — SODIUM CHLORIDE SOLN NEBU 3% 3 ML: 3 NEBU SOLN at 21:42

## 2017-05-15 RX ADMIN — CARVEDILOL 3.12 MG: 3.12 TABLET, FILM COATED ORAL at 08:44

## 2017-05-15 RX ADMIN — PANTOPRAZOLE SODIUM 40 MG: 40 TABLET, DELAYED RELEASE ORAL at 08:44

## 2017-05-15 RX ADMIN — ACETAMINOPHEN 975 MG: 325 TABLET, FILM COATED ORAL at 01:02

## 2017-05-15 RX ADMIN — OXYCODONE HYDROCHLORIDE 5 MG: 5 TABLET ORAL at 00:38

## 2017-05-15 RX ADMIN — OXYCODONE HYDROCHLORIDE 10 MG: 5 TABLET ORAL at 23:00

## 2017-05-15 RX ADMIN — LORAZEPAM 0.5 MG: 2 INJECTION INTRAMUSCULAR at 15:22

## 2017-05-15 RX ADMIN — LORAZEPAM 0.5 MG: 2 INJECTION INTRAMUSCULAR; INTRAVENOUS at 15:22

## 2017-05-15 RX ADMIN — OXYCODONE HYDROCHLORIDE 10 MG: 5 TABLET ORAL at 19:51

## 2017-05-15 RX ADMIN — CETIRIZINE HYDROCHLORIDE 5 MG: 5 TABLET ORAL at 08:44

## 2017-05-15 RX ADMIN — HYDROMORPHONE HYDROCHLORIDE 0.5 MG: 1 INJECTION, SOLUTION INTRAMUSCULAR; INTRAVENOUS; SUBCUTANEOUS at 20:04

## 2017-05-15 RX ADMIN — HEPARIN SODIUM 5000 UNITS: 5000 INJECTION, SOLUTION INTRAVENOUS; SUBCUTANEOUS at 17:11

## 2017-05-15 RX ADMIN — SODIUM CHLORIDE SOLN NEBU 3% 3 ML: 3 NEBU SOLN at 08:24

## 2017-05-15 RX ADMIN — MELATONIN TAB 3 MG 3 MG: 3 TAB at 18:04

## 2017-05-15 RX ADMIN — DEXTROSE AND SODIUM CHLORIDE: 5; 450 INJECTION, SOLUTION INTRAVENOUS at 06:49

## 2017-05-15 RX ADMIN — FLUTICASONE FUROATE AND VILANTEROL TRIFENATATE 1 PUFF: 100; 25 POWDER RESPIRATORY (INHALATION) at 08:44

## 2017-05-15 RX ADMIN — OXYCODONE HYDROCHLORIDE 10 MG: 5 TABLET ORAL at 11:04

## 2017-05-15 RX ADMIN — GUAIFENESIN 600 MG: 600 TABLET, EXTENDED RELEASE ORAL at 08:44

## 2017-05-15 RX ADMIN — METOROPROLOL TARTRATE 5 MG: 5 INJECTION, SOLUTION INTRAVENOUS at 20:29

## 2017-05-15 RX ADMIN — METOROPROLOL TARTRATE 5 MG: 5 INJECTION, SOLUTION INTRAVENOUS at 12:40

## 2017-05-15 RX ADMIN — SENNOSIDES AND DOCUSATE SODIUM 2 TABLET: 8.6; 5 TABLET ORAL at 08:44

## 2017-05-15 RX ADMIN — CARVEDILOL 3.12 MG: 3.12 TABLET, FILM COATED ORAL at 18:04

## 2017-05-15 RX ADMIN — HEPARIN SODIUM 5000 UNITS: 5000 INJECTION, SOLUTION INTRAVENOUS; SUBCUTANEOUS at 01:12

## 2017-05-15 RX ADMIN — LEVALBUTEROL HYDROCHLORIDE 0.63 MG: 0.63 SOLUTION RESPIRATORY (INHALATION) at 21:38

## 2017-05-15 RX ADMIN — SODIUM CHLORIDE SOLN NEBU 3% 3 ML: 3 NEBU SOLN at 12:36

## 2017-05-15 RX ADMIN — ALBUTEROL SULFATE 2.5 MG: 2.5 SOLUTION RESPIRATORY (INHALATION) at 08:24

## 2017-05-15 RX ADMIN — BUPIVACAINE HYDROCHLORIDE: 7.5 INJECTION, SOLUTION EPIDURAL; RETROBULBAR at 06:49

## 2017-05-15 RX ADMIN — METOROPROLOL TARTRATE 5 MG: 5 INJECTION, SOLUTION INTRAVENOUS at 18:18

## 2017-05-15 RX ADMIN — HYDROMORPHONE HYDROCHLORIDE 0.5 MG: 1 INJECTION, SOLUTION INTRAMUSCULAR; INTRAVENOUS; SUBCUTANEOUS at 15:02

## 2017-05-15 RX ADMIN — FUROSEMIDE 20 MG: 20 TABLET ORAL at 08:44

## 2017-05-15 ASSESSMENT — PAIN DESCRIPTION - DESCRIPTORS
DESCRIPTORS: SHARP;ACHING;CONSTANT
DESCRIPTORS: ACHING;DISCOMFORT

## 2017-05-15 NOTE — PROVIDER NOTIFICATION
Rn went into room at 0645 due to pt saturations reading low on phone at 86% dropping, Rn at monitor at desk down to 76% room air. Rn ran into room and medical student at bedside assisting pt sitting up at bedside due to being uncomfortable. Nasal cannula first applied, then oxiplus when saturations were not coming up beyond 86% on 15L. Pt c/o feeling like she has something stuck in lower right chest area that she cannot cough up and feeling of being full. Which are not new complaints. Dr. Diaz and Dr. Stubbs arrived in room, STAT chest xray done. RRT called. Pt moved to chair for xray and oxygen came up to 94% on 15L with movement, sat in chair and it dropped to 86% again. Pt did deep breathing and oxygen able to be weaned to 8L at 94-96%. Sitting up in Chair. Newton Encarnacion NP updated and wants CT to -40 sxn and will recheck cxr at 10am. Newton also updated that pt is having pac's up to 25/min.  Occasional pvc, pt did have this on Friday and last ekg was then. Pt stable in chair, dayshift Rn updated to plan of care.

## 2017-05-15 NOTE — PROGRESS NOTES
"                     Diabetes Consult Daily  Progress Note          Assessment/Plan:   keegan Valenzuela is a year old female with DM-1, CKD IV, factor V Leiden mutation, HTN, HLD, CAD s/p stent, PVD, carotid artery stenosis, asthma, and newly diagnosed lung adenocarcinoma, s/ p right lower lobectomy 05/11. On an insulin pump as outpatient, transitioned off insulin drip to SQ injections on 5/13, doing well since.     Type 1 diabetes:  Plan:  -lantus 18 units every 24 hours HS  Aspart meal Insulin  Breakfast: 1 unit per 11 grams of CHO  Lunch: 1 unit per 11 grams of CHO  Dinner: 1 unit per 10 grams of CHO  Snacks: 1 unit per 10 grams of CHO  -Aspart medium correction with meals and snacks  Monitor glucose before meals, HS and 0200         I reviewed the last 24 hours progress notes           Interval History:     Blood sugars are moderately controlled  Glucose at ~ midnight 160  glucose ~ 0500, 138/142  Was having acute respiratory distress, right chest tube was placed for pneumothorax, returned to the floor. Ms Valenzuela reports feeling better but stressed. Wanting to \"calm down.\"        Recent Labs  Lab 05/15/17  1305 05/15/17  0856 05/15/17  0545 05/15/17  0524 05/15/17  0043 05/14/17  2100 05/14/17  2019  05/14/17  0655  05/13/17  0548  05/12/17 2001 05/12/17  1547  05/12/17  0712   GLC  --   --  142*  --   --   --   --   --  90  --  157*  --  177*  --  202*  --  62*   * 189*  --  138* 160* 293* 291*  < >  --   < >  --   < >  --   < >  --   < >  --    < > = values in this interval not displayed.            Review of Systems:      please see interval history       Medications:       Active Diet Order      NPO     Physical Exam:  Gen: Alert, resting in bed, in NAD   HEENT: NC/AT, mucous membranes are moist  Resp: Unlabored, supplemental oxygen  Ext: moving all extremities  Neuro:oriented x3, communicating clearly  /68  Pulse 144  Temp 97.7  F (36.5  C) (Oral)  Resp 12  Ht 1.626 m (5' 4\")  Wt 66.5 kg " (146 lb 9.7 oz)  SpO2 95%  BMI 25.16 kg/m2           Data:     Lab Results   Component Value Date    A1C 7.0 05/03/2017    A1C 6.0 08/04/2014              CBC RESULTS:   Recent Labs   Lab Test  05/15/17   0545   WBC  8.0   RBC  3.22*   HGB  9.5*   HCT  29.3*   MCV  91   MCH  29.5   MCHC  32.4   RDW  13.6   PLT  155     Recent Labs   Lab Test  05/15/17   0545  05/14/17   0655   NA  137  136   POTASSIUM  4.9  4.9   CHLORIDE  107  108   CO2  22  21   ANIONGAP  8  7   GLC  142*  90   BUN  48*  53*   CR  1.83*  1.99*   ZAHEER  8.4*  8.2*     Liver Function Studies -   Recent Labs   Lab Test  08/04/14   0712   PROTTOTAL  7.5   ALBUMIN  4.3   BILITOTAL  0.4   ALKPHOS  79   AST  29   ALT  44     Lab Results   Component Value Date    INR 1.06 04/18/2017    INR 1.08 04/04/2017    INR 0.99 01/13/2017    INR 0.97 08/04/2014           Yudy Wilson, CNP pager [phone]  Diabetes Management Job Code 0240

## 2017-05-15 NOTE — PLAN OF CARE
Problem: Goal Outcome Summary  Goal: Goal Outcome Summary  Outcome: No Change  Pt has had respiratory and cardiac changes today.  She was on 10L oxypluse face mask this morning. Then went down to IR to have a second CT placed.  Both CT have air leaks and are tidaling high.  Upon return she was able to be weaned down to 4L NC sating low 90%s.  She was in SR with frequent PACs and after the CT placement she flipped into A flutter with rates up to 145.  One dose of 5 mg Metoprolol given.  HR now down to 66 in SR with PACs.  Spinal block and Dilaudid PCA DC'd today.  Now has oxycodone prn and is reporting chest tube pain.  Hall in place and has good urine output.  She complains of feeling of a bubble in her throat that is making it hard to swallow.  She has only taken some pudding with crushed pills this morning.  Continue to monitor closely.

## 2017-05-15 NOTE — PLAN OF CARE
Problem: Goal Outcome Summary  Goal: Goal Outcome Summary  Outcome: Declining  S/B: RRt called at shift change, see note.      A: A&Ox4. VSS pac's 25/minute, pvc's occasional. SR 60's. Afebrile. Denies pain and nausea. Tolerating regular diet; fair. Electrolytes WNL. Bupivacaine drip infusing per epidural. Dilaudid pca and oxycodone po for pain, one at bedtime. Slept well between cares.  at bedside. Pt has home insulin pump needle in abdomen still from admit and doesn't want to remove. Mucinex and nebs to loosen secretions. Chest tube dressing done on evenings; air leaking at site and in CT container; not new per previous Rn.      I/O this shift:  In: -   Out: 90 [Chest Tube:90]     Temp:  [97.5  F (36.4  C)-99.7  F (37.6  C)] 97.5  F (36.4  C)  Heart Rate:  [60-84] 83  Resp:  [16-20] 20  BP: (129-151)/(42-63) 137/42  SpO2:  [78 %-100 %] 92 %      R:  Pt currently on 10L oxiplus up in chair, stable but gets dyspneic quickly.  updated. Continue with POC. Notify primary team with changes.

## 2017-05-15 NOTE — PROGRESS NOTES
REGIONAL ANESTHESIA PAIN SERVICE EPIDURAL NOTE  SUBJECTIVE:   Interval History: Pt reports adequate pain control via epidural and PCA HYDROmorphone.  Pt is complaining more of a pressure vs pain.   She is also complaining of something stuck in her throat.  Denies any weakness, paresthesias, circumoral numbness, metallic taste or tinnitus.  Pt transfers to the chair with assistance.  Patient is currently without nausea; without pruritis.  Currently tolerating a diet.       Clinically Aligned Pain Assessment (CAPA):  Comfort (How is your pain?): Comfortably manageable  Change in Pain (Since your last medication/intervention?): About the same  Pain Control (How are your pain treatments working?):  Partially effective pain control  Functioning (Are you able to do activities to get better?) : Can do most things, but pain gets in the way of some   Sleep (Does your pain management allow you to sleep or rest?): Awake with occasional pain        Anticoagulation:  Heparin 5,000units sq q 8 hrs      OBJECTIVE:  Diagnostics:  Lab Results   Component Value Date    WBC 8.0 05/15/2017     Lab Results   Component Value Date    RBC 3.22 05/15/2017     Lab Results   Component Value Date    HGB 9.5 05/15/2017     Lab Results   Component Value Date    HCT 29.3 05/15/2017     Lab Results   Component Value Date     05/15/2017       Lab Results   Component Value Date    INR 1.06 04/18/2017    INR 1.08 04/04/2017    INR 0.99 01/13/2017       Vitals:    Temp:  [97.5  F (36.4  C)-99.7  F (37.6  C)] 97.5  F (36.4  C)  Heart Rate:  [60-84] 82  Resp:  [16-18] 16  BP: (127-151)/(43-52) 151/50  SpO2:  [78 %-100 %] 97 %    Exam:    Strength 5/5 and symmetric grossly in bilateral LE      Epidural site with dressing c/d/i, no tenderness, erythema, heme, edema      ASSESSMENT/PLAN:    Yue Valenzuela is a 71 year old female POD #5 s/p BRONCHOSCOPY FLEXIBLE MEDIASTINOSCOPY  THORACOSCOPIC RESECTION LUNG THORACOSCOPIC EXCISE NODE MEDIASTINAL and  placement of T5-6 epidural for analgesia.  Pt receiving adequate analgesia with epidural infusion Bupivacaine 0.125% at mL/hour.  Pt transfers to the chair with assistance.  No weakness or paresthesias.  No evidence of adverse side effects related to local anesthetic.  Pt is using PCA HYDROmorphone PRN.  Pt is requiring significant supplemental oxygen this morning to keep oxygen saturation above 90%.    - DC'd epidural catheter today at 0800 dark tip intact with no complications  - will sign off  - call for questions or concerns  - discussed plan with attending anesthesiologist        KATHERYN Page CNP  [facility]ional Anest[facility] Pain Service  5/15/2017 7:09 AM    24 hour Job Code Pager.  For in-house use only.     Dial * * [phone] and  [place]:  [phone]  [place]: [phone]  Peds:  [phone]  Then enter call-back number  May text page using [employer], but NOT [employer].

## 2017-05-15 NOTE — PROVIDER NOTIFICATION
05/15/17 0700   Post RRT Intervention Assessment   Post RRT Assessment Stable/Improved   Date Follow Up Done 05/15/17   Time Follow Up Done 1139   Comments went to IR for insertion of second chest tube, resting comfortably on 4l per Nasal Cannula

## 2017-05-15 NOTE — IR NOTE
Interventional Radiology Intra-procedural Nursing Note    Patient Name: Yue Valenzuela  Medical Record Number: [id number]  Today's Date: May 15, 2017    Start Time: 0935  End of procedure time: 1005  Procedure: image guided right chest tube placement  Report given to: QUIQUE Marc [id number]  Time pt departs:  1010    Other Notes: Pt arrives from 6B to IR 3 with RN and monitoring. Pt is consented and placed supine on Fluoro table. Prepped and time out done with Dr Mendez, Dr Gunter. Fire safety of 1. Placed a 12 [place] non locking flexima and sutured in place. Air aspirated by MD. Connected to suction/atrium. Dressing applied. Pt tolerated and pt is using her own dilaudid PCA. Pt returns to 6B with RN and monitoring after going to xray.    Maureen Ferrell

## 2017-05-15 NOTE — ANESTHESIA POST-OP FOLLOW-UP NOTE
REGIONAL ANESTHESIA PAIN SERVICE EPIDURAL NOTE  SUBJECTIVE:   Interval History: Pt reports adequate pain control via epidural and PCA HYDROmorphone. Pt is complaining more of a pressure vs pain. She is also complaining of something stuck in her throat. Denies any weakness, paresthesias, circumoral numbness, metallic taste or tinnitus. Pt transfers to the chair with assistance. Patient is currently without nausea; without pruritis. Currently tolerating a diet.      Clinically Aligned Pain Assessment (CAPA):  Comfort (How is your pain?): Comfortably manageable  Change in Pain (Since your last medication/intervention?): About the same  Pain Control (How are your pain treatments working?): Partially effective pain control  Functioning (Are you able to do activities to get better?) : Can do most things, but pain gets in the way of some   Sleep (Does your pain management allow you to sleep or rest?): Awake with occasional pain           Anticoagulation: Heparin 5,000units sq q 8 hrs        OBJECTIVE:  Diagnostics:        Lab Results   Component Value Date     WBC 8.0 05/15/2017            Lab Results   Component Value Date     RBC 3.22 05/15/2017            Lab Results   Component Value Date     HGB 9.5 05/15/2017            Lab Results   Component Value Date     HCT 29.3 05/15/2017            Lab Results   Component Value Date      05/15/2017               Lab Results   Component Value Date     INR 1.06 04/18/2017     INR 1.08 04/04/2017     INR 0.99 01/13/2017         Vitals:  Temp: [97.5  F (36.4  C)-99.7  F (37.6  C)] 97.5  F (36.4  C)  Heart Rate: [60-84] 82  Resp: [16-18] 16  BP: (127-151)/(43-52) 151/50  SpO2: [78 %-100 %] 97 %     Exam:   Strength 5/5 and symmetric grossly in bilateral LE      Epidural site with dressing c/d/i, no tenderness, erythema, heme, edema        ASSESSMENT/PLAN:   Yue Valenzuela is a 71 year old female POD #5 s/p BRONCHOSCOPY FLEXIBLE MEDIASTINOSCOPY  THORACOSCOPIC RESECTION LUNG  THORACOSCOPIC EXCISE NODE MEDIASTINAL and placement of T5-6 epidural for analgesia. Pt receiving adequate analgesia with epidural infusion Bupivacaine 0.125% at mL/hour. Pt transfers to the chair with assistance. No weakness or paresthesias. No evidence of adverse side effects related to local anesthetic. Pt is using PCA HYDROmorphone PRN. Pt is requiring significant supplemental oxygen this morning to keep oxygen saturation above 90%.     - DC'd epidural catheter today at 0800 dark tip intact with no complications  - will sign off  - call for questions or concerns    Newton Graff DO    [facility]  Pain Management  Department of Anesthesiology

## 2017-05-15 NOTE — ADDENDUM NOTE
Addendum  created 05/15/17 1437 by Newton Graff DO    Anesthesia Event edited, Procedure Event Log accessed, Sign clinical note

## 2017-05-15 NOTE — PROGRESS NOTES
Chronic Pulmonary Disease Consult Post-Surgical Lung Optimization     Yue Valenzuela is a 71 year old female now post operative day 5 from a right VATS, lower lobectomy and middle wedge. This morning patient developed new pneumothorax with new CT. Now in A-flutter.   Met with patient to follow up with pre-optimization in PAC using Aerobika device. She brought her Aerobika with her and said that she used it every day prior to surgery with some difficultly getting enough breath to exhale through device.     Assessment:  Spo2 93% on 4L NC, RR 20, -130, /76, BBS clear    Recommendations  -Will continue to follow after patient is stable to further work with Aerobika device.     I spent 20 minutes with the patient    Clarissa Wilcox, RRT  Chronic Pulmonary Disease Specialist  [phone]

## 2017-05-15 NOTE — PLAN OF CARE
Problem: Goal Outcome Summary  Goal: Goal Outcome Summary  OT 6B: OT session canceled; patient not medically appropriate to participate in OT today per bedside nurse.

## 2017-05-15 NOTE — PROCEDURES
Interventional Radiology Brief Post Procedure Note    Procedure: @[email or web address]([id number])@    Proceduralist: Quin Ambrosio PA-C, Tiffanie Lewis MD and None    Assistant: Eliecer Mendez MD and None    Time Out: Prior to the start of the procedure and with procedural staff participation, I verbally confirmed the patient s identity using two indicators, relevant allergies, that the procedure was appropriate and matched the consent or emergent situation, and that the correct equipment/implants were available. Immediately prior to starting the procedure I conducted the Time Out with the procedural staff and re-confirmed the patient s name, procedure, and site/side. (The Joint Commission universal protocol was followed.)  Yes    Sedation: None. Local Anesthestic used    Findings: Right apical pneumothorax.    Estimated Blood Loss: Minimal    Fluoroscopy Time: Less than 1 minute    SPECIMENS: None    Complications: 1. None     Condition: Stable    Plan: Return to storm.  -20 cm H20 wall suction chest tube.    Comments: See dictated procedure note for full details.    Eliecer Mendez MD

## 2017-05-15 NOTE — PROGRESS NOTES
"Thoracic Surgery Daily Progress Note  Yue Valenzuela  05/15/2017    Subjective:   This morning on rounds patient was in acute distress and short of breath. Sats dropping into the low 80s and requiring oxi-plus mask up to 10L. CT dressing taken down and re-secured. Large air leak noted. STAT CXR obtained with new pneumothorax. CT placed to -40 suction.     Objective:    /42 (BP Location: Right arm)  Pulse 65  Temp 97.5  F (36.4  C) (Oral)  Resp 20  Ht 1.626 m (5' 4\")  Wt 66.5 kg (146 lb 9.7 oz)  SpO2 92%  BMI 25.16 kg/m2  Sitting up in bed in acute distress  Labored breathing on supplemental oxygen. CT in place with airleak- redressed and leak improved slightly.   Incisions c/d/i.   Abdomen soft nontender.     Ins/Outs:   I/O last 3 completed shifts:  In: 1170 [P.O.:450; I.V.:720]  Out: 4435 [Urine:3975; Chest Tube:460]    Labs:   Labs reviewed. Cr improving to 1.83, WBC 8, Hgb 9.5  STAT CXR with moderate sized right pneumothorax.     Assessment:  Yue Valenzuela is a 71 year old female now post operative day 5 from a right VATS, lower lobectomy and middle wedge.     Plan:       Neuro:  Transition to oral pain medications and off of PCA    Cardiovascular/Heme: Hemodynamically stable. Continue home medications, holding lisinopril.     Respiratory/Pulm: Acute shortness of breath associated with pneumothorax. Increase CT suction to -40 now and follow up CXR. If persistent pneumothorax with have IR place anterior pigtail. If acute worsening, will need to place urgently bedside    FEN/Gastrointestinal: Regular diet. Bowel regimen. NPO for now given respiratory issues/pigtail catheter.     Renal/Genitourinary: SUPRIYA on CKD. Continue swift (replaced for retention) Appreciate nephrology assistance. Restarted home dose of lasix.    Infectious: No acute concerns.     Prophy: Heparin, protonix. Holding AM heparin for IR pigtail.     Activity:  Up to chair, out of bed, increase activity.     Endo: DM, on insulin, " appreciate endocrinology assistance.     Dispo: Continue care on 6B    Patient was seen and discussed with Dr. Diaz, Thoracic Surgery Fellow, who agrees with the assessment and plan and will discuss with staff.     Evelyn Liriano MD  General Surgery  Pager: [phone]

## 2017-05-15 NOTE — CONSULTS
Electrophysiology Consultation Note   EP Attending: .   Reason for consultation: AFL.   Provider requesting consultation: .  Date of Service: 5/15/2017      HPI:   Ms. Valenzuela is a 71 year old female who has a past medical history significant for CAD s/p PCI LCX (2006), Factor V Leiden, HTN, HLD, DM1, PVD (known stenosis of right common iliac), carotid artery stenosis, CKD IV, asthma, anemia, and newly diagnosed lung adenocarcinoma s/p VATS RLL on 5/10/17. This morning (5/15/17), she had respiratory distress and was found to have right apical pneumothorax for which she had CT placed. She was noted to go into AFL this morning 100-150's around 1145am. She is asymptomatic in her AFL. She denies any prior history of cardiac arrhythmias. NM Lexiscan from 3/24/17 shows LVEF 74% with normal perfusion on rest and stress. She is having intermittent confusion today. SCr 1.78 GFR 28, Electrolytes stable. Telemetry shows -150's. She is hemodynamically stable. Current cardiac medications include: Norvasc, ASA, Coreg, and Pravastatin.   Past Medical History:   Past Medical History:   Diagnosis Date     Adenocarcinoma, lung (H)      Asthma      CAD (coronary artery disease) 9/17/2014 2006 PCI circumflex territory  Mid LAD lesion      Carotid artery stenosis      CKD (chronic kidney disease) stage 4, GFR 15-29 ml/min (H)      Diabetes mellitus type 1 (H)      H/O factor V Leiden mutation      Hypertension      Mixed hyperlipidemia (DYSLIPIDEMIA) 8/1/2014     Osteopenia      Pneumothorax 4/16/2017     PVD (peripheral vascular disease) (H)      Thyroid nodule 04/12/2012    Hurtle Cells, non malignant     Past Surgical History:   Past Surgical History:   Procedure Laterality Date     BRONCHOSCOPY FLEXIBLE Right 5/10/2017    Procedure: BRONCHOSCOPY FLEXIBLE;  Flexible Bronchoscopy, esaphogastroduodenoscopy, cervical Mediastinoscopy, Right  Thoracoscopic Surgery, right Lower Lobectomy, Mediastinal Lymph  Node Dissection, Right middle lobe wedge resection*Latex Allergy* (ERAS Patient);  Surgeon: Glenna Hall MD;  Location: [facility] OR     ESOPHAGOSCOPY, GASTROSCOPY, DUODENOSCOPY (EGD), COMBINED N/A 5/10/2017    Procedure: COMBINED ESOPHAGOSCOPY, GASTROSCOPY, DUODENOSCOPY (EGD);;  Surgeon: Glenna Hall MD;  Location: [facility] OR     ganglion cyst removal       hemithyroidectomy  4/13/2012    PATH report showed benign Hurtle Cell Adenoma     IR biopsy of lung  04/2017     lipoma removal       MEDIASTINOSCOPY N/A 5/10/2017    Procedure: MEDIASTINOSCOPY;;  Surgeon: Glenna Hall MD;  Location: [facility] OR     SHOULDER SURGERY Left      THORACOSCOPIC EXCISE NODE MEDIASTINAL Right 5/10/2017    Procedure: THORACOSCOPIC EXCISE NODE MEDIASTINAL;;  Surgeon: Glenna Hall MD;  Location: [facility] OR     THORACOSCOPIC RESECTION LUNG Right 5/10/2017    Procedure: THORACOSCOPIC RESECTION LUNG;;  Surgeon: Glenna Hall MD;  Location: [facility] OR     TONSILLECTOMY       Allergies: Per MAR     Allergies   Allergen Reactions     Codeine Other (See Comments)     Makes her sleepless     Loratadine Other (See Comments)     Extreme sleepiness       Sulfa Drugs Unknown     Other reaction(s): Unknown     Terazosin Other (See Comments)     Swellling in legs, improved when went off     Adhesive Tape Rash     Foam tape used during surgery was what caused rash     Latex Rash     Liquid Adhesive Rash     Medications:   Per MAR current outpatient cardiovascular medications include: Prescriptions Prior to Admission   Medication Sig Dispense Refill Last Dose     albuterol (PROAIR HFA/PROVENTIL HFA/VENTOLIN HFA) 108 (90 BASE) MCG/ACT Inhaler Inhale 2 puffs into the lungs 2 times daily   5/9/2017 at Unknown time     Respiratory Therapy Supplies (AEROBIKA) GAGANDEEP 1 Device 6 times daily 1 each 0 5/9/2017 at Unknown time     Insulin Aspart (INSULIN PUMP - OUTPATIENT) Insulin pump base rate: 7960-5230 rate 0.5  7512-6616 rate 1.0  8097-1821 rate 1.95  9240-2792 rate 1.35    5/10/2017 at Unknown time     Acetaminophen (TYLENOL PO) Take 1,000 mg by mouth every 8 hours as needed for mild pain or fever    Past Week at Unknown time     budesonide-formoterol (SYMBICORT) 80-4.5 MCG/ACT inhaler Inhale 2 puffs into the lungs 2 times daily 3 Inhaler 3 Past Week at Unknown time     amLODIPine (NORVASC) 10 MG tablet Take 10 mg by mouth every evening    5/9/2017 at Unknown time     aspirin EC 81 MG tablet Take 81 mg by mouth every evening    5/9/2017 at Unknown time     carvedilol (COREG) 3.125 MG tablet Take 3.125 mg by mouth 2 times daily (with meals)    5/9/2017 at Unknown time     furosemide (LASIX) 20 MG tablet 20 mg every morning    5/9/2017 at Unknown time     lisinopril (PRINIVIL,ZESTRIL) 20 MG tablet Take 10 mg by mouth every evening    5/9/2017 at Unknown time     pravastatin (PRAVACHOL) 40 MG tablet 40 mg every evening    5/9/2017 at Unknown time     cetirizine (ZYRTEC) 10 MG tablet Take 5 mg by mouth daily        calcium 600 MG tablet Take 1 tablet by mouth every morning        clonazePAM (KLONOPIN) 0.5 MG tablet Take 0.5 tablets (0.25 mg) by mouth 2 times daily as needed for anxiety 90 tablet 1 Taking     blood glucose (ONE TOUCH ULTRA) test strip    Taking     calcium acetate (PHOSLO) 667 MG CAPS Take 667 mg by mouth 4 times daily (with meals and nightly)    Taking     cholecalciferol (VITAMIN D) 1000 UNIT tablet Take 1,000 Units by mouth every morning    Taking     ONETOUCH DELICA LANCETS 33G MISC    Taking     No current outpatient prescriptions on file.     Current Facility-Administered Medications   Medication Dose Route Frequency     [START ON 5/16/2017] insulin aspart   Subcutaneous Daily with breakfast     [START ON 5/16/2017] insulin aspart   Subcutaneous Daily before lunch     insulin aspart   Subcutaneous Daily with supper     levalbuterol  0.63 mg Nebulization Q4H WA     sodium chloride  3 mL Nebulization Q4H While awake     amLODIPine  10 mg Oral QPM     furosemide  20 mg  "Oral QAM     guaiFENesin  600 mg Oral BID     insulin glargine  18 Units Subcutaneous Q24H     insulin aspart  1-7 Units Subcutaneous TID AC     insulin aspart  1-5 Units Subcutaneous At Bedtime     bisacodyl  10 mg Rectal Daily     senna-docusate  2 tablet Oral BID     polyethylene glycol  17 g Oral BID     sodium chloride  3 mL Nebulization Q4H While awake     acetaminophen  975 mg Oral Q8H     melatonin  3 mg Oral At Bedtime     menthol   Transdermal Q8H     carvedilol  3.125 mg Oral BID w/meals     aspirin EC  81 mg Oral QPM     cetirizine  5 mg Oral Daily     pravastatin  40 mg Oral QPM     heparin  5,000 Units Subcutaneous Q8H     pantoprazole  40 mg Oral Daily     fluticasone-vilanterol  1 puff Inhalation Daily     Family History:   Family History   Problem Relation Age of Onset     Cancer - colorectal Father      DIABETES Father      Scleroderma Mother      CEREBROVASCULAR DISEASE Brother      DIABETES Brother      Hypertension Brother      DIABETES Brother      2nd brother     Social History:   Social History   Substance Use Topics     Smoking status: Former Smoker     Packs/day: 0.50     Years: 15.00     Types: Cigarettes     Start date: 1/1/1970     Quit date: 12/31/1985     Smokeless tobacco: Never Used     Alcohol use 0.0 - 0.5 oz/week     0 - 1 Standard drinks or equivalent per week       ROS:   A comprehensive 10 point ROS was negative other than as mentioned in HPI.    Physical Examination:   VITALS: BP 92/61 (BP Location: Right arm)  Pulse 84  Temp 98.3  F (36.8  C) (Axillary)  Resp 12  Ht 1.626 m (5' 4\")  Wt 66.5 kg (146 lb 9.7 oz)  SpO2 100%  BMI 25.16 kg/m2  GENERAL APPEARANCE: confused, NAD   HEENT:  MMM.  NECK: Supple.    CHEST: Absent RLL, diminished throughout    CARDIOVASCULAR: Regularly irregular, no murmur.   ABDOMEN: NT, ND, BS+, soft.   EXTREMITIES: Trace pedal edema.   NEURO:Confused.   SKIN: Warm and dry.   Data:   Labs:  [facility]  Recent Labs  Lab 05/15/17  0545 05/14/17  0655 " 05/13/17  0548 05/12/17 2001    136 133 130*   POTASSIUM 4.9 4.9 5.0 5.2   CHLORIDE 107 108 104 100   ZAHEER 8.4* 8.2* 7.8* 7.7*   CO2 22 21 20 21   BUN 48* 53* 64* 65*   CR 1.83* 1.99* 2.14* 2.37*   * 90 157* 177*     CBC  Recent Labs  Lab 05/15/17  0545 05/14/17  0655 05/13/17  0548 05/12/17  0712   WBC 8.0 7.2 9.4 12.9*   RBC 3.22* 2.94* 2.98* 3.03*   HGB 9.5* 8.7* 8.9* 8.9*   HCT 29.3* 27.2* 27.5* 27.9*   MCV 91 93 92 92   MCH 29.5 29.6 29.9 29.4   MCHC 32.4 32.0 32.4 31.9   RDW 13.6 13.8 13.9 14.2    151 142* 167     Cholesterol (mg/dL)   Date Value   08/04/2014 133     Cholesterol/HDL Ratio (no units)   Date Value   08/04/2014 2.5     HDL Cholesterol (mg/dL)   Date Value   08/04/2014 53     LDL Cholesterol Calculated (mg/dL)   Date Value   08/04/2014 54     EKG:     3/2016 ECHO:   Interpretation Summary  Technically difficult study. Extremely difficult acoustic windows despite the  use of contrast for endcardial border definition.  Global and regional left ventricular function is normal with an EF of 55-60%.  Global right ventricular function is normal.  Moderate left atrial enlargement.  Mild pulmonary hypertension. Right ventricular systolic pressure is 35 mmHg  above the right atrial pressure.  The inferior vena cava was dilated at 2.3 cm without respiratory variability,  consistent with increased right atrial pressure.  Estimated mean right atrial pressure is >8 mmHg.  No pericardial effusion is present.  Assessment:   Ms. Valenzuela is a 71 year old female who has a past medical history significant for CAD s/p PCI LCX (2006), Factor V Leiden, HTN, HLD, DM1, PVD (known stenosis of right common iliac), carotid artery stenosis, CKD IV, asthma, anemia, and newly diagnosed lung adenocarcinoma s/p VATS RLL on 5/10/17 now with post operative AFL with RVR which is asymptomatic and hemodynamically stable. AFL mostly likely due to high catecholamine and inflammatory state and AFL will often improve  over next 6-8 weeks post operatively.   EP Recommendations:  We discussed in detail with the patient management/treatment options for AFL includin. Stroke Prophylaxis:  CHADSVASC=+age, +gender, +CAD, +HTN, +DM1  5, corresponding to a 6.7% annual stroke / systemic emolism event rate. indicating need for long term oral anticoagulation.  We would recommend anticoagulation when deemed possible from surgical standpoint. She is not a candidate for DOACs considering her renal function. Therefore, we would recommend anticoagulation with warfarin with goal INR 2-3. While chest tubes in place, certainly reasonable to do low intensity heparin infusion and can be used as bridge to therapeutic warfarin when CTs are able to be pulled. We can consider continuation vs cessation of anticoagulation as an outpatient.   2. Rate Control: Continue beta blockers, would up titrate. If needed, can switch to metoprolol for better HR control with less BP effect.  She is on CCB (Norvasc) for HTN management so we would avoid diltiazem.   3. Rhythm Control: CAD and renal function limit AAT options (preclude use of Class ICs, Sotalol, and Dofetilide). Therefore, likely amiodarone is only AAT option. We would not recommend AATs at this stage and would focus on rate control. If sustained episodes lasting >48h, we would consider JENA/DCCV. Typical A.flutter is highly amenable to ablation procedure. An ablation can be considered for any recurrence after further post operative healing.   4. Risk Factor Management: Continue Statin, maintain good BP control, and IVIS evaluation as indicated as outpt.      Thank you much for allowing us to participate in the care of this patient.     The patient was discussed w/ Dr. Coates.  The above note reflects our joint plan.    KATHERYN Combs CNP  Electrophysiology Consult Service  Pager: [phone]    EP STAFF NOTE  I have seen and examined the patient as part of a shared visit with DAPHNE Combs NP.  I  agree with the note above. I reviewed today's vital signs and medications. I have reviewed and discussed with the advanced practice provider their physical examination, assessment, and plan   Briefly, postoperative AFL after lung adenocarcinoma resection, started today.  My key history/exam findings are: AFL with RVR.   The key management decisions made by me: rate control, coumadin when able, decide to continue or stop as outpatient based on monitoring. Ablation if refractory.    Duane Coates MD [facility]  Cardiology - Electrophysiology

## 2017-05-15 NOTE — PROCEDURES
THORACIC SURGERY BEDSIDE PROCEDURE   NAME: Yue Valenzuela   MRN: [id number]  : 1945    Diagnosis:  Atelectasis    Procedure: Flexible bronchoscopy     Specimen: None sent    Premedication: .5m g ativan , Lidocaine nebulization,  Procedure Meds:  1 % topical lidocaine solution at the vocal folds, 1% topical lidocaine solution at the meredith    Procedure: A timeout was called to review the case and patient information. The patient was positioned supine. The vocal folds were passed without difficulty appeared to be achieving appropriate apposition.  Bilateral tracheobronchial trees were inspected closely to the level of the subsegmental bronchi.  Secretions were found to be thick and isolated to the right side.  These were lavaged and evacuated without difficulty. The procedure was completed and the patient tolerated the procedure well and without complications.      Findings:  Moderate thick clear secretions in the right upper airways, minimal edema.     Plan: CXR PRN for respiratory decompensation, plan to rebronch PRN. Aggressive pulmonary toilet.     Dr. Hall was available for assistance throughout the entire procedure.      Evelyn Liriano MD  General Surgery  Pager: [phone]

## 2017-05-15 NOTE — PLAN OF CARE
Problem: Goal Outcome Summary  Goal: Goal Outcome Summary  PT/6B: Patient not appropriate for therapies today per RN. Rescheduled.

## 2017-05-15 NOTE — PROVIDER NOTIFICATION
"   05/15/17 0700   Call Information   Date of Call 05/15/17   Time of Call 0627   Name of person requesting the team Marilyn   Title of person requesting team RN   RRT Arrival time 0627   Time RRT ended 0800   Reason for call   Type of RRT Adult   Primary reason for call Respiratory   Respiratory O2sat less than 88% for greater than 5 minutes despite O2;Labored breathing;Respiratory pattern change   Was patient transferred from the ED, ICU, or PACU within last 24 hours prior to RRT call? No   SBAR   Situation Sudden drop in Saturations needing 15 liters to get sats > 85%  (c/o of fullness at mid sternum(denies pain). Diffic breathe)   Background admitted for lung resection   Notable History/Conditions Cancer;Cardiac;Diabetes;End-Stage disease;Hypertension;Recent surgery  (asthma, adenocarcinoma, encouraged pain button)   Assessment dyspneic, unable to breath deeply due to \"fullness\"   Interventions CXR;O2 per N/C or mask;Other (describe)  (reinforced CT dressing. Sat Patient in recliner,)   Patient Outcome   Patient Outcome Stabilized on unit  (IR to place CT in pneumo)   RRT Team   Physician(s) Vinicius Diaz MD   Lead RN May Escoto RN   QUIQUE Nolasco RN   RT Mitali Roland RT   Other staff Unit 6B RN & para staff,      "

## 2017-05-15 NOTE — PROGRESS NOTES
Nephrology Initial Consult  May 15, 2017      Yue Valenzuela MRN:[id number] YOB: 1945  Date of Admission:5/10/2017  Primary care provider: Nory Elliott  Requesting physician: Glenna Hall MD    ASSESSMENT AND RECOMMENDATIONS:   71 year old female with long standing type I diabetes with presumed diabetic nephropathy (followed by Dr Sim of [facility]), CKD stage 4, Scr 1.8-2.2 mg/dL (eGFR 20) who is s/p VATS for lung adenocarcinoma. Nephrology is consulted for CKD and hyperkalemia    CKD with elevated creatinine   Scr as low as 1.5mg/dL reccently likely with being off lisinopril.    - Scr up to 2.4 this admission likely in setting of stress of complex surgery (RLL lobectomy/wedgectomy) and intraoperative hypotension per chart  -Has downtrending for past 2 days without specific intervention, back to baseline CRT 1.83 today.    Blood pressure/ volume   -on furosemide 20mg daily at home.  Held at admission.   -Given IV Lasix 20 mg on 5/14 and 5/15 to treat hypervolemia.  U/O increased to 4 L.   -BPs currently in good range systolic 110s  -Monitor    Anemia  - acute blood loss after surgery  -monitor for now.  Can be managed as outpatient after discharge    Electrolytes/ acid base  - had hyperkalemia in setting of hyperglycemia yesterday. Improved with insulin.   -Has good UOP. Continue to monitor daily potassium    Bone- phosphorus mildly high, monitor for improvement with renal function back to baseline  -if no improvement and level >6, can use use phosphorus binder with meals (tums). Should not be used if patient is NPO.    Renal will continue to follow.    Patient and plan were discussed and formulated with Dr TASHA Ruvalcaba.  Recommendations were communicated to primary team via this note      Sheridan Houston PA-C       REASON FOR CONSULT: SUPRIYA on CKD, hyperkalemia    HISTORY OF PRESENT ILLNESS:  Yue Valenzuela is a 71 year old female with type I diabetes, lung mass s/p VATS and resection of  mass, chest tube. She is now post op and doing fairly well. She has CKD stage 4 and has been seen at the [place] and has been ealuated for kidney transplant. She also has a primary nephrologist at [facility].  Her SCr has been ear 2, though it seems with stopping lisinopril it ahs been lower (1.5-1.8mg/dL).  She had hyperkalemia yesterday, and blood sugar >300, which responded to insulin and furosemide.  She is usually on furosemide 20mg daily.   She currently feels well, having just completed a walk with PT. Her chest tube site is sore, but otherwise pain is controlled. She denies lightheadedness, chest pain or shortness of breath.    INTERVAL HISTORY  Chest fullness with hypoxia this am.  Currently improved, pending bronchoscopy.    REVIEW OF SYSTEMS:  No confusion or lightheadedness  No vomiting or diarrhea. +nausea intermittently.  Chest discomfort and dyspnea severe this am, better now.  +Weakness  Good urine output without hematuria, dysuria.    PAST MEDICAL HISTORY:  Reviewed with patient at time of initial consult.    Past Medical History:   Diagnosis Date     Adenocarcinoma, lung (H)      Asthma      CAD (coronary artery disease) 9/17/2014 2006 PCI circumflex territory  Mid LAD lesion      Carotid artery stenosis      CKD (chronic kidney disease) stage 4, GFR 15-29 ml/min (H)      Diabetes mellitus type 1 (H)      H/O factor V Leiden mutation      Hypertension      Mixed hyperlipidemia (DYSLIPIDEMIA) 8/1/2014     Osteopenia      Pneumothorax 4/16/2017     PVD (peripheral vascular disease) (H)      Thyroid nodule 04/12/2012    Hurtle Cells, non malignant       Past Surgical History:   Procedure Laterality Date     BRONCHOSCOPY FLEXIBLE Right 5/10/2017    Procedure: BRONCHOSCOPY FLEXIBLE;  Flexible Bronchoscopy, esaphogastroduodenoscopy, cervical Mediastinoscopy, Right  Thoracoscopic Surgery, right Lower Lobectomy, Mediastinal Lymph Node Dissection, Right middle lobe wedge resection*Latex Allergy*  (ERAS Patient);  Surgeon: Glenna Hall MD;  Location: [facility] OR     ESOPHAGOSCOPY, GASTROSCOPY, DUODENOSCOPY (EGD), COMBINED N/A 5/10/2017    Procedure: COMBINED ESOPHAGOSCOPY, GASTROSCOPY, DUODENOSCOPY (EGD);;  Surgeon: Glenna Hall MD;  Location: [facility] OR     ganglion cyst removal       hemithyroidectomy  4/13/2012    PATH report showed benign Hurtle Cell Adenoma     IR biopsy of lung  04/2017     lipoma removal       MEDIASTINOSCOPY N/A 5/10/2017    Procedure: MEDIASTINOSCOPY;;  Surgeon: Glenna Hall MD;  Location: [facility] OR     SHOULDER SURGERY Left      THORACOSCOPIC EXCISE NODE MEDIASTINAL Right 5/10/2017    Procedure: THORACOSCOPIC EXCISE NODE MEDIASTINAL;;  Surgeon: Glenna Hall MD;  Location: [facility] OR     THORACOSCOPIC RESECTION LUNG Right 5/10/2017    Procedure: THORACOSCOPIC RESECTION LUNG;;  Surgeon: Glenna Hall MD;  Location: [facility] OR     TONSILLECTOMY          MEDICATIONS:  PTA Meds  Prior to Admission medications    Medication Sig Last Dose Taking? Auth Provider   albuterol (PROAIR HFA/PROVENTIL HFA/VENTOLIN HFA) 108 (90 BASE) MCG/ACT Inhaler Inhale 2 puffs into the lungs 2 times daily 5/9/2017 at Unknown time Yes Reported, Patient   Respiratory Therapy Supplies (AEROBIKA) GAGANDEEP 1 Device 6 times daily 5/9/2017 at Unknown time Yes Demario Torrez MD   Insulin Aspart (INSULIN PUMP - OUTPATIENT) Insulin pump base rate: 7109-7092 rate 0.5  0346-5484 rate 1.0  4657-1459 rate 1.95  3884-1054 rate 1.35 5/10/2017 at Unknown time Yes Reported, Patient   Acetaminophen (TYLENOL PO) Take 1,000 mg by mouth every 8 hours as needed for mild pain or fever  Past Week at Unknown time Yes Reported, Patient   budesonide-formoterol (SYMBICORT) 80-4.5 MCG/ACT inhaler Inhale 2 puffs into the lungs 2 times daily Past Week at Unknown time Yes John Plaza MD   amLODIPine (NORVASC) 10 MG tablet Take 10 mg by mouth every evening  5/9/2017 at Unknown time Yes Reported, Patient   aspirin EC 81 MG tablet Take 81 mg  by mouth every evening  5/9/2017 at Unknown time Yes Reported, Patient   carvedilol (COREG) 3.125 MG tablet Take 3.125 mg by mouth 2 times daily (with meals)  5/9/2017 at Unknown time Yes Reported, Patient   furosemide (LASIX) 20 MG tablet 20 mg every morning  5/9/2017 at Unknown time Yes Reported, Patient   lisinopril (PRINIVIL,ZESTRIL) 20 MG tablet Take 10 mg by mouth every evening  5/9/2017 at Unknown time Yes Reported, Patient   pravastatin (PRAVACHOL) 40 MG tablet 40 mg every evening  5/9/2017 at Unknown time Yes Reported, Patient   cetirizine (ZYRTEC) 10 MG tablet Take 5 mg by mouth daily   Reported, Patient   calcium 600 MG tablet Take 1 tablet by mouth every morning   Reported, Patient   clonazePAM (KLONOPIN) 0.5 MG tablet Take 0.5 tablets (0.25 mg) by mouth 2 times daily as needed for anxiety   Elisa Ellington MD   blood glucose (ONE TOUCH ULTRA) test strip    Reported, Patient   calcium acetate (PHOSLO) 667 MG CAPS Take 667 mg by mouth 4 times daily (with meals and nightly)    Reported, Patient   cholecalciferol (VITAMIN D) 1000 UNIT tablet Take 1,000 Units by mouth every morning    Reported, Patient   ONETOUCH DELICA LANCETS 33G MISC    Reported, Patient      Current Meds    [START ON 5/16/2017] insulin aspart   Subcutaneous Daily with breakfast     [START ON 5/16/2017] insulin aspart   Subcutaneous Daily before lunch     insulin aspart   Subcutaneous Daily with supper     levalbuterol  0.63 mg Nebulization Q4H WA     sodium chloride  3 mL Nebulization Q4H While awake     amLODIPine  10 mg Oral QPM     furosemide  20 mg Oral QAM     guaiFENesin  600 mg Oral BID     insulin glargine  18 Units Subcutaneous Q24H     insulin aspart  1-7 Units Subcutaneous TID AC     insulin aspart  1-5 Units Subcutaneous At Bedtime     bisacodyl  10 mg Rectal Daily     senna-docusate  2 tablet Oral BID     polyethylene glycol  17 g Oral BID     acetaminophen  975 mg Oral Q8H     melatonin  3 mg Oral At Bedtime     menthol    Transdermal Q8H     carvedilol  3.125 mg Oral BID w/meals     aspirin EC  81 mg Oral QPM     cetirizine  5 mg Oral Daily     pravastatin  40 mg Oral QPM     heparin  5,000 Units Subcutaneous Q8H     pantoprazole  40 mg Oral Daily     fluticasone-vilanterol  1 puff Inhalation Daily     Infusion Meds    dextrose 5% and 0.45% NaCl 30 mL/hr at 05/15/17 0649       ALLERGIES:    Allergies   Allergen Reactions     Codeine Other (See Comments)     Makes her sleepless     Loratadine Other (See Comments)     Extreme sleepiness       Sulfa Drugs Unknown     Other reaction(s): Unknown     Terazosin Other (See Comments)     Swellling in legs, improved when went off     Adhesive Tape Rash     Foam tape used during surgery was what caused rash     Latex Rash     Liquid Adhesive Rash     SOCIAL HISTORY:   Social History     Social History     Marital status:      Spouse name: N/A     Number of children: N/A     Years of education: N/A     Occupational History     retail Other     part-time     Social History Main Topics     Smoking status: Former Smoker     Packs/day: 0.50     Years: 15.00     Types: Cigarettes     Start date: 1/1/1970     Quit date: 12/31/1985     Smokeless tobacco: Never Used     Alcohol use 0.0 - 0.5 oz/week     0 - 1 Standard drinks or equivalent per week     Drug use: No     Sexual activity: Not on file     Other Topics Concern     Not on file     Social History Narrative    The patient has a 7 pk yr tobacco hx.  She has no active use since 1985.  Alcohol use is 0 alcoholic drinks per week.  She denies use of recreational drugs.          She is retired.  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           The patient is .  Has 2 children.        Hot Tub Exposure: NO    Recent Travel: NO     Hx of incarceration:  NO    Bird Exposure:   NO    Animal Exposure:  NO    Inhalation Exposure:  NO         Reviewed with patient at time of initial consult.    [facility]  "HISTORY:   Family History   Problem Relation Age of Onset     Cancer - colorectal Father      DIABETES Father      Scleroderma Mother      CEREBROVASCULAR DISEASE Brother      DIABETES Brother      Hypertension Brother      DIABETES Brother      2nd brother     Reviewed with patient at time of initial consult.    PHYSICAL EXAM:   Temp  Av.8  F (36.6  C)  Min: 96.5  F (35.8  C)  Max: 99.4  F (37.4  C)  Arterial Line MAP (mmHg)  Av mmHg  Min: 58 mmHg  Max: 88 mmHg  Arterial Line BP  Min: 109/36  Max: 147/49      Pulse  Av.3  Min: 60  Max: 88 Resp  Av.4  Min: 10  Max: 20  SpO2  Av.5 %  Min: 93 %  Max: 100 %       /82 (BP Location: Right arm)  Pulse 84  Temp 98.2  F (36.8  C) (Oral)  Resp 16  Ht 1.626 m (5' 4\")  Wt 66.5 kg (146 lb 9.7 oz)  SpO2 99%  BMI 25.16 kg/m2   Date 17 07 - 17 0659   Shift 6241-2271 4874-5383 7242-0530 24 Hour Total   I  N  T  A  K  E   Shift Total  (mL/kg)       O  U  T  P  U  T   Urine 375   375    Chest Tube 150   150    Shift Total  (mL/kg) 525  (7.55)   525  (7.55)   Weight (kg) 69.5 69.5 69.5 69.5        Admit Weight: 64.1 kg (141 lb 5 oz)     GENERAL APPEARANCE: no distress,  awake  EYES: no scleral icterus, pupils equal  HENT: NC/AT,  mouth  without ulcers or lesions  Lymphatics: no cervical or supraclavicular LAD. Midline incision over trachea  Pulmonary: lungs clear to auscultation with equal breath sounds bilaterally, no clubbing.  CT tubes in place.  CV: regular rhythm, normal rate, no rub   - JVP not elevated   - Edema- not significant  GI: soft, mildly distended, nontender, normal bowel sounds.  MS: no evidence of inflammation in joints, no muscle tenderness  : + Hall  SKIN: no rash, warm, dry, no cyanosis  NEURO: mentation intact and speech normal    LABS:   CMP    Recent Labs  Lab 05/15/17  1545 05/15/17  0545 17  0655 17  0548    137 136 133   POTASSIUM 4.8 4.9 4.9 5.0   CHLORIDE 103 107 108 104   CO2  " 21 20   ANIONGAP 7 8 7 9   * 142* 90 157*   BUN 46* 48* 53* 64*   CR 1.78* 1.83* 1.99* 2.14*   GFRESTIMATED 28* 27* 25* 23*   GFRESTBLACK 34* 33* 30* 28*   ZAHEER 8.3* 8.4* 8.2* 7.8*   MAG 2.2 2.3 2.3 2.1   PHOS 4.1 3.8 3.7 4.2     CBC    Recent Labs  Lab 05/15/17  0545 05/14/17  0655 05/13/17  0548 05/12/17  0712   HGB 9.5* 8.7* 8.9* 8.9*   WBC 8.0 7.2 9.4 12.9*   RBC 3.22* 2.94* 2.98* 3.03*   HCT 29.3* 27.2* 27.5* 27.9*   MCV 91 93 92 92   MCH 29.5 29.6 29.9 29.4   MCHC 32.4 32.0 32.4 31.9   RDW 13.6 13.8 13.9 14.2    151 142* 167     INRNo lab results found in last 7 days.  ABG    Recent Labs  Lab 05/10/17  1530 05/10/17  1420 05/10/17  1348 05/10/17  1245   PH 7.34* 7.32* 7.28* 7.28*   PCO2 35 38 44 47*   PO2 164* 165* 84 311*   HCO3 19* 20* 21 22   O2PER 55.0 57.0 50.0 100.0      URINE STUDIES  Recent Labs   Lab Test  05/13/17   0914  05/12/17   2312  08/04/14   0832   COLOR  Light Yellow  Light Yellow  Straw   APPEARANCE  Clear  Clear  Clear   URINEGLC  300*  70*  300*   URINEBILI  Negative  Negative  Negative   URINEKETONE  Negative  Negative  Negative   SG  1.008  1.009  1.008   UBLD  Negative  Negative  Negative   URINEPH  5.0  5.0  5.5   PROTEIN  Negative  Negative  10*   NITRITE  Negative  Negative  Negative   LEUKEST  Negative  Negative  Negative   RBCU  0  <1  <1   WBCU  1  1  <1     No lab results found.  PTH  No lab results found.  IRON STUDIES  No lab results found.    IMAGING:  All imaging studies reviewed by me.     Sheridan Houston PA-C

## 2017-05-16 ENCOUNTER — APPOINTMENT (OUTPATIENT)
Dept: GENERAL RADIOLOGY | Facility: CLINIC | Age: 72
DRG: 163 | End: 2017-05-16
Attending: STUDENT IN AN ORGANIZED HEALTH CARE EDUCATION/TRAINING PROGRAM
Payer: MEDICARE

## 2017-05-16 ENCOUNTER — APPOINTMENT (OUTPATIENT)
Dept: OCCUPATIONAL THERAPY | Facility: CLINIC | Age: 72
DRG: 163 | End: 2017-05-16
Attending: STUDENT IN AN ORGANIZED HEALTH CARE EDUCATION/TRAINING PROGRAM
Payer: MEDICARE

## 2017-05-16 ENCOUNTER — APPOINTMENT (OUTPATIENT)
Dept: SPEECH THERAPY | Facility: CLINIC | Age: 72
DRG: 163 | End: 2017-05-16
Attending: STUDENT IN AN ORGANIZED HEALTH CARE EDUCATION/TRAINING PROGRAM
Payer: MEDICARE

## 2017-05-16 ENCOUNTER — APPOINTMENT (OUTPATIENT)
Dept: PHYSICAL THERAPY | Facility: CLINIC | Age: 72
DRG: 163 | End: 2017-05-16
Attending: STUDENT IN AN ORGANIZED HEALTH CARE EDUCATION/TRAINING PROGRAM
Payer: MEDICARE

## 2017-05-16 LAB
ANION GAP SERPL CALCULATED.3IONS-SCNC: 12 MMOL/L (ref 3–14)
BUN SERPL-MCNC: 45 MG/DL (ref 7–30)
CALCIUM SERPL-MCNC: 8.8 MG/DL (ref 8.5–10.1)
CHLORIDE SERPL-SCNC: 104 MMOL/L (ref 94–109)
CO2 SERPL-SCNC: 20 MMOL/L (ref 20–32)
COPATH REPORT: NORMAL
CREAT SERPL-MCNC: 1.74 MG/DL (ref 0.52–1.04)
ERYTHROCYTE [DISTWIDTH] IN BLOOD BY AUTOMATED COUNT: 13.6 % (ref 10–15)
GFR SERPL CREATININE-BSD FRML MDRD: 29 ML/MIN/1.7M2
GLUCOSE BLDC GLUCOMTR-MCNC: 170 MG/DL (ref 70–99)
GLUCOSE BLDC GLUCOMTR-MCNC: 194 MG/DL (ref 70–99)
GLUCOSE BLDC GLUCOMTR-MCNC: 202 MG/DL (ref 70–99)
GLUCOSE BLDC GLUCOMTR-MCNC: 210 MG/DL (ref 70–99)
GLUCOSE BLDC GLUCOMTR-MCNC: 213 MG/DL (ref 70–99)
GLUCOSE BLDC GLUCOMTR-MCNC: 223 MG/DL (ref 70–99)
GLUCOSE BLDC GLUCOMTR-MCNC: 251 MG/DL (ref 70–99)
GLUCOSE SERPL-MCNC: 246 MG/DL (ref 70–99)
HCT VFR BLD AUTO: 34.8 % (ref 35–47)
HGB BLD-MCNC: 11.5 G/DL (ref 11.7–15.7)
INTERPRETATION ECG - MUSE: NORMAL
INTERPRETATION ECG - MUSE: NORMAL
LACTATE BLD-SCNC: 2.3 MMOL/L (ref 0.7–2.1)
MAGNESIUM SERPL-MCNC: 2.1 MG/DL (ref 1.6–2.3)
MCH RBC QN AUTO: 30.2 PG (ref 26.5–33)
MCHC RBC AUTO-ENTMCNC: 33 G/DL (ref 31.5–36.5)
MCV RBC AUTO: 91 FL (ref 78–100)
PHOSPHATE SERPL-MCNC: 3.8 MG/DL (ref 2.5–4.5)
PLATELET # BLD AUTO: 208 10E9/L (ref 150–450)
POTASSIUM SERPL-SCNC: 5.1 MMOL/L (ref 3.4–5.3)
RBC # BLD AUTO: 3.81 10E12/L (ref 3.8–5.2)
SODIUM SERPL-SCNC: 136 MMOL/L (ref 133–144)
WBC # BLD AUTO: 13.3 10E9/L (ref 4–11)

## 2017-05-16 PROCEDURE — A9270 NON-COVERED ITEM OR SERVICE: HCPCS | Mod: GY | Performed by: THORACIC SURGERY (CARDIOTHORACIC VASCULAR SURGERY)

## 2017-05-16 PROCEDURE — 25000132 ZZH RX MED GY IP 250 OP 250 PS 637: Mod: GY | Performed by: THORACIC SURGERY (CARDIOTHORACIC VASCULAR SURGERY)

## 2017-05-16 PROCEDURE — A9270 NON-COVERED ITEM OR SERVICE: HCPCS | Mod: GY | Performed by: SURGERY

## 2017-05-16 PROCEDURE — S5010 5% DEXTROSE AND 0.45% SALINE: HCPCS | Performed by: STUDENT IN AN ORGANIZED HEALTH CARE EDUCATION/TRAINING PROGRAM

## 2017-05-16 PROCEDURE — 71020 XR CHEST 2 VW: CPT

## 2017-05-16 PROCEDURE — 40000133 ZZH STATISTIC OT WARD VISIT

## 2017-05-16 PROCEDURE — 40000275 ZZH STATISTIC RCP TIME EA 10 MIN

## 2017-05-16 PROCEDURE — 25000128 H RX IP 250 OP 636: Performed by: THORACIC SURGERY (CARDIOTHORACIC VASCULAR SURGERY)

## 2017-05-16 PROCEDURE — 94640 AIRWAY INHALATION TREATMENT: CPT | Mod: 76

## 2017-05-16 PROCEDURE — 40000047 ZZH STATISTIC CTO2 CONT OXYGEN TECH TIME EA 90 MIN

## 2017-05-16 PROCEDURE — 36415 COLL VENOUS BLD VENIPUNCTURE: CPT | Performed by: SURGERY

## 2017-05-16 PROCEDURE — 84100 ASSAY OF PHOSPHORUS: CPT | Performed by: SURGERY

## 2017-05-16 PROCEDURE — 25000128 H RX IP 250 OP 636: Performed by: STUDENT IN AN ORGANIZED HEALTH CARE EDUCATION/TRAINING PROGRAM

## 2017-05-16 PROCEDURE — 25000132 ZZH RX MED GY IP 250 OP 250 PS 637: Mod: GY | Performed by: SURGERY

## 2017-05-16 PROCEDURE — 25000125 ZZHC RX 250: Performed by: PHYSICIAN ASSISTANT

## 2017-05-16 PROCEDURE — 97530 THERAPEUTIC ACTIVITIES: CPT | Mod: GO

## 2017-05-16 PROCEDURE — 94640 AIRWAY INHALATION TREATMENT: CPT

## 2017-05-16 PROCEDURE — 00000146 ZZHCL STATISTIC GLUCOSE BY METER IP

## 2017-05-16 PROCEDURE — 25000132 ZZH RX MED GY IP 250 OP 250 PS 637: Mod: GY | Performed by: STUDENT IN AN ORGANIZED HEALTH CARE EDUCATION/TRAINING PROGRAM

## 2017-05-16 PROCEDURE — 40000193 ZZH STATISTIC PT WARD VISIT

## 2017-05-16 PROCEDURE — 97110 THERAPEUTIC EXERCISES: CPT | Mod: GP

## 2017-05-16 PROCEDURE — A9270 NON-COVERED ITEM OR SERVICE: HCPCS | Mod: GY | Performed by: STUDENT IN AN ORGANIZED HEALTH CARE EDUCATION/TRAINING PROGRAM

## 2017-05-16 PROCEDURE — 40000141 ZZH STATISTIC PERIPHERAL IV START W/O US GUIDANCE

## 2017-05-16 PROCEDURE — 85049 AUTOMATED PLATELET COUNT: CPT | Performed by: THORACIC SURGERY (CARDIOTHORACIC VASCULAR SURGERY)

## 2017-05-16 PROCEDURE — 25800025 ZZH RX 258: Performed by: STUDENT IN AN ORGANIZED HEALTH CARE EDUCATION/TRAINING PROGRAM

## 2017-05-16 PROCEDURE — 25000125 ZZHC RX 250: Performed by: STUDENT IN AN ORGANIZED HEALTH CARE EDUCATION/TRAINING PROGRAM

## 2017-05-16 PROCEDURE — 83605 ASSAY OF LACTIC ACID: CPT | Performed by: STUDENT IN AN ORGANIZED HEALTH CARE EDUCATION/TRAINING PROGRAM

## 2017-05-16 PROCEDURE — 83735 ASSAY OF MAGNESIUM: CPT | Performed by: SURGERY

## 2017-05-16 PROCEDURE — 97535 SELF CARE MNGMENT TRAINING: CPT | Mod: GO

## 2017-05-16 PROCEDURE — 25000132 ZZH RX MED GY IP 250 OP 250 PS 637: Mod: GY | Performed by: PHYSICIAN ASSISTANT

## 2017-05-16 PROCEDURE — 36415 COLL VENOUS BLD VENIPUNCTURE: CPT | Performed by: STUDENT IN AN ORGANIZED HEALTH CARE EDUCATION/TRAINING PROGRAM

## 2017-05-16 PROCEDURE — 12000006 ZZH R&B IMCU INTERMEDIATE UMMC

## 2017-05-16 PROCEDURE — 85027 COMPLETE CBC AUTOMATED: CPT | Performed by: SURGERY

## 2017-05-16 PROCEDURE — 92526 ORAL FUNCTION THERAPY: CPT | Mod: GN

## 2017-05-16 PROCEDURE — 40000225 ZZH STATISTIC SLP WARD VISIT

## 2017-05-16 PROCEDURE — A9270 NON-COVERED ITEM OR SERVICE: HCPCS | Mod: GY | Performed by: PHYSICIAN ASSISTANT

## 2017-05-16 PROCEDURE — 80048 BASIC METABOLIC PNL TOTAL CA: CPT | Performed by: SURGERY

## 2017-05-16 PROCEDURE — 40000274 ZZH STATISTIC RCP CONSULT EA 30 MIN

## 2017-05-16 RX ORDER — OXYCODONE HYDROCHLORIDE 5 MG/1
5-10 TABLET ORAL EVERY 4 HOURS PRN
Status: DISCONTINUED | OUTPATIENT
Start: 2017-05-16 | End: 2017-05-17

## 2017-05-16 RX ORDER — FLUORIDE TOOTHPASTE
10 TOOTHPASTE DENTAL 4 TIMES DAILY
Status: DISCONTINUED | OUTPATIENT
Start: 2017-05-16 | End: 2017-05-26 | Stop reason: HOSPADM

## 2017-05-16 RX ORDER — LIDOCAINE 40 MG/G
CREAM TOPICAL
Status: DISCONTINUED | OUTPATIENT
Start: 2017-05-16 | End: 2017-05-26 | Stop reason: HOSPADM

## 2017-05-16 RX ORDER — PIPERACILLIN SODIUM, TAZOBACTAM SODIUM 4; .5 G/20ML; G/20ML
4.5 INJECTION, POWDER, LYOPHILIZED, FOR SOLUTION INTRAVENOUS EVERY 6 HOURS
Status: CANCELLED | OUTPATIENT
Start: 2017-05-16

## 2017-05-16 RX ORDER — LIDOCAINE 50 MG/G
1 PATCH TOPICAL
Status: DISCONTINUED | OUTPATIENT
Start: 2017-05-16 | End: 2017-05-26 | Stop reason: HOSPADM

## 2017-05-16 RX ORDER — SODIUM CHLORIDE, SODIUM LACTATE, POTASSIUM CHLORIDE, CALCIUM CHLORIDE 600; 310; 30; 20 MG/100ML; MG/100ML; MG/100ML; MG/100ML
INJECTION, SOLUTION INTRAVENOUS CONTINUOUS
Status: CANCELLED | OUTPATIENT
Start: 2017-05-16

## 2017-05-16 RX ADMIN — LIDOCAINE 1 PATCH: 50 PATCH TOPICAL at 21:19

## 2017-05-16 RX ADMIN — POLYETHYLENE GLYCOL 3350 17 G: 17 POWDER, FOR SOLUTION ORAL at 19:58

## 2017-05-16 RX ADMIN — PANTOPRAZOLE SODIUM 40 MG: 40 TABLET, DELAYED RELEASE ORAL at 09:33

## 2017-05-16 RX ADMIN — SENNOSIDES AND DOCUSATE SODIUM 2 TABLET: 8.6; 5 TABLET ORAL at 09:41

## 2017-05-16 RX ADMIN — PRAVASTATIN SODIUM 40 MG: 40 TABLET ORAL at 19:58

## 2017-05-16 RX ADMIN — OXYCODONE HYDROCHLORIDE 5 MG: 5 TABLET ORAL at 14:01

## 2017-05-16 RX ADMIN — MELATONIN TAB 3 MG 3 MG: 3 TAB at 18:21

## 2017-05-16 RX ADMIN — DILTIAZEM HYDROCHLORIDE 10 MG/HR: 5 INJECTION INTRAVENOUS at 21:31

## 2017-05-16 RX ADMIN — FUROSEMIDE 20 MG: 20 TABLET ORAL at 09:35

## 2017-05-16 RX ADMIN — LEVALBUTEROL HYDROCHLORIDE 0.63 MG: 0.63 SOLUTION RESPIRATORY (INHALATION) at 09:46

## 2017-05-16 RX ADMIN — ACETAMINOPHEN 975 MG: 325 TABLET, FILM COATED ORAL at 16:42

## 2017-05-16 RX ADMIN — HEPARIN SODIUM 5000 UNITS: 5000 INJECTION, SOLUTION INTRAVENOUS; SUBCUTANEOUS at 00:47

## 2017-05-16 RX ADMIN — LEVALBUTEROL HYDROCHLORIDE 0.63 MG: 0.63 SOLUTION RESPIRATORY (INHALATION) at 12:32

## 2017-05-16 RX ADMIN — Medication 10 ML: at 21:19

## 2017-05-16 RX ADMIN — FLUTICASONE FUROATE AND VILANTEROL TRIFENATATE 1 PUFF: 100; 25 POWDER RESPIRATORY (INHALATION) at 09:40

## 2017-05-16 RX ADMIN — OXYCODONE HYDROCHLORIDE 10 MG: 5 TABLET ORAL at 02:41

## 2017-05-16 RX ADMIN — HEPARIN SODIUM 5000 UNITS: 5000 INJECTION, SOLUTION INTRAVENOUS; SUBCUTANEOUS at 09:41

## 2017-05-16 RX ADMIN — GUAIFENESIN 600 MG: 600 TABLET, EXTENDED RELEASE ORAL at 19:58

## 2017-05-16 RX ADMIN — CARVEDILOL 3.12 MG: 3.12 TABLET, FILM COATED ORAL at 09:33

## 2017-05-16 RX ADMIN — DEXTROSE AND SODIUM CHLORIDE: 5; 450 INJECTION, SOLUTION INTRAVENOUS at 18:16

## 2017-05-16 RX ADMIN — OXYCODONE HYDROCHLORIDE 5 MG: 5 TABLET ORAL at 14:25

## 2017-05-16 RX ADMIN — ACETAMINOPHEN 975 MG: 325 TABLET, FILM COATED ORAL at 00:36

## 2017-05-16 RX ADMIN — SODIUM CHLORIDE SOLN NEBU 3% 3 ML: 3 NEBU SOLN at 16:28

## 2017-05-16 RX ADMIN — LEVALBUTEROL HYDROCHLORIDE 0.63 MG: 0.63 SOLUTION RESPIRATORY (INHALATION) at 16:28

## 2017-05-16 RX ADMIN — DILTIAZEM HYDROCHLORIDE 5 MG/HR: 5 INJECTION INTRAVENOUS at 01:00

## 2017-05-16 RX ADMIN — HEPARIN SODIUM 5000 UNITS: 5000 INJECTION, SOLUTION INTRAVENOUS; SUBCUTANEOUS at 16:45

## 2017-05-16 RX ADMIN — CETIRIZINE HYDROCHLORIDE 5 MG: 5 TABLET ORAL at 09:35

## 2017-05-16 RX ADMIN — CARVEDILOL 3.12 MG: 3.12 TABLET, FILM COATED ORAL at 18:21

## 2017-05-16 RX ADMIN — ACETAMINOPHEN 975 MG: 325 TABLET, FILM COATED ORAL at 09:35

## 2017-05-16 RX ADMIN — GUAIFENESIN 600 MG: 600 TABLET, EXTENDED RELEASE ORAL at 09:35

## 2017-05-16 RX ADMIN — SODIUM CHLORIDE SOLN NEBU 3% 3 ML: 3 NEBU SOLN at 09:46

## 2017-05-16 RX ADMIN — ASPIRIN 81 MG CHEWABLE TABLET 81 MG: 81 TABLET CHEWABLE at 19:58

## 2017-05-16 RX ADMIN — SODIUM CHLORIDE SOLN NEBU 3% 3 ML: 3 NEBU SOLN at 12:32

## 2017-05-16 RX ADMIN — SODIUM CHLORIDE, POTASSIUM CHLORIDE, SODIUM LACTATE AND CALCIUM CHLORIDE 500 ML: 600; 310; 30; 20 INJECTION, SOLUTION INTRAVENOUS at 22:56

## 2017-05-16 RX ADMIN — OXYCODONE HYDROCHLORIDE 5 MG: 5 TABLET ORAL at 19:58

## 2017-05-16 ASSESSMENT — PAIN DESCRIPTION - DESCRIPTORS
DESCRIPTORS: ACHING
DESCRIPTORS: SHARP

## 2017-05-16 NOTE — PLAN OF CARE
"Problem: Goal Outcome Summary  Goal: Goal Outcome Summary  Outcome: No Change  BP (!) 129/105  Pulse 84  Temp 98.2  F (36.8  C) (Oral)  Resp 16  Ht 1.626 m (5' 4\")  Wt 66.5 kg (146 lb 9.7 oz)  SpO2 100%  BMI 25.16 kg/m2     Neuro: Disoriented to place, time, and situation. Pt is lethargic, likely d/t dilaudid (given for severe pain d/t chest tubes)  Cardiac: A fib/flutter, HR ranges from 35 to 165, typically is 130-160s. Metoprolol 5mg has been given x3 (max # of doses). Team paged - order placed per CARDS for dilt gtt. Waiting for pharmacy to review and profile.   Respiratory: Sating 98% on 6L HFNC w/humidity. Pt , A fib/flutter. BP is 129/105. Have given max number of doses (3) of metoprolol today for HR over 130. Pt refused scheduled oral carvedilol.   GI: no BM on shift  : Adequate urine output via swift  IV Access: L PIV, D5.45 NS @30ml/hr. Will set up Dilt gtt as soon as available and give 10mg bolus.   Drains/tubes: R upper pigtail chest tube to -20 suction, had small serosanguinous output. R lower chest tube, -20 suction, having medium output serosanguinous.   Activity:  Ax2 to turn and boost. Pt refusing to be repositioned at times, stating \"don't touch me!\"  Pain: When patient wakes up, she is very anxious and appears very painful. After a minute or two, patient will fall back asleep. Pt is confused as well, seems to worsen with narcs. PRN dilaudid was given once for severe pain.  Skin: Chest tube sites and site at trachea for bedside bronch are all CDI, see flowsheet. No new deficits noticed. Pt refused positioning q2h. Was repo'ed x2 during shift.     R: Continue with POC. Will start diltiazem gtt and give bolus. Notify primary team with changes.          "

## 2017-05-16 NOTE — PLAN OF CARE
Problem: Goal Outcome Summary  Goal: Goal Outcome Summary     SLP 6B: Pt seen for dysphagia tx. Recommend continue regular diet and thin liquids, pt reports selecting softer items. Pt should be upright, take small bites/sips and alternate consistencies. May benefit from smaller, more frequent meals for endurance.

## 2017-05-16 NOTE — PROGRESS NOTES
"                     Diabetes Consult Daily  Progress Note          Assessment/Plan:   Yue Valenzuela is a 71 year old female with DM-1 managed on ambulatory subcutaneous insulin pump at home, CKD IV, factor V Leiden mutation, HTN, HLD, CAD s/p stent, PVD, carotid artery stenosis, asthma, and newly diagnosed lung adenocarcinoma, s/ p right lower lobectomy 05/11.     Glucose trending steadily above target.  However, pt is hesitant to increase insulin dosing.    Plan:  -pt agrees to conservative increase in glargine from 18 units to 20 units tonight  -aspart meal Insulin:  Breakfast: 1 unit per 11 grams of CHO  Lunch: 1 unit per 11 grams of CHO  Dinner: 1 unit per 10 grams of CHO  Snacks: 1 unit per 10 grams of CHO  -Aspart medium correction with meals and snacks  Monitor glucose before meals, HS and 0200                    Interval History:   I reviewed the last 24 hours progress notes  Lowest glucose in last 36 hour: 138.  Glucose spiked yesterday afternoon to high 200s around time of arrhythmia.  Overnight, when more stable, BG continued to be elevated.  Pt is taking medications with pudding and not receiving aspart for small amounts of carbohydrate    Pt said Dr. Sim (outpt provider) would be happy with her glucose high 100s to low 200s.  Her  corrected that he'd like nearer .  Pt worried about low BG.  She asked for half unit change in glargine.  Reminded difference glargine and pump basal rates.  Pt more accepting.    RN notes continue to document pt \"forgetful\".        Recent Labs  Lab 05/16/17  1536 05/16/17  1250 05/16/17  0951 05/16/17  0735 05/16/17  0458 05/16/17  0042 05/15/17  2254  05/15/17  1545  05/15/17  0545  05/14/17  0655  05/13/17  0548  05/12/17 2001   GLC  --   --  246*  --   --   --   --   --  198*  --  142*  --  90  --  157*  --  177*   * 202*  --  194* 213* 170* 176*  < >  --   < >  --   < >  --   < >  --   < >  --    < > = values in this interval not displayed.          " "  Review of Systems:      please see interval history       Medications:       Active Diet Order      Regular Diet Adult     Physical Exam:  Gen: Alert, resting in chair in NAD,  at bedside and helpful  HEENT: NC/AT, mucous membranes are moist  Resp: Unlabored, supplemental oxygen  Ext: moving all extremities  Neuro:oriented x3, communicating clearly  /58 (BP Location: Right arm)  Pulse 84  Temp 98  F (36.7  C) (Oral)  Resp 20  Ht 1.626 m (5' 4\")  Wt 63.4 kg (139 lb 12.4 oz)  SpO2 98%  BMI 23.99 kg/m2           Data:     Lab Results   Component Value Date    A1C 7.0 05/03/2017    A1C 6.0 08/04/2014              Recent Labs   Lab Test  05/16/17   0951  05/15/17   1545   NA  136  133   POTASSIUM  5.1  4.8   CHLORIDE  104  103   CO2  20  23   ANIONGAP  12  7   GLC  246*  198*   BUN  45*  46*   CR  1.74*  1.78*   ZAHEER  8.8  8.3*     CBC RESULTS:   Recent Labs   Lab Test  05/16/17   0951   WBC  13.3*   RBC  3.81   HGB  11.5*   HCT  34.8*   MCV  91   MCH  30.2   MCHC  33.0   RDW  13.6   PLT  208     Coleen Hemphill APRN -[id number]    Diabetes Management Job Code [id number]          "

## 2017-05-16 NOTE — ANESTHESIA POST-OP FOLLOW-UP NOTE
REGIONAL ANESTHESIA PAIN SERVICE EPIDURAL NOTE  SUBJECTIVE:   Interval History: Pt reports good pain control via epidural. Denies any weakness, paresthesias, circumoral numbness, metallic taste or tinnitus. Pt is ambulating with assistance. Patient is currently without nausea; without pruritis. Currently tolerating a general diet. She is very sensitive to narcotics, and hallucinates when taking dilaudid.      Clinically Aligned Pain Assessment (CAPA):  Comfort (How is your pain?): Comfortably manageable  Change in Pain (Since your last medication/intervention?): Getting better  Pain Control (How are your pain treatments working?): Partially effective pain control  Functioning (Are you able to do activities to get better?) : Can do most things, but pain gets in the way of some   Sleep (Does your pain management allow you to sleep or rest?): Normal sleep     Numerical Rating Scale:   Reports pain 2/10 at rest and 5-6/10 with movement.            Anticoagulation: Heparin 5000 U sq q8        OBJECTIVE:  Diagnostics:        Lab Results   Component Value Date     WBC 7.2 05/14/2017            Lab Results   Component Value Date     RBC 2.94 05/14/2017            Lab Results   Component Value Date     HGB 8.7 05/14/2017            Lab Results   Component Value Date     HCT 27.2 05/14/2017            Lab Results   Component Value Date      05/14/2017               Lab Results   Component Value Date     INR 1.06 04/18/2017     INR 1.08 04/04/2017     INR 0.99 01/13/2017         Vitals:  Temp: [36.2  C (97.2  F)-37.5  C (99.5  F)] 36.7  C (98  F)  Pulse: [61-65] 65  Heart Rate: [65-82] 82  Resp: [16-18] 18  BP: (127-143)/(41-56) 127/45  SpO2: [93 %-98 %] 96 %     Exam:   Strength 5/5 and symmetric grossly in bilateral LE Epidural site with dressing c/d/i, no tenderness, erythema, heme, edema        ASSESSMENT/PLAN:   Yue Valenzuela is a 71 year old female POD #4 s/p BRONCHOSCOPY FLEXIBLE  MEDIASTINOSCOPY  THORACOSCOPIC  RESECTION LUNG  THORACOSCOPIC EXCISE NODE MEDIASTINAL and placement of T5-6 epidural for analgesia. Pt receiving adequate analgesia with epidural infusion Bupivacaine 0.125% at 9 mL/hr. Pt is ambulating without difficulty. No weakness or paresthesias, adequate sensory block. No evidence of adverse side effects related to local anesthetic. Hallucinates when taking Dilaudid. I encouraged the nurse to page us if patient is having significant pain, so we may bolus the epidural rather than giving narcotics.      - continue current epidural infusion at 9mL/hour   - will continue to follow and adjust as needed  Shekhar Hi MD  5/14/17

## 2017-05-16 NOTE — PROGRESS NOTES
"Surgery Cross Cover Brief Note  5:29 AM 5/16/2017     I was paged by nursing regarding persistent atrial fibrillation. Went to see patient at bedside.    Subjective  Patient remains in atrial fibrillation, despite maximum metoprolol dosing. Denies chest pain or sense of palpitations. No dizziness.  states that she has been more interactive this evening after receiving sedation for her bronchoscopy earlier in the day.      Objective  /72 (BP Location: Right arm)  Pulse 84  Temp 98.1  F (36.7  C) (Oral)  Resp 20  Ht 1.626 m (5' 4\")  Wt 66.5 kg (146 lb 9.7 oz)  SpO2 100%  BMI 25.16 kg/m2    Exam:  Gen: NAD, A&O x3  Cardiac: Irregular rhythm, tachycardic  Respi: NLB  Abd: soft, non-distended, non-tender      Assessment / Plan  71F now POD #5-6 s/p right VATS, lower lobectomy, and middle wedge with new-onset atrial fibrillation today.      Will begin diltiazem gtt: 5 mg/hr without bolus; titrate to HR < 120 bpm. Discussed with on-call cardiologist who felt that since the patient did not receive her evening dose of Norvasc earlier this evening, a diltiazem drip would be our best choice.    Administer SQ heparin as scheduled.    Discussed with Dr. Diaz.      - - - - - - - - - - - -  Antonio Nascimento MD  PGY-1, General Surgery  [facility]  Pager: [phone]  Please contact primary team after 6am.  "

## 2017-05-16 NOTE — PROGRESS NOTES
Nephrology Progress Note      Interval History:  Ms Valenzuela is sitting up in the chair this morning and denies any complaints. She reports of feeling well overall. She continues to be in afib with RVR since yesterday needing initiation of Cardizem gtt.     ASSESSMENT AND RECOMMENDATIONS:   71 year old female with long standing type I diabetes with presumed diabetic nephropathy (followed by Dr Sim of [facility]), CKD stage 4, Scr 1.8-2.2 mg/dL (eGFR 20) who is s/p VATS for lung adenocarcinoma. Nephrology is consulted for CKD and hyperkalemia    CKD with elevated creatinine: baseline creatinine of 1.8-2.2. Her creatinine peaked to 2.4 during this admission, likely in the setting of jarocho-op hypotension/hemodynamic changes in the setting of poor renal reserve.   --creatinine continues to improve and is at baseline.  --Continue to hold Lisinopril and do not order on discharge.  --She should follow up with her nephrologist to restart it.   --Nephrology will sign off at this time. Please call with questions.     Blood pressure/ volume : Continue lasix 20 mg daily. Currently on cardizem gtt and hence her amlodipine has been held. Continues to be on Coreg 3.125 mg BID  --Goal BP would be < 130/90 mm of Hg.   --Patient has not tolerated multiple antihypertensives in the past, including doxazosin.  --Consider increasing her Coreg to 6.25 mg BID   --Consider addition of Hydralazine eventually if her pressures remain uncontrolled since we are stopping her Lisinopril.     High normal K: In the setting of total body potassium excess and hyperglycemia.   --Recommend low K diet   --holding lisinopril.     Yoly Ruvalcaba MD       REVIEW OF SYSTEMS:  No confusion or lightheadedness  No vomiting or diarrhea. +nausea intermittently.  Chest discomfort and dyspnea severe this am, better now.  +Weakness  Good urine output without hematuria, dysuria.    PAST  MEDICAL HISTORY:  Reviewed with patient at time of initial consult.    Past Medical History:   Diagnosis Date     Adenocarcinoma, lung (H)      Asthma      CAD (coronary artery disease) 9/17/2014 2006 PCI circumflex territory  Mid LAD lesion      Carotid artery stenosis      CKD (chronic kidney disease) stage 4, GFR 15-29 ml/min (H)      Diabetes mellitus type 1 (H)      H/O factor V Leiden mutation      Hypertension      Mixed hyperlipidemia (DYSLIPIDEMIA) 8/1/2014     Osteopenia      Pneumothorax 4/16/2017     PVD (peripheral vascular disease) (H)      Thyroid nodule 04/12/2012    Hurtle Cells, non malignant       Past Surgical History:   Procedure Laterality Date     BRONCHOSCOPY FLEXIBLE Right 5/10/2017    Procedure: BRONCHOSCOPY FLEXIBLE;  Flexible Bronchoscopy, esaphogastroduodenoscopy, cervical Mediastinoscopy, Right  Thoracoscopic Surgery, right Lower Lobectomy, Mediastinal Lymph Node Dissection, Right middle lobe wedge resection*Latex Allergy* (ERAS Patient);  Surgeon: Glenna Hall MD;  Location: [facility] OR     ESOPHAGOSCOPY, GASTROSCOPY, DUODENOSCOPY (EGD), COMBINED N/A 5/10/2017    Procedure: COMBINED ESOPHAGOSCOPY, GASTROSCOPY, DUODENOSCOPY (EGD);;  Surgeon: Glenna Hall MD;  Location: [facility] OR     ganglion cyst removal       hemithyroidectomy  4/13/2012    PATH report showed benign Hurtle Cell Adenoma     IR biopsy of lung  04/2017     lipoma removal       MEDIASTINOSCOPY N/A 5/10/2017    Procedure: MEDIASTINOSCOPY;;  Surgeon: Glenna Hall MD;  Location: [facility] OR     SHOULDER SURGERY Left      THORACOSCOPIC EXCISE NODE MEDIASTINAL Right 5/10/2017    Procedure: THORACOSCOPIC EXCISE NODE MEDIASTINAL;;  Surgeon: Glenna Hall MD;  Location: [facility] OR     THORACOSCOPIC RESECTION LUNG Right 5/10/2017    Procedure: THORACOSCOPIC RESECTION LUNG;;  Surgeon: Glenna Hall MD;  Location: [facility] OR     TONSILLECTOMY          MEDICATIONS:  PTA Meds  Prior to Admission medications    Medication Sig Last Dose Taking?  Auth Provider   albuterol (PROAIR HFA/PROVENTIL HFA/VENTOLIN HFA) 108 (90 BASE) MCG/ACT Inhaler Inhale 2 puffs into the lungs 2 times daily 5/9/2017 at Unknown time Yes Reported, Patient   Respiratory Therapy Supplies (AEROBIKA) GAGANDEEP 1 Device 6 times daily 5/9/2017 at Unknown time Yes Demario Torrez MD   Insulin Aspart (INSULIN PUMP - OUTPATIENT) Insulin pump base rate: 2899-7255 rate 0.5  8143-8113 rate 1.0  8825-5169 rate 1.95  1085-0722 rate 1.35 5/10/2017 at Unknown time Yes Reported, Patient   Acetaminophen (TYLENOL PO) Take 1,000 mg by mouth every 8 hours as needed for mild pain or fever  Past Week at Unknown time Yes Reported, Patient   budesonide-formoterol (SYMBICORT) 80-4.5 MCG/ACT inhaler Inhale 2 puffs into the lungs 2 times daily Past Week at Unknown time Yes John Plaza MD   amLODIPine (NORVASC) 10 MG tablet Take 10 mg by mouth every evening  5/9/2017 at Unknown time Yes Reported, Patient   aspirin EC 81 MG tablet Take 81 mg by mouth every evening  5/9/2017 at Unknown time Yes Reported, Patient   carvedilol (COREG) 3.125 MG tablet Take 3.125 mg by mouth 2 times daily (with meals)  5/9/2017 at Unknown time Yes Reported, Patient   furosemide (LASIX) 20 MG tablet 20 mg every morning  5/9/2017 at Unknown time Yes Reported, Patient   lisinopril (PRINIVIL,ZESTRIL) 20 MG tablet Take 10 mg by mouth every evening  5/9/2017 at Unknown time Yes Reported, Patient   pravastatin (PRAVACHOL) 40 MG tablet 40 mg every evening  5/9/2017 at Unknown time Yes Reported, Patient   cetirizine (ZYRTEC) 10 MG tablet Take 5 mg by mouth daily   Reported, Patient   calcium 600 MG tablet Take 1 tablet by mouth every morning   Reported, Patient   clonazePAM (KLONOPIN) 0.5 MG tablet Take 0.5 tablets (0.25 mg) by mouth 2 times daily as needed for anxiety   Elisa Ellington MD   blood glucose (ONE TOUCH ULTRA) test strip    Reported, Patient   calcium acetate (PHOSLO) 667 MG CAPS Take 667 mg by mouth 4 times daily  (with meals and nightly)    Reported, Patient   cholecalciferol (VITAMIN D) 1000 UNIT tablet Take 1,000 Units by mouth every morning    Reported, Patient   ONETOUCH DELICA LANCETS 33G MISC    Reported, Patient      Current Meds    insulin glargine  20 Units Subcutaneous Q24H     insulin aspart   Subcutaneous Daily with breakfast     insulin aspart   Subcutaneous Daily before lunch     insulin aspart   Subcutaneous Daily with supper     levalbuterol  0.63 mg Nebulization Q4H WA     sodium chloride  3 mL Nebulization Q4H While awake     furosemide  20 mg Oral QAM     guaiFENesin  600 mg Oral BID     insulin aspart  1-7 Units Subcutaneous TID AC     insulin aspart  1-5 Units Subcutaneous At Bedtime     bisacodyl  10 mg Rectal Daily     senna-docusate  2 tablet Oral BID     polyethylene glycol  17 g Oral BID     acetaminophen  975 mg Oral Q8H     melatonin  3 mg Oral At Bedtime     menthol   Transdermal Q8H     carvedilol  3.125 mg Oral BID w/meals     aspirin EC  81 mg Oral QPM     cetirizine  5 mg Oral Daily     pravastatin  40 mg Oral QPM     heparin  5,000 Units Subcutaneous Q8H     pantoprazole  40 mg Oral Daily     fluticasone-vilanterol  1 puff Inhalation Daily     Infusion Meds    diltiazem (CARDIZEM) infusion ADULT 5 mg/hr (05/16/17 0700)     dextrose 5% and 0.45% NaCl 30 mL/hr at 05/15/17 2000       ALLERGIES:    Allergies   Allergen Reactions     Codeine Other (See Comments)     Makes her sleepless     Loratadine Other (See Comments)     Extreme sleepiness       Sulfa Drugs Unknown     Other reaction(s): Unknown     Terazosin Other (See Comments)     Swellling in legs, improved when went off     Adhesive Tape Rash     Foam tape used during surgery was what caused rash     Latex Rash     Liquid Adhesive Rash     SOCIAL HISTORY:   Social History     Social History     Marital status:      Spouse name: N/A     Number of children: N/A     Years of education: N/A     Occupational History     retail Other     " part-time     Social History Main Topics     Smoking status: Former Smoker     Packs/day: 0.50     Years: 15.00     Types: Cigarettes     Start date: 1970     Quit date: 1985     Smokeless tobacco: Never Used     Alcohol use 0.0 - 0.5 oz/week     0 - 1 Standard drinks or equivalent per week     Drug use: No     Sexual activity: Not on file     Other Topics Concern     Not on file     Social History Narrative    The patient has a 7 pk yr tobacco hx.  She has no active use since .  Alcohol use is 0 alcoholic drinks per week.  She denies use of recreational drugs.          She is retired.  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           The patient is .  Has 2 children.        Hot Tub Exposure: NO    Recent Travel: NO     Hx of incarceration:  NO    Bird Exposure:   NO    Animal Exposure:  NO    Inhalation Exposure:  NO         Reviewed with patient at time of initial consult.    FAMILY MEDICAL HISTORY:   Family History   Problem Relation Age of Onset     Cancer - colorectal Father      DIABETES Father      Scleroderma Mother      CEREBROVASCULAR DISEASE Brother      DIABETES Brother      Hypertension Brother      DIABETES Brother      2nd brother     Reviewed with patient at time of initial consult.    PHYSICAL EXAM:   Temp  Av.8  F (36.6  C)  Min: 96.5  F (35.8  C)  Max: 99.4  F (37.4  C)  Arterial Line MAP (mmHg)  Av mmHg  Min: 58 mmHg  Max: 88 mmHg  Arterial Line BP  Min: 109/36  Max: 147/49      Pulse  Av.3  Min: 60  Max: 88 Resp  Av.4  Min: 10  Max: 20  SpO2  Av.5 %  Min: 93 %  Max: 100 %       /63 (BP Location: Right arm)  Pulse 84  Temp 97.9  F (36.6  C) (Oral)  Resp 18  Ht 1.626 m (5' 4\")  Wt 63.4 kg (139 lb 12.4 oz)  SpO2 97%  BMI 23.99 kg/m2   Date 17 07 - 17 0659   Shift 2146-1524 9081-6060 4392-4490 24 Hour Total   I  N  T  A  K  E   Shift Total  (mL/kg)       O  U  T  P  U  T   Urine 375   449    Chest " Tube 150   150    Shift Total  (mL/kg) 525  (7.55)   525  (7.55)   Weight (kg) 69.5 69.5 69.5 69.5        Admit Weight: 64.1 kg (141 lb 5 oz)     GENERAL APPEARANCE: no distress,  awake  EYES: no scleral icterus, pupils equal  HENT: NC/AT,  mouth  without ulcers or lesions  Lymphatics: no cervical or supraclavicular LAD. Midline incision over trachea  Pulmonary: lungs clear to auscultation with equal breath sounds bilaterally, no clubbing.  CT tubes in place.  CV: regular rhythm, normal rate, no rub   - JVP not elevated   - Edema- not significant  GI: soft, mildly distended, nontender, normal bowel sounds.  MS: no evidence of inflammation in joints, no muscle tenderness  : + Hall  SKIN: no rash, warm, dry, no cyanosis  NEURO: mentation intact and speech normal    LABS:   CMP    Recent Labs  Lab 05/16/17  0951 05/15/17  1545 05/15/17  0545 05/14/17  0655    133 137 136   POTASSIUM 5.1 4.8 4.9 4.9   CHLORIDE 104 103 107 108   CO2 20 23 22 21   ANIONGAP 12 7 8 7   * 198* 142* 90   BUN 45* 46* 48* 53*   CR 1.74* 1.78* 1.83* 1.99*   GFRESTIMATED 29* 28* 27* 25*   GFRESTBLACK 35* 34* 33* 30*   ZAHEER 8.8 8.3* 8.4* 8.2*   MAG 2.1 2.2 2.3 2.3   PHOS 3.8 4.1 3.8 3.7     CBC    Recent Labs  Lab 05/16/17  0951 05/15/17  0545 05/14/17  0655 05/13/17  0548   HGB 11.5* 9.5* 8.7* 8.9*   WBC 13.3* 8.0 7.2 9.4   RBC 3.81 3.22* 2.94* 2.98*   HCT 34.8* 29.3* 27.2* 27.5*   MCV 91 91 93 92   MCH 30.2 29.5 29.6 29.9   MCHC 33.0 32.4 32.0 32.4   RDW 13.6 13.6 13.8 13.9    155 151 142*     INRNo lab results found in last 7 days.  ABG    Recent Labs  Lab 05/10/17  1530 05/10/17  1420 05/10/17  1348 05/10/17  1245   PH 7.34* 7.32* 7.28* 7.28*   PCO2 35 38 44 47*   PO2 164* 165* 84 311*   HCO3 19* 20* 21 22   O2PER 55.0 57.0 50.0 100.0      URINE STUDIES  Recent Labs   Lab Test  05/13/17   0914  05/12/17   2312  08/04/14   0832   COLOR  Light Yellow  Light Yellow  Straw   APPEARANCE  Clear  Clear  Clear   URINEGLC  300*   70*  300*   URINEBILI  Negative  Negative  Negative   URINEKETONE  Negative  Negative  Negative   SG  1.008  1.009  1.008   UBLD  Negative  Negative  Negative   URINEPH  5.0  5.0  5.5   PROTEIN  Negative  Negative  10*   NITRITE  Negative  Negative  Negative   LEUKEST  Negative  Negative  Negative   RBCU  0  <1  <1   WBCU  1  1  <1     No lab results found.  PTH  No lab results found.  IRON STUDIES  No lab results found.    IMAGING:  All imaging studies reviewed by me.     Yoly Ruvalcaba MD

## 2017-05-16 NOTE — PROGRESS NOTES
"Thoracic Surgery Daily Progress Note  Yue Valenzuela  05/16/2017    Subjective:   Yesterday afternoon with new afib/flutter with variable rates (40s-140s). Started on diltiazem drip, now intermittently in sinus vs. Aflutter.   Bronchoscopy yesterday afternoon for mucus plugging with evacuation of thick secretions.     Objective:    /62 (BP Location: Right arm)  Pulse 84  Temp 97.4  F (36.3  C) (Axillary)  Resp 20  Ht 1.626 m (5' 4\")  Wt 63.4 kg (139 lb 12.4 oz)  SpO2 100%  BMI 23.99 kg/m2  Sitting up in bed in no acute distress  Awake, alert and appropriate  Non-labored breathing on HFNC  Sinus rhythm with PACs/PVCs on tele  CTs in place with air leaks.     Ins/Outs:   I/O last 3 completed shifts:  In: 895 [P.O.:160; I.V.:735]  Out: 3740 [Urine:2990; Chest Tube:750]    Labs:   Labs reviewed. Cr improving to 1.74, WBC 13.3, Hgb 11.5  CXR reviewed.     Assessment:  Yue Valenzuela is a 71 year old female now post operative day 6 from a right VATS, lower lobectomy and middle wedge.     Plan:       Neuro:  Transition to oral pain medications and off of PCA    Cardiovascular/Heme: Cardiology consult for new arrhythmia, appreciate assistance. Currently on diltiazem drip, intermittently appears to be in sinus    Respiratory/Pulm: Continue chest tubes to suction. Aggressive pulmonary toilet/nebs/therapy.     FEN/Gastrointestinal: Regular diet. Bowel regimen.    Renal/Genitourinary: SUPRIYA on CKD. Appreciate nephrology assistance. Restarted home dose of lasix.    Infectious: No acute concerns.     Prophy: Heparin, protonix.    Activity:  Up to chair, out of bed, increase activity.     Endo: DM, on insulin, appreciate endocrinology assistance.     Dispo: Continue care on 6B    Patient was seen and discussed with Dr. Diaz, Thoracic Surgery Fellow, who agrees with the assessment and plan and will discuss with staff.     Evelyn Liriano MD  General Surgery  Pager: [phone]                       "

## 2017-05-16 NOTE — PROVIDER NOTIFICATION
Team paged: Pt , A fib/flutter. BP is 129/105. Have already given max number of doses (3) of metoprolol today for HR over 130. Pt refused scheduled oral carvedilol at 8pm. Pt also confused, she has been disoriented to place, time, and situation since she awoke post bedside bronch.  says she does get confused with narcotics and antianxiolytics (pt has received both ativan and dilaudid). Pt will fall asleep, seem comfortable, and then jump awake with anxiety and confusion, occasional complaints of pain. Upon further questions, is too disoriented to answer questions appropriately. Will then fall back asleep. Will continue to monitor patient closely and update with any changes.

## 2017-05-16 NOTE — PLAN OF CARE
Problem: Goal Outcome Summary  Goal: Goal Outcome Summary  Outcome: No Change  Neuro: A&Ox4. Lethargic at times. Forgetful.   Cardiac: SBP ranging between 110s-140s. Cardizem gtt initiated without bolus at beginning of shift. Pt rhythm varied between SR with frequent PACs to A flutter/fib with rate controlled between 70s-110 while on drip. Prior to drip initiation HR was in A fib/flutter between 120s-160s.              Respiratory: Sating 100% on 45% Hi flow NC.  R upper lobe chest tube with no drainage output. R lower chest to had 100-200 out total. Both to -20 suction. Chest tube dressings dry and intact.   GI/: Adequate urine output. Clear yellow per swift. No BM.   Diet/appetite: Pt only ate small bites of pudding with her crushed pills. Q4 accuchecks had range of 170-210s.  Activity:  Assist of 1-2 to adjust in bed.  Pain: Tylenol and oxy X1 on my shift. Heat pad given for chronic back pain.  Skin: Intact, no new deficits noted.     R: Continue with POC. Notify primary team with changes.

## 2017-05-16 NOTE — PLAN OF CARE
Problem: Goal Outcome Summary  Goal: Goal Outcome Summary  OT 6B: patient is progressing well with OT, showing increasing endurance and independence with ADL's. Min Assist for bed> chair transfer with walker, mainly to assist with CT and lines. Patient walked to bathroom and completed H/G tasks while standing at sink x 6 min ,then needing to sit due to fatigue and LE weakness. O2 sats stable on 2l o2.  At this time, recommend TCU to increase activity tolerance and endurance for greater ADL independence.

## 2017-05-16 NOTE — PLAN OF CARE
Problem: Goal Outcome Summary  Goal: Goal Outcome Summary  Outcome: Therapy, progress toward functional goals as expected  PT/6B: Patient motivated to work with therapy. Limited by fatigue and SI pain. Participates in seated and standing exercises. VSS throughout. Continue to recommend TCU at time of discharge.

## 2017-05-17 ENCOUNTER — APPOINTMENT (OUTPATIENT)
Dept: GENERAL RADIOLOGY | Facility: CLINIC | Age: 72
DRG: 163 | End: 2017-05-17
Attending: PHYSICIAN ASSISTANT
Payer: MEDICARE

## 2017-05-17 ENCOUNTER — APPOINTMENT (OUTPATIENT)
Dept: CARDIOLOGY | Facility: CLINIC | Age: 72
DRG: 163 | End: 2017-05-17
Attending: STUDENT IN AN ORGANIZED HEALTH CARE EDUCATION/TRAINING PROGRAM
Payer: MEDICARE

## 2017-05-17 LAB
ANION GAP SERPL CALCULATED.3IONS-SCNC: 7 MMOL/L (ref 3–14)
BUN SERPL-MCNC: 46 MG/DL (ref 7–30)
CALCIUM SERPL-MCNC: 8.2 MG/DL (ref 8.5–10.1)
CHLORIDE SERPL-SCNC: 101 MMOL/L (ref 94–109)
CO2 SERPL-SCNC: 24 MMOL/L (ref 20–32)
CREAT SERPL-MCNC: 1.78 MG/DL (ref 0.52–1.04)
ERYTHROCYTE [DISTWIDTH] IN BLOOD BY AUTOMATED COUNT: 13.9 % (ref 10–15)
GFR SERPL CREATININE-BSD FRML MDRD: 28 ML/MIN/1.7M2
GLUCOSE BLDC GLUCOMTR-MCNC: 152 MG/DL (ref 70–99)
GLUCOSE BLDC GLUCOMTR-MCNC: 175 MG/DL (ref 70–99)
GLUCOSE BLDC GLUCOMTR-MCNC: 225 MG/DL (ref 70–99)
GLUCOSE BLDC GLUCOMTR-MCNC: 244 MG/DL (ref 70–99)
GLUCOSE BLDC GLUCOMTR-MCNC: 285 MG/DL (ref 70–99)
GLUCOSE SERPL-MCNC: 238 MG/DL (ref 70–99)
HCT VFR BLD AUTO: 31 % (ref 35–47)
HGB BLD-MCNC: 9.9 G/DL (ref 11.7–15.7)
LACTATE BLD-SCNC: 2.4 MMOL/L (ref 0.7–2.1)
LACTATE BLD-SCNC: 2.6 MMOL/L (ref 0.7–2.1)
LMWH PPP CHRO-ACNC: 0.46 IU/ML
MAGNESIUM SERPL-MCNC: 2.1 MG/DL (ref 1.6–2.3)
MCH RBC QN AUTO: 29.5 PG (ref 26.5–33)
MCHC RBC AUTO-ENTMCNC: 31.9 G/DL (ref 31.5–36.5)
MCV RBC AUTO: 92 FL (ref 78–100)
PHOSPHATE SERPL-MCNC: 2.7 MG/DL (ref 2.5–4.5)
PLATELET # BLD AUTO: 213 10E9/L (ref 150–450)
POTASSIUM SERPL-SCNC: 4.4 MMOL/L (ref 3.4–5.3)
RADIOLOGIST FLAGS: ABNORMAL
RBC # BLD AUTO: 3.36 10E12/L (ref 3.8–5.2)
SODIUM SERPL-SCNC: 132 MMOL/L (ref 133–144)
TROPONIN I SERPL-MCNC: 0.04 UG/L (ref 0–0.04)
TROPONIN I SERPL-MCNC: 0.05 UG/L (ref 0–0.04)
WBC # BLD AUTO: 17.2 10E9/L (ref 4–11)

## 2017-05-17 PROCEDURE — 93010 ELECTROCARDIOGRAM REPORT: CPT | Performed by: INTERNAL MEDICINE

## 2017-05-17 PROCEDURE — 71010 XR CHEST PORT 1 VW: CPT

## 2017-05-17 PROCEDURE — A9270 NON-COVERED ITEM OR SERVICE: HCPCS | Mod: GY | Performed by: STUDENT IN AN ORGANIZED HEALTH CARE EDUCATION/TRAINING PROGRAM

## 2017-05-17 PROCEDURE — A9270 NON-COVERED ITEM OR SERVICE: HCPCS | Mod: GY | Performed by: PHYSICIAN ASSISTANT

## 2017-05-17 PROCEDURE — 80048 BASIC METABOLIC PNL TOTAL CA: CPT | Performed by: STUDENT IN AN ORGANIZED HEALTH CARE EDUCATION/TRAINING PROGRAM

## 2017-05-17 PROCEDURE — 93321 DOPPLER ECHO F-UP/LMTD STD: CPT | Mod: 26 | Performed by: INTERNAL MEDICINE

## 2017-05-17 PROCEDURE — 94640 AIRWAY INHALATION TREATMENT: CPT | Mod: 76 | Performed by: OPTOMETRIST

## 2017-05-17 PROCEDURE — 25000132 ZZH RX MED GY IP 250 OP 250 PS 637: Mod: GY | Performed by: THORACIC SURGERY (CARDIOTHORACIC VASCULAR SURGERY)

## 2017-05-17 PROCEDURE — 84484 ASSAY OF TROPONIN QUANT: CPT | Performed by: INTERNAL MEDICINE

## 2017-05-17 PROCEDURE — A9270 NON-COVERED ITEM OR SERVICE: HCPCS | Mod: GY | Performed by: THORACIC SURGERY (CARDIOTHORACIC VASCULAR SURGERY)

## 2017-05-17 PROCEDURE — 84484 ASSAY OF TROPONIN QUANT: CPT | Performed by: HOSPITALIST

## 2017-05-17 PROCEDURE — 83605 ASSAY OF LACTIC ACID: CPT | Performed by: HOSPITALIST

## 2017-05-17 PROCEDURE — 93308 TTE F-UP OR LMTD: CPT | Mod: 26 | Performed by: INTERNAL MEDICINE

## 2017-05-17 PROCEDURE — 25000132 ZZH RX MED GY IP 250 OP 250 PS 637: Mod: GY | Performed by: STUDENT IN AN ORGANIZED HEALTH CARE EDUCATION/TRAINING PROGRAM

## 2017-05-17 PROCEDURE — 25000125 ZZHC RX 250: Performed by: STUDENT IN AN ORGANIZED HEALTH CARE EDUCATION/TRAINING PROGRAM

## 2017-05-17 PROCEDURE — 40000275 ZZH STATISTIC RCP TIME EA 10 MIN: Performed by: OPTOMETRIST

## 2017-05-17 PROCEDURE — 25500064 ZZH RX 255 OP 636: Performed by: STUDENT IN AN ORGANIZED HEALTH CARE EDUCATION/TRAINING PROGRAM

## 2017-05-17 PROCEDURE — 94640 AIRWAY INHALATION TREATMENT: CPT | Mod: 76

## 2017-05-17 PROCEDURE — 40000275 ZZH STATISTIC RCP TIME EA 10 MIN

## 2017-05-17 PROCEDURE — 93325 DOPPLER ECHO COLOR FLOW MAPG: CPT | Mod: 26 | Performed by: INTERNAL MEDICINE

## 2017-05-17 PROCEDURE — 25000132 ZZH RX MED GY IP 250 OP 250 PS 637: Mod: GY | Performed by: PHYSICIAN ASSISTANT

## 2017-05-17 PROCEDURE — 36415 COLL VENOUS BLD VENIPUNCTURE: CPT | Performed by: STUDENT IN AN ORGANIZED HEALTH CARE EDUCATION/TRAINING PROGRAM

## 2017-05-17 PROCEDURE — 12000006 ZZH R&B IMCU INTERMEDIATE UMMC

## 2017-05-17 PROCEDURE — 36415 COLL VENOUS BLD VENIPUNCTURE: CPT | Performed by: SURGERY

## 2017-05-17 PROCEDURE — 40000264 ECHO LIMITED WITH LUMASON

## 2017-05-17 PROCEDURE — 85027 COMPLETE CBC AUTOMATED: CPT | Performed by: SURGERY

## 2017-05-17 PROCEDURE — 99232 SBSQ HOSP IP/OBS MODERATE 35: CPT | Mod: 25 | Performed by: INTERNAL MEDICINE

## 2017-05-17 PROCEDURE — 00000146 ZZHCL STATISTIC GLUCOSE BY METER IP

## 2017-05-17 PROCEDURE — 25000128 H RX IP 250 OP 636: Performed by: THORACIC SURGERY (CARDIOTHORACIC VASCULAR SURGERY)

## 2017-05-17 PROCEDURE — 84100 ASSAY OF PHOSPHORUS: CPT | Performed by: STUDENT IN AN ORGANIZED HEALTH CARE EDUCATION/TRAINING PROGRAM

## 2017-05-17 PROCEDURE — 93005 ELECTROCARDIOGRAM TRACING: CPT

## 2017-05-17 PROCEDURE — 94640 AIRWAY INHALATION TREATMENT: CPT

## 2017-05-17 PROCEDURE — 85520 HEPARIN ASSAY: CPT | Performed by: HOSPITALIST

## 2017-05-17 PROCEDURE — 25000132 ZZH RX MED GY IP 250 OP 250 PS 637: Mod: GY | Performed by: SURGERY

## 2017-05-17 PROCEDURE — 36415 COLL VENOUS BLD VENIPUNCTURE: CPT | Performed by: INTERNAL MEDICINE

## 2017-05-17 PROCEDURE — 25000128 H RX IP 250 OP 636: Performed by: SURGERY

## 2017-05-17 PROCEDURE — 83605 ASSAY OF LACTIC ACID: CPT | Performed by: STUDENT IN AN ORGANIZED HEALTH CARE EDUCATION/TRAINING PROGRAM

## 2017-05-17 PROCEDURE — 84484 ASSAY OF TROPONIN QUANT: CPT | Performed by: SURGERY

## 2017-05-17 PROCEDURE — 25000125 ZZHC RX 250: Performed by: PHYSICIAN ASSISTANT

## 2017-05-17 PROCEDURE — 99211 OFF/OP EST MAY X REQ PHY/QHP: CPT

## 2017-05-17 PROCEDURE — A9270 NON-COVERED ITEM OR SERVICE: HCPCS | Mod: GY | Performed by: SURGERY

## 2017-05-17 PROCEDURE — 25000128 H RX IP 250 OP 636: Performed by: STUDENT IN AN ORGANIZED HEALTH CARE EDUCATION/TRAINING PROGRAM

## 2017-05-17 PROCEDURE — 83735 ASSAY OF MAGNESIUM: CPT | Performed by: STUDENT IN AN ORGANIZED HEALTH CARE EDUCATION/TRAINING PROGRAM

## 2017-05-17 RX ORDER — METOPROLOL TARTRATE 1 MG/ML
5 INJECTION, SOLUTION INTRAVENOUS EVERY 4 HOURS PRN
Status: DISCONTINUED | OUTPATIENT
Start: 2017-05-17 | End: 2017-05-20

## 2017-05-17 RX ORDER — OXYCODONE HYDROCHLORIDE 5 MG/1
5 TABLET ORAL EVERY 4 HOURS PRN
Status: DISCONTINUED | OUTPATIENT
Start: 2017-05-17 | End: 2017-05-22

## 2017-05-17 RX ORDER — HEPARIN SODIUM 10000 [USP'U]/100ML
0-3500 INJECTION, SOLUTION INTRAVENOUS CONTINUOUS
Status: DISCONTINUED | OUTPATIENT
Start: 2017-05-17 | End: 2017-05-24

## 2017-05-17 RX ORDER — METOPROLOL TARTRATE 25 MG/1
25 TABLET, FILM COATED ORAL 2 TIMES DAILY
Status: DISCONTINUED | OUTPATIENT
Start: 2017-05-17 | End: 2017-05-26 | Stop reason: HOSPADM

## 2017-05-17 RX ADMIN — LEVALBUTEROL HYDROCHLORIDE 0.63 MG: 0.63 SOLUTION RESPIRATORY (INHALATION) at 07:49

## 2017-05-17 RX ADMIN — DILTIAZEM HYDROCHLORIDE 15 MG/HR: 5 INJECTION INTRAVENOUS at 06:51

## 2017-05-17 RX ADMIN — FUROSEMIDE 20 MG: 20 TABLET ORAL at 09:34

## 2017-05-17 RX ADMIN — HEPARIN SODIUM 750 UNITS/HR: 10000 INJECTION, SOLUTION INTRAVENOUS at 13:40

## 2017-05-17 RX ADMIN — OXYCODONE HYDROCHLORIDE 5 MG: 5 TABLET ORAL at 00:41

## 2017-05-17 RX ADMIN — LEVALBUTEROL HYDROCHLORIDE 0.63 MG: 0.63 SOLUTION RESPIRATORY (INHALATION) at 12:03

## 2017-05-17 RX ADMIN — OXYCODONE HYDROCHLORIDE 5 MG: 5 TABLET ORAL at 14:44

## 2017-05-17 RX ADMIN — Medication 10 ML: at 13:13

## 2017-05-17 RX ADMIN — PANTOPRAZOLE SODIUM 40 MG: 40 TABLET, DELAYED RELEASE ORAL at 09:34

## 2017-05-17 RX ADMIN — SULFUR HEXAFLUORIDE 5 ML: KIT at 13:05

## 2017-05-17 RX ADMIN — SENNOSIDES AND DOCUSATE SODIUM 2 TABLET: 8.6; 5 TABLET ORAL at 09:34

## 2017-05-17 RX ADMIN — METOPROLOL TARTRATE 25 MG: 25 TABLET ORAL at 20:35

## 2017-05-17 RX ADMIN — ASPIRIN 81 MG CHEWABLE TABLET 81 MG: 81 TABLET CHEWABLE at 20:35

## 2017-05-17 RX ADMIN — DILTIAZEM HYDROCHLORIDE 15 MG/HR: 5 INJECTION INTRAVENOUS at 16:09

## 2017-05-17 RX ADMIN — ONDANSETRON 4 MG: 2 INJECTION INTRAMUSCULAR; INTRAVENOUS at 09:44

## 2017-05-17 RX ADMIN — SODIUM CHLORIDE SOLN NEBU 3% 3 ML: 3 NEBU SOLN at 16:52

## 2017-05-17 RX ADMIN — SODIUM CHLORIDE SOLN NEBU 3% 3 ML: 3 NEBU SOLN at 07:48

## 2017-05-17 RX ADMIN — ACETAMINOPHEN 975 MG: 325 TABLET, FILM COATED ORAL at 00:40

## 2017-05-17 RX ADMIN — ACETAMINOPHEN 975 MG: 325 TABLET, FILM COATED ORAL at 09:35

## 2017-05-17 RX ADMIN — LEVALBUTEROL HYDROCHLORIDE 0.63 MG: 0.63 SOLUTION RESPIRATORY (INHALATION) at 20:49

## 2017-05-17 RX ADMIN — Medication 10 ML: at 09:38

## 2017-05-17 RX ADMIN — GUAIFENESIN 600 MG: 600 TABLET, EXTENDED RELEASE ORAL at 09:35

## 2017-05-17 RX ADMIN — LIDOCAINE 1 PATCH: 50 PATCH TOPICAL at 20:34

## 2017-05-17 RX ADMIN — SENNOSIDES AND DOCUSATE SODIUM 2 TABLET: 8.6; 5 TABLET ORAL at 20:35

## 2017-05-17 RX ADMIN — PRAVASTATIN SODIUM 40 MG: 40 TABLET ORAL at 20:36

## 2017-05-17 RX ADMIN — SODIUM CHLORIDE SOLN NEBU 3% 3 ML: 3 NEBU SOLN at 12:03

## 2017-05-17 RX ADMIN — FLUTICASONE FUROATE AND VILANTEROL TRIFENATATE 1 PUFF: 100; 25 POWDER RESPIRATORY (INHALATION) at 08:29

## 2017-05-17 RX ADMIN — MELATONIN TAB 3 MG 3 MG: 3 TAB at 20:35

## 2017-05-17 RX ADMIN — OXYCODONE HYDROCHLORIDE 5 MG: 5 TABLET ORAL at 18:34

## 2017-05-17 RX ADMIN — CARVEDILOL 3.12 MG: 3.12 TABLET, FILM COATED ORAL at 09:34

## 2017-05-17 RX ADMIN — LEVALBUTEROL HYDROCHLORIDE 0.63 MG: 0.63 SOLUTION RESPIRATORY (INHALATION) at 16:52

## 2017-05-17 RX ADMIN — ACETAMINOPHEN 975 MG: 325 TABLET, FILM COATED ORAL at 16:55

## 2017-05-17 RX ADMIN — HEPARIN SODIUM 5000 UNITS: 5000 INJECTION, SOLUTION INTRAVENOUS; SUBCUTANEOUS at 09:38

## 2017-05-17 RX ADMIN — HEPARIN SODIUM 5000 UNITS: 5000 INJECTION, SOLUTION INTRAVENOUS; SUBCUTANEOUS at 00:40

## 2017-05-17 RX ADMIN — GUAIFENESIN 600 MG: 600 TABLET, EXTENDED RELEASE ORAL at 20:37

## 2017-05-17 RX ADMIN — OXYCODONE HYDROCHLORIDE 5 MG: 5 TABLET ORAL at 08:54

## 2017-05-17 RX ADMIN — CETIRIZINE HYDROCHLORIDE 5 MG: 5 TABLET ORAL at 09:34

## 2017-05-17 RX ADMIN — HEPARIN SODIUM 600 UNITS/HR: 10000 INJECTION, SOLUTION INTRAVENOUS at 20:30

## 2017-05-17 RX ADMIN — BISACODYL 10 MG: 10 SUPPOSITORY RECTAL at 08:25

## 2017-05-17 ASSESSMENT — PAIN DESCRIPTION - DESCRIPTORS
DESCRIPTORS: SHARP
DESCRIPTORS: SHARP

## 2017-05-17 NOTE — PLAN OF CARE
Problem: Goal Outcome Summary  Goal: Goal Outcome Summary  OT 6B: OT session canceled; patient in rapid a-flutter with possible cardioversion; reschedule for 5/18.

## 2017-05-17 NOTE — PLAN OF CARE
"Problem: Goal Outcome Summary  Goal: Goal Outcome Summary  Outcome: No Change  /51 (BP Location: Right arm)  Pulse 84  Temp 98.4  F (36.9  C) (Oral)  Resp 16  Ht 1.626 m (5' 4\")  Wt 63.4 kg (139 lb 12.4 oz)  SpO2 99%  BMI 23.99 kg/m2     Aflutter rates 100s-150s, brief period of sinus rhythm. Pt asymptomatic, denies chest pain. Cards in to see-EKG, ECHO and trops done. Dilt gtt still infusing at 15/hr. Heparin started, recheck scheduled for 1945. RA. Oxycodone given x2 for chest tube site pain. R CT x2 to waterseal. Back on regular diet, minimal appetite. ACHS blood sugars with carb coverage. Voided x2 this shift, . Small BM after suppository. Up with 1 assist. Continue to monitor.       "

## 2017-05-17 NOTE — PLAN OF CARE
Problem: Goal Outcome Summary  Goal: Goal Outcome Summary  PT 6B: Cancel, per discussion with RN pt not appropriate for PT at this time as pt with rene-starlaer. Will reschedule.

## 2017-05-17 NOTE — PROGRESS NOTES
Electrophysiology Progress Note    Patient Name: Yue Valenzuela  Medical Record Number: [id number]  Date of Service: 5/17/2017    Assessment:   Ms. Valenzuela is a 71 year old female who has a past medical history significant for CAD s/p PCI LCX (2006), Factor V Leiden, HTN, HLD, DM1, PVD (known stenosis of right common iliac), carotid artery stenosis, CKD IV, asthma, anemia, and newly diagnosed lung adenocarcinoma s/p VATS RLL on 5/10/17 now with post operative AFL with RVR which is asymptomatic and hemodynamically stable.       Recommendations:  -New TWI anter/lateral leads: stat troponin indicated slight elevation in enzymes, this may be just demand ischemia, but given persistence, recommend starting a low dose heparin gtt and trending troponins  -continue diltizem gtt   -recommend starting warfarin, her [id number] score places her the high risk category for thromboembolic event  -echo(ordered)   -we will follow, amiodarone may be needed if her rates are uncontrolled on high dose diltiazem or her MAPS decrease  -She has Paroxysmal atriall flutter, we very recent episode of sinus rhythm, given this, it is okay to start amiodarone if needed without JENA  -If EF decreased, stop diltiazem and start amiodarone bolus with gtt    Interval Hx: She has been treated with diltiazem up to 15 mg/hr, she most recently she converted back to sinus rhythm. She has no chest pain, but does note some heaviness.    Telemetry: she has what appears to be flutter with variable conduction and, 2:1 flutter for the majority of the past 24 hours.     Meds:    insulin glargine  20 Units Subcutaneous Q24H     lidocaine  1 patch Transdermal Q24h    And     lidocaine   Transdermal Q24H    And     lidocaine   Transdermal Q8H     artificial saliva  10 mL Swish & Spit 4x Daily     sodium chloride (PF)  3 mL Intracatheter Q8H     insulin aspart   Subcutaneous Daily with breakfast     insulin aspart   Subcutaneous Daily before lunch     insulin aspart   " Subcutaneous Daily with supper     levalbuterol  0.63 mg Nebulization Q4H WA     sodium chloride  3 mL Nebulization Q4H While awake     furosemide  20 mg Oral QAM     guaiFENesin  600 mg Oral BID     insulin aspart  1-7 Units Subcutaneous TID AC     insulin aspart  1-5 Units Subcutaneous At Bedtime     bisacodyl  10 mg Rectal Daily     senna-docusate  2 tablet Oral BID     polyethylene glycol  17 g Oral BID     acetaminophen  975 mg Oral Q8H     melatonin  3 mg Oral At Bedtime     menthol   Transdermal Q8H     carvedilol  3.125 mg Oral BID w/meals     aspirin EC  81 mg Oral QPM     cetirizine  5 mg Oral Daily     pravastatin  40 mg Oral QPM     heparin  5,000 Units Subcutaneous Q8H     pantoprazole  40 mg Oral Daily     fluticasone-vilanterol  1 puff Inhalation Daily       Objective:   Vital signs: /71 (BP Location: Right arm)  Pulse 84  Temp 98.4  F (36.9  C) (Oral)  Resp 16  Ht 1.626 m (5' 4\")  Wt 63.4 kg (139 lb 12.4 oz)  SpO2 93%  BMI 23.99 kg/m2     Intake/Output Summary (Last 24 hours) at 05/17/17 1201  Last data filed at 05/17/17 0800   Gross per 24 hour   Intake             1450 ml   Output              750 ml   Net              700 ml     Wt Readings from Last 3 Encounters:   05/16/17 63.4 kg (139 lb 12.4 oz)   05/03/17 63.2 kg (139 lb 4.8 oz)   04/27/17 63.6 kg (140 lb 3.2 oz)     Gen: sitting in chair, NAD  Chest: chest tubes   Cardiac: rrr  Abd: soft  Ext: wwp  Neuro: a and o x 3    LABS:  CMP  Recent Labs  Lab 05/17/17  0128 05/16/17  0951 05/15/17  1545 05/15/17  0545   * 136 133 137   POTASSIUM 4.4 5.1 4.8 4.9   CHLORIDE 101 104 103 107   CO2 24 20 23 22   ANIONGAP 7 12 7 8   * 246* 198* 142*   BUN 46* 45* 46* 48*   CR 1.78* 1.74* 1.78* 1.83*   GFRESTIMATED 28* 29* 28* 27*   GFRESTBLACK 34* 35* 34* 33*   ZAHEER 8.2* 8.8 8.3* 8.4*   MAG 2.1 2.1 2.2 2.3   PHOS 2.7 3.8 4.1 3.8     CBC  Recent Labs  Lab 05/16/17  0951 05/15/17  0545 05/14/17  0655 05/13/17  0548   WBC 13.3* 8.0 7.2 " 9.4   RBC 3.81 3.22* 2.94* 2.98*   HGB 11.5* 9.5* 8.7* 8.9*   HCT 34.8* 29.3* 27.2* 27.5*   MCV 91 91 93 92   MCH 30.2 29.5 29.6 29.9   MCHC 33.0 32.4 32.0 32.4   RDW 13.6 13.6 13.8 13.9    155 151 142*     INRNo lab results found in last 7 days.  Arterial Blood Gas  Recent Labs  Lab 05/10/17  1530 05/10/17  1420 05/10/17  1348 05/10/17  1245   PH 7.34* 7.32* 7.28* 7.28*   PCO2 35 38 44 47*   PO2 164* 165* 84 311*   HCO3 19* 20* 21 22   O2PER 55.0 57.0 50.0 100.0     LipidsNo lab results found in last 7 days.    Invalid input(s): TRI  TSHNo lab results found in last 7 days.  [id number] input(s): HGBA1C  Troponin    EKG: reviewed   New TWI anterior/lateral leads    ECHO:  None recently, prior Nuc Med stress with normal EF      Imaging/Angiography:      Thank you for allowing us to participate in the care of this  patient. Please call if you have further questions or concerns.     Yue Valenzuela was seen and examined with Dr. Aburto, attending physician, who agrees with the above assessment and plan.     Donn Wilcox M.D.  Cardiac Electrophysiology Fellow  [phone]  May 17, 2017      ELECTROPHYSIOLOGY STAFF  Patient seen and examined by me.  History and physical examination discussed with Dr. Wilcox whose note reflects our joint assessment and recommendation/plans.  71 year old woman with CAD, PVD, htn, DM, Factor V Leiden, s/p RLL lobectomy for adenocarcinoma, has been going in and out of atrial flutter.  She seems asymptomatic and is hemodynamically stable.  Of note, however, are lateral T-wave inversions   She denies chest pain.  We ordered a bedside echo that shows no regional wall motion abnormality.  Initial troponin 0.047, minimally elevated.  At this point we suspect that the T-wave inversions represent demand ischemia and we suggest observation (follow troponin trend) for now.  In terms of her atrial arrhythmia, she is at significant thromboembolic risk.  We suggest IV heparin and oral  anticoagulation if safe to do so from a post-op point of view.  Continue IV diltiazem, and can add IV amiodarone to help maintain sinus rhythm.  Edgard Aburto

## 2017-05-17 NOTE — PROVIDER NOTIFICATION
2315: Pt converted back into A-flutter with rates 150s. Diltiazem gtt titrated to 15 mg/hr per titration orders.   0100: Dr. Nascimento notified regarding pt's continued A-flutter rates 150s. BPs stable. Provider spoke with cardiology who instructed to continue to monitor pt. Pt's diet changed to NPO in case pt would need to be cardioverted at some point today. Will continue to monitor and update provider with changes.   0400: Pt continues to be in A-flutter with rates 150s. Asymptomatic. BPs stable 115/66. Dr. Nascimento w/ surgery cross cover notified again. Provider stated he would contact cardiology. Per Dr. Nascimento, cardiology is okay with HRs in 150s as long as BPs are not affected. Cardiology stated that they would come see the pt first thing in the morning and will likely cardiovert her. Dr. Nascimento stated he would come to assess the pt as well.

## 2017-05-17 NOTE — PROGRESS NOTES
"Thoracic Surgery Daily Progress Note  Yue Valenzuela  05/17/2017    Subjective:   Continued with rapid atrial arrhythmia overnight. Increasing dilt drip. Otherwise no acute events. Doing well. Pain controlled. NPO for possible cardioversion.     Objective:    /75 (BP Location: Right arm)  Pulse 84  Temp 98.1  F (36.7  C) (Oral)  Resp 18  Ht 1.626 m (5' 4\")  Wt 63.4 kg (139 lb 12.4 oz)  SpO2 95%  BMI 23.99 kg/m2  Sitting up in bed in no acute distress  Awake, alert and appropriate  Non-labored breathing on HFNC  Sinus rhythm with PACs/PVCs on tele  CTs in place with surgical CT with small air leak     Ins/Outs:   I/O last 3 completed shifts:  In: 1550 [P.O.:450; I.V.:600; IV Piggyback:500]  Out: 1278 [Urine:1075; Chest Tube:203]    Labs:   Overnight labs reviewed. CXR to be done this morning.     Assessment:  Yue Valenzuela is a 71 year old female now post operative day 7 from a right VATS, lower lobectomy and middle wedge.     Plan:       Neuro:  PO pain medications.     Cardiovascular/Heme: Cardiology consult for new arrhythmia, appreciate assistance. Currently on diltiazem drip- possible cardioversion.     Respiratory/Pulm: Continue chest tubes. Pigtail to waterseal, surgical clamped. Aggressive pulmonary toilet/nebs/therapy.     FEN/Gastrointestinal: Regular diet. Bowel regimen.    Renal/Genitourinary: SUPRIYA on CKD. Appreciate nephrology assistance. Restarted home dose of lasix.    Infectious: No acute concerns.     Prophy: Heparin, protonix.    Activity:  Up to chair, out of bed, increase activity.     Endo: DM, on insulin, appreciate endocrinology assistance.     Dispo: Continue care on 6B    Patient was seen and discussed with Dr. Diaz, Thoracic Surgery Fellow, who agrees with the assessment and plan and will discuss with staff.     Evelyn Liriano MD  General Surgery  Pager: [phone]                       "

## 2017-05-17 NOTE — PROGRESS NOTES
"Surgery Cross Cover Brief Note  2:29 AM 5/17/2017     I was paged by nursing regarding tachycardia. Went to see patient at bedside.    Subjective  Ms. Valenzuela is a 71 year-old female now POD #6-7 after right VATS, right lower lobectomy, and right middle wedge resection who was noted to be in atrial fibrillation on POD #5. A diltiazem drip was begun and titrated up to max dose of 15 mg/hr overnight around 11pm. RN notes that heart rate continued to be in the 150s overnight with telemetry showing primarily atrial flutter with some atrial fibrillation. Patient states she feels fine; denies chest pain, palpitations, and shortness of breath.      Objective  /68 (BP Location: Right arm)  Pulse 84  Temp 98.2  F (36.8  C) (Oral)  Resp 18  Ht 1.626 m (5' 4\")  Wt 63.4 kg (139 lb 12.4 oz)  SpO2 95%  BMI 23.99 kg/m2    Exam  Gen: NAD, woken from peaceful sleep, A&O x3  Cardiac: tachycardic, irregular rhythm  Respi: NLB  - CTs in place with air leaks, to scution  Abd: soft, non-tender, non-distended    Relevant Labs and Imaging  - Lac 2.3 -> 2.6      Assessment / Plan  71F now POD #6-7 after right VATS, right lower lobectomy, and right middle wedge resection; in atrial flutter / fibrillation for one day despite diltiazem drip at max rate.      Discussed A-flutter / A-fib with cardiology twice overnight by phone. HR in 150s OK as long as BPs remain stable. Cardiology team will see in the morning and consider cardioversion.    By request of patient, have spoken with  to update him; he will be at the hospital in the morning    NPO for possible cardiology intervention      - - - - - - - - - - - -  Antonio Nascimento MD  PGY-1, General Surgery  [facility]  Pager: [phone]  Please contact primary team after 6am.  "

## 2017-05-17 NOTE — PROGRESS NOTES
"CLINICAL NUTRITION SERVICES - ASSESSMENT NOTE     Nutrition Prescription    RECOMMENDATIONS FOR MDs/PROVIDERS TO ORDER:  - Encourage PO intakes    Malnutrition Status:    -Severe malnutrition in the context of acute illness    Recommendations already ordered by Registered Dietitian (RD):  - Provided cafe passes to help improve PO intakes.     Future/Additional Recommendations:  - Schedule supplements/ calorie counts in PO intakes decrease     REASON FOR ASSESSMENT  Yue Valenzuela is a/an 71 year old female assessed by the dietitian for LOS    NUTRITION HISTORY  Per pt: Prior to admission, she was eating okay at home. She currently does not have much of an appetite and dislikes the hospital food. At home she will eat foods such as, small slider bun with meat, pb, ect, yogurt, fruit, cranberry juice, lemonade. She states she is lactose intolerant, but it is mild. She reports having a higher protein diet at home. She feels her wt loss is most likely due to her recent NPOs and the fact that the food here is too dry and unappetizing.     CURRENT NUTRITION ORDERS  Diet: Regular  Intake/Tolerance: fair intakes per nursing flow sheets, multiple NPOs. Magic cups ordered between meals per provider. Pt has been NPO/CL for 4 days of her 7 day stay.    LABS  Labs reviewed    MEDICATIONS  Medications reviewed    ANTHROPOMETRICS  Height: 162.6 cm (5' 4\")  Most Recent Weight: 63.4 kg (139 lb 12.4 oz) (bed weight)    IBW: 54.5 kg  BMI: Normal BMI  Weight History: Pt states her UBW is ~145 lbs. Her current wt would be about 5% less then her UBW.   Wt Readings from Last 10 Encounters:   05/16/17 63.4 kg (139 lb 12.4 oz)   05/03/17 63.2 kg (139 lb 4.8 oz)   04/27/17 63.6 kg (140 lb 3.2 oz)   04/20/17 62.7 kg (138 lb 3.2 oz)   04/04/17 65.3 kg (143 lb 14.4 oz)   03/15/17 68.2 kg (150 lb 6.4 oz)   02/08/17 63.9 kg (140 lb 14.4 oz)   02/02/17 63.2 kg (139 lb 6.4 oz)   03/30/16 69.9 kg (154 lb 3.2 oz)   10/30/14 68.9 kg (152 lb) "     Dosing Weight: 63 kg    ASSESSED NUTRITION NEEDS  Estimated Energy Needs: 1917-6719 kcals/day (25 - 30 kcals/kg)  Justification: Maintenance  Estimated Protein Needs: 76-95 grams protein/day (1.2 - 1.5 grams of pro/kg)  Justification: Increased needs and Maintenance  Estimated Fluid Needs: 1 mL/kcal/day   Justification: Maintenance    PHYSICAL FINDINGS  See malnutrition section below.    MALNUTRITION  % Intake: </= 50% for >/= 5 days (severe)  % Weight Loss: Weight loss does not meet criteria  Subcutaneous Fat Loss: None observed  Muscle Loss: Temporal:  mild and Upper arm (bicep, tricep):  Mild/moderate  Fluid Accumulation/Edema: None noted  Malnutrition Diagnosis: Severe malnutrition in the context of acute illness    NUTRITION DIAGNOSIS  Inadequate protein-energy intake related to decreased appetite, prolonged LOS, dislike of hospital food and recurrent NPO statuses as evidenced by pt having 4/7 days of NPOs, <50% intakes and pt report.       INTERVENTIONS  Implementation  Nutrition Education: Provided education on role of RD and nutrition POC   Medical food supplement therapy     Goals  Patient to consume % of nutritionally adequate meal trays TID, or the equivalent with supplements/snacks.     Monitoring/Evaluation  Progress toward goals will be monitored and evaluated per protocol.    Rosario Lua, OZZIE, MS, LD  6B- Pager: [phone]

## 2017-05-17 NOTE — PROVIDER NOTIFICATION
Thoracic paged regarding elevated lactic acid, 2.3. No page back at this time-will pass on to PM nurse to followup with primary team.

## 2017-05-17 NOTE — PLAN OF CARE
Problem: Goal Outcome Summary  Goal: Goal Outcome Summary  Outcome: No Change  Neuro: A&Ox4.   Cardiac: SR/A-Fib/A-Flutter: Pt initially converting between rhythms frequently, remained in A-flutter from 2330 with rates 150s. See provider notification. VSS.      Respiratory: Sating >94% on RA. R CTx2 (-10 suction) with small amount of output.   GI/: Straight cath X1 for retention: 450 mL out. No BM- Miralax given.   Diet/appetite: NPO since 0130.   Activity:  Assist of 1 to commode. Ambulates with walker.   Pain: At acceptable level on current regimen. PRN oxycodone given X2.   Skin: Intact, no new deficits noted.     R: Likely planning for cardioversion this morning per Cards. Pt's  notified by Dr. Nascimento. Continue with POC. Notify primary team with changes.

## 2017-05-17 NOTE — PROGRESS NOTES
Diabetes Consult Daily  Progress Note          Assessment/Plan:     Yue Valenzuela is a 71 year old female with DM-1 managed on ambulatory subcutaneous insulin pump at home, CKD IV, factor V Leiden mutation, HTN, HLD, CAD s/p stent, PVD, carotid artery stenosis, asthma, and newly diagnosed lung adenocarcinoma, s/ p right lower lobectomy and middle wedge 5/10/2017.     Plan:  -Lantus 20 units HS  -aspart meal Insulin:  Breakfast: 1 unit per 11 grams of CHO  Lunch: 1 unit per 11 grams of CHO  Dinner: 1 unit per 10 grams of CHO  Snacks: 1 unit per 10 grams of CHO  -Aspart medium correction with meals and snacks  Monitor glucose before meals, HS and 0200      Will continue to follow           Interval History:      I reviewed  the diabetes management plan with the primary team  I reviewed the last 24 hours progress notes    Blood sugars are moderately controlled.   glucose at ~ 0200, 238/225 ( per nursing, has been having pudding with her medications, had pain medications prior to 0200 glucose check)  Continues on fluids with dextrose D5% and 0.45 at 30 cc/hour  Appetite is poor,     Recent Labs  Lab 05/17/17  1312 05/17/17  0831 05/17/17  0231 05/17/17  0128 05/16/17  2118 05/16/17  1728 05/16/17  1536  05/16/17  0951  05/15/17  1545  05/15/17  0545  05/14/17  0655  05/13/17  0548   GLC  --   --   --  238*  --   --   --   --  246*  --  198*  --  142*  --  90  --  157*   * 152* 225*  --  223* 251* 210*  < >  --   < >  --   < >  --   < >  --   < >  --    < > = values in this interval not displayed.            Review of Systems:   Please see interval history          Medications:       Active Diet Order      Regular Diet Adult     Physical Exam:  Gen: Alert, resting in bed, in NAD   HEENT: NC/AT, mucous membranes are moist  Resp: Unlabored  Ext: moving all extremities  Neuro:oriented x3, communicating clearly, periods of confusion/forgetfullness   /51 (BP Location: Right arm)  Pulse 84   "Temp 98.4  F (36.9  C) (Oral)  Resp 16  Ht 1.626 m (5' 4\")  Wt 63.4 kg (139 lb 12.4 oz)  SpO2 99%  BMI 23.99 kg/m2           Data:     Lab Results   Component Value Date    A1C 7.0 05/03/2017    A1C 6.0 08/04/2014              CBC RESULTS:   Recent Labs   Lab Test  05/17/17   1338   WBC  17.2*   RBC  3.36*   HGB  9.9*   HCT  31.0*   MCV  92   MCH  29.5   MCHC  31.9   RDW  13.9   PLT  213     Recent Labs   Lab Test  05/17/17   0128  05/16/17   0951   NA  132*  136   POTASSIUM  4.4  5.1   CHLORIDE  101  104   CO2  24  20   ANIONGAP  7  12   GLC  238*  246*   BUN  46*  45*   CR  1.78*  1.74*   ZAHEER  8.2*  8.8     Liver Function Studies -   Recent Labs   Lab Test  08/04/14   0712   PROTTOTAL  7.5   ALBUMIN  4.3   BILITOTAL  0.4   ALKPHOS  79   AST  29   ALT  44     Lab Results   Component Value Date    INR 1.06 04/18/2017    INR 1.08 04/04/2017    INR 0.99 01/13/2017    INR 0.97 08/04/2014           Yudy Wilson, MISSY pager [phone]  Diabetes Management Job Code [id number]          "

## 2017-05-17 NOTE — PLAN OF CARE
"Problem: Goal Outcome Summary  Goal: Goal Outcome Summary  Outcome: Improving  /71  Pulse 84  Temp 98.2  F (36.8  C) (Oral)  Resp 20  Ht 1.626 m (5' 4\")  Wt 63.4 kg (139 lb 12.4 oz)  SpO2 98%  BMI 23.99 kg/m2      Intermittent afib/SR-dilt gtt @ 10/hr. Weaned to RA. Oxy given x1 for R chest pain. R sided CT x2, (-10) suction. Hall removed this afternoon-has not voided yet. Regular diet with minimal appetite. Up with 1 assist and walker, continue to monitor.              "

## 2017-05-18 ENCOUNTER — APPOINTMENT (OUTPATIENT)
Dept: SPEECH THERAPY | Facility: CLINIC | Age: 72
DRG: 163 | End: 2017-05-18
Attending: STUDENT IN AN ORGANIZED HEALTH CARE EDUCATION/TRAINING PROGRAM
Payer: MEDICARE

## 2017-05-18 ENCOUNTER — APPOINTMENT (OUTPATIENT)
Dept: PHYSICAL THERAPY | Facility: CLINIC | Age: 72
DRG: 163 | End: 2017-05-18
Attending: STUDENT IN AN ORGANIZED HEALTH CARE EDUCATION/TRAINING PROGRAM
Payer: MEDICARE

## 2017-05-18 ENCOUNTER — APPOINTMENT (OUTPATIENT)
Dept: GENERAL RADIOLOGY | Facility: CLINIC | Age: 72
DRG: 163 | End: 2017-05-18
Attending: STUDENT IN AN ORGANIZED HEALTH CARE EDUCATION/TRAINING PROGRAM
Payer: MEDICARE

## 2017-05-18 ENCOUNTER — APPOINTMENT (OUTPATIENT)
Dept: OCCUPATIONAL THERAPY | Facility: CLINIC | Age: 72
DRG: 163 | End: 2017-05-18
Attending: STUDENT IN AN ORGANIZED HEALTH CARE EDUCATION/TRAINING PROGRAM
Payer: MEDICARE

## 2017-05-18 LAB
ANION GAP SERPL CALCULATED.3IONS-SCNC: 8 MMOL/L (ref 3–14)
BUN SERPL-MCNC: 55 MG/DL (ref 7–30)
CALCIUM SERPL-MCNC: 8 MG/DL (ref 8.5–10.1)
CHLORIDE SERPL-SCNC: 103 MMOL/L (ref 94–109)
CO2 SERPL-SCNC: 23 MMOL/L (ref 20–32)
CREAT SERPL-MCNC: 2.38 MG/DL (ref 0.52–1.04)
ERYTHROCYTE [DISTWIDTH] IN BLOOD BY AUTOMATED COUNT: 14.3 % (ref 10–15)
GFR SERPL CREATININE-BSD FRML MDRD: 20 ML/MIN/1.7M2
GLUCOSE BLDC GLUCOMTR-MCNC: 169 MG/DL (ref 70–99)
GLUCOSE BLDC GLUCOMTR-MCNC: 177 MG/DL (ref 70–99)
GLUCOSE BLDC GLUCOMTR-MCNC: 188 MG/DL (ref 70–99)
GLUCOSE BLDC GLUCOMTR-MCNC: 200 MG/DL (ref 70–99)
GLUCOSE BLDC GLUCOMTR-MCNC: 227 MG/DL (ref 70–99)
GLUCOSE SERPL-MCNC: 220 MG/DL (ref 70–99)
HCT VFR BLD AUTO: 28.2 % (ref 35–47)
HGB BLD-MCNC: 8.9 G/DL (ref 11.7–15.7)
INTERPRETATION ECG - MUSE: NORMAL
INTERPRETATION ECG - MUSE: NORMAL
LMWH PPP CHRO-ACNC: 0.2 IU/ML
LMWH PPP CHRO-ACNC: 0.33 IU/ML
MAGNESIUM SERPL-MCNC: 2.3 MG/DL (ref 1.6–2.3)
MCH RBC QN AUTO: 29.2 PG (ref 26.5–33)
MCHC RBC AUTO-ENTMCNC: 31.6 G/DL (ref 31.5–36.5)
MCV RBC AUTO: 93 FL (ref 78–100)
PHOSPHATE SERPL-MCNC: 4 MG/DL (ref 2.5–4.5)
PLATELET # BLD AUTO: 228 10E9/L (ref 150–450)
POTASSIUM SERPL-SCNC: 4.6 MMOL/L (ref 3.4–5.3)
RBC # BLD AUTO: 3.05 10E12/L (ref 3.8–5.2)
SODIUM SERPL-SCNC: 133 MMOL/L (ref 133–144)
TROPONIN I SERPL-MCNC: 0.03 UG/L (ref 0–0.04)
TROPONIN I SERPL-MCNC: 0.04 UG/L (ref 0–0.04)
WBC # BLD AUTO: 18.4 10E9/L (ref 4–11)

## 2017-05-18 PROCEDURE — 92526 ORAL FUNCTION THERAPY: CPT | Mod: GN | Performed by: SPEECH-LANGUAGE PATHOLOGIST

## 2017-05-18 PROCEDURE — 25000132 ZZH RX MED GY IP 250 OP 250 PS 637: Mod: GY | Performed by: PHYSICIAN ASSISTANT

## 2017-05-18 PROCEDURE — 40000225 ZZH STATISTIC SLP WARD VISIT: Performed by: SPEECH-LANGUAGE PATHOLOGIST

## 2017-05-18 PROCEDURE — 25000128 H RX IP 250 OP 636: Performed by: STUDENT IN AN ORGANIZED HEALTH CARE EDUCATION/TRAINING PROGRAM

## 2017-05-18 PROCEDURE — 25000125 ZZHC RX 250: Performed by: PHYSICIAN ASSISTANT

## 2017-05-18 PROCEDURE — 84484 ASSAY OF TROPONIN QUANT: CPT | Performed by: SURGERY

## 2017-05-18 PROCEDURE — 85027 COMPLETE CBC AUTOMATED: CPT | Performed by: SURGERY

## 2017-05-18 PROCEDURE — 97116 GAIT TRAINING THERAPY: CPT | Mod: GP | Performed by: PHYSICAL THERAPIST

## 2017-05-18 PROCEDURE — 97535 SELF CARE MNGMENT TRAINING: CPT | Mod: GO

## 2017-05-18 PROCEDURE — A9270 NON-COVERED ITEM OR SERVICE: HCPCS | Mod: GY | Performed by: THORACIC SURGERY (CARDIOTHORACIC VASCULAR SURGERY)

## 2017-05-18 PROCEDURE — 97110 THERAPEUTIC EXERCISES: CPT | Mod: GO

## 2017-05-18 PROCEDURE — 25000125 ZZHC RX 250: Performed by: STUDENT IN AN ORGANIZED HEALTH CARE EDUCATION/TRAINING PROGRAM

## 2017-05-18 PROCEDURE — 84100 ASSAY OF PHOSPHORUS: CPT | Performed by: SURGERY

## 2017-05-18 PROCEDURE — 85520 HEPARIN ASSAY: CPT | Performed by: HOSPITALIST

## 2017-05-18 PROCEDURE — 25000132 ZZH RX MED GY IP 250 OP 250 PS 637: Mod: GY | Performed by: STUDENT IN AN ORGANIZED HEALTH CARE EDUCATION/TRAINING PROGRAM

## 2017-05-18 PROCEDURE — 83735 ASSAY OF MAGNESIUM: CPT | Performed by: SURGERY

## 2017-05-18 PROCEDURE — A9270 NON-COVERED ITEM OR SERVICE: HCPCS | Mod: GY | Performed by: PHYSICIAN ASSISTANT

## 2017-05-18 PROCEDURE — 97530 THERAPEUTIC ACTIVITIES: CPT | Mod: GP | Performed by: PHYSICAL THERAPIST

## 2017-05-18 PROCEDURE — 25800025 ZZH RX 258: Performed by: STUDENT IN AN ORGANIZED HEALTH CARE EDUCATION/TRAINING PROGRAM

## 2017-05-18 PROCEDURE — 25000132 ZZH RX MED GY IP 250 OP 250 PS 637: Mod: GY | Performed by: SURGERY

## 2017-05-18 PROCEDURE — 12000006 ZZH R&B IMCU INTERMEDIATE UMMC

## 2017-05-18 PROCEDURE — 87040 BLOOD CULTURE FOR BACTERIA: CPT | Performed by: SURGERY

## 2017-05-18 PROCEDURE — 40000133 ZZH STATISTIC OT WARD VISIT

## 2017-05-18 PROCEDURE — 93005 ELECTROCARDIOGRAM TRACING: CPT

## 2017-05-18 PROCEDURE — A9270 NON-COVERED ITEM OR SERVICE: HCPCS | Mod: GY | Performed by: SURGERY

## 2017-05-18 PROCEDURE — 94640 AIRWAY INHALATION TREATMENT: CPT | Mod: 76

## 2017-05-18 PROCEDURE — 85520 HEPARIN ASSAY: CPT | Performed by: SURGERY

## 2017-05-18 PROCEDURE — 36415 COLL VENOUS BLD VENIPUNCTURE: CPT | Performed by: HOSPITALIST

## 2017-05-18 PROCEDURE — 40000193 ZZH STATISTIC PT WARD VISIT: Performed by: PHYSICAL THERAPIST

## 2017-05-18 PROCEDURE — A9270 NON-COVERED ITEM OR SERVICE: HCPCS | Mod: GY | Performed by: STUDENT IN AN ORGANIZED HEALTH CARE EDUCATION/TRAINING PROGRAM

## 2017-05-18 PROCEDURE — S5010 5% DEXTROSE AND 0.45% SALINE: HCPCS | Performed by: STUDENT IN AN ORGANIZED HEALTH CARE EDUCATION/TRAINING PROGRAM

## 2017-05-18 PROCEDURE — 80048 BASIC METABOLIC PNL TOTAL CA: CPT | Performed by: SURGERY

## 2017-05-18 PROCEDURE — 93010 ELECTROCARDIOGRAM REPORT: CPT | Performed by: INTERNAL MEDICINE

## 2017-05-18 PROCEDURE — 97110 THERAPEUTIC EXERCISES: CPT | Mod: GP | Performed by: PHYSICAL THERAPIST

## 2017-05-18 PROCEDURE — 71020 XR CHEST 2 VW: CPT

## 2017-05-18 PROCEDURE — 00000146 ZZHCL STATISTIC GLUCOSE BY METER IP

## 2017-05-18 PROCEDURE — 25000132 ZZH RX MED GY IP 250 OP 250 PS 637: Mod: GY | Performed by: THORACIC SURGERY (CARDIOTHORACIC VASCULAR SURGERY)

## 2017-05-18 PROCEDURE — 94640 AIRWAY INHALATION TREATMENT: CPT

## 2017-05-18 PROCEDURE — 36415 COLL VENOUS BLD VENIPUNCTURE: CPT | Performed by: SURGERY

## 2017-05-18 PROCEDURE — 40000275 ZZH STATISTIC RCP TIME EA 10 MIN

## 2017-05-18 PROCEDURE — 99231 SBSQ HOSP IP/OBS SF/LOW 25: CPT | Performed by: NURSE PRACTITIONER

## 2017-05-18 RX ORDER — LEVOFLOXACIN 250 MG/1
250 TABLET, FILM COATED ORAL DAILY
Status: DISCONTINUED | OUTPATIENT
Start: 2017-05-19 | End: 2017-05-25

## 2017-05-18 RX ORDER — LEVOFLOXACIN 5 MG/ML
250 INJECTION, SOLUTION INTRAVENOUS EVERY 24 HOURS
Status: DISCONTINUED | OUTPATIENT
Start: 2017-05-19 | End: 2017-05-18

## 2017-05-18 RX ORDER — LEVOFLOXACIN 500 MG/1
500 TABLET, FILM COATED ORAL ONCE
Status: COMPLETED | OUTPATIENT
Start: 2017-05-18 | End: 2017-05-18

## 2017-05-18 RX ORDER — DILTIAZEM HYDROCHLORIDE 60 MG/1
120 CAPSULE, EXTENDED RELEASE ORAL 2 TIMES DAILY
Status: DISCONTINUED | OUTPATIENT
Start: 2017-05-18 | End: 2017-05-18

## 2017-05-18 RX ORDER — LEVOFLOXACIN 5 MG/ML
500 INJECTION, SOLUTION INTRAVENOUS ONCE
Status: DISCONTINUED | OUTPATIENT
Start: 2017-05-18 | End: 2017-05-18

## 2017-05-18 RX ORDER — DILTIAZEM HYDROCHLORIDE 90 MG/1
180 CAPSULE, EXTENDED RELEASE ORAL 2 TIMES DAILY
Status: DISCONTINUED | OUTPATIENT
Start: 2017-05-18 | End: 2017-05-26 | Stop reason: HOSPADM

## 2017-05-18 RX ORDER — DILTIAZEM HYDROCHLORIDE 60 MG/1
120 CAPSULE, EXTENDED RELEASE ORAL 3 TIMES DAILY
Status: DISCONTINUED | OUTPATIENT
Start: 2017-05-18 | End: 2017-05-18

## 2017-05-18 RX ADMIN — LEVALBUTEROL HYDROCHLORIDE 0.63 MG: 0.63 SOLUTION RESPIRATORY (INHALATION) at 16:23

## 2017-05-18 RX ADMIN — SODIUM CHLORIDE SOLN NEBU 3% 3 ML: 3 NEBU SOLN at 12:19

## 2017-05-18 RX ADMIN — SODIUM CHLORIDE SOLN NEBU 3% 3 ML: 3 NEBU SOLN at 08:53

## 2017-05-18 RX ADMIN — OXYCODONE HYDROCHLORIDE 5 MG: 5 TABLET ORAL at 22:17

## 2017-05-18 RX ADMIN — ASPIRIN 81 MG CHEWABLE TABLET 81 MG: 81 TABLET CHEWABLE at 19:22

## 2017-05-18 RX ADMIN — MENTHOL 2 PATCH: 205.5 PATCH TOPICAL at 08:28

## 2017-05-18 RX ADMIN — ACETAMINOPHEN 975 MG: 325 TABLET, FILM COATED ORAL at 01:18

## 2017-05-18 RX ADMIN — LEVALBUTEROL HYDROCHLORIDE 0.63 MG: 0.63 SOLUTION RESPIRATORY (INHALATION) at 08:52

## 2017-05-18 RX ADMIN — Medication 10 ML: at 08:22

## 2017-05-18 RX ADMIN — DILTIAZEM HYDROCHLORIDE 15 MG/HR: 5 INJECTION INTRAVENOUS at 10:21

## 2017-05-18 RX ADMIN — GUAIFENESIN 600 MG: 600 TABLET, EXTENDED RELEASE ORAL at 08:25

## 2017-05-18 RX ADMIN — METOPROLOL TARTRATE 25 MG: 25 TABLET ORAL at 08:24

## 2017-05-18 RX ADMIN — METOPROLOL TARTRATE 25 MG: 25 TABLET ORAL at 19:22

## 2017-05-18 RX ADMIN — OXYCODONE HYDROCHLORIDE 5 MG: 5 TABLET ORAL at 18:06

## 2017-05-18 RX ADMIN — GUAIFENESIN 600 MG: 600 TABLET, EXTENDED RELEASE ORAL at 19:21

## 2017-05-18 RX ADMIN — DILTIAZEM HYDROCHLORIDE 15 MG/HR: 5 INJECTION INTRAVENOUS at 01:24

## 2017-05-18 RX ADMIN — SODIUM CHLORIDE SOLN NEBU 3% 3 ML: 3 NEBU SOLN at 16:23

## 2017-05-18 RX ADMIN — Medication 10 ML: at 13:04

## 2017-05-18 RX ADMIN — ACETAMINOPHEN 975 MG: 325 TABLET, FILM COATED ORAL at 15:47

## 2017-05-18 RX ADMIN — DILTIAZEM HYDROCHLORIDE 180 MG: 90 CAPSULE, EXTENDED RELEASE ORAL at 14:07

## 2017-05-18 RX ADMIN — FLUTICASONE FUROATE AND VILANTEROL TRIFENATATE 1 PUFF: 100; 25 POWDER RESPIRATORY (INHALATION) at 08:29

## 2017-05-18 RX ADMIN — PRAVASTATIN SODIUM 40 MG: 40 TABLET ORAL at 19:22

## 2017-05-18 RX ADMIN — DEXTROSE AND SODIUM CHLORIDE: 5; 450 INJECTION, SOLUTION INTRAVENOUS at 04:30

## 2017-05-18 RX ADMIN — ACETAMINOPHEN 650 MG: 325 TABLET, FILM COATED ORAL at 13:04

## 2017-05-18 RX ADMIN — LIDOCAINE 1 PATCH: 50 PATCH TOPICAL at 19:31

## 2017-05-18 RX ADMIN — SENNOSIDES AND DOCUSATE SODIUM 2 TABLET: 8.6; 5 TABLET ORAL at 08:26

## 2017-05-18 RX ADMIN — CETIRIZINE HYDROCHLORIDE 5 MG: 5 TABLET ORAL at 08:23

## 2017-05-18 RX ADMIN — ACETAMINOPHEN 975 MG: 325 TABLET, FILM COATED ORAL at 08:21

## 2017-05-18 RX ADMIN — POLYETHYLENE GLYCOL 3350 17 G: 17 POWDER, FOR SOLUTION ORAL at 08:23

## 2017-05-18 RX ADMIN — MELATONIN TAB 3 MG 3 MG: 3 TAB at 19:22

## 2017-05-18 RX ADMIN — DILTIAZEM HYDROCHLORIDE 180 MG: 90 CAPSULE, EXTENDED RELEASE ORAL at 19:24

## 2017-05-18 RX ADMIN — LEVOFLOXACIN 500 MG: 500 TABLET, FILM COATED ORAL at 13:04

## 2017-05-18 RX ADMIN — PANTOPRAZOLE SODIUM 40 MG: 40 TABLET, DELAYED RELEASE ORAL at 08:24

## 2017-05-18 RX ADMIN — LEVALBUTEROL HYDROCHLORIDE 0.63 MG: 0.63 SOLUTION RESPIRATORY (INHALATION) at 12:19

## 2017-05-18 RX ADMIN — SENNOSIDES AND DOCUSATE SODIUM 2 TABLET: 8.6; 5 TABLET ORAL at 19:21

## 2017-05-18 ASSESSMENT — PAIN DESCRIPTION - DESCRIPTORS
DESCRIPTORS: ACHING

## 2017-05-18 NOTE — PLAN OF CARE
Problem: Goal Outcome Summary  Goal: Goal Outcome Summary  Pt seen for dysphagia therapy. Pt tolerating regular consistency diet with thin liquids well. Continue on current diet and encourage po intake. Sit pt upright while eating/drinking. Encourage small bites/sips and slow rate. No further SLP services indicated SLP to sign off as goals have been met.      Speech Language Therapy Discharge Summary     Reason for therapy discharge:    All goals and outcomes met, no further needs identified.     Progress towards therapy goal(s). See goals on Care Plan in [facility] health record for goal details.  Goals met     Therapy recommendation(s):    No further therapy is recommended.      Regular diet with thin liquids 5/18/17

## 2017-05-18 NOTE — PROGRESS NOTES
"                     Diabetes Consult Daily  Progress Note          Assessment/Plan:   Yue Valenzuela is a 71 year old female with DM-1 managed on ambulatory subcutaneous insulin pump at home, CKD IV, factor V Leiden mutation, HTN, HLD, CAD s/p stent, PVD, carotid artery stenosis, asthma, and newly diagnosed lung adenocarcinoma, s/ p right lower lobectomy and middle wedge 5/10/2017.      Plan: no change made to plan  -Lantus 20 units HS  -aspart meal Insulin:  Breakfast: 1 unit per 11 grams of CHO  Lunch: 1 unit per 11 grams of CHO  Dinner: 1 unit per 10 grams of CHO  Snacks: 1 unit per 10 grams of CHO  -Aspart medium correction with meals and snacks  Monitor glucose before meals, HS and 0200      Will continue to follow                     Interval History:     I reviewed the last 24 hours progress notes  Blood sugars are moderately controlled  Per Mr. Valenzuela, did not receive coverage for \"magic Cup\" last evening. Ms. Valenzuela has been enjoying the \"Magic Cups\" each at ~ 40 grams of CHO.  Did have 1/2 piece of [place] toast for breakfast and some pears.  Reports is feeling better today. Appetite is improving.deneis abdominal pain, N/V/D      Recent Labs  Lab 05/18/17  1145 05/18/17  0731 05/18/17  0630 05/17/17  2231 05/17/17  1653 05/17/17  1312 05/17/17  0831  05/17/17  0128  05/16/17  0951  05/15/17  1545  05/15/17  0545  05/14/17  0655   GLC  --   --  220*  --   --   --   --   --  238*  --  246*  --  198*  --  142*  --  90   * 227*  --  285* 175* 244* 152*  < >  --   < >  --   < >  --   < >  --   < >  --    < > = values in this interval not displayed.            Review of Systems:      please see interval  history       Medications:       Active Diet Order      Regular Diet Adult     Physical Exam:  Gen: Alert,  NAD   HEENT: NC/AT, mucous membranes are moist  Resp: Unlabored  Ext: + lower extremity edema, moving all extremities  Neuro:oriented x3, communicating clearly  /47 (BP Location: Right arm)  " "Pulse 84  Temp 97.3  F (36.3  C) (Oral)  Resp 18  Ht 1.626 m (5' 4\")  Wt 64.5 kg (142 lb 3.2 oz)  SpO2 99%  BMI 24.41 kg/m2           Data:     Lab Results   Component Value Date    A1C 7.0 05/03/2017    A1C 6.0 08/04/2014              CBC RESULTS:   Recent Labs   Lab Test  05/18/17   0630   WBC  18.4*   RBC  3.05*   HGB  8.9*   HCT  28.2*   MCV  93   MCH  29.2   MCHC  31.6   RDW  14.3   PLT  228     Recent Labs   Lab Test  05/18/17   0630  05/17/17   0128   NA  133  132*   POTASSIUM  4.6  4.4   CHLORIDE  103  101   CO2  23  24   ANIONGAP  8  7   GLC  220*  238*   BUN  55*  46*   CR  2.38*  1.78*   ZAHEER  8.0*  8.2*     Liver Function Studies -   Recent Labs   Lab Test  08/04/14   0712   PROTTOTAL  7.5   ALBUMIN  4.3   BILITOTAL  0.4   ALKPHOS  79   AST  29   ALT  44     Lab Results   Component Value Date    INR 1.06 04/18/2017    INR 1.08 04/04/2017    INR 0.99 01/13/2017    INR 0.97 08/04/2014           Yudy Wilson, CNP pager [phone]  Diabetes Management Job Code [id number]          "

## 2017-05-18 NOTE — PROGRESS NOTES
"    Electrophysiology Consult Service  Follow up Note   EP Attending:    Date of Service: 5/18/2017     S: We continue to follow this patient for AFL management. She converted to sinus overnight. She continues on diltiazem gtt. She remains hemodynamically stable. She denies chest pain, dizziness, lightheadedness, shortness of breath, palpitations, or syncopal symptoms.   HPI:  Ms. Valenzuela is a 71 year old female who has a past medical history significant for CAD s/p PCI LCX (2006), Factor V Leiden, HTN, HLD, DM1, PVD (known stenosis of right common iliac), carotid artery stenosis, CKD IV, asthma, anemia, and newly diagnosed lung adenocarcinoma s/p VATS RLL on 5/10/17. This morning (5/15/17), she had respiratory distress and was found to have right apical pneumothorax for which she had CT placed. She was noted to go into AFL this morning 100-150's around 1145am. She is asymptomatic in her AFL. She denies any prior history of cardiac arrhythmias. NM Lexiscan from 3/24/17 shows LVEF 74% with normal perfusion on rest and stress. She is having intermittent confusion today. SCr 1.78 GFR 28, Electrolytes stable. Telemetry shows -150's. She is hemodynamically stable. Current cardiac medications include: Norvasc, ASA, Coreg, and Pravastatin.   O:   Vitals: /47 (BP Location: Right arm)  Pulse 84  Temp 97.3  F (36.3  C) (Oral)  Resp 18  Ht 1.626 m (5' 4\")  Wt 64.5 kg (142 lb 3.2 oz)  SpO2 99%  BMI 24.41 kg/m2  GENERAL APPEARANCE: NAD   HEENT: MMM.  NECK: Supple.   CHEST: Absent RLL, diminished throughout    CARDIOVASCULAR: Regular, S1/S2, no murmur.   ABDOMEN: NT, ND, BS+, soft.   EXTREMITIES: Trace pedal edema.   NEURO:A&O.    SKIN: Warm and dry.   Data:  Labs:  [facility]  Recent Labs  Lab 05/18/17  0630 05/17/17  0128 05/16/17  0951 05/15/17  1545    132* 136 133   POTASSIUM 4.6 4.4 5.1 4.8   CHLORIDE 103 101 104 103   ZAHEER 8.0* 8.2* 8.8 8.3*   CO2 23 24 20 23   BUN 55* 46* 45* 46*   CR 2.38* " 1.78* 1.74* 1.78*   * 238* 246* 198*     CBC  Recent Labs  Lab 05/18/17  0630 05/17/17  1338 05/16/17  0951 05/15/17  0545   WBC 18.4* 17.2* 13.3* 8.0   RBC 3.05* 3.36* 3.81 3.22*   HGB 8.9* 9.9* 11.5* 9.5*   HCT 28.2* 31.0* 34.8* 29.3*   MCV 93 92 91 91   MCH 29.2 29.5 30.2 29.5   MCHC 31.6 31.9 33.0 32.4   RDW 14.3 13.9 13.6 13.6    213 208 155       EKG:       Meds per [facility] EMR:  Current Facility-Administered Medications   Medication     magnesium hydroxide (MILK OF MAGNESIA) suspension 30 mL     levofloxacin (LEVAQUIN) tablet 500 mg     [START ON 5/19/2017] levofloxacin (LEVAQUIN) tablet 250 mg     diltiazem (CARDIZEM SR) 12 hr capsule 120 mg     oxyCODONE (ROXICODONE) IR tablet 5 mg     heparin  drip 25,000 units in 0.45% NaCl 250 mL (see additional administration details for dose)     heparin bolus from infusion pump     metoprolol (LOPRESSOR) tablet 25 mg     metoprolol (LOPRESSOR) injection 5 mg     insulin glargine (LANTUS) injection 20 Units     lidocaine (LIDODERM) 5 % Patch 1 patch    And     lidocaine (LIDODERM) patch REMOVAL    And     lidocaine (LIDODERM) Patch in Place     artificial saliva (BIOTENE DRY MOUTHWASH) liquid 10 mL     lidocaine 1 % 1 mL     lidocaine (LMX4) kit     sodium chloride (PF) 0.9% PF flush 3 mL     sodium chloride (PF) 0.9% PF flush 3 mL     metoprolol (LOPRESSOR) injection 5 mg     insulin aspart (NovoLOG) inj (RAPID ACTING)     insulin aspart (NovoLOG) inj (RAPID ACTING)     insulin aspart (NovoLOG) inj (RAPID ACTING)     insulin aspart (NovoLOG) inj (RAPID ACTING)     levalbuterol (XOPENEX) neb solution 0.63 mg     sodium chloride 3 % neb solution 3 mL     guaiFENesin (MUCINEX) 12 hr tablet 600 mg     HYDROmorphone (PF) (DILAUDID) injection 0.3-0.5 mg     pink lady enema     insulin aspart (NovoLOG) inj (RAPID ACTING)     insulin aspart (NovoLOG) inj (RAPID ACTING)     bisacodyl (DULCOLAX) Suppository 10 mg     senna-docusate (SENOKOT-S;PERICOLACE) 8.6-50 MG  per tablet 2 tablet     polyethylene glycol (MIRALAX/GLYCOLAX) Packet 17 g     acetaminophen (TYLENOL) tablet 975 mg     melatonin tablet 3 mg     calcium carbonate (TUMS) chewable tablet 500 mg     menthol (ICY HOT) 5 % patch 2 patch    And     menthol (ICY HOT) Patch in Place    And     menthol (ICY HOT) patch REMOVAL     dextrose 5% and 0.45% NaCl infusion     aspirin EC EC tablet 81 mg     cetirizine (zyrTEC) tablet 5 mg     pravastatin (PRAVACHOL) tablet 40 mg     glucose 40 % gel 15-30 g    Or     dextrose 50 % injection 25-50 mL    Or     glucagon injection 1 mg     naloxone (NARCAN) injection 0.1-0.4 mg     labetalol (NORMODYNE/TRANDATE) injection 10-40 mg     albuterol (PROAIR HFA/PROVENTIL HFA/VENTOLIN HFA) Inhaler 4 puff     pantoprazole (PROTONIX) EC tablet 40 mg     potassium chloride SA (K-DUR/KLOR-CON M) CR tablet 20-40 mEq     potassium chloride (KLOR-CON) Packet 20-40 mEq     potassium chloride 10 mEq in 100 mL intermittent infusion     potassium chloride 10 mEq in 100 mL intermittent infusion with 10 mg lidocaine     potassium chloride 20 mEq in 50 mL intermittent infusion     magnesium sulfate 2 g in NS intermittent infusion (PharMEDium or [facility]V Cmpd)     magnesium sulfate 4 g in 100 mL sterile water (premade)     sodium phosphate 10 mmol in D5W intermittent infusion     sodium phosphate 15 mmol in D5W intermittent infusion     sodium phosphate 20 mmol in D5W intermittent infusion     sodium phosphate 25 mmol in D5W intermittent infusion     acetaminophen (TYLENOL) tablet 650 mg     ondansetron (ZOFRAN-ODT) ODT tab 4 mg    Or     ondansetron (ZOFRAN) injection 4 mg     fluticasone-vilanterol (BREO ELLIPTA) 100-25 MCG/INH oral inhaler 1 puff     3/2016 ECHO:   Interpretation Summary  Technically difficult study. Extremely difficult acoustic windows despite the  use of contrast for endcardial border definition.  Global and regional left ventricular function is normal with an EF of 55-60%.  Global right  ventricular function is normal.  Moderate left atrial enlargement.  Mild pulmonary hypertension. Right ventricular systolic pressure is 35 mmHg  above the right atrial pressure.  The inferior vena cava was dilated at 2.3 cm without respiratory variability,  consistent with increased right atrial pressure.  Estimated mean right atrial pressure is >8 mmHg.  No pericardial effusion is present.  A:   Ms. Valenzuela is a 71 year old female who has a past medical history significant for CAD s/p PCI LCX (), Factor V Leiden, HTN, HLD, DM1, PVD (known stenosis of right common iliac), carotid artery stenosis, CKD IV, asthma, anemia, and newly diagnosed lung adenocarcinoma s/p VATS RLL on 5/10/17 now with post operative AFL with RVR which is asymptomatic and hemodynamically stable. AFL mostly likely due to high catecholamine and inflammatory state and AFL will often improve over next 6-8 weeks post operatively.  We continue to follow this patient for AFL management. She spontaneously converted to sinus and then reverted back to AFL and now again converted to sinus overnight. She continues on diltiazem gtt. She remains hemodynamically stable. She denies chest pain, dizziness, lightheadedness, shortness of breath, palpitations, or syncoal symptoms.   EP recommendations:   We discussed in detail with the patient management/treatment options for AFL includin. Stroke Prophylaxis: CHADSVASC=+age, +gender, +CAD, +HTN, +DM1 5, corresponding to a 6.7% annual stroke / systemic emolism event rate. indicating need for long term oral anticoagulation. We would recommend anticoagulation when deemed possible from surgical standpoint. She is not a candidate for DOACs considering her renal function. Therefore, we would recommend anticoagulation with warfarin with goal INR 2-3.  2. Rate Control: Continue beta blockers, would up titrate. If needed, can switch to metoprolol for better HR control with less BP effect. She was placed on  diltiazem gtt. We would switch to oral diltiazem 360 mg CD daily. Please ensure she does not resume home Norvasc while on oral diltiazem to avoid dual CCB.   3. Rhythm Control: CAD and renal function limit AAT options (preclude use of Class ICs, Sotalol, and Dofetilide). Therefore, likely amiodarone is only AAT option. We would not recommend AATs at this stage and would focus on rate control. If sustained episodes lasting >48h, we would consider JENA/DCCV. Currently, DCCV would not be frutiful given she is already going in and out herself. She is currently in sinus. Typical A.flutter is highly amenable to ablation procedure. An ablation can be considered for any recurrence after further post operative healing.   4. Risk Factor Management: Continue Statin, maintain good BP control, and IVIS evaluation as indicated as outpt. Troponin and t-wave changes felt likely to represent demand ischemia.   The patient states understanding and is agreeable with plan.   Thank you much for allowing us to participate in the care of this pleasant patient.     KATHERYN Combs CNP  Electrophysiology Consult Service  Pager: [phone]

## 2017-05-18 NOTE — PROGRESS NOTES
Nephrology Progress Note      Interval History:  Ms Valenzuela is sitting up in the chair this morning and denies any complaints. She reports of feeling well overall.   Cardizem gtt->oral today with continued Heparin drip for recent, unstable Afib with RVR, team also converting Carvedilol to Metoprolol today for rate control.     ASSESSMENT AND RECOMMENDATIONS:   71 year old female with long standing type I diabetes with presumed diabetic nephropathy (followed by Dr Sim of [facility]), CKD stage 4, Scr 1.8-2.2 mg/dL (eGFR 20) who is s/p VATS for lung adenocarcinoma. Nephrology is consulted for CKD and hyperkalemia    CKD with elevated creatinine: baseline creatinine of 1.8-2.2.   Her creatinine peaked at 2.4 in perioperative period likely secondary to hypotension/hemodynamic changes in the setting of poor renal reserve.   Today we are again consulted for CRT rebound to 2.3.  It appears there is some concern for infection given rising WBC and patient has had several low systolic BPs and patient likely experienced poor renal perfusion related to cardiac arrhythmia.   These events are most likely responsible for recurrent decline in renal function.    --treat infection as indicated to prevent sepsis.  --Continue SC if patient is unable to void or feels pressure/sensation of incomplete emptying.  --Avoid nephrotoxins and ensure medications are renally dosed.  --Continue to hold Lisinopril and do not order on discharge.  --She should follow up with her nephrologist to restart it    Blood pressure/ volume : Continue lasix 20 mg daily with Cardizem gtt as long as needed.  Agree with replacing Coreg with Metoprolol and continued hold of amlodipine.  --Goal BP would be < 130/90 mm Hg. Avoid hypotensive episodes.   --Patient has not tolerated multiple antihypertensives in the past, including doxazosin.  --If blood pressure again becomes uncontrolled  (hyper).  May consider the addition of Hydralazine in the future since we have removed Lisinopril.     High normal K: In the setting of total body potassium excess and hyperglycemia.   --Recommend low K diet   --holding lisinopril.     Sheridan Houston PA-C     REVIEW OF SYSTEMS:  No confusion or lightheadedness  No N/V/D.   Breathing well on RA, Ambulated around unit, only weak at end.  Good urine output without hematuria, dysuria.    PAST MEDICAL HISTORY:  Reviewed with patient at time of initial consult.    Past Medical History:   Diagnosis Date     Adenocarcinoma, lung (H)      Asthma      CAD (coronary artery disease) 9/17/2014 2006 PCI circumflex territory  Mid LAD lesion      Carotid artery stenosis      CKD (chronic kidney disease) stage 4, GFR 15-29 ml/min (H)      Diabetes mellitus type 1 (H)      H/O factor V Leiden mutation      Hypertension      Mixed hyperlipidemia (DYSLIPIDEMIA) 8/1/2014     Osteopenia      Pneumothorax 4/16/2017     PVD (peripheral vascular disease) (H)      Thyroid nodule 04/12/2012    Hurtle Cells, non malignant       Past Surgical History:   Procedure Laterality Date     BRONCHOSCOPY FLEXIBLE Right 5/10/2017    Procedure: BRONCHOSCOPY FLEXIBLE;  Flexible Bronchoscopy, esaphogastroduodenoscopy, cervical Mediastinoscopy, Right  Thoracoscopic Surgery, right Lower Lobectomy, Mediastinal Lymph Node Dissection, Right middle lobe wedge resection*Latex Allergy* ([facility] Patient);  Surgeon: Glenna Hall MD;  Location:  OR     ESOPHAGOSCOPY, GASTROSCOPY, DUODENOSCOPY (EGD), COMBINED N/A 5/10/2017    Procedure: COMBINED ESOPHAGOSCOPY, GASTROSCOPY, DUODENOSCOPY (EGD);;  Surgeon: Glenna Hall MD;  Location:  OR     ganglion cyst removal       hemithyroidectomy  4/13/2012    PATH report showed benign Hurtle Cell Adenoma     IR biopsy of lung  04/2017     lipoma removal       MEDIASTINOSCOPY N/A 5/10/2017    Procedure: MEDIASTINOSCOPY;;  Surgeon: Glenna Hall MD;  Location:  OR      SHOULDER SURGERY Left      THORACOSCOPIC EXCISE NODE MEDIASTINAL Right 5/10/2017    Procedure: THORACOSCOPIC EXCISE NODE MEDIASTINAL;;  Surgeon: Glenna Hall MD;  Location: [facility] OR     THORACOSCOPIC RESECTION LUNG Right 5/10/2017    Procedure: THORACOSCOPIC RESECTION LUNG;;  Surgeon: Glenna Hall MD;  Location: [facility] OR     TONSILLECTOMY          MEDICATIONS:  PTA Meds  Prior to Admission medications    Medication Sig Last Dose Taking? Auth Provider   albuterol (PROAIR HFA/PROVENTIL HFA/VENTOLIN HFA) 108 (90 BASE) MCG/ACT Inhaler Inhale 2 puffs into the lungs 2 times daily 5/9/2017 at Unknown time Yes Reported, Patient   Respiratory Therapy Supplies (AEROBIKA) GAGANDEEP 1 Device 6 times daily 5/9/2017 at Unknown time Yes Demario Torrez MD   Insulin Aspart (INSULIN PUMP - OUTPATIENT) Insulin pump base rate: 7163-4480 rate 0.5  6471-8184 rate 1.0  2583-3022 rate 1.95  8532-2712 rate 1.35 5/10/2017 at Unknown time Yes Reported, Patient   Acetaminophen (TYLENOL PO) Take 1,000 mg by mouth every 8 hours as needed for mild pain or fever  Past Week at Unknown time Yes Reported, Patient   budesonide-formoterol (SYMBICORT) 80-4.5 MCG/ACT inhaler Inhale 2 puffs into the lungs 2 times daily Past Week at Unknown time Yes John Plaza MD   amLODIPine (NORVASC) 10 MG tablet Take 10 mg by mouth every evening  5/9/2017 at Unknown time Yes Reported, Patient   aspirin EC 81 MG tablet Take 81 mg by mouth every evening  5/9/2017 at Unknown time Yes Reported, Patient   carvedilol (COREG) 3.125 MG tablet Take 3.125 mg by mouth 2 times daily (with meals)  5/9/2017 at Unknown time Yes Reported, Patient   furosemide (LASIX) 20 MG tablet 20 mg every morning  5/9/2017 at Unknown time Yes Reported, Patient   lisinopril (PRINIVIL,ZESTRIL) 20 MG tablet Take 10 mg by mouth every evening  5/9/2017 at Unknown time Yes Reported, Patient   pravastatin (PRAVACHOL) 40 MG tablet 40 mg every evening  5/9/2017 at Unknown time Yes Reported,  Patient   cetirizine (ZYRTEC) 10 MG tablet Take 5 mg by mouth daily   Reported, Patient   calcium 600 MG tablet Take 1 tablet by mouth every morning   Reported, Patient   clonazePAM (KLONOPIN) 0.5 MG tablet Take 0.5 tablets (0.25 mg) by mouth 2 times daily as needed for anxiety   Elisa Ellington MD   blood glucose (ONE TOUCH ULTRA) test strip    Reported, Patient   calcium acetate (PHOSLO) 667 MG CAPS Take 667 mg by mouth 4 times daily (with meals and nightly)    Reported, Patient   cholecalciferol (VITAMIN D) 1000 UNIT tablet Take 1,000 Units by mouth every morning    Reported, Patient   ONETOUCH DELICA LANCETS 33G MISC    Reported, Patient      Current Meds    [START ON 5/19/2017] levofloxacin  250 mg Oral Daily     diltiazem  180 mg Oral BID     metoprolol  25 mg Oral BID     insulin glargine  20 Units Subcutaneous Q24H     lidocaine  1 patch Transdermal Q24h    And     lidocaine   Transdermal Q24H    And     lidocaine   Transdermal Q8H     artificial saliva  10 mL Swish & Spit 4x Daily     sodium chloride (PF)  3 mL Intracatheter Q8H     insulin aspart   Subcutaneous Daily with breakfast     insulin aspart   Subcutaneous Daily before lunch     insulin aspart   Subcutaneous Daily with supper     levalbuterol  0.63 mg Nebulization Q4H WA     sodium chloride  3 mL Nebulization Q4H While awake     guaiFENesin  600 mg Oral BID     insulin aspart  1-7 Units Subcutaneous TID AC     insulin aspart  1-5 Units Subcutaneous At Bedtime     bisacodyl  10 mg Rectal Daily     senna-docusate  2 tablet Oral BID     polyethylene glycol  17 g Oral BID     acetaminophen  975 mg Oral Q8H     melatonin  3 mg Oral At Bedtime     menthol   Transdermal Q8H     aspirin EC  81 mg Oral QPM     cetirizine  5 mg Oral Daily     pravastatin  40 mg Oral QPM     pantoprazole  40 mg Oral Daily     fluticasone-vilanterol  1 puff Inhalation Daily     Infusion Meds    HEParin 600 Units/hr (05/18/17 0800)     dextrose 5% and 0.45% NaCl 30 mL/hr at  17 0430       ALLERGIES:    Allergies   Allergen Reactions     Codeine Other (See Comments)     Makes her sleepless     Loratadine Other (See Comments)     Extreme sleepiness       Sulfa Drugs Unknown     Other reaction(s): Unknown     Terazosin Other (See Comments)     Swellling in legs, improved when went off     Adhesive Tape Rash     Foam tape used during surgery was what caused rash     Latex Rash     Liquid Adhesive Rash     SOCIAL HISTORY:   Social History     Social History     Marital status:      Spouse name: N/A     Number of children: N/A     Years of education: N/A     Occupational History     retail Other     part-time     Social History Main Topics     Smoking status: Former Smoker     Packs/day: 0.50     Years: 15.00     Types: Cigarettes     Start date: 1970     Quit date: 1985     Smokeless tobacco: Never Used     Alcohol use 0.0 - 0.5 oz/week     0 - 1 Standard drinks or equivalent per week     Drug use: No     Sexual activity: Not on file     Other Topics Concern     Not on file     Social History Narrative    The patient has a 7 pk yr tobacco hx.  She has no active use since .  Alcohol use is 0 alcoholic drinks per week.  She denies use of recreational drugs.          She is retired.  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           The patient is .  Has 2 children.        Hot Tub Exposure: NO    Recent Travel: NO     Hx of incarceration:  NO    Bird Exposure:   NO    Animal Exposure:  NO    Inhalation Exposure:  NO         Reviewed with patient at time of initial consult.    FAMILY MEDICAL HISTORY:   Family History   Problem Relation Age of Onset     Cancer - colorectal Father      DIABETES Father      Scleroderma Mother      CEREBROVASCULAR DISEASE Brother      DIABETES Brother      Hypertension Brother      DIABETES Brother      2nd brother     Reviewed with patient at time of initial consult.    PHYSICAL EXAM:   Temp  Av.8  " F (36.6  C)  Min: 96.5  F (35.8  C)  Max: 99.4  F (37.4  C)  Arterial Line MAP (mmHg)  Av mmHg  Min: 58 mmHg  Max: 88 mmHg  Arterial Line BP  Min: 109/36  Max: 147/49      Pulse  Av.3  Min: 60  Max: 88 Resp  Av.4  Min: 10  Max: 20  SpO2  Av.5 %  Min: 93 %  Max: 100 %       /43 (BP Location: Right arm)  Pulse 84  Temp 98  F (36.7  C) (Oral)  Resp 18  Ht 1.626 m (5' 4\")  Wt 64.5 kg (142 lb 3.2 oz)  SpO2 99%  BMI 24.41 kg/m2   Date 17 07 - 17 0659   Shift 6626-8160 5280-7716 3329-1582 24 Hour Total   I  N  T  A  K  E   Shift Total  (mL/kg)       O  U  T  P  U  T   Urine 375   375    Chest Tube 150   150    Shift Total  (mL/kg) 525  (7.55)   525  (7.55)   Weight (kg) 69.5 69.5 69.5 69.5        Admit Weight: 64.1 kg (141 lb 5 oz)     GENERAL APPEARANCE: no distress,  awake  EYES: no scleral icterus, pupils equal  HENT: NC/AT,  mouth  without ulcers or lesions  Lymphatics: no cervical or supraclavicular LAD. Midline incision over trachea  Pulmonary: Decreased air moving in RLL.  Non labored breathing on RA.  CT tubes in place.  CV: regular rhythm, normal rate, no rub   - JVP not elevated   - Edema- not significant  GI: soft, mildly distended, nontender, normal bowel sounds.  MS: no evidence of inflammation in joints, no muscle tenderness  SKIN: no rash, warm, dry, no cyanosis  NEURO: mentation intact and speech normal    LABS:   CMP    Recent Labs  Lab 17  0630 17  0128 17  0951 05/15/17  1545    132* 136 133   POTASSIUM 4.6 4.4 5.1 4.8   CHLORIDE 103 101 104 103   CO2 23 24 20 23   ANIONGAP 8 7 12 7   * 238* 246* 198*   BUN 55* 46* 45* 46*   CR 2.38* 1.78* 1.74* 1.78*   GFRESTIMATED 20* 28* 29* 28*   GFRESTBLACK 24* 34* 35* 34*   ZAHEER 8.0* 8.2* 8.8 8.3*   MAG 2.3 2.1 2.1 2.2   PHOS 4.0 2.7 3.8 4.1     CBC    Recent Labs  Lab 17  0630 17  1338 17  0951 05/15/17  0545   HGB 8.9* 9.9* 11.5* 9.5*   WBC 18.4* 17.2* 13.3* 8.0   RBC " 3.05* 3.36* 3.81 3.22*   HCT 28.2* 31.0* 34.8* 29.3*   MCV 93 92 91 91   MCH 29.2 29.5 30.2 29.5   MCHC 31.6 31.9 33.0 32.4   RDW 14.3 13.9 13.6 13.6    213 208 155     INRNo lab results found in last 7 days.  ABG  No lab results found in last 7 days.   URINE STUDIES  Recent Labs   Lab Test  05/13/17   0914  05/12/17   2312  08/04/14   0832   COLOR  Light Yellow  Light Yellow  Straw   APPEARANCE  Clear  Clear  Clear   URINEGLC  300*  70*  300*   URINEBILI  Negative  Negative  Negative   URINEKETONE  Negative  Negative  Negative   SG  1.008  1.009  1.008   UBLD  Negative  Negative  Negative   URINEPH  5.0  5.0  5.5   PROTEIN  Negative  Negative  10*   NITRITE  Negative  Negative  Negative   LEUKEST  Negative  Negative  Negative   RBCU  0  <1  <1   WBCU  1  1  <1     No lab results found.  PTH  No lab results found.  IRON STUDIES  No lab results found.    IMAGING:  All imaging studies reviewed by me.     Sheridan Houston PA-C

## 2017-05-18 NOTE — PLAN OF CARE
Problem: Goal Outcome Summary  Goal: Goal Outcome Summary  PT 6B: Patient ambulates 650ft with FWW progressing to single UE on IV pole. Pt reporting fatigue and mild dizziness, improving with standing rest break. SpO2 unable to be obtained via portable monitor due to cold fingers, pt on room air. Pt with some unsteadiness and mild instability, no overt losses of balance. Pt needing cues for precautions, appears forgetful.   Recommend discharge to TCU when medically appropriate to improve strength, activity tolerance, and independence with functional mobility.

## 2017-05-18 NOTE — PROVIDER NOTIFICATION
Cross cover notified that pt's HR converted to sinus rhythm rates 70s. STAT 12 lead EKG ordered. Per Dr. Nascimento, will continue diltiazem gtt at this time until orders to hold. VSS. Will continue to monitor and update with changes or concerns.

## 2017-05-18 NOTE — PLAN OF CARE
"Problem: Goal Outcome Summary  Goal: Goal Outcome Summary  Outcome: No Change  /54 (BP Location: Right arm)  Pulse 84  Temp 98  F (36.7  C) (Oral)  Resp 18  Ht 1.626 m (5' 4\")  Wt 64.5 kg (142 lb 3.2 oz)  SpO2 96%  BMI 24.41 kg/m2  HR sinus rhythm now rates 60s, see provider note re: diltiazem gtt. On room air. Maintaining adequate O2 sats. CT x2 to waterseal.  Pt A&Ox4, forgetful at times. Resting comfortably. Using call light appropriately.  attentive at bedside. Pt denying pain. Receiving scheduled tylenol for pain relief. R chest dressings CDI. Middle neck incision CDI with liquid bandage. Negligible drainage to CTs. Oliguric over night. No BM. Nursing will continue to follow the POC and update the MD team with concerns.          "

## 2017-05-18 NOTE — PROGRESS NOTES
Upon entering the pt's room, introduced self and told her that I had been requested to start a 3rd IV. Pt states she hadn't been informed of that, and wanted to speak to her nurse.  Called pt's nurse, and informed him of situation.   Informed later that request has been canceled.

## 2017-05-18 NOTE — PROGRESS NOTES
"Thoracic Surgery Daily Progress Note  Yue Valenzuela  05/18/2017    Subjective:   Converted to sinus rhythm overnight. Pain is well controlled- not taking her narcotics. No chest pain/pressure or shortness of breath. Tolerating minimal regular diet. Passing flatus, had a small bowel movement.      Objective:    /50 (BP Location: Right arm)  Pulse 84  Temp 97.5  F (36.4  C) (Oral)  Resp 18  Ht 1.626 m (5' 4\")  Wt 64.5 kg (142 lb 3.2 oz)  SpO2 91%  BMI 24.41 kg/m2  Sitting up in bed in no acute distress  Awake, alert and appropriate  Non-labored breathing  Sinus rhythm on telemetry  CTs in place with surgical CT with small air leak.   Extremities warm, no significant edema.     Ins/Outs:   I/O last 3 completed shifts:  In: 1974.51 [P.O.:482; I.V.:1492.51]  Out: 1167 [Urine:1025; Chest Tube:142]    Labs:   AM labs reviewed - Cr up to 2.38 from 1.78  Troponin downtrended to .031   Rising leukocytosis    Assessment:  Yue Valenzuela is a 71 year old female now post operative day 8 from a right VATS, lower lobectomy and middle wedge.     Plan:       Neuro:  PO pain medications, limit narcotics as able.    Cardiovascular/Heme: Cardiology consulted, appreciate assistance. Converted into sinus rhythm- will defer to cardiology regarding long term rate/rhythm control.     Respiratory/Pulm: Continue chest tubes. Will clamp pigtail and place surgical CT to water-seal, follow up morning chest xray. Continue aggressive pulmonary toilet.     FEN/Gastrointestinal: Regular diet. Bowel regimen.    Renal/Genitourinary: SUPRIYA on CKD resolving, though Cr elevated this morning - will monitor closely and re-consult nephrology if doesn't improve. Urine output adequate.     Infectious: Worsening leukocytosis, will follow up CXR and send UA.     Prophy: Heparin drip, protonix.    Activity:  Up to chair, out of bed, increase activity.     Endo: DM, on insulin, appreciate endocrinology assistance.     Dispo: Continue care on " 6B    Patient was seen and discussed with Dr. Diaz, Thoracic Surgery Fellow, who agrees with the assessment and plan and will discuss with staff.     Evelyn Liriano MD  General Surgery  Pager: [phone]

## 2017-05-19 ENCOUNTER — APPOINTMENT (OUTPATIENT)
Dept: PHYSICAL THERAPY | Facility: CLINIC | Age: 72
DRG: 163 | End: 2017-05-19
Attending: STUDENT IN AN ORGANIZED HEALTH CARE EDUCATION/TRAINING PROGRAM
Payer: MEDICARE

## 2017-05-19 ENCOUNTER — APPOINTMENT (OUTPATIENT)
Dept: GENERAL RADIOLOGY | Facility: CLINIC | Age: 72
DRG: 163 | End: 2017-05-19
Attending: PHYSICIAN ASSISTANT
Payer: MEDICARE

## 2017-05-19 ENCOUNTER — APPOINTMENT (OUTPATIENT)
Dept: OCCUPATIONAL THERAPY | Facility: CLINIC | Age: 72
DRG: 163 | End: 2017-05-19
Attending: STUDENT IN AN ORGANIZED HEALTH CARE EDUCATION/TRAINING PROGRAM
Payer: MEDICARE

## 2017-05-19 LAB
ALBUMIN UR-MCNC: NEGATIVE MG/DL
ANION GAP SERPL CALCULATED.3IONS-SCNC: 11 MMOL/L (ref 3–14)
APPEARANCE UR: ABNORMAL
BACTERIA #/AREA URNS HPF: ABNORMAL /HPF
BILIRUB UR QL STRIP: NEGATIVE
BUN SERPL-MCNC: 58 MG/DL (ref 7–30)
CALCIUM SERPL-MCNC: 7.7 MG/DL (ref 8.5–10.1)
CHLORIDE SERPL-SCNC: 100 MMOL/L (ref 94–109)
CO2 SERPL-SCNC: 20 MMOL/L (ref 20–32)
COLOR UR AUTO: YELLOW
CREAT SERPL-MCNC: 2.3 MG/DL (ref 0.52–1.04)
ERYTHROCYTE [DISTWIDTH] IN BLOOD BY AUTOMATED COUNT: 14.3 % (ref 10–15)
GFR SERPL CREATININE-BSD FRML MDRD: 21 ML/MIN/1.7M2
GLUCOSE BLDC GLUCOMTR-MCNC: 111 MG/DL (ref 70–99)
GLUCOSE BLDC GLUCOMTR-MCNC: 150 MG/DL (ref 70–99)
GLUCOSE BLDC GLUCOMTR-MCNC: 151 MG/DL (ref 70–99)
GLUCOSE BLDC GLUCOMTR-MCNC: 165 MG/DL (ref 70–99)
GLUCOSE BLDC GLUCOMTR-MCNC: 184 MG/DL (ref 70–99)
GLUCOSE BLDC GLUCOMTR-MCNC: 185 MG/DL (ref 70–99)
GLUCOSE BLDC GLUCOMTR-MCNC: 289 MG/DL (ref 70–99)
GLUCOSE SERPL-MCNC: 181 MG/DL (ref 70–99)
GLUCOSE UR STRIP-MCNC: 70 MG/DL
HCT VFR BLD AUTO: 29 % (ref 35–47)
HGB BLD-MCNC: 9.2 G/DL (ref 11.7–15.7)
HGB UR QL STRIP: NEGATIVE
HYALINE CASTS #/AREA URNS LPF: 1 /LPF (ref 0–2)
KETONES UR STRIP-MCNC: NEGATIVE MG/DL
LEUKOCYTE ESTERASE UR QL STRIP: ABNORMAL
LMWH PPP CHRO-ACNC: 0.47 IU/ML
LMWH PPP CHRO-ACNC: NORMAL IU/ML
MCH RBC QN AUTO: 29.2 PG (ref 26.5–33)
MCHC RBC AUTO-ENTMCNC: 31.7 G/DL (ref 31.5–36.5)
MCV RBC AUTO: 92 FL (ref 78–100)
MUCOUS THREADS #/AREA URNS LPF: PRESENT /LPF
NITRATE UR QL: NEGATIVE
PH UR STRIP: 5 PH (ref 5–7)
PLATELET # BLD AUTO: 230 10E9/L (ref 150–450)
PLATELET # BLD AUTO: 237 10E9/L (ref 150–450)
POTASSIUM SERPL-SCNC: 4.4 MMOL/L (ref 3.4–5.3)
RBC # BLD AUTO: 3.15 10E12/L (ref 3.8–5.2)
RBC #/AREA URNS AUTO: <1 /HPF (ref 0–2)
SODIUM SERPL-SCNC: 131 MMOL/L (ref 133–144)
SP GR UR STRIP: 1.01 (ref 1–1.03)
SQUAMOUS #/AREA URNS AUTO: <1 /HPF (ref 0–1)
TRANS CELLS #/AREA URNS HPF: <1 /HPF (ref 0–1)
URN SPEC COLLECT METH UR: ABNORMAL
UROBILINOGEN UR STRIP-MCNC: NORMAL MG/DL (ref 0–2)
WBC # BLD AUTO: 21.9 10E9/L (ref 4–11)
WBC #/AREA URNS AUTO: 44 /HPF (ref 0–2)
WBC CLUMPS #/AREA URNS HPF: PRESENT /HPF

## 2017-05-19 PROCEDURE — 85520 HEPARIN ASSAY: CPT | Performed by: SURGERY

## 2017-05-19 PROCEDURE — 36415 COLL VENOUS BLD VENIPUNCTURE: CPT | Performed by: STUDENT IN AN ORGANIZED HEALTH CARE EDUCATION/TRAINING PROGRAM

## 2017-05-19 PROCEDURE — A9270 NON-COVERED ITEM OR SERVICE: HCPCS | Mod: GY | Performed by: THORACIC SURGERY (CARDIOTHORACIC VASCULAR SURGERY)

## 2017-05-19 PROCEDURE — 97116 GAIT TRAINING THERAPY: CPT | Mod: GP | Performed by: PHYSICAL THERAPIST

## 2017-05-19 PROCEDURE — 40000275 ZZH STATISTIC RCP TIME EA 10 MIN

## 2017-05-19 PROCEDURE — A9270 NON-COVERED ITEM OR SERVICE: HCPCS | Mod: GY | Performed by: STUDENT IN AN ORGANIZED HEALTH CARE EDUCATION/TRAINING PROGRAM

## 2017-05-19 PROCEDURE — 94640 AIRWAY INHALATION TREATMENT: CPT

## 2017-05-19 PROCEDURE — A9270 NON-COVERED ITEM OR SERVICE: HCPCS | Mod: GY | Performed by: PHYSICIAN ASSISTANT

## 2017-05-19 PROCEDURE — 00000146 ZZHCL STATISTIC GLUCOSE BY METER IP

## 2017-05-19 PROCEDURE — 25000132 ZZH RX MED GY IP 250 OP 250 PS 637: Mod: GY | Performed by: THORACIC SURGERY (CARDIOTHORACIC VASCULAR SURGERY)

## 2017-05-19 PROCEDURE — A9270 NON-COVERED ITEM OR SERVICE: HCPCS | Mod: GY | Performed by: SURGERY

## 2017-05-19 PROCEDURE — 97110 THERAPEUTIC EXERCISES: CPT | Mod: GP | Performed by: PHYSICAL THERAPIST

## 2017-05-19 PROCEDURE — 40000133 ZZH STATISTIC OT WARD VISIT: Performed by: OCCUPATIONAL THERAPIST

## 2017-05-19 PROCEDURE — 40000193 ZZH STATISTIC PT WARD VISIT: Performed by: PHYSICAL THERAPIST

## 2017-05-19 PROCEDURE — 97530 THERAPEUTIC ACTIVITIES: CPT | Mod: GP | Performed by: PHYSICAL THERAPIST

## 2017-05-19 PROCEDURE — 97535 SELF CARE MNGMENT TRAINING: CPT | Mod: GO | Performed by: OCCUPATIONAL THERAPIST

## 2017-05-19 PROCEDURE — 85049 AUTOMATED PLATELET COUNT: CPT | Performed by: THORACIC SURGERY (CARDIOTHORACIC VASCULAR SURGERY)

## 2017-05-19 PROCEDURE — 80048 BASIC METABOLIC PNL TOTAL CA: CPT | Performed by: SURGERY

## 2017-05-19 PROCEDURE — 25000132 ZZH RX MED GY IP 250 OP 250 PS 637: Mod: GY | Performed by: SURGERY

## 2017-05-19 PROCEDURE — 40000274 ZZH STATISTIC RCP CONSULT EA 30 MIN

## 2017-05-19 PROCEDURE — 25000132 ZZH RX MED GY IP 250 OP 250 PS 637: Mod: GY | Performed by: PHYSICIAN ASSISTANT

## 2017-05-19 PROCEDURE — 12000006 ZZH R&B IMCU INTERMEDIATE UMMC

## 2017-05-19 PROCEDURE — 71020 XR CHEST 2 VW: CPT

## 2017-05-19 PROCEDURE — 25000132 ZZH RX MED GY IP 250 OP 250 PS 637: Mod: GY | Performed by: STUDENT IN AN ORGANIZED HEALTH CARE EDUCATION/TRAINING PROGRAM

## 2017-05-19 PROCEDURE — 85520 HEPARIN ASSAY: CPT | Performed by: STUDENT IN AN ORGANIZED HEALTH CARE EDUCATION/TRAINING PROGRAM

## 2017-05-19 PROCEDURE — 85520 HEPARIN ASSAY: CPT | Performed by: HOSPITALIST

## 2017-05-19 PROCEDURE — 81001 URINALYSIS AUTO W/SCOPE: CPT | Performed by: SURGERY

## 2017-05-19 PROCEDURE — 94640 AIRWAY INHALATION TREATMENT: CPT | Mod: 76

## 2017-05-19 PROCEDURE — 87186 SC STD MICRODIL/AGAR DIL: CPT | Performed by: SURGERY

## 2017-05-19 PROCEDURE — 25000128 H RX IP 250 OP 636: Performed by: SURGERY

## 2017-05-19 PROCEDURE — 36415 COLL VENOUS BLD VENIPUNCTURE: CPT | Performed by: HOSPITALIST

## 2017-05-19 PROCEDURE — 87086 URINE CULTURE/COLONY COUNT: CPT | Performed by: SURGERY

## 2017-05-19 PROCEDURE — 25000125 ZZHC RX 250: Performed by: PHYSICIAN ASSISTANT

## 2017-05-19 PROCEDURE — S5010 5% DEXTROSE AND 0.45% SALINE: HCPCS | Performed by: STUDENT IN AN ORGANIZED HEALTH CARE EDUCATION/TRAINING PROGRAM

## 2017-05-19 PROCEDURE — 85027 COMPLETE CBC AUTOMATED: CPT | Performed by: SURGERY

## 2017-05-19 PROCEDURE — 87088 URINE BACTERIA CULTURE: CPT | Performed by: SURGERY

## 2017-05-19 PROCEDURE — 36415 COLL VENOUS BLD VENIPUNCTURE: CPT | Performed by: SURGERY

## 2017-05-19 PROCEDURE — 25800025 ZZH RX 258: Performed by: STUDENT IN AN ORGANIZED HEALTH CARE EDUCATION/TRAINING PROGRAM

## 2017-05-19 RX ADMIN — ASPIRIN 81 MG CHEWABLE TABLET 81 MG: 81 TABLET CHEWABLE at 19:41

## 2017-05-19 RX ADMIN — DILTIAZEM HYDROCHLORIDE 180 MG: 90 CAPSULE, EXTENDED RELEASE ORAL at 19:40

## 2017-05-19 RX ADMIN — ACETAMINOPHEN 975 MG: 325 TABLET, FILM COATED ORAL at 01:19

## 2017-05-19 RX ADMIN — OXYCODONE HYDROCHLORIDE 5 MG: 5 TABLET ORAL at 14:01

## 2017-05-19 RX ADMIN — GUAIFENESIN 600 MG: 600 TABLET, EXTENDED RELEASE ORAL at 08:49

## 2017-05-19 RX ADMIN — PRAVASTATIN SODIUM 40 MG: 40 TABLET ORAL at 19:42

## 2017-05-19 RX ADMIN — ACETAMINOPHEN 975 MG: 325 TABLET, FILM COATED ORAL at 16:25

## 2017-05-19 RX ADMIN — Medication 10 ML: at 08:00

## 2017-05-19 RX ADMIN — DILTIAZEM HYDROCHLORIDE 180 MG: 90 CAPSULE, EXTENDED RELEASE ORAL at 08:49

## 2017-05-19 RX ADMIN — Medication 10 ML: at 19:46

## 2017-05-19 RX ADMIN — HEPARIN SODIUM 600 UNITS/HR: 10000 INJECTION, SOLUTION INTRAVENOUS at 07:15

## 2017-05-19 RX ADMIN — METOPROLOL TARTRATE 25 MG: 25 TABLET ORAL at 19:41

## 2017-05-19 RX ADMIN — LEVALBUTEROL HYDROCHLORIDE 0.63 MG: 0.63 SOLUTION RESPIRATORY (INHALATION) at 15:45

## 2017-05-19 RX ADMIN — LIDOCAINE 1 PATCH: 50 PATCH TOPICAL at 22:27

## 2017-05-19 RX ADMIN — LEVALBUTEROL HYDROCHLORIDE 0.63 MG: 0.63 SOLUTION RESPIRATORY (INHALATION) at 20:52

## 2017-05-19 RX ADMIN — PANTOPRAZOLE SODIUM 40 MG: 40 TABLET, DELAYED RELEASE ORAL at 08:49

## 2017-05-19 RX ADMIN — FLUTICASONE FUROATE AND VILANTEROL TRIFENATATE 1 PUFF: 100; 25 POWDER RESPIRATORY (INHALATION) at 08:48

## 2017-05-19 RX ADMIN — DEXTROSE AND SODIUM CHLORIDE: 5; 450 INJECTION, SOLUTION INTRAVENOUS at 16:34

## 2017-05-19 RX ADMIN — SODIUM CHLORIDE SOLN NEBU 3% 3 ML: 3 NEBU SOLN at 07:59

## 2017-05-19 RX ADMIN — MELATONIN TAB 3 MG 3 MG: 3 TAB at 22:06

## 2017-05-19 RX ADMIN — SODIUM CHLORIDE SOLN NEBU 3% 3 ML: 3 NEBU SOLN at 15:45

## 2017-05-19 RX ADMIN — Medication 10 ML: at 11:52

## 2017-05-19 RX ADMIN — OXYCODONE HYDROCHLORIDE 5 MG: 5 TABLET ORAL at 19:39

## 2017-05-19 RX ADMIN — Medication 10 ML: at 16:25

## 2017-05-19 RX ADMIN — LEVALBUTEROL HYDROCHLORIDE 0.63 MG: 0.63 SOLUTION RESPIRATORY (INHALATION) at 07:59

## 2017-05-19 RX ADMIN — LEVOFLOXACIN 250 MG: 250 TABLET, FILM COATED ORAL at 08:49

## 2017-05-19 RX ADMIN — GUAIFENESIN 600 MG: 600 TABLET, EXTENDED RELEASE ORAL at 19:46

## 2017-05-19 RX ADMIN — METOPROLOL TARTRATE 25 MG: 25 TABLET ORAL at 08:49

## 2017-05-19 RX ADMIN — ACETAMINOPHEN 650 MG: 325 TABLET, FILM COATED ORAL at 07:19

## 2017-05-19 RX ADMIN — SENNOSIDES AND DOCUSATE SODIUM 2 TABLET: 8.6; 5 TABLET ORAL at 19:44

## 2017-05-19 RX ADMIN — OXYCODONE HYDROCHLORIDE 5 MG: 5 TABLET ORAL at 07:19

## 2017-05-19 RX ADMIN — OXYCODONE HYDROCHLORIDE 5 MG: 5 TABLET ORAL at 02:27

## 2017-05-19 RX ADMIN — SENNOSIDES AND DOCUSATE SODIUM 2 TABLET: 8.6; 5 TABLET ORAL at 08:49

## 2017-05-19 RX ADMIN — SODIUM CHLORIDE SOLN NEBU 3% 3 ML: 3 NEBU SOLN at 20:52

## 2017-05-19 RX ADMIN — HEPARIN SODIUM 750 UNITS/HR: 10000 INJECTION, SOLUTION INTRAVENOUS at 17:55

## 2017-05-19 RX ADMIN — POLYETHYLENE GLYCOL 3350 17 G: 17 POWDER, FOR SOLUTION ORAL at 08:46

## 2017-05-19 RX ADMIN — ACETAMINOPHEN 650 MG: 325 TABLET, FILM COATED ORAL at 22:04

## 2017-05-19 ASSESSMENT — PAIN DESCRIPTION - DESCRIPTORS
DESCRIPTORS: ACHING
DESCRIPTORS: ACHING;DULL;PRESSURE

## 2017-05-19 NOTE — PLAN OF CARE
Problem: Goal Outcome Summary  Goal: Goal Outcome Summary  OT 6B Pt continues to be limited by pain. Completed in room transfers and bathroom transfers min A.  Stood x 5' for simple ADLs.   Rec TCU to improve cognitive compensation, strength activity tolerance for ADLs and home safety

## 2017-05-19 NOTE — PLAN OF CARE
Problem: Goal Outcome Summary  Goal: Goal Outcome Summary  PT 6B: Patient seen after recent chest tube removal, completes seated and standing LE exercises with min assist for balance, ambulates 140ft without AD and min assist for balance. Pt unable to recall all post-surgical precautions, reports her legs feel weak and tired. Min assist for bed mobility and sit to stand transfers.   Recommend discharge to TCU when medically appropriate to improve strength, activity tolerance, and independence with functional mobility.

## 2017-05-19 NOTE — PLAN OF CARE
Neuro: A&Ox4.   Cardiac: SR. With frequent PAC'S. VSS.   Respiratory: Sating upper 90's on RA. Right CT to -10 suction.   GI/: Adequate urine output.   Diet/appetite: Tolerating regular diet. Eating well. Calorie counts continue.   Activity:  Assist of 1, up to chair and in halls.  Pain: At acceptable level on current regimen. Pain controlled with scheduled tylenol and PRN oxycodone.   Skin: Intact, no new deficits noted. Upper right CT pulled by MD this afternoon.     R: Continue with POC. Notify primary team with changes. Heparin gtt continues at ordered rate. 10A to be rechecked at 1600.

## 2017-05-19 NOTE — DISCHARGE SUMMARY
NAME: Yue Valenzuela   MRN: [id number]   : 1945     DATE OF ADMISSION: 5/10/2017     PRE/POSTOPERATIVE DIAGNOSES: Right lower lobe lung cancer.      CODING ADDENDUM:  Discharge diagnosis: Severe malnutrition in the context of acute illness    PROCEDURES PERFORMED:   Flexible bronchoscopy, cervical mediastinoscopy and lymph node biopsies followed by right video-assisted thoracoscopic surgery, right lower lobectomy, right middle lobe wedge resection and mediastinal lymph node dissection.     PATHOLOGY RESULTS: Right lower lobe adenocarcinoma    CULTURE RESULTS:  Urine Culture Aerobic Bacterial [[id number]] (Abnormal)  Collected: 17 0740      Order Status: Completed Lab Status: Final result Updated: 17 0603      Specimen Description Midstream Urine      Special Requests Specimen received in preservative      Culture Micro 10,000 to 50,000 colonies/mL Pseudomonas aeruginosa (A)      Micro Report Status FINAL 2017      Organism: 10,000 to 50,000 colonies/mL Pseudomonas aeruginosa     Culture & Susceptibility      10,000 TO 50,000 COLONIES/ML PSEUDOMONAS AERUGINOSA (BRITT)      Antibiotic Sensitivity Unit Status     AMIKACIN <=2 Susceptible ug/mL Final     CEFEPIME 2 Susceptible ug/mL Final     CEFTAZIDIME 4 Susceptible ug/mL Final     CIPROFLOXACIN <=0.25 Susceptible ug/mL Final     GENTAMICIN <=1 Susceptible ug/mL Final     LEVOFLOXACIN 1 Susceptible ug/mL Final     MEROPENEM <=0.25 Susceptible ug/mL Final     Piperacillin/Tazo 8 Susceptible ug/mL Final     TOBRAMYCIN <=1 Susceptible ug/mL Final                INTRAOPERATIVE COMPLICATIONS: None     POSTOPERATIVE COMPLICATIONS:   1. Atelectasis requiring postop bronchoscopy- Her RML became completely atelectatic, requiring postop bronchoscopy.  Prophylactic Levaquin was prescribed and and 8 day course was completed    2. Atrial flutter- Paroxysmal, cardiology (EP) was consulted who recommended anticoagulation, transition from home coreg to  "metoprolol, and diltiazem.  Also noted, \"Typical A.flutter is highly amenable to ablation procedure. An ablation can be considered for any recurrence after further post operative healing.\"    3. SUPRIYA- 1.  Nephrology consulted, with final recs as below  Etiology felt to be 2/2 hypotension/ Afib with RVR during perioperative period in setting of poor renal reserve. Peak creat 2.3. Baseline 1.8-2.2.    - Continue to monitor renal function closely    - Avoid nephrotoxins/hypotension   - Renal dose medications   - Continue to hold ACE. Outpt Nephrologist can restart    4. Prolonged postop air leak:  Treated with continuation of postop chest tube.  The tube was clamped on 5/24 with stable 24hr postclamp imaging.  The tube was removed on 5/25 with stable CXR on 5/26.    5.  UTI- Treated concurrently with prophylactic PNA Tx with Levaquin    ADDITIONAL CONSULTS: Diabetes management consult: final recs  -glargine 17 units in the morning  -aspart meal Insulin:  Breakfast: 1 unit per 11 grams of CHO  Lunch: 1 unit per 11 grams of CHO  Dinner: 1 unit per 10 grams of CHO   Snacks: 1 unit per 10 grams of CHO  -Aspart medium correction with meals and HS  - Monitor glucose before meals, HS and 0200  -If bedtime glucose < 150 ( Ms. Valenzuela will have a snack of approximately 6 oz of Sprit and some pretzels)    DRAINS/TUBES PRESENT AT DISCHARGE: None    DATE OF DISCHARGE:  5/26/2017     HOSPITAL COURSE: Yue Valenzuela is a 71 year old female who on 5/10/2017 underwent the above-named procedures.  She tolerated the operation well and postoperatively was transferred to the general post-surgical unit.  The remainder of her course was complicated by the above noted items.  Prior to discharge, her pain was controlled well, she was able to perform ADLs and ambulate independently without difficulty, and had full return of bowel and bladder function.  On May 26, 2017, she was discharged to [facility] [place] [facility] in stable " condition.    DISCHARGE EXAM:   A&O, NAD  Resp non-labored  Distal extremities warm    Incisions healing well     DISCHARGE INSTRUCTIONS:    Discharge Procedure Orders  XR Chest 2 Views   Standing Status: Future  Standing Exp. Date: 08/24/17     General info for SNF   Order Comments: Length of Stay Estimate: Short Term Care: Estimated # of Days <30  Condition at Discharge: Improving  Level of care: Skilled  Rehabilitation Potential: Good  Admission H&P remains valid and up-to-date: Yes  Recent Chemotherapy: N/A  Use Nursing Home Standing Orders: Yes     Mantoux instructions   Order Comments: Give two-step Mantoux (PPD) Per Facility Policy Yes     Discharge Instructions   Order Comments: THORACIC SURGERY DISCHARGE INSTRUCTIONS    DIET: Regular diet - as prior to admission     If your plans upon discharge include prolonged periods of sitting (i.e a lengthy car or plane ride), it is highly beneficial to get up and walk at least once per hour to help prevent swelling and blood clots.     You may remove chest tube dressing 48 hours after tube removal and bandage the site at your own discretion thereafter.  Small amounts of leakage are normal for 2-3 days after removal.  Feel free to call with questions.    You may get incision wet 2 days after operation. Do not submerge, soak, or scrub incision or swim until seen in follow-up.    Take incentive spirometer home for continued frequent use    Activity as tolerated, no strenous activity until seen in follow-up, no lifting greater than 20 pounds for the next 1-2 weeks.    Stay hydrated. Take over the counter fiber (metamucil or benefiber) and stool softeners (Miralax, docusate or senna) if becoming constipated.     Call for fever greater than 101.5, chills, increased size of incision, red skin around incision, vision changes, muscle strength changes, sensation changes, shortness of breath, or other concerns.    No driving while taking narcotic pain medication.    Transition  "to ibuprofen or tylenol/acetaminophen for pain control. Do not take tylenol/acetaminophen and acetaminophen containing narcotic (e.g., percocet or vicodin) at the same time. If you have known ulcer problems, or kidney trouble (elevated creatinine) do not take the ibuprofen.    In emergencies, call 911    For other Questions or Concerns;  A.) During weekday working hours (Monday through Friday 8am to 4:30pm)  call [phone] and ask to speak to a clinical nurse specialist.    B.) At nights (after 4:30pm), on weekends, or if urgent call [phone] and  tell the  \"I would like to page job code [phone], the thoracic surgery  fellow on call, please.\"     Follow Up and recommended labs and tests   Order Comments: 1.) Follow up with primary care physician, Nory Elliott, in 1-2 weeks.  2.) Follow up with Dr. Glenna Hall in Thoracic Surgery clinic in 1 month, prior to which a CXR should be performed.     Physical Therapy Peds Consult   Order Comments: Evaluate and treat as clinically indicated.    Reason:  S/p thoracic surgery, deconditioning     Occupational Therapy Peds Consult   Order Comments: Evaluate and treat as clinically indicated.    Reason:  S/p thoracic surgery, deconditioning         DISCHARGE MEDICATIONS:    Yue Valenzuela   Home Medication Instructions PAMELA:[id number]    Printed on:05/26/17 1028   Medication Information                      Acetaminophen (TYLENOL PO)  Take 1,000 mg by mouth every 8 hours as needed for mild pain or fever              acetaminophen (TYLENOL) 325 MG tablet  Take 3 tablets (975 mg) by mouth every 8 hours             albuterol (PROAIR HFA/PROVENTIL HFA/VENTOLIN HFA) 108 (90 BASE) MCG/ACT Inhaler  Inhale 2 puffs into the lungs 2 times daily             aspirin EC 81 MG tablet  Take 81 mg by mouth every evening              blood glucose (ONE TOUCH ULTRA) test strip               budesonide-formoterol (SYMBICORT) 80-4.5 MCG/ACT inhaler  Inhale 2 puffs into the " lungs 2 times daily             calcium 600 MG tablet  Take 1 tablet by mouth every morning             calcium acetate (PHOSLO) 667 MG CAPS  Take 667 mg by mouth 4 times daily (with meals and nightly)              cetirizine (ZYRTEC) 10 MG tablet  Take 5 mg by mouth daily             cholecalciferol (VITAMIN D) 1000 UNIT tablet  Take 1,000 Units by mouth every morning              diltiazem (CARDIZEM SR) 90 MG 12 hr capsule  Take 2 capsules (180 mg) by mouth 2 times daily             furosemide (LASIX) 20 MG tablet  20 mg every morning              insulin aspart (NOVOLOG PEN) 100 UNIT/ML injection  Inject 1-7 Units Subcutaneous 3 times daily (before meals) Correction Scale - MEDIUM   For Pre-Meal  - 189 give 1 unit  For Pre-Meal  - 239 give 2 units  For Pre-Meal  - 289 give 3 units  For Pre-Meal  - 339 give 4 units  For Pre-Meal - 399 give 5 units  For Pre-Meal -449 give 6 units  For Pre-Meal BG greater than or equal to 450 give 7 units.  To be given with prandial insulin, and based on pre-meal glucose             insulin aspart (NOVOLOG PEN) 100 UNIT/ML injection  Inject 1-5 Units Subcutaneous At Bedtime MEDIUM INSULIN RESISTANCE DOSING    Do Not give Bedtime Correction Insulin if BG less than  200.   For  - 249 give 1 units.   For  - 299 give 2 units.   For  - 349 give 3 units.   For  -399 give 4 units.   For BG greater than or equal to 400 give 5 units.  Notify provider if glucose greater than or equal to 350 mg/dL after administration of correction dose.             insulin aspart (NOVOLOG PEN) 100 UNIT/ML injection  Carb counting: LUNCH: 1 unit per 11 grams of CHO             insulin aspart (NOVOLOG PEN) 100 UNIT/ML injection  Carb Counting: BREAKFAST: 1 unit per 11 grams of CHO             insulin aspart (NOVOLOG PEN) 100 UNIT/ML injection  Carb Counting: DINNER: 1 units per 10 grams of carbohydrate             insulin aspart (NOVOLOG PEN) 100  UNIT/ML injection  Carb Counting: Snacks and Supplements: 1 unit per 10 grams of carbohydrate             insulin glargine (LANTUS) 100 UNIT/ML injection  Inject 17 Units Subcutaneous every 24 hours             metoprolol (LOPRESSOR) 25 MG tablet  Take 1 tablet (25 mg) by mouth 2 times daily             ONETOUCH DELICA LANCETS 33G MISC               polyethylene glycol (MIRALAX/GLYCOLAX) Packet  Take 17 g by mouth daily             pravastatin (PRAVACHOL) 40 MG tablet  40 mg every evening              Respiratory Therapy Supplies (AEROBIKA) GAGANDEEP  1 Device 6 times daily             senna-docusate (SENOKOT-S;PERICOLACE) 8.6-50 MG per tablet  Take 2 tablets by mouth 2 times daily             Warfarin Therapy Reminder  1 each continuous prn

## 2017-05-19 NOTE — PROGRESS NOTES
"                     Diabetes Consult Daily  Progress Note          Assessment/Plan:   Yue Valenzuela is a 71 year old female with DM-1 managed on ambulatory subcutaneous insulin pump at home, CKD IV, factor V Leiden mutation, HTN, HLD, CAD s/p stent, PVD, carotid artery stenosis, asthma, and newly diagnosed lung adenocarcinoma, s/ p right lower lobectomy and middle wedge 5/10/2017.       Plan: no change made to plan  -Lantus 20 units HS  -aspart meal Insulin:  Breakfast: 1 unit per 11 grams of CHO  Lunch: 1 unit per 11 grams of CHO  Dinner: 1 unit per 10 grams of CHO  Snacks: 1 unit per 10 grams of CHO  -Aspart medium correction with meals and snacks  Monitor glucose before meals, HS and 0200      Will continue to follow           Interval History:     I reviewed the last 24 hours progress notes  Blood sugars moderately controlled  Glucose at ~ 0200, 111 and fasting 181/165  Mr. Valenzuela at bedside, Mrs. Valenzuela, he reports her eating breakfast and \"magic Cup\". No meal insulin documented on glucose according  Thus, glucose of 289 at noon.  Mr. Valenzuela reports her appetite is improving and he brought in some foods from home.        Recent Labs  Lab 05/19/17  1156 05/19/17  0842 05/19/17  0757 05/19/17  0614 05/19/17  0512 05/19/17  0151 05/18/17  2204  05/18/17  0630  05/17/17  0128  05/16/17  0951  05/15/17  1545  05/15/17  0545   GLC  --   --   --   --  181*  --   --   --  220*  --  238*  --  246*  --  198*  --  142*   * 184* 185* 165*  --  111* 200*  < >  --   < >  --   < >  --   < >  --   < >  --    < > = values in this interval not displayed.            Review of Systems:      please see interval history       Medications:       Active Diet Order      Regular Diet Adult     Physical Exam:  Gen: NAD   HEENT:  mucous membranes are moist  Resp: Unlabored   Neuro: sleeping  /54 (BP Location: Right arm)  Pulse 79  Temp 97.8  F (36.6  C) (Oral)  Resp 18  Ht 1.626 m (5' 4\")  Wt 62.6 kg (138 lb)  SpO2 " 98%  BMI 23.69 kg/m2           Data:     Lab Results   Component Value Date    A1C 7.0 05/03/2017    A1C 6.0 08/04/2014              CBC RESULTS:   Recent Labs   Lab Test  05/19/17   0953   WBC  21.9*   RBC  3.15*   HGB  9.2*   HCT  29.0*   MCV  92   MCH  29.2   MCHC  31.7   RDW  14.3   PLT  230     Recent Labs   Lab Test  05/19/17   0512  05/18/17   0630   NA  131*  133   POTASSIUM  4.4  4.6   CHLORIDE  100  103   CO2  20  23   ANIONGAP  11  8   GLC  181*  220*   BUN  58*  55*   CR  2.30*  2.38*   ZAHEER  7.7*  8.0*     Liver Function Studies -   Recent Labs   Lab Test  08/04/14   0712   PROTTOTAL  7.5   ALBUMIN  4.3   BILITOTAL  0.4   ALKPHOS  79   AST  29   ALT  44     Lab Results   Component Value Date    INR 1.06 04/18/2017    INR 1.08 04/04/2017    INR 0.99 01/13/2017    INR 0.97 08/04/2014           Yudy Wilson, CNP pager [phone]  Diabetes Management Job Code [id number]

## 2017-05-19 NOTE — PROGRESS NOTES
"Thoracic Surgery Daily Progress Note  Yue Valenzuela  05/19/2017    Subjective:   Remains in sinus rhythm. Pain is well controlled- not taking her narcotics frequently. No chest pain/pressure or shortness of breath. Tolerating minimal regular diet, likes supplements. Passing flatus, had a small bowel movement.      Objective:    /54 (BP Location: Right arm)  Pulse 79  Temp 97.8  F (36.6  C) (Oral)  Resp 18  Ht 1.626 m (5' 4\")  Wt 62.6 kg (138 lb)  SpO2 98%  BMI 23.69 kg/m2  Sitting up in bed in no acute distress  Awake, alert and appropriate  Non-labored breathing  Sinus rhythm on telemetry  CTs in place with surgical CT with small air leak.   Extremities warm, no significant edema.     Ins/Outs:   I/O last 3 completed shifts:  In: 636 [P.O.:240; I.V.:396]  Out: 766 [Urine:650; Chest Tube:116]    Labs:   AM labs reviewed - Cr mildly decreased today  Rising leukocytosis despite recent initiation of abx    Assessment:  Yue Valenzuela is a 71 year old female now post operative day 9 from a right VATS, lower lobectomy and middle wedge.     Plan:       Neuro:  PO pain medications, limit narcotics as able.    Cardiovascular/Heme: SR today, Cardiology consulted, appreciate assistance.      Respiratory/Pulm: Continue chest tube, pigtail removed this PM. Follow up morning chest xray. Continue aggressive pulmonary toilet.     FEN/Gastrointestinal: Regular diet. Bowel regimen.    Renal/Genitourinary: SUPRIYA on CKD resolving, Cr bumped yesterday but slightly improved today, will monitor closely, appreciate nephrology recs. Urine output adequate. Strict I/O    Infectious: Worsening leukocytosis, started on Levaquin yesterday for urine and lung coverage, remains afebrile, continue to monitor.     Prophy: Heparin drip, protonix.    Activity:  Up to chair, out of bed, increase activity.     Endo: DM, on insulin, appreciate endocrinology assistance.     Dispo: Continue care on 6B    Patient was seen and discussed with " Dr. Diaz, Thoracic Surgery Fellow, who agrees with the assessment and plan and will discuss with staff.     Emiliano Encarnacion PA-C  P: [phone]

## 2017-05-19 NOTE — PROGRESS NOTES
Nephrology Progress Note      Interval History:  Ms Valenzuela is sitting up in the chair this morning and denies any complaints. She reports of feeling well overall.   Cardizem gtt->oral yesterday and patient has converted to SR in setting of Metoprolol for rate control.  Continuing on Heparin drip unstable Afib with RVR.  Her WBC has been climbing and she is on Levaquin for coverage post bronchoscopy.  Cultures NGTD.    ASSESSMENT AND RECOMMENDATIONS:   71 year old female with long standing type I diabetes with presumed diabetic nephropathy (followed by Dr Sim of [facility]), CKD stage 4, Scr 1.8-2.2 mg/dL (eGFR 20) who is s/p VATS for lung adenocarcinoma. Nephrology is consulted for CKD and hyperkalemia.    CKD with elevated creatinine: baseline creatinine of 1.8-2.2. Patient and  reports is often as high as 2.3 as outpatient.   Her creatinine peaked at 2.4 in perioperative period likely secondary to hypotension/hemodynamic changes in the setting of poor renal reserve.   Then CRT rebound to 2.3 on 5/18 .  Multifactorial insults including concern for infection given rising WBC, recent albeit infrequent low systolic BPs and poor renal perfusion during cardiac arrhythmia.       --treat infection as indicated to prevent sepsis.  --Continue SC if patient is unable to void or feels pressure/sensation of incomplete emptying.  --Avoid nephrotoxins and ensure medications are renally dosed.  --Continue to hold Lisinopril and do not order on discharge.  --She should follow up with her nephrologist to restart it.    Blood pressure/ volume : Lasix 20 mg daily (D/C'ed today), Cardizem 180 SR.  Agree with replacing Coreg with Metoprolol and continued hold of amlodipine.  --Goal BP would be < 130/90 mm Hg. Avoid hypotensive episodes.   --Patient has not tolerated multiple antihypertensives in the past, including doxazosin.  --If blood pressure  "again becomes uncontrolled (hyper).  May consider the addition of Hydralazine in the future since we have removed Lisinopril.     High normal K: In the setting of total body potassium excess and hyperglycemia.   --Improving on low K diet.   --continue holding lisinopril.     Sheridan Houston PA-C     REVIEW OF SYSTEMS:  Mouth very dry; chronic.  Good urine output without hematuria, dysuria.  No confusion or lightheadedness  No N/V/D.   Breathing well on RA, Ambulating around unit, weakness improving.    PHYSICAL EXAM:   Temp  Av.8  F (36.6  C)  Min: 96.5  F (35.8  C)  Max: 99.4  F (37.4  C)  Arterial Line MAP (mmHg)  Av mmHg  Min: 58 mmHg  Max: 88 mmHg  Arterial Line BP  Min: 109/36  Max: 147/49      Pulse  Av.3  Min: 60  Max: 88 Resp  Av.4  Min: 10  Max: 20  SpO2  Av.5 %  Min: 93 %  Max: 100 %       /54 (BP Location: Right arm)  Pulse 79  Temp 97.8  F (36.6  C) (Oral)  Resp 18  Ht 1.626 m (5' 4\")  Wt 62.6 kg (138 lb)  SpO2 98%  BMI 23.69 kg/m2   Date 17 0700 - 17 0659     Wt Readings from Last 2 Encounters:   17 62.6 kg (138 lb)   17 63.2 kg (139 lb 4.8 oz)     Intake/Output Summary (Last 24 hours) at 17 1434  Last data filed at 17 1200   Gross per 24 hour   Intake            683.8 ml   Output              756 ml   Net            -72.2 ml     GENERAL APPEARANCE: no distress,  awake  EYES: no scleral icterus, pupils equal  HENT: NC/AT,  mouth  without ulcers or lesions  Lymphatics: no cervical or supraclavicular LAD. Midline incision over trachea  Pulmonary: Decreased air moving in RLL.  Non labored breathing on RA.  CT tubes in place.  CV: regular rhythm, normal rate, no rub   - JVP not elevated   - Edema- not significant  GI: soft, mildly distended, nontender, normal bowel sounds.  MS: no evidence of inflammation in joints, no muscle tenderness  SKIN: no rash, warm, dry, no cyanosis  NEURO: mentation intact and speech normal      PAST " MEDICAL HISTORY:  Reviewed with patient at time of initial consult.    Past Medical History:   Diagnosis Date     Adenocarcinoma, lung (H)      Asthma      CAD (coronary artery disease) 9/17/2014 2006 PCI circumflex territory  Mid LAD lesion      Carotid artery stenosis      CKD (chronic kidney disease) stage 4, GFR 15-29 ml/min (H)      Diabetes mellitus type 1 (H)      H/O factor V Leiden mutation      Hypertension      Mixed hyperlipidemia (DYSLIPIDEMIA) 8/1/2014     Osteopenia      Pneumothorax 4/16/2017     PVD (peripheral vascular disease) (H)      Thyroid nodule 04/12/2012    Hurtle Cells, non malignant       Past Surgical History:   Procedure Laterality Date     BRONCHOSCOPY FLEXIBLE Right 5/10/2017    Procedure: BRONCHOSCOPY FLEXIBLE;  Flexible Bronchoscopy, esaphogastroduodenoscopy, cervical Mediastinoscopy, Right  Thoracoscopic Surgery, right Lower Lobectomy, Mediastinal Lymph Node Dissection, Right middle lobe wedge resection*Latex Allergy* (ERAS Patient);  Surgeon: Glenna Hall MD;  Location: [facility] OR     ESOPHAGOSCOPY, GASTROSCOPY, DUODENOSCOPY (EGD), COMBINED N/A 5/10/2017    Procedure: COMBINED ESOPHAGOSCOPY, GASTROSCOPY, DUODENOSCOPY (EGD);;  Surgeon: Glenna Hall MD;  Location: [facility] OR     ganglion cyst removal       hemithyroidectomy  4/13/2012    PATH report showed benign Hurtle Cell Adenoma     IR biopsy of lung  04/2017     lipoma removal       MEDIASTINOSCOPY N/A 5/10/2017    Procedure: MEDIASTINOSCOPY;;  Surgeon: Glenna Hall MD;  Location: [facility] OR     SHOULDER SURGERY Left      THORACOSCOPIC EXCISE NODE MEDIASTINAL Right 5/10/2017    Procedure: THORACOSCOPIC EXCISE NODE MEDIASTINAL;;  Surgeon: Glenna Hall MD;  Location: [facility] OR     THORACOSCOPIC RESECTION LUNG Right 5/10/2017    Procedure: THORACOSCOPIC RESECTION LUNG;;  Surgeon: Glenna Hall MD;  Location: [facility] OR     TONSILLECTOMY          MEDICATIONS:  PTA Meds  Prior to Admission medications    Medication Sig Last Dose Taking?  Auth Provider   albuterol (PROAIR HFA/PROVENTIL HFA/VENTOLIN HFA) 108 (90 BASE) MCG/ACT Inhaler Inhale 2 puffs into the lungs 2 times daily 5/9/2017 at Unknown time Yes Reported, Patient   Respiratory Therapy Supplies (AEROBIKA) GAGANDEEP 1 Device 6 times daily 5/9/2017 at Unknown time Yes Demario Torrez MD   Insulin Aspart (INSULIN PUMP - OUTPATIENT) Insulin pump base rate: 9729-0655 rate 0.5  3617-9029 rate 1.0  1370-7822 rate 1.95  5231-9678 rate 1.35 5/10/2017 at Unknown time Yes Reported, Patient   Acetaminophen (TYLENOL PO) Take 1,000 mg by mouth every 8 hours as needed for mild pain or fever  Past Week at Unknown time Yes Reported, Patient   budesonide-formoterol (SYMBICORT) 80-4.5 MCG/ACT inhaler Inhale 2 puffs into the lungs 2 times daily Past Week at Unknown time Yes John Plaza MD   amLODIPine (NORVASC) 10 MG tablet Take 10 mg by mouth every evening  5/9/2017 at Unknown time Yes Reported, Patient   aspirin EC 81 MG tablet Take 81 mg by mouth every evening  5/9/2017 at Unknown time Yes Reported, Patient   carvedilol (COREG) 3.125 MG tablet Take 3.125 mg by mouth 2 times daily (with meals)  5/9/2017 at Unknown time Yes Reported, Patient   furosemide (LASIX) 20 MG tablet 20 mg every morning  5/9/2017 at Unknown time Yes Reported, Patient   lisinopril (PRINIVIL,ZESTRIL) 20 MG tablet Take 10 mg by mouth every evening  5/9/2017 at Unknown time Yes Reported, Patient   pravastatin (PRAVACHOL) 40 MG tablet 40 mg every evening  5/9/2017 at Unknown time Yes Reported, Patient   cetirizine (ZYRTEC) 10 MG tablet Take 5 mg by mouth daily   Reported, Patient   calcium 600 MG tablet Take 1 tablet by mouth every morning   Reported, Patient   clonazePAM (KLONOPIN) 0.5 MG tablet Take 0.5 tablets (0.25 mg) by mouth 2 times daily as needed for anxiety   Elisa Ellington MD   blood glucose (ONE TOUCH ULTRA) test strip    Reported, Patient   calcium acetate (PHOSLO) 667 MG CAPS Take 667 mg by mouth 4 times daily  (with meals and nightly)    Reported, Patient   cholecalciferol (VITAMIN D) 1000 UNIT tablet Take 1,000 Units by mouth every morning    Reported, Patient   ONETOUCH DELICA LANCETS 33G MISC    Reported, Patient      Current Meds    levofloxacin  250 mg Oral Daily     diltiazem  180 mg Oral BID     metoprolol  25 mg Oral BID     insulin glargine  20 Units Subcutaneous Q24H     lidocaine  1 patch Transdermal Q24h    And     lidocaine   Transdermal Q24H    And     lidocaine   Transdermal Q8H     artificial saliva  10 mL Swish & Spit 4x Daily     sodium chloride (PF)  3 mL Intracatheter Q8H     insulin aspart   Subcutaneous Daily with breakfast     insulin aspart   Subcutaneous Daily before lunch     insulin aspart   Subcutaneous Daily with supper     levalbuterol  0.63 mg Nebulization Q4H WA     sodium chloride  3 mL Nebulization Q4H While awake     guaiFENesin  600 mg Oral BID     insulin aspart  1-7 Units Subcutaneous TID AC     insulin aspart  1-5 Units Subcutaneous At Bedtime     bisacodyl  10 mg Rectal Daily     senna-docusate  2 tablet Oral BID     polyethylene glycol  17 g Oral BID     acetaminophen  975 mg Oral Q8H     melatonin  3 mg Oral At Bedtime     menthol   Transdermal Q8H     aspirin EC  81 mg Oral QPM     cetirizine  5 mg Oral Daily     pravastatin  40 mg Oral QPM     pantoprazole  40 mg Oral Daily     fluticasone-vilanterol  1 puff Inhalation Daily     Infusion Meds    HEParin 900 Units/hr (05/19/17 1008)     dextrose 5% and 0.45% NaCl 30 mL/hr at 05/19/17 0400       ALLERGIES:    Allergies   Allergen Reactions     Codeine Other (See Comments)     Makes her sleepless     Loratadine Other (See Comments)     Extreme sleepiness       Sulfa Drugs Unknown     Other reaction(s): Unknown     Terazosin Other (See Comments)     Swellling in legs, improved when went off     Adhesive Tape Rash     Foam tape used during surgery was what caused rash     Latex Rash     Liquid Adhesive Rash     SOCIAL HISTORY:    Social History     Social History     Marital status:      Spouse name: N/A     Number of children: N/A     Years of education: N/A     Occupational History     retail Other     part-time     Social History Main Topics     Smoking status: Former Smoker     Packs/day: 0.50     Years: 15.00     Types: Cigarettes     Start date: 1/1/1970     Quit date: 12/31/1985     Smokeless tobacco: Never Used     Alcohol use 0.0 - 0.5 oz/week     0 - 1 Standard drinks or equivalent per week     Drug use: No     Sexual activity: Not on file     Other Topics Concern     Not on file     Social History Narrative    The patient has a 7 pk yr tobacco hx.  She has no active use since 1985.  Alcohol use is 0 alcoholic drinks per week.  She denies use of recreational drugs.          She is retired.  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           The patient is .  Has 2 children.        Hot Tub Exposure: NO    Recent Travel: NO     Hx of incarceration:  NO    Bird Exposure:   NO    Animal Exposure:  NO    Inhalation Exposure:  NO         Reviewed with patient at time of initial consult.    FAMILY MEDICAL HISTORY:   Family History   Problem Relation Age of Onset     Cancer - colorectal Father      DIABETES Father      Scleroderma Mother      CEREBROVASCULAR DISEASE Brother      DIABETES Brother      Hypertension Brother      DIABETES Brother      2nd brother     Reviewed with patient at time of initial consult.    LABS:   CMP    Recent Labs  Lab 05/19/17  0512 05/18/17  0630 05/17/17  0128 05/16/17  0951 05/15/17  1545   * 133 132* 136 133   POTASSIUM 4.4 4.6 4.4 5.1 4.8   CHLORIDE 100 103 101 104 103   CO2 20 23 24 20 23   ANIONGAP 11 8 7 12 7   * 220* 238* 246* 198*   BUN 58* 55* 46* 45* 46*   CR 2.30* 2.38* 1.78* 1.74* 1.78*   GFRESTIMATED 21* 20* 28* 29* 28*   GFRESTBLACK 25* 24* 34* 35* 34*   ZAHEER 7.7* 8.0* 8.2* 8.8 8.3*   MAG  --  2.3 2.1 2.1 2.2   PHOS  --  4.0 2.7 3.8 4.1      CBC    Recent Labs  Lab 05/19/17  0953 05/19/17  0458 05/18/17  0630 05/17/17  1338 05/16/17  0951   HGB 9.2*  --  8.9* 9.9* 11.5*   WBC 21.9*  --  18.4* 17.2* 13.3*   RBC 3.15*  --  3.05* 3.36* 3.81   HCT 29.0*  --  28.2* 31.0* 34.8*   MCV 92  --  93 92 91   MCH 29.2  --  29.2 29.5 30.2   MCHC 31.7  --  31.6 31.9 33.0   RDW 14.3  --  14.3 13.9 13.6    237 228 213 208     INRNo lab results found in last 7 days.  ABG  No lab results found in last 7 days.   URINE STUDIES  Recent Labs   Lab Test  05/19/17   0740  05/13/17   0914  05/12/17   2312  08/04/14   0832   COLOR  Yellow  Light Yellow  Light Yellow  Straw   APPEARANCE  Slightly Cloudy  Clear  Clear  Clear   URINEGLC  70*  300*  70*  300*   URINEBILI  Negative  Negative  Negative  Negative   URINEKETONE  Negative  Negative  Negative  Negative   SG  1.013  1.008  1.009  1.008   UBLD  Negative  Negative  Negative  Negative   URINEPH  5.0  5.0  5.0  5.5   PROTEIN  Negative  Negative  Negative  10*   NITRITE  Negative  Negative  Negative  Negative   LEUKEST  Large*  Negative  Negative  Negative   RBCU  <1  0  <1  <1   WBCU  44*  1  1  <1     No lab results found.  PTH  No lab results found.  IRON STUDIES  No lab results found.    IMAGING:  All imaging studies reviewed by me.     Sheridan Houston PA-C

## 2017-05-19 NOTE — PROGRESS NOTES
[id number]     Neuro: A&Ox4.   Cardiac: VSS.  Sinus kenny. Started oral dilt today   Respiratory: RA   GI/: Voiding spontaneously. No BM this shift.   Diet/appetite: Tolerating regular diet. Blood sugars with meals and HS. Denies nausea   Activity: Up with SBA    Pain: . C/o right shoulder pain, tylenol and oxycodone PRN   Skin: Intact. 3 Incisions intact and liquid bandaged, open to air. Chest tube dressing is CDI.   Lines: PIV infusing D5 1/2 NS at 30ml/hr and heparin gtt at 600u/hr which is therapeutic, recheck 10a in AM.   Drains: Right upper chest tube clamped. Righ lower chest tube to water seal.     Pt has been resting comfortably throughout shift, will continue to monitor and follow plan of care.

## 2017-05-19 NOTE — PLAN OF CARE
Problem: Goal Outcome Summary  Goal: Goal Outcome Summary  Outcome: Therapy, progress towards functional goals is fair  OT-6B: Pt enaged in RUE thoracotomy exercises with fair tolerance and many vc's. Therapist educated pt on LE dressing within precautions. Pt engaged in few self cares with setup and vc's. Pt tolerated therapy session well. VSS.      REC: Discharge to TCU when medically appropriate.

## 2017-05-19 NOTE — PLAN OF CARE
"Problem: Goal Outcome Summary  Goal: Goal Outcome Summary  Outcome: Improving  Blood pressure 132/44, pulse 79, temperature 97.9  F (36.6  C), temperature source Oral, resp. rate 18, height 1.626 m (5' 4\"), weight 62.6 kg (138 lb), SpO2 94 %, not currently breastfeeding.    Neuro: A&Ox4.   Cardiac: SR. Apical pulse regular.  Respiratory: Sating 95% on RA.  GI/: Adequate urine output.  Diet/appetite: Tolerating regular diet. Eating well.  Activity:  SBA, up to chair and in halls.  Pain: At acceptable level on current regimen. Received 1 PRN dose of oxycodone.  Skin: Intact, no new deficits noted.     R: Continue with POC. Notify primary team with changes.          "

## 2017-05-20 ENCOUNTER — APPOINTMENT (OUTPATIENT)
Dept: GENERAL RADIOLOGY | Facility: CLINIC | Age: 72
DRG: 163 | End: 2017-05-20
Attending: STUDENT IN AN ORGANIZED HEALTH CARE EDUCATION/TRAINING PROGRAM
Payer: MEDICARE

## 2017-05-20 LAB
ANION GAP SERPL CALCULATED.3IONS-SCNC: 11 MMOL/L (ref 3–14)
ANION GAP SERPL CALCULATED.3IONS-SCNC: 9 MMOL/L (ref 3–14)
BUN SERPL-MCNC: 51 MG/DL (ref 7–30)
BUN SERPL-MCNC: 53 MG/DL (ref 7–30)
CALCIUM SERPL-MCNC: 7.2 MG/DL (ref 8.5–10.1)
CALCIUM SERPL-MCNC: 7.3 MG/DL (ref 8.5–10.1)
CHLORIDE SERPL-SCNC: 95 MMOL/L (ref 94–109)
CHLORIDE SERPL-SCNC: 98 MMOL/L (ref 94–109)
CO2 SERPL-SCNC: 20 MMOL/L (ref 20–32)
CO2 SERPL-SCNC: 21 MMOL/L (ref 20–32)
CREAT SERPL-MCNC: 2.11 MG/DL (ref 0.52–1.04)
CREAT SERPL-MCNC: 2.2 MG/DL (ref 0.52–1.04)
ERYTHROCYTE [DISTWIDTH] IN BLOOD BY AUTOMATED COUNT: 14.1 % (ref 10–15)
GFR SERPL CREATININE-BSD FRML MDRD: 22 ML/MIN/1.7M2
GFR SERPL CREATININE-BSD FRML MDRD: 23 ML/MIN/1.7M2
GLUCOSE BLDC GLUCOMTR-MCNC: 138 MG/DL (ref 70–99)
GLUCOSE BLDC GLUCOMTR-MCNC: 144 MG/DL (ref 70–99)
GLUCOSE BLDC GLUCOMTR-MCNC: 171 MG/DL (ref 70–99)
GLUCOSE BLDC GLUCOMTR-MCNC: 241 MG/DL (ref 70–99)
GLUCOSE BLDC GLUCOMTR-MCNC: 253 MG/DL (ref 70–99)
GLUCOSE SERPL-MCNC: 118 MG/DL (ref 70–99)
GLUCOSE SERPL-MCNC: 260 MG/DL (ref 70–99)
HCT VFR BLD AUTO: 25.7 % (ref 35–47)
HGB BLD-MCNC: 8.4 G/DL (ref 11.7–15.7)
LACTATE BLD-SCNC: 0.8 MMOL/L (ref 0.7–2.1)
LMWH PPP CHRO-ACNC: 0.13 IU/ML
LMWH PPP CHRO-ACNC: 0.14 IU/ML
LMWH PPP CHRO-ACNC: 0.28 IU/ML
MAGNESIUM SERPL-MCNC: 2.2 MG/DL (ref 1.6–2.3)
MCH RBC QN AUTO: 29.3 PG (ref 26.5–33)
MCHC RBC AUTO-ENTMCNC: 32.7 G/DL (ref 31.5–36.5)
MCV RBC AUTO: 90 FL (ref 78–100)
PHOSPHATE SERPL-MCNC: 3.8 MG/DL (ref 2.5–4.5)
PLATELET # BLD AUTO: 235 10E9/L (ref 150–450)
POTASSIUM SERPL-SCNC: 4.2 MMOL/L (ref 3.4–5.3)
POTASSIUM SERPL-SCNC: 4.7 MMOL/L (ref 3.4–5.3)
RBC # BLD AUTO: 2.87 10E12/L (ref 3.8–5.2)
SODIUM SERPL-SCNC: 125 MMOL/L (ref 133–144)
SODIUM SERPL-SCNC: 125 MMOL/L (ref 133–144)
SODIUM SERPL-SCNC: 129 MMOL/L (ref 133–144)
WBC # BLD AUTO: 18.9 10E9/L (ref 4–11)

## 2017-05-20 PROCEDURE — 25000128 H RX IP 250 OP 636: Performed by: SURGERY

## 2017-05-20 PROCEDURE — 85027 COMPLETE CBC AUTOMATED: CPT | Performed by: STUDENT IN AN ORGANIZED HEALTH CARE EDUCATION/TRAINING PROGRAM

## 2017-05-20 PROCEDURE — A9270 NON-COVERED ITEM OR SERVICE: HCPCS | Mod: GY | Performed by: PHYSICIAN ASSISTANT

## 2017-05-20 PROCEDURE — 84300 ASSAY OF URINE SODIUM: CPT | Performed by: INTERNAL MEDICINE

## 2017-05-20 PROCEDURE — 25000132 ZZH RX MED GY IP 250 OP 250 PS 637: Mod: GY | Performed by: SURGERY

## 2017-05-20 PROCEDURE — A9270 NON-COVERED ITEM OR SERVICE: HCPCS | Mod: GY | Performed by: STUDENT IN AN ORGANIZED HEALTH CARE EDUCATION/TRAINING PROGRAM

## 2017-05-20 PROCEDURE — 85520 HEPARIN ASSAY: CPT | Performed by: SURGERY

## 2017-05-20 PROCEDURE — 80048 BASIC METABOLIC PNL TOTAL CA: CPT | Performed by: SURGERY

## 2017-05-20 PROCEDURE — 85520 HEPARIN ASSAY: CPT | Performed by: STUDENT IN AN ORGANIZED HEALTH CARE EDUCATION/TRAINING PROGRAM

## 2017-05-20 PROCEDURE — 80048 BASIC METABOLIC PNL TOTAL CA: CPT | Performed by: STUDENT IN AN ORGANIZED HEALTH CARE EDUCATION/TRAINING PROGRAM

## 2017-05-20 PROCEDURE — 93005 ELECTROCARDIOGRAM TRACING: CPT

## 2017-05-20 PROCEDURE — 25000132 ZZH RX MED GY IP 250 OP 250 PS 637: Mod: GY | Performed by: PHYSICIAN ASSISTANT

## 2017-05-20 PROCEDURE — A9270 NON-COVERED ITEM OR SERVICE: HCPCS | Mod: GY | Performed by: SURGERY

## 2017-05-20 PROCEDURE — 83605 ASSAY OF LACTIC ACID: CPT | Performed by: STUDENT IN AN ORGANIZED HEALTH CARE EDUCATION/TRAINING PROGRAM

## 2017-05-20 PROCEDURE — 94640 AIRWAY INHALATION TREATMENT: CPT | Mod: 76

## 2017-05-20 PROCEDURE — 85027 COMPLETE CBC AUTOMATED: CPT | Performed by: SURGERY

## 2017-05-20 PROCEDURE — 25000132 ZZH RX MED GY IP 250 OP 250 PS 637: Mod: GY | Performed by: STUDENT IN AN ORGANIZED HEALTH CARE EDUCATION/TRAINING PROGRAM

## 2017-05-20 PROCEDURE — A9270 NON-COVERED ITEM OR SERVICE: HCPCS | Mod: GY | Performed by: THORACIC SURGERY (CARDIOTHORACIC VASCULAR SURGERY)

## 2017-05-20 PROCEDURE — 00000146 ZZHCL STATISTIC GLUCOSE BY METER IP

## 2017-05-20 PROCEDURE — 40000275 ZZH STATISTIC RCP TIME EA 10 MIN

## 2017-05-20 PROCEDURE — 84295 ASSAY OF SERUM SODIUM: CPT | Performed by: STUDENT IN AN ORGANIZED HEALTH CARE EDUCATION/TRAINING PROGRAM

## 2017-05-20 PROCEDURE — 25000132 ZZH RX MED GY IP 250 OP 250 PS 637: Mod: GY | Performed by: THORACIC SURGERY (CARDIOTHORACIC VASCULAR SURGERY)

## 2017-05-20 PROCEDURE — 83735 ASSAY OF MAGNESIUM: CPT | Performed by: STUDENT IN AN ORGANIZED HEALTH CARE EDUCATION/TRAINING PROGRAM

## 2017-05-20 PROCEDURE — 25000125 ZZHC RX 250

## 2017-05-20 PROCEDURE — 71020 XR CHEST 2 VW: CPT

## 2017-05-20 PROCEDURE — 25000125 ZZHC RX 250: Performed by: STUDENT IN AN ORGANIZED HEALTH CARE EDUCATION/TRAINING PROGRAM

## 2017-05-20 PROCEDURE — 12000006 ZZH R&B IMCU INTERMEDIATE UMMC

## 2017-05-20 PROCEDURE — 36415 COLL VENOUS BLD VENIPUNCTURE: CPT | Performed by: SURGERY

## 2017-05-20 PROCEDURE — 93010 ELECTROCARDIOGRAM REPORT: CPT | Performed by: INTERNAL MEDICINE

## 2017-05-20 PROCEDURE — 36415 COLL VENOUS BLD VENIPUNCTURE: CPT | Performed by: STUDENT IN AN ORGANIZED HEALTH CARE EDUCATION/TRAINING PROGRAM

## 2017-05-20 PROCEDURE — 94640 AIRWAY INHALATION TREATMENT: CPT

## 2017-05-20 PROCEDURE — 84100 ASSAY OF PHOSPHORUS: CPT | Performed by: STUDENT IN AN ORGANIZED HEALTH CARE EDUCATION/TRAINING PROGRAM

## 2017-05-20 PROCEDURE — 25000125 ZZHC RX 250: Performed by: PHYSICIAN ASSISTANT

## 2017-05-20 RX ORDER — METOPROLOL TARTRATE 1 MG/ML
5 INJECTION, SOLUTION INTRAVENOUS EVERY 5 MIN PRN
Status: DISCONTINUED | OUTPATIENT
Start: 2017-05-20 | End: 2017-05-26 | Stop reason: HOSPADM

## 2017-05-20 RX ORDER — SODIUM CHLORIDE 9 MG/ML
INJECTION, SOLUTION INTRAVENOUS CONTINUOUS
Status: DISCONTINUED | OUTPATIENT
Start: 2017-05-20 | End: 2017-05-20

## 2017-05-20 RX ORDER — METOPROLOL TARTRATE 1 MG/ML
INJECTION, SOLUTION INTRAVENOUS
Status: COMPLETED
Start: 2017-05-20 | End: 2017-05-20

## 2017-05-20 RX ADMIN — ACETAMINOPHEN 650 MG: 325 TABLET, FILM COATED ORAL at 20:29

## 2017-05-20 RX ADMIN — MELATONIN TAB 3 MG 3 MG: 3 TAB at 21:19

## 2017-05-20 RX ADMIN — CETIRIZINE HYDROCHLORIDE 5 MG: 5 TABLET ORAL at 08:19

## 2017-05-20 RX ADMIN — DILTIAZEM HYDROCHLORIDE 180 MG: 90 CAPSULE, EXTENDED RELEASE ORAL at 20:14

## 2017-05-20 RX ADMIN — Medication 10 ML: at 17:57

## 2017-05-20 RX ADMIN — Medication 10 ML: at 08:26

## 2017-05-20 RX ADMIN — OXYCODONE HYDROCHLORIDE 5 MG: 5 TABLET ORAL at 00:19

## 2017-05-20 RX ADMIN — OXYCODONE HYDROCHLORIDE 5 MG: 5 TABLET ORAL at 10:48

## 2017-05-20 RX ADMIN — SENNOSIDES AND DOCUSATE SODIUM 2 TABLET: 8.6; 5 TABLET ORAL at 08:19

## 2017-05-20 RX ADMIN — LEVOFLOXACIN 250 MG: 250 TABLET, FILM COATED ORAL at 08:19

## 2017-05-20 RX ADMIN — METOPROLOL TARTRATE 5 MG: 5 INJECTION INTRAVENOUS at 03:56

## 2017-05-20 RX ADMIN — Medication 10 ML: at 21:22

## 2017-05-20 RX ADMIN — HEPARIN SODIUM 900 UNITS/HR: 10000 INJECTION, SOLUTION INTRAVENOUS at 11:06

## 2017-05-20 RX ADMIN — DOCUSATE SODIUM 286 ML: 50 LIQUID ORAL at 10:37

## 2017-05-20 RX ADMIN — PRAVASTATIN SODIUM 40 MG: 40 TABLET ORAL at 20:15

## 2017-05-20 RX ADMIN — PANTOPRAZOLE SODIUM 40 MG: 40 TABLET, DELAYED RELEASE ORAL at 08:19

## 2017-05-20 RX ADMIN — DILTIAZEM HYDROCHLORIDE 180 MG: 90 CAPSULE, EXTENDED RELEASE ORAL at 08:18

## 2017-05-20 RX ADMIN — ACETAMINOPHEN 975 MG: 325 TABLET, FILM COATED ORAL at 08:20

## 2017-05-20 RX ADMIN — SODIUM CHLORIDE SOLN NEBU 3% 3 ML: 3 NEBU SOLN at 20:01

## 2017-05-20 RX ADMIN — GUAIFENESIN 600 MG: 600 TABLET, EXTENDED RELEASE ORAL at 20:15

## 2017-05-20 RX ADMIN — LIDOCAINE 1 PATCH: 50 PATCH TOPICAL at 20:18

## 2017-05-20 RX ADMIN — SENNOSIDES AND DOCUSATE SODIUM 2 TABLET: 8.6; 5 TABLET ORAL at 20:15

## 2017-05-20 RX ADMIN — SODIUM CHLORIDE SOLN NEBU 3% 4 ML: 3 NEBU SOLN at 12:56

## 2017-05-20 RX ADMIN — POLYETHYLENE GLYCOL 3350 17 G: 17 POWDER, FOR SOLUTION ORAL at 08:20

## 2017-05-20 RX ADMIN — METOPROLOL TARTRATE 25 MG: 25 TABLET ORAL at 21:19

## 2017-05-20 RX ADMIN — LEVALBUTEROL HYDROCHLORIDE 0.63 MG: 0.63 SOLUTION RESPIRATORY (INHALATION) at 20:01

## 2017-05-20 RX ADMIN — ACETAMINOPHEN 650 MG: 325 TABLET, FILM COATED ORAL at 13:42

## 2017-05-20 RX ADMIN — METOPROLOL TARTRATE 25 MG: 25 TABLET ORAL at 08:19

## 2017-05-20 RX ADMIN — METOPROLOL TARTRATE 5 MG: 5 INJECTION INTRAVENOUS at 05:15

## 2017-05-20 RX ADMIN — ASPIRIN 81 MG CHEWABLE TABLET 81 MG: 81 TABLET CHEWABLE at 20:15

## 2017-05-20 RX ADMIN — ACETAMINOPHEN 975 MG: 325 TABLET, FILM COATED ORAL at 17:04

## 2017-05-20 RX ADMIN — FLUTICASONE FUROATE AND VILANTEROL TRIFENATATE 1 PUFF: 100; 25 POWDER RESPIRATORY (INHALATION) at 08:21

## 2017-05-20 RX ADMIN — LEVALBUTEROL HYDROCHLORIDE 0.63 MG: 0.63 SOLUTION RESPIRATORY (INHALATION) at 12:54

## 2017-05-20 RX ADMIN — BISACODYL 10 MG: 10 SUPPOSITORY RECTAL at 08:27

## 2017-05-20 RX ADMIN — SODIUM CHLORIDE: 9 INJECTION, SOLUTION INTRAVENOUS at 06:41

## 2017-05-20 RX ADMIN — GUAIFENESIN 600 MG: 600 TABLET, EXTENDED RELEASE ORAL at 08:21

## 2017-05-20 ASSESSMENT — PAIN DESCRIPTION - DESCRIPTORS
DESCRIPTORS: ACHING

## 2017-05-20 NOTE — PROGRESS NOTES
"Thoracic Surgery Daily Progress Note  Yue Valenzuela  05/20/2017    Subjective:   Converted back into aflutter overnight, though rate controlled.   Pain is well controlled. Tolerating diet. Feels constipated, unable to have a bowel movement this morning.   No chest pain or shortness of breath.     Objective:    /43 (BP Location: Right arm)  Pulse 79  Temp 97.7  F (36.5  C) (Axillary)  Resp 18  Ht 1.626 m (5' 4\")  Wt 62.2 kg (137 lb 1.6 oz)  SpO2 98%  BMI 23.53 kg/m2  Sitting up in chair in no acute distress  Awake, alert and appropriate  Non-labored breathing  Sinus rhythm on telemetry  CT in place with SS drainage. +airleak.     Ins/Outs:   I/O last 3 completed shifts:  In: 1628.8 [P.O.:900; I.V.:728.8]  Out: 1697 [Urine:1525; Chest Tube:172]    Labs:   AM labs reviewed - Cr down to 2.2, Na down to 129. WBC down to 18.9     Assessment:  Yue Valenzuela is a 71 year old female now post operative day 10 from a right VATS, lower lobectomy and middle wedge.     Plan:       Neuro:  PO pain medications, limit narcotics as able.    Cardiovascular/Heme: Cardiology consulted, appreciate assistance. Back in aflutter, rate controlled. Appreciate recommendations. Continue heparin.     Respiratory/Pulm: Continue chest tube to suction. If xray is stable, with place on water seal and repeat xray. Continue aggressive pulmonary toilet.     FEN/Gastrointestinal: Regular diet. Bowel regimen - increase today.    Renal/Genitourinary: SUPRIYA on CKD resolving. Appreciate nephrology assistance with SUPRIYA/electorlytes and hypoNa.     Infectious: Started on levaquin for worsening leukocytosis, improving. Continue to monitor.     Prophy: Heparin drip, protonix.    Activity:  Up to chair, out of bed, increase activity.     Endo: DM, on insulin, appreciate endocrinology assistance.     Dispo: Continue care on 6B    Patient was seen and discussed with Dr. Joya, Thoracic Surgery Fellow, who agrees with the assessment and plan and will " discuss with staff.     Evelyn Liriano MD  General Surgery  Pager: [phone]

## 2017-05-20 NOTE — PLAN OF CARE
Problem: Goal Outcome Summary  Goal: Goal Outcome Summary  PT 6B: Cancel, pt in a-flutter and not appropriate for PT at this time, will reschedule.

## 2017-05-20 NOTE — PLAN OF CARE
"Problem: Goal Outcome Summary  Goal: Goal Outcome Summary  OT 6B: OT session canceled; attempted x2; patient having \"panic attack\" per  on fist attempt and just had enema on second attempt; also still in atrial flutter. Check status 5/21 and resume OT if indicated.       "

## 2017-05-20 NOTE — PROVIDER NOTIFICATION
"Paged overnight Surgery crosscover Dr. Antonio Nascimento with update. Telemetry technician called to inform pt converted to atrial flutter @ ~0200 with rates of 110's-125. Last set of vitals taken at 0330: /64 (BP Location: Right arm, Cuff Size: Adult Small)  Pulse 79  Temp 98.3  F (36.8  C) (Oral)  Resp 14  Ht 1.626 m (5' 4\")  Wt 62.2 kg (137 lb 1.6 oz)  SpO2 97%  BMI 23.53 kg/m2. Will collect second set of vitals in 30 minutes, new orders entered for labs & STAT EKG, will continue to monitor per plan of care.    "

## 2017-05-20 NOTE — PROGRESS NOTES
"Surgery Cross Cover Brief Note  3:50 AM 5/20/2017     I was paged by nursing regarding recurrent atrial flutter. Went to see patient at bedside.    Subjective  Ms. Valenzuela is a 71 year-old female now POD #10 s/p right VATS, lower lobectomy, and middle wedge resection. She was noted to be in atrial flutter this morning at about 2am. Previous episode of atrial flutter / fibrillation lasted over one day and finally resolved two days ago with diltiazem gtt. Now on PO diltiazem and metoprolol. Patient sleeping at time of rhythm change. On questioning, she denies chest pain, palpitations, and difficulty breathing.    Objective  /64 (BP Location: Right arm, Cuff Size: Adult Small)  Pulse 79  Temp 98.3  F (36.8  C) (Oral)  Resp 14  Ht 1.626 m (5' 4\")  Wt 62.2 kg (137 lb 1.6 oz)  SpO2 97%  BMI 23.53 kg/m2    Exam:  Gen: NAD, A&O x3  Cardiac: Irregular rhythm, tachycardic  Respi: NLB on RA  Abd: soft, non-tender, non-distended    Relevant Labs and Imaging  - K 4.4 yesterday morning      Assessment / Plan  71F POD #10 s/p right VATS, lower lobectomy, and middle wedge resection with recurrent atrial flutter on PO diltiazem and metoprolol. Hemodynamically stable and asymptomatic.      EKG    CBC, BMP, Mg, Ph    Metoprolol 5 mg IV q5m for max of three doses    Cardiology following and considering ablation after post-op recovery    Will call if atrial flutter continues with HR > 120s    Continue to monitor closely      - - - - - - - - - - - -  Antonio Nascimento MD  PGY-1, General Surgery  [facility]  Pager: [phone]  Please contact primary team after 6am.  "

## 2017-05-20 NOTE — PROGRESS NOTES
Nephrology Progress Note      Interval History:  Ms Valenzuela was doing well until yesterday, rate controlled on PO cardizem. This morning, she was in quite distress from her abdominal and back pain and constipation. She has not had a good BM for 2-3 days per her .     ASSESSMENT AND RECOMMENDATIONS:   71 year old female with long standing type I diabetes with presumed diabetic nephropathy (followed by Dr Sim of [facility]), CKD stage 4, Scr 1.8-2.2 mg/dL (eGFR 20) who is s/p VATS for lung adenocarcinoma. Nephrology is consulted for CKD and hyperkalemia.    CKD with elevated creatinine: baseline creatinine of 1.8-2.2. Frequent fluctuations with hemodynamic changes even in the outpatient settings have been noted by the patient and her    Her creatinine peaked at 2.4 in perioperative period likely secondary to hypotension/hemodynamic changes in the setting of poor renal reserve. This was further complicated by Afib with RVR and likely infection (pneumonia vs UTI)   --Creatinine remains stable  --Avoid nephrotoxins and ensure medications are renally dosed.  --Continue to hold Lisinopril and do not order on discharge.  --She should follow up with her nephrologist to restart it.    Hyponatremia: Euvolemic on examination. Likely has some component of elevated ADH from recent surgery and ongoing pain. Adenocarcinoma of lung can rarely cause SIADH, however this is quite acute. She was also receiving D5 1/2 NS as maintenance which could worsen it. Her lasix has also been stopped recently.  --Check urine Osm, Urine Na, Serum Osm (ordered for you)  --PO fluid restriction to < 1L/day. Discontinue NS   --Check serum Na Q 6 hours     Blood pressure/ volume : Blood pressures remain well controlled on PO Cardizem and carvedilol.   --Goal BP would be < 130/90 mm Hg. Avoid hypotensive episodes.   --Patient has not tolerated multiple  "antihypertensives in the past, including doxazosin.  --If blood pressure again becomes uncontrolled (hyper).  May consider the addition of Hydralazine in the future since we have removed Lisinopril.     High normal K: In the setting of total body potassium excess and hyperglycemia.   --Improving on low K diet.   --continue holding lisinopril.     Constipation: bowel protocol per primary.     Yoly Ruvalcaba MD     REVIEW OF SYSTEMS:  Mouth very dry; chronic.  Good urine output without hematuria, dysuria.  No confusion or lightheadedness  No N/V/D.   Breathing well on RA, Ambulating around unit, weakness improving.    PHYSICAL EXAM:   Temp  Av.8  F (36.6  C)  Min: 96.5  F (35.8  C)  Max: 99.4  F (37.4  C)  Arterial Line MAP (mmHg)  Av mmHg  Min: 58 mmHg  Max: 88 mmHg  Arterial Line BP  Min: 109/36  Max: 147/49      Pulse  Av.3  Min: 60  Max: 88 Resp  Av.4  Min: 10  Max: 20  SpO2  Av.5 %  Min: 93 %  Max: 100 %       /54 (BP Location: Right arm)  Pulse 79  Temp 97.8  F (36.6  C) (Oral)  Resp 18  Ht 1.626 m (5' 4\")  Wt 62.6 kg (138 lb)  SpO2 98%  BMI 23.69 kg/m2   Date 17 0700 - 17 0659     Wt Readings from Last 2 Encounters:   17 62.2 kg (137 lb 1.6 oz)   17 63.2 kg (139 lb 4.8 oz)     Intake/Output Summary (Last 24 hours) at 17 1434  Last data filed at 17 1200   Gross per 24 hour   Intake            683.8 ml   Output              756 ml   Net            -72.2 ml     Limited examination:   GENERAL APPEARANCE: mild distress,  awake  Pulmonary: Decreased air moving in RLL.  Non labored breathing on RA.  CT tubes in place.  CV: regular rhythm, normal rate, no rub   - JVP not elevated   - Edema- not significant  NEURO: mentation intact and speech normal      PAST MEDICAL HISTORY:  Reviewed with patient at time of initial consult.    Past Medical History:   Diagnosis Date     Adenocarcinoma, lung (H)      Asthma      CAD (coronary artery disease) " 9/17/2014 2006 PCI circumflex territory  Mid LAD lesion      Carotid artery stenosis      CKD (chronic kidney disease) stage 4, GFR 15-29 ml/min (H)      Diabetes mellitus type 1 (H)      H/O factor V Leiden mutation      Hypertension      Mixed hyperlipidemia (DYSLIPIDEMIA) 8/1/2014     Osteopenia      Pneumothorax 4/16/2017     PVD (peripheral vascular disease) (H)      Thyroid nodule 04/12/2012    Hurtle Cells, non malignant       Past Surgical History:   Procedure Laterality Date     BRONCHOSCOPY FLEXIBLE Right 5/10/2017    Procedure: BRONCHOSCOPY FLEXIBLE;  Flexible Bronchoscopy, esaphogastroduodenoscopy, cervical Mediastinoscopy, Right  Thoracoscopic Surgery, right Lower Lobectomy, Mediastinal Lymph Node Dissection, Right middle lobe wedge resection*Latex Allergy* (ERAS Patient);  Surgeon: Glenna Hall MD;  Location: [facility] OR     ESOPHAGOSCOPY, GASTROSCOPY, DUODENOSCOPY (EGD), COMBINED N/A 5/10/2017    Procedure: COMBINED ESOPHAGOSCOPY, GASTROSCOPY, DUODENOSCOPY (EGD);;  Surgeon: Glenna Hall MD;  Location: [facility] OR     ganglion cyst removal       hemithyroidectomy  4/13/2012    PATH report showed benign Hurtle Cell Adenoma     IR biopsy of lung  04/2017     lipoma removal       MEDIASTINOSCOPY N/A 5/10/2017    Procedure: MEDIASTINOSCOPY;;  Surgeon: Glenna Hall MD;  Location: [facility] OR     SHOULDER SURGERY Left      THORACOSCOPIC EXCISE NODE MEDIASTINAL Right 5/10/2017    Procedure: THORACOSCOPIC EXCISE NODE MEDIASTINAL;;  Surgeon: Glenna Hall MD;  Location: [facility] OR     THORACOSCOPIC RESECTION LUNG Right 5/10/2017    Procedure: THORACOSCOPIC RESECTION LUNG;;  Surgeon: Glenna Hall MD;  Location: [facility] OR     TONSILLECTOMY          MEDICATIONS:  PTA Meds  Prior to Admission medications    Medication Sig Last Dose Taking? Auth Provider   albuterol (PROAIR HFA/PROVENTIL HFA/VENTOLIN HFA) 108 (90 BASE) MCG/ACT Inhaler Inhale 2 puffs into the lungs 2 times daily 5/9/2017 at Unknown time Yes Reported, Patient    Respiratory Therapy Supplies (AEROBIKA) GAGANDEEP 1 Device 6 times daily 5/9/2017 at Unknown time Yes Demario Torrze MD   Insulin Aspart (INSULIN PUMP - OUTPATIENT) Insulin pump base rate: 3642-5343 rate 0.5  3373-5262 rate 1.0  7733-0408 rate 1.95  4879-5606 rate 1.35 5/10/2017 at Unknown time Yes Reported, Patient   Acetaminophen (TYLENOL PO) Take 1,000 mg by mouth every 8 hours as needed for mild pain or fever  Past Week at Unknown time Yes Reported, Patient   budesonide-formoterol (SYMBICORT) 80-4.5 MCG/ACT inhaler Inhale 2 puffs into the lungs 2 times daily Past Week at Unknown time Yes John Plaza MD   amLODIPine (NORVASC) 10 MG tablet Take 10 mg by mouth every evening  5/9/2017 at Unknown time Yes Reported, Patient   aspirin EC 81 MG tablet Take 81 mg by mouth every evening  5/9/2017 at Unknown time Yes Reported, Patient   carvedilol (COREG) 3.125 MG tablet Take 3.125 mg by mouth 2 times daily (with meals)  5/9/2017 at Unknown time Yes Reported, Patient   furosemide (LASIX) 20 MG tablet 20 mg every morning  5/9/2017 at Unknown time Yes Reported, Patient   lisinopril (PRINIVIL,ZESTRIL) 20 MG tablet Take 10 mg by mouth every evening  5/9/2017 at Unknown time Yes Reported, Patient   pravastatin (PRAVACHOL) 40 MG tablet 40 mg every evening  5/9/2017 at Unknown time Yes Reported, Patient   cetirizine (ZYRTEC) 10 MG tablet Take 5 mg by mouth daily   Reported, Patient   calcium 600 MG tablet Take 1 tablet by mouth every morning   Reported, Patient   clonazePAM (KLONOPIN) 0.5 MG tablet Take 0.5 tablets (0.25 mg) by mouth 2 times daily as needed for anxiety   Elisa Ellington MD   blood glucose (ONE TOUCH ULTRA) test strip    Reported, Patient   calcium acetate (PHOSLO) 667 MG CAPS Take 667 mg by mouth 4 times daily (with meals and nightly)    Reported, Patient   cholecalciferol (VITAMIN D) 1000 UNIT tablet Take 1,000 Units by mouth every morning    Reported, Patient   ONETOUCH DELICA LANCETS 33G MISC     Reported, Patient      Current Meds    magnesium hydroxide  30 mL Oral Once     levofloxacin  250 mg Oral Daily     diltiazem  180 mg Oral BID     metoprolol  25 mg Oral BID     insulin glargine  20 Units Subcutaneous Q24H     lidocaine  1 patch Transdermal Q24h    And     lidocaine   Transdermal Q24H    And     lidocaine   Transdermal Q8H     artificial saliva  10 mL Swish & Spit 4x Daily     sodium chloride (PF)  3 mL Intracatheter Q8H     insulin aspart   Subcutaneous Daily with breakfast     insulin aspart   Subcutaneous Daily before lunch     insulin aspart   Subcutaneous Daily with supper     levalbuterol  0.63 mg Nebulization Q4H WA     sodium chloride  3 mL Nebulization Q4H While awake     guaiFENesin  600 mg Oral BID     insulin aspart  1-7 Units Subcutaneous TID AC     insulin aspart  1-5 Units Subcutaneous At Bedtime     bisacodyl  10 mg Rectal Daily     senna-docusate  2 tablet Oral BID     polyethylene glycol  17 g Oral BID     acetaminophen  975 mg Oral Q8H     melatonin  3 mg Oral At Bedtime     menthol   Transdermal Q8H     aspirin EC  81 mg Oral QPM     cetirizine  5 mg Oral Daily     pravastatin  40 mg Oral QPM     pantoprazole  40 mg Oral Daily     fluticasone-vilanterol  1 puff Inhalation Daily     Infusion Meds    NaCl 30 mL/hr at 05/20/17 0641     HEParin 900 Units/hr (05/20/17 1106)       ALLERGIES:    Allergies   Allergen Reactions     Codeine Other (See Comments)     Makes her sleepless     Loratadine Other (See Comments)     Extreme sleepiness       Sulfa Drugs Unknown     Other reaction(s): Unknown     Terazosin Other (See Comments)     Swellling in legs, improved when went off     Adhesive Tape Rash     Foam tape used during surgery was what caused rash     Latex Rash     Liquid Adhesive Rash     SOCIAL HISTORY:   Social History     Social History     Marital status:      Spouse name: N/A     Number of children: N/A     Years of education: N/A     Occupational History     retail  Other     part-time     Social History Main Topics     Smoking status: Former Smoker     Packs/day: 0.50     Years: 15.00     Types: Cigarettes     Start date: 1/1/1970     Quit date: 12/31/1985     Smokeless tobacco: Never Used     Alcohol use 0.0 - 0.5 oz/week     0 - 1 Standard drinks or equivalent per week     Drug use: No     Sexual activity: Not on file     Other Topics Concern     Not on file     Social History Narrative    The patient has a 7 pk yr tobacco hx.  She has no active use since 1985.  Alcohol use is 0 alcoholic drinks per week.  She denies use of recreational drugs.          She is retired.  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           The patient is .  Has 2 children.        Hot Tub Exposure: NO    Recent Travel: NO     Hx of incarceration:  NO    Bird Exposure:   NO    Animal Exposure:  NO    Inhalation Exposure:  NO         Reviewed with patient at time of initial consult.    FAMILY MEDICAL HISTORY:   Family History   Problem Relation Age of Onset     Cancer - colorectal Father      DIABETES Father      Scleroderma Mother      CEREBROVASCULAR DISEASE Brother      DIABETES Brother      Hypertension Brother      DIABETES Brother      2nd brother     Reviewed with patient at time of initial consult.    LABS:   CMP    Recent Labs  Lab 05/20/17  1346 05/20/17  0426 05/19/17  0512 05/18/17  0630 05/17/17  0128 05/16/17  0951   * 129* 131* 133 132* 136   POTASSIUM 4.7 4.2 4.4 4.6 4.4 5.1   CHLORIDE 95 98 100 103 101 104   CO2 21 20 20 23 24 20   ANIONGAP 9 11 11 8 7 12   * 118* 181* 220* 238* 246*   BUN 51* 53* 58* 55* 46* 45*   CR 2.11* 2.20* 2.30* 2.38* 1.78* 1.74*   GFRESTIMATED 23* 22* 21* 20* 28* 29*   GFRESTBLACK 28* 27* 25* 24* 34* 35*   ZAHEER 7.2* 7.3* 7.7* 8.0* 8.2* 8.8   MAG  --  2.2  --  2.3 2.1 2.1   PHOS  --  3.8  --  4.0 2.7 3.8     CBC    Recent Labs  Lab 05/20/17  0426 05/19/17  0953 05/19/17  0458 05/18/17  0630 05/17/17  1338   B  8.4* 9.2*  --  8.9* 9.9*   WBC 18.9* 21.9*  --  18.4* 17.2*   RBC 2.87* 3.15*  --  3.05* 3.36*   HCT 25.7* 29.0*  --  28.2* 31.0*   MCV 90 92  --  93 92   MCH 29.3 29.2  --  29.2 29.5   MCHC 32.7 31.7  --  31.6 31.9   RDW 14.1 14.3  --  14.3 13.9    230 237 228 213     INRNo lab results found in last 7 days.  ABG  No lab results found in last 7 days.   URINE STUDIES  Recent Labs   Lab Test  05/19/17   0740  05/13/17   0914  05/12/17   2312  08/04/14   0832   COLOR  Yellow  Light Yellow  Light Yellow  Straw   APPEARANCE  Slightly Cloudy  Clear  Clear  Clear   URINEGLC  70*  300*  70*  300*   URINEBILI  Negative  Negative  Negative  Negative   URINEKETONE  Negative  Negative  Negative  Negative   SG  1.013  1.008  1.009  1.008   UBLD  Negative  Negative  Negative  Negative   URINEPH  5.0  5.0  5.0  5.5   PROTEIN  Negative  Negative  Negative  10*   NITRITE  Negative  Negative  Negative  Negative   LEUKEST  Large*  Negative  Negative  Negative   RBCU  <1  0  <1  <1   WBCU  44*  1  1  <1     No lab results found.  PTH  No lab results found.  IRON STUDIES  No lab results found.    IMAGING:  All imaging studies reviewed by me.     Yoly Ruvalacba MD

## 2017-05-20 NOTE — PLAN OF CARE
"Problem: Goal Outcome Summary  Goal: Goal Outcome Summary  Outcome: No Change  /48 (BP Location: Right arm)  Pulse 79  Temp 97.9  F (36.6  C) (Oral)  Resp 18  Ht 1.626 m (5' 4\")  Wt 62.2 kg (137 lb 1.6 oz)  SpO2 94%  BMI 23.53 kg/m2  A&Ox4 but forgetful.  Converted to NSR at 1400.  HR 60s.  Previously A flutter.  IV fluids discontinued.  Fluid restriction started.  On regular diet.  Walked X2.  CT -10 suction with known air leak with serous output.  Pt had medium stool after enema. Heparin gtt infusing.  Xray done.  Need urine sample.  Oxycodone and tylenol given for pain.  Continue to monitor.        "

## 2017-05-21 ENCOUNTER — APPOINTMENT (OUTPATIENT)
Dept: OCCUPATIONAL THERAPY | Facility: CLINIC | Age: 72
DRG: 163 | End: 2017-05-21
Attending: STUDENT IN AN ORGANIZED HEALTH CARE EDUCATION/TRAINING PROGRAM
Payer: MEDICARE

## 2017-05-21 ENCOUNTER — APPOINTMENT (OUTPATIENT)
Dept: GENERAL RADIOLOGY | Facility: CLINIC | Age: 72
DRG: 163 | End: 2017-05-21
Attending: SURGERY
Payer: MEDICARE

## 2017-05-21 ENCOUNTER — APPOINTMENT (OUTPATIENT)
Dept: PHYSICAL THERAPY | Facility: CLINIC | Age: 72
DRG: 163 | End: 2017-05-21
Attending: STUDENT IN AN ORGANIZED HEALTH CARE EDUCATION/TRAINING PROGRAM
Payer: MEDICARE

## 2017-05-21 LAB
ANION GAP SERPL CALCULATED.3IONS-SCNC: 12 MMOL/L (ref 3–14)
BACTERIA SPEC CULT: ABNORMAL
BUN SERPL-MCNC: 53 MG/DL (ref 7–30)
CALCIUM SERPL-MCNC: 7.6 MG/DL (ref 8.5–10.1)
CHLORIDE SERPL-SCNC: 97 MMOL/L (ref 94–109)
CO2 SERPL-SCNC: 18 MMOL/L (ref 20–32)
CREAT SERPL-MCNC: 2.16 MG/DL (ref 0.52–1.04)
ERYTHROCYTE [DISTWIDTH] IN BLOOD BY AUTOMATED COUNT: 14.1 % (ref 10–15)
GFR SERPL CREATININE-BSD FRML MDRD: 22 ML/MIN/1.7M2
GLUCOSE BLDC GLUCOMTR-MCNC: 138 MG/DL (ref 70–99)
GLUCOSE BLDC GLUCOMTR-MCNC: 170 MG/DL (ref 70–99)
GLUCOSE BLDC GLUCOMTR-MCNC: 190 MG/DL (ref 70–99)
GLUCOSE BLDC GLUCOMTR-MCNC: 217 MG/DL (ref 70–99)
GLUCOSE BLDC GLUCOMTR-MCNC: 290 MG/DL (ref 70–99)
GLUCOSE SERPL-MCNC: 154 MG/DL (ref 70–99)
HCT VFR BLD AUTO: 26.5 % (ref 35–47)
HGB BLD-MCNC: 8.8 G/DL (ref 11.7–15.7)
INR PPP: 1.16 (ref 0.86–1.14)
LMWH PPP CHRO-ACNC: 0.25 IU/ML
Lab: ABNORMAL
MCH RBC QN AUTO: 29.6 PG (ref 26.5–33)
MCHC RBC AUTO-ENTMCNC: 33.2 G/DL (ref 31.5–36.5)
MCV RBC AUTO: 89 FL (ref 78–100)
MICRO REPORT STATUS: ABNORMAL
MICROORGANISM SPEC CULT: ABNORMAL
OSMOLALITY SERPL: 281 MMOL/KG (ref 280–301)
PLATELET # BLD AUTO: 286 10E9/L (ref 150–450)
POTASSIUM SERPL-SCNC: 4.6 MMOL/L (ref 3.4–5.3)
RBC # BLD AUTO: 2.97 10E12/L (ref 3.8–5.2)
SODIUM SERPL-SCNC: 127 MMOL/L (ref 133–144)
SODIUM SERPL-SCNC: 128 MMOL/L (ref 133–144)
SODIUM UR-SCNC: 17 MMOL/L
SPECIMEN SOURCE: ABNORMAL
WBC # BLD AUTO: 22.5 10E9/L (ref 4–11)

## 2017-05-21 PROCEDURE — 25000132 ZZH RX MED GY IP 250 OP 250 PS 637: Mod: GY | Performed by: STUDENT IN AN ORGANIZED HEALTH CARE EDUCATION/TRAINING PROGRAM

## 2017-05-21 PROCEDURE — 25000132 ZZH RX MED GY IP 250 OP 250 PS 637: Mod: GY | Performed by: SURGERY

## 2017-05-21 PROCEDURE — A9270 NON-COVERED ITEM OR SERVICE: HCPCS | Mod: GY | Performed by: SURGERY

## 2017-05-21 PROCEDURE — 40000275 ZZH STATISTIC RCP TIME EA 10 MIN

## 2017-05-21 PROCEDURE — 85520 HEPARIN ASSAY: CPT | Performed by: SURGERY

## 2017-05-21 PROCEDURE — A9270 NON-COVERED ITEM OR SERVICE: HCPCS | Mod: GY | Performed by: STUDENT IN AN ORGANIZED HEALTH CARE EDUCATION/TRAINING PROGRAM

## 2017-05-21 PROCEDURE — 85610 PROTHROMBIN TIME: CPT | Performed by: STUDENT IN AN ORGANIZED HEALTH CARE EDUCATION/TRAINING PROGRAM

## 2017-05-21 PROCEDURE — 93005 ELECTROCARDIOGRAM TRACING: CPT

## 2017-05-21 PROCEDURE — 85027 COMPLETE CBC AUTOMATED: CPT | Performed by: SURGERY

## 2017-05-21 PROCEDURE — 94640 AIRWAY INHALATION TREATMENT: CPT

## 2017-05-21 PROCEDURE — 97535 SELF CARE MNGMENT TRAINING: CPT | Mod: GO

## 2017-05-21 PROCEDURE — A9270 NON-COVERED ITEM OR SERVICE: HCPCS | Mod: GY | Performed by: PHYSICIAN ASSISTANT

## 2017-05-21 PROCEDURE — 25000132 ZZH RX MED GY IP 250 OP 250 PS 637: Mod: GY | Performed by: PHYSICIAN ASSISTANT

## 2017-05-21 PROCEDURE — 25000128 H RX IP 250 OP 636: Performed by: SURGERY

## 2017-05-21 PROCEDURE — 71020 XR CHEST 2 VW: CPT

## 2017-05-21 PROCEDURE — 12000006 ZZH R&B IMCU INTERMEDIATE UMMC

## 2017-05-21 PROCEDURE — 36415 COLL VENOUS BLD VENIPUNCTURE: CPT | Performed by: SURGERY

## 2017-05-21 PROCEDURE — 00000146 ZZHCL STATISTIC GLUCOSE BY METER IP

## 2017-05-21 PROCEDURE — 97530 THERAPEUTIC ACTIVITIES: CPT | Mod: GP | Performed by: PHYSICAL THERAPIST

## 2017-05-21 PROCEDURE — 40000193 ZZH STATISTIC PT WARD VISIT: Performed by: PHYSICAL THERAPIST

## 2017-05-21 PROCEDURE — 93010 ELECTROCARDIOGRAM REPORT: CPT | Performed by: INTERNAL MEDICINE

## 2017-05-21 PROCEDURE — 83930 ASSAY OF BLOOD OSMOLALITY: CPT | Performed by: SURGERY

## 2017-05-21 PROCEDURE — 97116 GAIT TRAINING THERAPY: CPT | Mod: GP | Performed by: PHYSICAL THERAPIST

## 2017-05-21 PROCEDURE — 80048 BASIC METABOLIC PNL TOTAL CA: CPT | Performed by: SURGERY

## 2017-05-21 PROCEDURE — 25000132 ZZH RX MED GY IP 250 OP 250 PS 637: Mod: GY | Performed by: THORACIC SURGERY (CARDIOTHORACIC VASCULAR SURGERY)

## 2017-05-21 PROCEDURE — A9270 NON-COVERED ITEM OR SERVICE: HCPCS | Mod: GY | Performed by: THORACIC SURGERY (CARDIOTHORACIC VASCULAR SURGERY)

## 2017-05-21 PROCEDURE — 36415 COLL VENOUS BLD VENIPUNCTURE: CPT | Performed by: STUDENT IN AN ORGANIZED HEALTH CARE EDUCATION/TRAINING PROGRAM

## 2017-05-21 PROCEDURE — 84295 ASSAY OF SERUM SODIUM: CPT | Performed by: SURGERY

## 2017-05-21 PROCEDURE — 94640 AIRWAY INHALATION TREATMENT: CPT | Mod: 76

## 2017-05-21 PROCEDURE — 40000133 ZZH STATISTIC OT WARD VISIT

## 2017-05-21 PROCEDURE — 25000125 ZZHC RX 250: Performed by: PHYSICIAN ASSISTANT

## 2017-05-21 RX ORDER — CALCIUM CARBONATE 500 MG/1
500 TABLET, CHEWABLE ORAL ONCE
Status: COMPLETED | OUTPATIENT
Start: 2017-05-21 | End: 2017-05-21

## 2017-05-21 RX ORDER — CALCIUM CARBONATE 500 MG/1
500 TABLET, CHEWABLE ORAL 2 TIMES DAILY PRN
Status: DISCONTINUED | OUTPATIENT
Start: 2017-05-21 | End: 2017-05-26 | Stop reason: HOSPADM

## 2017-05-21 RX ORDER — WARFARIN SODIUM 3 MG/1
3 TABLET ORAL
Status: COMPLETED | OUTPATIENT
Start: 2017-05-21 | End: 2017-05-21

## 2017-05-21 RX ADMIN — ACETAMINOPHEN 975 MG: 325 TABLET, FILM COATED ORAL at 08:33

## 2017-05-21 RX ADMIN — LEVALBUTEROL HYDROCHLORIDE 0.63 MG: 0.63 SOLUTION RESPIRATORY (INHALATION) at 20:37

## 2017-05-21 RX ADMIN — PRAVASTATIN SODIUM 40 MG: 40 TABLET ORAL at 20:11

## 2017-05-21 RX ADMIN — SODIUM CHLORIDE SOLN NEBU 3% 3 ML: 3 NEBU SOLN at 20:37

## 2017-05-21 RX ADMIN — LIDOCAINE 1 PATCH: 50 PATCH TOPICAL at 20:12

## 2017-05-21 RX ADMIN — ASPIRIN 81 MG CHEWABLE TABLET 81 MG: 81 TABLET CHEWABLE at 20:11

## 2017-05-21 RX ADMIN — FLUTICASONE FUROATE AND VILANTEROL TRIFENATATE 1 PUFF: 100; 25 POWDER RESPIRATORY (INHALATION) at 08:38

## 2017-05-21 RX ADMIN — PANTOPRAZOLE SODIUM 40 MG: 40 TABLET, DELAYED RELEASE ORAL at 08:33

## 2017-05-21 RX ADMIN — CALCIUM CARBONATE (ANTACID) CHEW TAB 500 MG 500 MG: 500 CHEW TAB at 17:57

## 2017-05-21 RX ADMIN — SENNOSIDES AND DOCUSATE SODIUM 2 TABLET: 8.6; 5 TABLET ORAL at 08:36

## 2017-05-21 RX ADMIN — METOPROLOL TARTRATE 25 MG: 25 TABLET ORAL at 20:11

## 2017-05-21 RX ADMIN — GUAIFENESIN 600 MG: 600 TABLET, EXTENDED RELEASE ORAL at 20:12

## 2017-05-21 RX ADMIN — Medication 10 ML: at 20:13

## 2017-05-21 RX ADMIN — Medication 10 ML: at 13:36

## 2017-05-21 RX ADMIN — GUAIFENESIN 600 MG: 600 TABLET, EXTENDED RELEASE ORAL at 08:32

## 2017-05-21 RX ADMIN — LEVOFLOXACIN 250 MG: 250 TABLET, FILM COATED ORAL at 08:33

## 2017-05-21 RX ADMIN — LEVALBUTEROL HYDROCHLORIDE 0.63 MG: 0.63 SOLUTION RESPIRATORY (INHALATION) at 16:21

## 2017-05-21 RX ADMIN — Medication 10 ML: at 08:41

## 2017-05-21 RX ADMIN — POLYETHYLENE GLYCOL 3350 17 G: 17 POWDER, FOR SOLUTION ORAL at 08:32

## 2017-05-21 RX ADMIN — LEVALBUTEROL HYDROCHLORIDE 0.63 MG: 0.63 SOLUTION RESPIRATORY (INHALATION) at 08:12

## 2017-05-21 RX ADMIN — SENNOSIDES AND DOCUSATE SODIUM 2 TABLET: 8.6; 5 TABLET ORAL at 20:13

## 2017-05-21 RX ADMIN — CALCIUM CARBONATE (ANTACID) CHEW TAB 500 MG 500 MG: 500 CHEW TAB at 13:48

## 2017-05-21 RX ADMIN — DILTIAZEM HYDROCHLORIDE 180 MG: 90 CAPSULE, EXTENDED RELEASE ORAL at 08:33

## 2017-05-21 RX ADMIN — WARFARIN SODIUM 3 MG: 3 TABLET ORAL at 17:56

## 2017-05-21 RX ADMIN — MELATONIN TAB 3 MG 3 MG: 3 TAB at 20:12

## 2017-05-21 RX ADMIN — SODIUM CHLORIDE SOLN NEBU 3% 3 ML: 3 NEBU SOLN at 08:12

## 2017-05-21 RX ADMIN — SODIUM CHLORIDE SOLN NEBU 3% 3 ML: 3 NEBU SOLN at 16:21

## 2017-05-21 RX ADMIN — LEVALBUTEROL HYDROCHLORIDE 0.63 MG: 0.63 SOLUTION RESPIRATORY (INHALATION) at 12:03

## 2017-05-21 RX ADMIN — CALCIUM CARBONATE (ANTACID) CHEW TAB 500 MG 500 MG: 500 CHEW TAB at 13:22

## 2017-05-21 RX ADMIN — SODIUM CHLORIDE SOLN NEBU 3% 3 ML: 3 NEBU SOLN at 12:03

## 2017-05-21 RX ADMIN — ACETAMINOPHEN 975 MG: 325 TABLET, FILM COATED ORAL at 23:19

## 2017-05-21 RX ADMIN — HEPARIN SODIUM 900 UNITS/HR: 10000 INJECTION, SOLUTION INTRAVENOUS at 14:47

## 2017-05-21 RX ADMIN — ACETAMINOPHEN 975 MG: 325 TABLET, FILM COATED ORAL at 15:48

## 2017-05-21 RX ADMIN — CETIRIZINE HYDROCHLORIDE 5 MG: 5 TABLET ORAL at 08:32

## 2017-05-21 RX ADMIN — Medication 10 ML: at 16:45

## 2017-05-21 RX ADMIN — METOPROLOL TARTRATE 25 MG: 25 TABLET ORAL at 08:33

## 2017-05-21 RX ADMIN — DILTIAZEM HYDROCHLORIDE 180 MG: 90 CAPSULE, EXTENDED RELEASE ORAL at 20:11

## 2017-05-21 NOTE — PLAN OF CARE
Problem: Goal Outcome Summary  Goal: Goal Outcome Summary  OT 6B: Patient is moving better and progressing with tolerance for light activity w/i room; completes transfers with CGA. Activity tolerance still limited by fatigue, general deconditioning and pain related to CT insertion and incision sites.  At this time still recommending TCU at discharge as patient seems to be significantly below her baseline for activity tolerance and ADL independence. Patient and  are open to this recommendation.

## 2017-05-21 NOTE — PLAN OF CARE
Problem: Goal Outcome Summary  Goal: Goal Outcome Summary  Outcome: No Change  Pt A&O X4, VSS, afebrile, HR sinus rhythm 60's, BP's 130-140's/40's, O2 sats > 90% on RA. LS clear L side, diminished R side, BS+ X4, CMS intact. Pt slept majority of overnight shift, had few requests/complaints. Reported aching pain at chest tube insertion site, received 5mg PRN Oxycodone Q4hrs with relief. Dressing to R flank had minimal drainage, reinforced overnight, CT to -10mmHg suction, outputting moderate serosanguinous drainage. PIV in L hand patent & infusing IVF @ 30mL/hr with Heparin gtt @ 900 units/hr. Tolerating regular diet with no nausea/emesis, BG's checked for 151, 144. Gets up with SBA+1, ambulated in hallway.  at the bedside, able to make needs known via call light, will continue to monitor per plan of care

## 2017-05-21 NOTE — PLAN OF CARE
"Problem: Goal Outcome Summary  Goal: Goal Outcome Summary  Outcome: No Change  /41  Pulse 79  Temp 97.7  F (36.5  C) (Oral)  Resp 16  Ht 1.626 m (5' 4\")  Wt 62.2 kg (137 lb 1.6 oz)  SpO2 99% RA  Neuro: A&Ox4. Forgetful at times   Cardiac: SR. VSS.   Respiratory: Sating 99% on RA. Chest tube to water seal with known leak. Team is aware.  GI/: Adequate urine output. No BM reported on overnights. Continues with 1 L fluid restriction  Activity: Assist of 1, up to bedside commode.  Pain: At acceptable level on current regimen.   Skin: Intact, no new deficits noted.  Heparin drip at 900 unit/hr, therapeutic. Redraw in the AM     R: Continue with POC. Notify primary team with changes.      "

## 2017-05-21 NOTE — PLAN OF CARE
"Problem: Goal Outcome Summary  Goal: Goal Outcome Summary  Outcome: No Change  /44 (BP Location: Right arm)  Pulse 79  Temp 97.8  F (36.6  C) (Oral)  Resp 20  Ht 1.626 m (5' 4\")  Wt 62.2 kg (137 lb 1.6 oz)  SpO2 95%  BMI 23.53 kg/m2      7696-3606: VSS on room air.  CT to waterseal with known air leak.  C/o generalized aches, given tylenol PRN x1.  No SSI, given scheduled lantus 20 units.  Lungs clear, diminished in the bases.  Heparin gtt continues at 900 units/hr, next 10a in the am.  Continues with 1L fluid restriction.  Will continue to monitor per POC and update MD as needed.      "

## 2017-05-21 NOTE — PHARMACY-ANTICOAGULATION SERVICE
Clinical Pharmacy - Warfarin Dosing Consult     Pharmacy has been consulted to manage this patient s warfarin therapy.  Indication: Atrial Fibrillation  Therapy Goal: INR 2-3  Warfarin Prior to Admission: No  Significant drug interactions: aspirin, heparin, pravastatin, levofloxacin  Recent documented change in oral intake/nutrition: Unknown  Dose Comments: given patients age will dose more conservatively @ 3mg    INR   Date Value Ref Range Status   05/21/2017 1.16 (H) 0.86 - 1.14 Final   04/18/2017 1.06 0.86 - 1.14 Final       Recommend warfarin 3 mg today.  Pharmacy will monitor Yue Valenzuela daily and order warfarin doses to achieve specified goal.      Please contact pharmacy as soon as possible if the warfarin needs to be held for a procedure or if the warfarin goals change.      Zheng Holman

## 2017-05-21 NOTE — PROGRESS NOTES
"Thoracic Surgery Daily Progress Note  Yue Valenzuela  05/21/2017    Subjective:   Back into sinus rhythm. Otherwise no events overnight. Tolerating diet. Placed on fluid restriction per nephrology. Tolerating diet.     Objective:    /51 (BP Location: Right arm)  Pulse 68  Temp 97.6  F (36.4  C) (Oral)  Resp 16  Ht 1.626 m (5' 4\")  Wt 63.5 kg (140 lb 1.6 oz)  SpO2 97%  BMI 24.05 kg/m2  Sitting up in chair in no acute distress  Awake, alert and appropriate  Non-labored breathing  Sinus rhythm on telemetry  CT in place with SS drainage. +airleak.     Ins/Outs:   I/O last 3 completed shifts:  In: 1422.73 [P.O.:600; I.V.:822.73]  Out: 467 [Urine:200; Chest Tube:267]    Labs:   AM labs reviewed - , Cr 2.16. WBC 22.5 hgb 8.8  Urine culture with pseudomonas    Assessment:  Yue Valenzuela is a 71 year old female now post operative day 11 from a right VATS, lower lobectomy and middle wedge.     Plan:       Neuro:  PO pain medications, limit narcotics as able.    Cardiovascular/Heme: Cardiology consulted, appreciate assistance. Back in sinus rhythm, continue current medications.      Respiratory/Pulm: Continue chest tube to water-seal, follow up AM xray. CT to stay in place given high output. Continue aggressive pulmonary toilet.     FEN/Gastrointestinal: Regular diet. Bowel regimen.    Renal/Genitourinary: SUPRIYA on CKD resolving. Appreciate nephrology assistance with SUPRIYA/electorlytes and hypoNa.     Infectious: Pseudomonal UTI, on levaquin. Continued leukocytosis.     Prophy: Heparin drip, protonix.    Activity:  Up to chair, out of bed, increase activity.     Endo: DM, on insulin, appreciate endocrinology assistance.     Dispo: Continue care on 6B    Patient was seen and discussed with Dr. Joya, Thoracic Surgery Fellow, who agrees with the assessment and plan and will discuss with staff.     Evelyn Liriano MD  General Surgery  Pager: [phone]                       "

## 2017-05-21 NOTE — PLAN OF CARE
Problem: Individualization  Goal: Patient Preferences     Patient awake and oriented x 4 and up in the chair for most of the day. Appetite is very poor and she complains of heart burn; tums given. 2+ edema is present in her feet and she complains of tingling and numbness. 1000 ml fluid restriction was adhered to this shift. Patient denies pain. Right chest tube is to water seal with scant output. However, leakage at the Right CT site is copious requiring frequent dressing changes. CXR was done this morning. Lungs are coarse lower lobes and with pleural rub on the right side. Heparin 10A was 0.25 so no change to the heparin infusion rate was needed. It's infusing at 900 units/hr. Normal sinus rhythm on ECG. Blood glucoses 170 and 217 and sliding scale insulin was given. Refer to doc flow sheets, MAR, and notes for other specifics. Continue nursing cares per care plan.

## 2017-05-21 NOTE — PLAN OF CARE
Problem: Goal Outcome Summary  Goal: Goal Outcome Summary  PT 6B: Patient ambulates 120ft x 2 with single UE support on IV pole and SBA. Pt very limited today by fatigue and worsening pain in right side with incision/CT site. Pt reporting she feels depressed upon return to room, emotional support provided. Pt continues to be deconditioned, Recommend discharge to TCU when medically appropriate to improve strength, activity tolerance, and independence with functional mobility.

## 2017-05-21 NOTE — PLAN OF CARE
Problem: Goal Outcome Summary  Goal: Goal Outcome Summary  Outcome: No Change  Neuro: A&Ox4. Forgetful at times.   Cardiac: SR. VSS.       Respiratory: Sating at 97% on RA.  GI/: Adequate urine output. No Bm this shift.   Diet/appetite: Tolerating regular diet. 1L fluid restriction.   Activity:  Assist of 1, up to chair and in halls.  Pain: At acceptable level on current regimen.    Skin: Intact, no new deficits noted.     R: On heprin drip- at 900u/hr- will Continue with POC. Notify primary team with changes.

## 2017-05-21 NOTE — PLAN OF CARE
"Problem: Goal Outcome Summary  Goal: Goal Outcome Summary  Outcome: No Change  /57 (BP Location: Right arm)  Pulse 68  Temp 97.8  F (36.6  C) (Oral)  Resp 16  Ht 1.626 m (5' 4\")  Wt 63.5 kg (140 lb 1.6 oz)  SpO2 99%  BMI 24.05 kg/m2  A&Ox4 but forgetful. NSR. On regular diet, no nausea or vomiting. Walked X1. CT to H2O seal with serous drainage around site with frequent dressing changed.  MD aware.  Heparin gtt infusing. Xray done.  Continue to monitor.       "

## 2017-05-21 NOTE — PROGRESS NOTES
- Endocrinology Follow up -     Reason for consult: DM 1 mangement     HPI: Ate well (haf) breakfast. No acute event over night.      Current regimens:  Lantus 20 units bedtime  Novolog carb ratio  1:11 Breakfast  1:11 Lunch  1:10 Dinner  Novolog corrections     Glucose past 24 hour: [phone]     Assessment and Plan  71 year old female with DM-1 managed on ambulatory subcutaneous insulin pump at home, CKD IV, factor V Leiden mutation, HTN, HLD, CAD s/p stent, PVD, carotid artery stenosis, asthma, and newly diagnosed lung adenocarcinoma, s/ p right lower lobectomy and middle wedge 5/10/2017.    # Was high in the afternoon, please make sure for carb counting and give insulin accordingly.   - Continue current regimens  Lantus 20 units bedtime  Novolog carb ratio  1:11 Breakfast  1:11 Lunch  1:10 Dinner  Novolog corrections     - Will follow up     Katnia Knott MD  Staff Physician  Endocrinology and Metabolism  License: [id number]    Past Medical/Surgical History:  Past Medical History:   Diagnosis Date     Adenocarcinoma, lung (H)      Asthma      CAD (coronary artery disease) 9/17/2014 2006 PCI circumflex territory  Mid LAD lesion      Carotid artery stenosis      CKD (chronic kidney disease) stage 4, GFR 15-29 ml/min (H)      Diabetes mellitus type 1 (H)      H/O factor V Leiden mutation      Hypertension      Mixed hyperlipidemia (DYSLIPIDEMIA) 8/1/2014     Osteopenia      Pneumothorax 4/16/2017     PVD (peripheral vascular disease) (H)      Thyroid nodule 04/12/2012    Hurtle Cells, non malignant     Past Surgical History:   Procedure Laterality Date     BRONCHOSCOPY FLEXIBLE Right 5/10/2017    Procedure: BRONCHOSCOPY FLEXIBLE;  Flexible Bronchoscopy, esaphogastroduodenoscopy, cervical Mediastinoscopy, Right  Thoracoscopic Surgery, right Lower Lobectomy, Mediastinal Lymph Node Dissection, Right middle lobe wedge resection*Latex Allergy* (ERAS Patient);  Surgeon: Glenna Hall MD;   Location: [facility] OR     ESOPHAGOSCOPY, GASTROSCOPY, DUODENOSCOPY (EGD), COMBINED N/A 5/10/2017    Procedure: COMBINED ESOPHAGOSCOPY, GASTROSCOPY, DUODENOSCOPY (EGD);;  Surgeon: Glenna Hall MD;  Location: [facility] OR     ganglion cyst removal       hemithyroidectomy  4/13/2012    PATH report showed benign Hurtle Cell Adenoma     IR biopsy of lung  04/2017     lipoma removal       MEDIASTINOSCOPY N/A 5/10/2017    Procedure: MEDIASTINOSCOPY;;  Surgeon: Glenna Hall MD;  Location: [facility] OR     SHOULDER SURGERY Left      THORACOSCOPIC EXCISE NODE MEDIASTINAL Right 5/10/2017    Procedure: THORACOSCOPIC EXCISE NODE MEDIASTINAL;;  Surgeon: Glenna Hall MD;  Location: [facility]     THORACOSCOPIC RESECTION LUNG Right 5/10/2017    Procedure: THORACOSCOPIC RESECTION LUNG;;  Surgeon: Glenna Hall MD;  Location: [facility] OR     TONSILLECTOMY         Allergies:  Allergies   Allergen Reactions     Codeine Other (See Comments)     Makes her sleepless     Loratadine Other (See Comments)     Extreme sleepiness       Sulfa Drugs Unknown     Other reaction(s): Unknown     Terazosin Other (See Comments)     Swellling in legs, improved when went off     Adhesive Tape Rash     Foam tape used during surgery was what caused rash     Latex Rash     Liquid Adhesive Rash       Current Medications   Current Facility-Administered Medications   Medication     Warfarin Therapy Reminder (Check START DATE - warfarin may be starting in the FUTURE)     metoprolol (LOPRESSOR) injection 5 mg     magnesium hydroxide (MILK OF MAGNESIA) suspension 30 mL     magnesium hydroxide (MILK OF MAGNESIA) suspension 30 mL     levofloxacin (LEVAQUIN) tablet 250 mg     diltiazem (CARDIZEM SR) 12 hr capsule 180 mg     oxyCODONE (ROXICODONE) IR tablet 5 mg     heparin  drip 25,000 units in 0.45% NaCl 250 mL (see additional administration details for dose)     heparin bolus from infusion pump     metoprolol (LOPRESSOR) tablet 25 mg     insulin glargine (LANTUS) injection 20 Units      lidocaine (LIDODERM) 5 % Patch 1 patch    And     lidocaine (LIDODERM) patch REMOVAL    And     lidocaine (LIDODERM) Patch in Place     artificial saliva (BIOTENE DRY MOUTHWASH) liquid 10 mL     lidocaine 1 % 1 mL     lidocaine (LMX4) kit     sodium chloride (PF) 0.9% PF flush 3 mL     sodium chloride (PF) 0.9% PF flush 3 mL     insulin aspart (NovoLOG) inj (RAPID ACTING)     insulin aspart (NovoLOG) inj (RAPID ACTING)     insulin aspart (NovoLOG) inj (RAPID ACTING)     insulin aspart (NovoLOG) inj (RAPID ACTING)     levalbuterol (XOPENEX) neb solution 0.63 mg     sodium chloride 3 % neb solution 3 mL     guaiFENesin (MUCINEX) 12 hr tablet 600 mg     HYDROmorphone (PF) (DILAUDID) injection 0.3-0.5 mg     pink lady enema     insulin aspart (NovoLOG) inj (RAPID ACTING)     insulin aspart (NovoLOG) inj (RAPID ACTING)     bisacodyl (DULCOLAX) Suppository 10 mg     senna-docusate (SENOKOT-S;PERICOLACE) 8.6-50 MG per tablet 2 tablet     polyethylene glycol (MIRALAX/GLYCOLAX) Packet 17 g     acetaminophen (TYLENOL) tablet 975 mg     melatonin tablet 3 mg     calcium carbonate (TUMS) chewable tablet 500 mg     menthol (ICY HOT) 5 % patch 2 patch    And     menthol (ICY HOT) Patch in Place    And     menthol (ICY HOT) patch REMOVAL     aspirin EC EC tablet 81 mg     cetirizine (zyrTEC) tablet 5 mg     pravastatin (PRAVACHOL) tablet 40 mg     glucose 40 % gel 15-30 g    Or     dextrose 50 % injection 25-50 mL    Or     glucagon injection 1 mg     naloxone (NARCAN) injection 0.1-0.4 mg     labetalol (NORMODYNE/TRANDATE) injection 10-40 mg     albuterol (PROAIR HFA/PROVENTIL HFA/VENTOLIN HFA) Inhaler 4 puff     pantoprazole (PROTONIX) EC tablet 40 mg     potassium chloride SA (K-DUR/KLOR-CON M) CR tablet 20-40 mEq     potassium chloride (KLOR-CON) Packet 20-40 mEq     potassium chloride 10 mEq in 100 mL intermittent infusion     potassium chloride 10 mEq in 100 mL intermittent infusion with 10 mg lidocaine     potassium  "chloride 20 mEq in 50 mL intermittent infusion     magnesium sulfate 2 g in NS intermittent infusion ([facility]ar[facility] or FV Cmpd)     magnesium sulfate 4 g in 100 mL sterile water (premade)     sodium phosphate 10 mmol in D5W intermittent infusion     sodium phosphate 15 mmol in D5W intermittent infusion     sodium phosphate 20 mmol in D5W intermittent infusion     sodium phosphate 25 mmol in D5W intermittent infusion     acetaminophen (TYLENOL) tablet 650 mg     ondansetron (ZOFRAN-ODT) ODT tab 4 mg    Or     ondansetron (ZOFRAN) injection 4 mg     fluticasone-vilanterol (BREO ELLIPTA) 100-25 MCG/INH oral inhaler 1 puff       Family History:  Family History   Problem Relation Age of Onset     Cancer - colorectal Father      DIABETES Father      Scleroderma Mother      CEREBROVASCULAR DISEASE Brother      DIABETES Brother      Hypertension Brother      DIABETES Brother      2nd brother       Social History:  Social History   Substance Use Topics     Smoking status: Former Smoker     Packs/day: 0.50     Years: 15.00     Types: Cigarettes     Start date: 1/1/1970     Quit date: 12/31/1985     Smokeless tobacco: Never Used     Alcohol use 0.0 - 0.5 oz/week     0 - 1 Standard drinks or equivalent per week       Physical Exam:   Blood pressure 149/51, pulse 68, temperature 97.6  F (36.4  C), temperature source Oral, resp. rate 16, height 1.626 m (5' 4\"), weight 63.5 kg (140 lb 1.6 oz), SpO2 97 %,  General: well appearing, no acute distress  Mental Status/neuro: alert   Face: symmetrical, normal facial color  Heart: regular rhythm, S1S2, no murmur appreciated  Lung: clear to auscultation bilaterally  Legs: no swelling or edema      Labs (General):   Lab Results   Component Value Date     05/21/2017      Lab Results   Component Value Date    POTASSIUM 4.6 05/21/2017     Lab Results   Component Value Date    CHLORIDE 97 05/21/2017     Lab Results   Component Value Date    ZAHEER 7.6 05/21/2017     Lab Results   Component " Value Date    CO2 18 05/21/2017     Lab Results   Component Value Date    BUN 53 05/21/2017     Lab Results   Component Value Date    CR 2.16 05/21/2017     Lab Results   Component Value Date     05/21/2017     No results found for: TSH  No results found for: T4  Lab Results   Component Value Date    A1C 7.0 05/03/2017

## 2017-05-21 NOTE — PROVIDER NOTIFICATION
Per Tele Tech, patient had 8 beats of SVT at approximately 1843. Primary RN updated. Surgery cross-cover sent text page to update. No new orders at this time.

## 2017-05-22 ENCOUNTER — APPOINTMENT (OUTPATIENT)
Dept: OCCUPATIONAL THERAPY | Facility: CLINIC | Age: 72
DRG: 163 | End: 2017-05-22
Attending: STUDENT IN AN ORGANIZED HEALTH CARE EDUCATION/TRAINING PROGRAM
Payer: MEDICARE

## 2017-05-22 ENCOUNTER — APPOINTMENT (OUTPATIENT)
Dept: PHYSICAL THERAPY | Facility: CLINIC | Age: 72
DRG: 163 | End: 2017-05-22
Attending: STUDENT IN AN ORGANIZED HEALTH CARE EDUCATION/TRAINING PROGRAM
Payer: MEDICARE

## 2017-05-22 ENCOUNTER — APPOINTMENT (OUTPATIENT)
Dept: GENERAL RADIOLOGY | Facility: CLINIC | Age: 72
DRG: 163 | End: 2017-05-22
Attending: STUDENT IN AN ORGANIZED HEALTH CARE EDUCATION/TRAINING PROGRAM
Payer: MEDICARE

## 2017-05-22 LAB
ANION GAP SERPL CALCULATED.3IONS-SCNC: 10 MMOL/L (ref 3–14)
BUN SERPL-MCNC: 48 MG/DL (ref 7–30)
CALCIUM SERPL-MCNC: 8.1 MG/DL (ref 8.5–10.1)
CHLORIDE SERPL-SCNC: 105 MMOL/L (ref 94–109)
CO2 SERPL-SCNC: 18 MMOL/L (ref 20–32)
CREAT SERPL-MCNC: 1.89 MG/DL (ref 0.52–1.04)
ERYTHROCYTE [DISTWIDTH] IN BLOOD BY AUTOMATED COUNT: 14.3 % (ref 10–15)
GFR SERPL CREATININE-BSD FRML MDRD: 26 ML/MIN/1.7M2
GLUCOSE BLDC GLUCOMTR-MCNC: 142 MG/DL (ref 70–99)
GLUCOSE BLDC GLUCOMTR-MCNC: 254 MG/DL (ref 70–99)
GLUCOSE BLDC GLUCOMTR-MCNC: 270 MG/DL (ref 70–99)
GLUCOSE BLDC GLUCOMTR-MCNC: 272 MG/DL (ref 70–99)
GLUCOSE BLDC GLUCOMTR-MCNC: 87 MG/DL (ref 70–99)
GLUCOSE BLDC GLUCOMTR-MCNC: 92 MG/DL (ref 70–99)
GLUCOSE SERPL-MCNC: 66 MG/DL (ref 70–99)
HCT VFR BLD AUTO: 28.6 % (ref 35–47)
HGB BLD-MCNC: 9.3 G/DL (ref 11.7–15.7)
INR PPP: 1.17 (ref 0.86–1.14)
LMWH PPP CHRO-ACNC: 0.12 IU/ML
LMWH PPP CHRO-ACNC: 0.21 IU/ML
LMWH PPP CHRO-ACNC: 0.38 IU/ML
MAGNESIUM SERPL-MCNC: 2.4 MG/DL (ref 1.6–2.3)
MCH RBC QN AUTO: 29.2 PG (ref 26.5–33)
MCHC RBC AUTO-ENTMCNC: 32.5 G/DL (ref 31.5–36.5)
MCV RBC AUTO: 90 FL (ref 78–100)
OSMOLALITY UR: 272 MMOL/KG (ref 100–1200)
PLATELET # BLD AUTO: 299 10E9/L (ref 150–450)
POTASSIUM SERPL-SCNC: 4 MMOL/L (ref 3.4–5.3)
RBC # BLD AUTO: 3.19 10E12/L (ref 3.8–5.2)
SODIUM SERPL-SCNC: 128 MMOL/L (ref 133–144)
SODIUM SERPL-SCNC: 132 MMOL/L (ref 133–144)
SODIUM SERPL-SCNC: 133 MMOL/L (ref 133–144)
WBC # BLD AUTO: 20.2 10E9/L (ref 4–11)

## 2017-05-22 PROCEDURE — 25000132 ZZH RX MED GY IP 250 OP 250 PS 637: Mod: GY | Performed by: SURGERY

## 2017-05-22 PROCEDURE — 36415 COLL VENOUS BLD VENIPUNCTURE: CPT | Performed by: STUDENT IN AN ORGANIZED HEALTH CARE EDUCATION/TRAINING PROGRAM

## 2017-05-22 PROCEDURE — 80048 BASIC METABOLIC PNL TOTAL CA: CPT | Performed by: SURGERY

## 2017-05-22 PROCEDURE — 97530 THERAPEUTIC ACTIVITIES: CPT | Mod: GP

## 2017-05-22 PROCEDURE — 85520 HEPARIN ASSAY: CPT | Performed by: STUDENT IN AN ORGANIZED HEALTH CARE EDUCATION/TRAINING PROGRAM

## 2017-05-22 PROCEDURE — 97110 THERAPEUTIC EXERCISES: CPT | Mod: GP

## 2017-05-22 PROCEDURE — 83735 ASSAY OF MAGNESIUM: CPT | Performed by: SURGERY

## 2017-05-22 PROCEDURE — A9270 NON-COVERED ITEM OR SERVICE: HCPCS | Mod: GY | Performed by: PHYSICIAN ASSISTANT

## 2017-05-22 PROCEDURE — 83935 ASSAY OF URINE OSMOLALITY: CPT | Performed by: STUDENT IN AN ORGANIZED HEALTH CARE EDUCATION/TRAINING PROGRAM

## 2017-05-22 PROCEDURE — A9270 NON-COVERED ITEM OR SERVICE: HCPCS | Mod: GY | Performed by: STUDENT IN AN ORGANIZED HEALTH CARE EDUCATION/TRAINING PROGRAM

## 2017-05-22 PROCEDURE — 85049 AUTOMATED PLATELET COUNT: CPT | Performed by: THORACIC SURGERY (CARDIOTHORACIC VASCULAR SURGERY)

## 2017-05-22 PROCEDURE — 97110 THERAPEUTIC EXERCISES: CPT | Mod: GO

## 2017-05-22 PROCEDURE — 25000132 ZZH RX MED GY IP 250 OP 250 PS 637: Mod: GY | Performed by: STUDENT IN AN ORGANIZED HEALTH CARE EDUCATION/TRAINING PROGRAM

## 2017-05-22 PROCEDURE — 25000132 ZZH RX MED GY IP 250 OP 250 PS 637: Mod: GY | Performed by: PHYSICIAN ASSISTANT

## 2017-05-22 PROCEDURE — 36415 COLL VENOUS BLD VENIPUNCTURE: CPT | Performed by: SURGERY

## 2017-05-22 PROCEDURE — 84295 ASSAY OF SERUM SODIUM: CPT | Performed by: SURGERY

## 2017-05-22 PROCEDURE — A9270 NON-COVERED ITEM OR SERVICE: HCPCS | Mod: GY | Performed by: THORACIC SURGERY (CARDIOTHORACIC VASCULAR SURGERY)

## 2017-05-22 PROCEDURE — 25000128 H RX IP 250 OP 636: Performed by: SURGERY

## 2017-05-22 PROCEDURE — 85520 HEPARIN ASSAY: CPT | Performed by: SURGERY

## 2017-05-22 PROCEDURE — 40000275 ZZH STATISTIC RCP TIME EA 10 MIN

## 2017-05-22 PROCEDURE — 85027 COMPLETE CBC AUTOMATED: CPT | Performed by: SURGERY

## 2017-05-22 PROCEDURE — A9270 NON-COVERED ITEM OR SERVICE: HCPCS | Mod: GY | Performed by: SURGERY

## 2017-05-22 PROCEDURE — 40000133 ZZH STATISTIC OT WARD VISIT

## 2017-05-22 PROCEDURE — 94640 AIRWAY INHALATION TREATMENT: CPT | Mod: 76

## 2017-05-22 PROCEDURE — 25000125 ZZHC RX 250: Performed by: PHYSICIAN ASSISTANT

## 2017-05-22 PROCEDURE — 85520 HEPARIN ASSAY: CPT | Performed by: HOSPITALIST

## 2017-05-22 PROCEDURE — 94640 AIRWAY INHALATION TREATMENT: CPT

## 2017-05-22 PROCEDURE — 85610 PROTHROMBIN TIME: CPT | Performed by: SURGERY

## 2017-05-22 PROCEDURE — 25000132 ZZH RX MED GY IP 250 OP 250 PS 637: Mod: GY | Performed by: THORACIC SURGERY (CARDIOTHORACIC VASCULAR SURGERY)

## 2017-05-22 PROCEDURE — 71020 XR CHEST 2 VW: CPT

## 2017-05-22 PROCEDURE — 40000193 ZZH STATISTIC PT WARD VISIT

## 2017-05-22 PROCEDURE — 36415 COLL VENOUS BLD VENIPUNCTURE: CPT | Performed by: HOSPITALIST

## 2017-05-22 PROCEDURE — 12000006 ZZH R&B IMCU INTERMEDIATE UMMC

## 2017-05-22 PROCEDURE — 00000146 ZZHCL STATISTIC GLUCOSE BY METER IP

## 2017-05-22 RX ORDER — FUROSEMIDE 20 MG
20 TABLET ORAL EVERY MORNING
Status: DISCONTINUED | OUTPATIENT
Start: 2017-05-22 | End: 2017-05-26 | Stop reason: HOSPADM

## 2017-05-22 RX ORDER — POLYETHYLENE GLYCOL 3350 17 G/17G
17 POWDER, FOR SOLUTION ORAL DAILY
Status: DISCONTINUED | OUTPATIENT
Start: 2017-05-22 | End: 2017-05-26 | Stop reason: HOSPADM

## 2017-05-22 RX ADMIN — LEVALBUTEROL HYDROCHLORIDE 0.63 MG: 0.63 SOLUTION RESPIRATORY (INHALATION) at 11:54

## 2017-05-22 RX ADMIN — SODIUM CHLORIDE SOLN NEBU 3% 3 ML: 3 NEBU SOLN at 11:55

## 2017-05-22 RX ADMIN — ACETAMINOPHEN 975 MG: 325 TABLET, FILM COATED ORAL at 08:57

## 2017-05-22 RX ADMIN — POTASSIUM CHLORIDE 20 MEQ: 750 TABLET, EXTENDED RELEASE ORAL at 09:34

## 2017-05-22 RX ADMIN — PRAVASTATIN SODIUM 40 MG: 40 TABLET ORAL at 19:56

## 2017-05-22 RX ADMIN — LEVOFLOXACIN 250 MG: 250 TABLET, FILM COATED ORAL at 08:58

## 2017-05-22 RX ADMIN — GUAIFENESIN 600 MG: 600 TABLET, EXTENDED RELEASE ORAL at 08:59

## 2017-05-22 RX ADMIN — METOPROLOL TARTRATE 25 MG: 25 TABLET ORAL at 19:56

## 2017-05-22 RX ADMIN — FLUTICASONE FUROATE AND VILANTEROL TRIFENATATE 1 PUFF: 100; 25 POWDER RESPIRATORY (INHALATION) at 09:30

## 2017-05-22 RX ADMIN — Medication 10 ML: at 21:27

## 2017-05-22 RX ADMIN — HEPARIN SODIUM 900 UNITS/HR: 10000 INJECTION, SOLUTION INTRAVENOUS at 20:04

## 2017-05-22 RX ADMIN — SODIUM CHLORIDE SOLN NEBU 3% 3 ML: 3 NEBU SOLN at 16:18

## 2017-05-22 RX ADMIN — DILTIAZEM HYDROCHLORIDE 180 MG: 90 CAPSULE, EXTENDED RELEASE ORAL at 08:59

## 2017-05-22 RX ADMIN — LEVALBUTEROL HYDROCHLORIDE 0.63 MG: 0.63 SOLUTION RESPIRATORY (INHALATION) at 16:18

## 2017-05-22 RX ADMIN — PANTOPRAZOLE SODIUM 40 MG: 40 TABLET, DELAYED RELEASE ORAL at 08:59

## 2017-05-22 RX ADMIN — SENNOSIDES AND DOCUSATE SODIUM 2 TABLET: 8.6; 5 TABLET ORAL at 19:56

## 2017-05-22 RX ADMIN — ASPIRIN 81 MG CHEWABLE TABLET 81 MG: 81 TABLET CHEWABLE at 19:56

## 2017-05-22 RX ADMIN — HEPARIN SODIUM 750 UNITS/HR: 10000 INJECTION, SOLUTION INTRAVENOUS at 08:46

## 2017-05-22 RX ADMIN — METOPROLOL TARTRATE 25 MG: 25 TABLET ORAL at 08:59

## 2017-05-22 RX ADMIN — FUROSEMIDE 20 MG: 20 TABLET ORAL at 08:59

## 2017-05-22 RX ADMIN — Medication 10 ML: at 09:35

## 2017-05-22 RX ADMIN — LIDOCAINE 1 PATCH: 50 PATCH TOPICAL at 19:56

## 2017-05-22 RX ADMIN — CETIRIZINE HYDROCHLORIDE 5 MG: 5 TABLET ORAL at 08:59

## 2017-05-22 RX ADMIN — DILTIAZEM HYDROCHLORIDE 180 MG: 90 CAPSULE, EXTENDED RELEASE ORAL at 19:56

## 2017-05-22 RX ADMIN — ACETAMINOPHEN 975 MG: 325 TABLET, FILM COATED ORAL at 15:11

## 2017-05-22 RX ADMIN — GUAIFENESIN 600 MG: 600 TABLET, EXTENDED RELEASE ORAL at 19:56

## 2017-05-22 RX ADMIN — SODIUM CHLORIDE SOLN NEBU 3% 3 ML: 3 NEBU SOLN at 20:49

## 2017-05-22 RX ADMIN — Medication 10 ML: at 15:13

## 2017-05-22 RX ADMIN — MELATONIN TAB 3 MG 3 MG: 3 TAB at 19:56

## 2017-05-22 RX ADMIN — LEVALBUTEROL HYDROCHLORIDE 0.63 MG: 0.63 SOLUTION RESPIRATORY (INHALATION) at 20:49

## 2017-05-22 ASSESSMENT — PAIN DESCRIPTION - DESCRIPTORS: DESCRIPTORS: ACHING

## 2017-05-22 NOTE — PROGRESS NOTES
Nephrology Progress Note      Interval History:  Ms Valenzuela was complaining of acid reflux this morning and an irritation in her throat. She reported that she had an appetite but could not eat because of this. Her constipation has resolved with enema and now has regular bowel movements.     ASSESSMENT AND RECOMMENDATIONS:   71 year old female with long standing type I diabetes with presumed diabetic nephropathy (followed by Dr Sim of [facility]), CKD stage 4, Scr 1.8-2.2 mg/dL (eGFR 20) who is s/p VATS for lung adenocarcinoma. Nephrology is consulted for CKD and hyperkalemia.    CKD with elevated creatinine: baseline creatinine of 1.8-2.2. Frequent fluctuations with hemodynamic changes even in the outpatient settings have been noted by the patient and her    Her creatinine peaked at 2.4 in perioperative period likely secondary to hypotension/hemodynamic changes in the setting of poor renal reserve. This was further complicated by Afib with RVR and likely infection (pneumonia vs UTI)   --Creatinine remains stable  --Avoid nephrotoxins and ensure medications are renally dosed.  --Continue to hold Lisinopril and do not order on discharge.  --She should follow up with her nephrologist to restart it.    Hyponatremia: Euvolemic on examination. Likely has some component of elevated ADH from recent surgery and ongoing pain. Adenocarcinoma of lung could rarely cause SIADH, however this is quite acute and not consistent. She was also receiving D5 1/2 NS as maintenance which could worsen it. Her lasix has also been stopped recently.  --Check urine Osm( was not sent) Urine Na of 17 implies a sodium non-avid state.   --PO fluid restriction to < 1L/day. Discontinue NS   --Check serum Na Q 12 hours     Blood pressure/ volume : Blood pressures remain well controlled on PO Cardizem and carvedilol.   --Goal BP would be < 130/90 mm Hg. Avoid  "hypotensive episodes.   --Patient has not tolerated multiple antihypertensives in the past, including doxazosin.  --If blood pressure again becomes uncontrolled (hyper).  May consider the addition of Hydralazine in the future since we have removed Lisinopril.     High normal K: In the setting of total body potassium excess and hyperglycemia.   --Improving on low K diet.   --continue holding lisinopril.     Constipation: bowel protocol per primary.     Yoly Ruvalcaba MD     REVIEW OF SYSTEMS:  Mouth very dry; chronic.  Good urine output without hematuria, dysuria.  No confusion or lightheadedness  No N/V/D.   Breathing well on RA, Ambulating around unit, weakness improving.    PHYSICAL EXAM:   Temp  Av.8  F (36.6  C)  Min: 96.5  F (35.8  C)  Max: 99.4  F (37.4  C)  Arterial Line MAP (mmHg)  Av mmHg  Min: 58 mmHg  Max: 88 mmHg  Arterial Line BP  Min: 109/36  Max: 147/49      Pulse  Av.3  Min: 60  Max: 88 Resp  Av.4  Min: 10  Max: 20  SpO2  Av.5 %  Min: 93 %  Max: 100 %       /54 (BP Location: Right arm)  Pulse 79  Temp 97.8  F (36.6  C) (Oral)  Resp 18  Ht 1.626 m (5' 4\")  Wt 62.6 kg (138 lb)  SpO2 98%  BMI 23.69 kg/m2   Date 17 0700 - 17 0659     Wt Readings from Last 2 Encounters:   17 63.5 kg (140 lb 1.6 oz)   17 63.2 kg (139 lb 4.8 oz)     Intake/Output Summary (Last 24 hours) at 17 1434  Last data filed at 17 1200   Gross per 24 hour   Intake            683.8 ml   Output              756 ml   Net            -72.2 ml     Physical examination:   GENERAL APPEARANCE: mild distress,  awake  Pulmonary: Decreased air moving in RLL.  Non labored breathing on RA.  CT tubes in place.  CV: regular rhythm, normal rate, no rub   - JVP not elevated   - Edema- none   NEURO: mentation intact and speech normal      PAST MEDICAL HISTORY:  Reviewed with patient at time of initial consult.    Past Medical History:   Diagnosis Date     Adenocarcinoma, lung " (H)      Asthma      CAD (coronary artery disease) 9/17/2014 2006 PCI circumflex territory  Mid LAD lesion      Carotid artery stenosis      CKD (chronic kidney disease) stage 4, GFR 15-29 ml/min (H)      Diabetes mellitus type 1 (H)      H/O factor V Leiden mutation      Hypertension      Mixed hyperlipidemia (DYSLIPIDEMIA) 8/1/2014     Osteopenia      Pneumothorax 4/16/2017     PVD (peripheral vascular disease) (H)      Thyroid nodule 04/12/2012    Hurtle Cells, non malignant       Past Surgical History:   Procedure Laterality Date     BRONCHOSCOPY FLEXIBLE Right 5/10/2017    Procedure: BRONCHOSCOPY FLEXIBLE;  Flexible Bronchoscopy, esaphogastroduodenoscopy, cervical Mediastinoscopy, Right  Thoracoscopic Surgery, right Lower Lobectomy, Mediastinal Lymph Node Dissection, Right middle lobe wedge resection*Latex Allergy* (ERAS Patient);  Surgeon: Glenna Hall MD;  Location: [facility] OR     ESOPHAGOSCOPY, GASTROSCOPY, DUODENOSCOPY (EGD), COMBINED N/A 5/10/2017    Procedure: COMBINED ESOPHAGOSCOPY, GASTROSCOPY, DUODENOSCOPY (EGD);;  Surgeon: Glenna Hall MD;  Location: [facility] OR     ganglion cyst removal       hemithyroidectomy  4/13/2012    PATH report showed benign Hurtle Cell Adenoma     IR biopsy of lung  04/2017     lipoma removal       MEDIASTINOSCOPY N/A 5/10/2017    Procedure: MEDIASTINOSCOPY;;  Surgeon: Glenna Hall MD;  Location: [facility] OR     SHOULDER SURGERY Left      THORACOSCOPIC EXCISE NODE MEDIASTINAL Right 5/10/2017    Procedure: THORACOSCOPIC EXCISE NODE MEDIASTINAL;;  Surgeon: Glenna Hall MD;  Location: [facility]     THORACOSCOPIC RESECTION LUNG Right 5/10/2017    Procedure: THORACOSCOPIC RESECTION LUNG;;  Surgeon: Glenna Hall MD;  Location: [facility]     TONSILLECTOMY          MEDICATIONS:  PTA Meds  Prior to Admission medications    Medication Sig Last Dose Taking? Auth Provider   albuterol (PROAIR HFA/PROVENTIL HFA/VENTOLIN HFA) 108 (90 BASE) MCG/ACT Inhaler Inhale 2 puffs into the lungs 2 times  daily 5/9/2017 at Unknown time Yes Reported, Patient   Respiratory Therapy Supplies (AEROBIKA) GAGANDEEP 1 Device 6 times daily 5/9/2017 at Unknown time Yes Demario Torrez MD   Insulin Aspart (INSULIN PUMP - OUTPATIENT) Insulin pump base rate: 3151-1013 rate 0.5  8547-3247 rate 1.0  9646-3735 rate 1.95  9230-2512 rate 1.35 5/10/2017 at Unknown time Yes Reported, Patient   Acetaminophen (TYLENOL PO) Take 1,000 mg by mouth every 8 hours as needed for mild pain or fever  Past Week at Unknown time Yes Reported, Patient   budesonide-formoterol (SYMBICORT) 80-4.5 MCG/ACT inhaler Inhale 2 puffs into the lungs 2 times daily Past Week at Unknown time Yes John Plaza MD   amLODIPine (NORVASC) 10 MG tablet Take 10 mg by mouth every evening  5/9/2017 at Unknown time Yes Reported, Patient   aspirin EC 81 MG tablet Take 81 mg by mouth every evening  5/9/2017 at Unknown time Yes Reported, Patient   carvedilol (COREG) 3.125 MG tablet Take 3.125 mg by mouth 2 times daily (with meals)  5/9/2017 at Unknown time Yes Reported, Patient   furosemide (LASIX) 20 MG tablet 20 mg every morning  5/9/2017 at Unknown time Yes Reported, Patient   lisinopril (PRINIVIL,ZESTRIL) 20 MG tablet Take 10 mg by mouth every evening  5/9/2017 at Unknown time Yes Reported, Patient   pravastatin (PRAVACHOL) 40 MG tablet 40 mg every evening  5/9/2017 at Unknown time Yes Reported, Patient   cetirizine (ZYRTEC) 10 MG tablet Take 5 mg by mouth daily   Reported, Patient   calcium 600 MG tablet Take 1 tablet by mouth every morning   Reported, Patient   clonazePAM (KLONOPIN) 0.5 MG tablet Take 0.5 tablets (0.25 mg) by mouth 2 times daily as needed for anxiety   Elisa Ellington MD   blood glucose (ONE TOUCH ULTRA) test strip    Reported, Patient   calcium acetate (PHOSLO) 667 MG CAPS Take 667 mg by mouth 4 times daily (with meals and nightly)    Reported, Patient   cholecalciferol (VITAMIN D) 1000 UNIT tablet Take 1,000 Units by mouth every morning     Reported, Patient   ONETOUCH DELICA LANCETS 33G MISC    Reported, Patient      Current Meds    magnesium hydroxide  30 mL Oral Once     levofloxacin  250 mg Oral Daily     diltiazem  180 mg Oral BID     metoprolol  25 mg Oral BID     insulin glargine  20 Units Subcutaneous Q24H     lidocaine  1 patch Transdermal Q24h    And     lidocaine   Transdermal Q24H    And     lidocaine   Transdermal Q8H     artificial saliva  10 mL Swish & Spit 4x Daily     sodium chloride (PF)  3 mL Intracatheter Q8H     insulin aspart   Subcutaneous Daily with breakfast     insulin aspart   Subcutaneous Daily before lunch     insulin aspart   Subcutaneous Daily with supper     levalbuterol  0.63 mg Nebulization Q4H WA     sodium chloride  3 mL Nebulization Q4H While awake     guaiFENesin  600 mg Oral BID     insulin aspart  1-7 Units Subcutaneous TID AC     insulin aspart  1-5 Units Subcutaneous At Bedtime     bisacodyl  10 mg Rectal Daily     senna-docusate  2 tablet Oral BID     polyethylene glycol  17 g Oral BID     acetaminophen  975 mg Oral Q8H     melatonin  3 mg Oral At Bedtime     menthol   Transdermal Q8H     aspirin EC  81 mg Oral QPM     cetirizine  5 mg Oral Daily     pravastatin  40 mg Oral QPM     pantoprazole  40 mg Oral Daily     fluticasone-vilanterol  1 puff Inhalation Daily     Infusion Meds    Warfarin Therapy Reminder       HEParin 900 Units/hr (05/21/17 1500)       ALLERGIES:    Allergies   Allergen Reactions     Codeine Other (See Comments)     Makes her sleepless     Loratadine Other (See Comments)     Extreme sleepiness       Sulfa Drugs Unknown     Other reaction(s): Unknown     Terazosin Other (See Comments)     Swellling in legs, improved when went off     Adhesive Tape Rash     Foam tape used during surgery was what caused rash     Latex Rash     Liquid Adhesive Rash     SOCIAL HISTORY:   Social History     Social History     Marital status:      Spouse name: N/A     Number of children: N/A     Years of  education: N/A     Occupational History     retail Other     part-time     Social History Main Topics     Smoking status: Former Smoker     Packs/day: 0.50     Years: 15.00     Types: Cigarettes     Start date: 1/1/1970     Quit date: 12/31/1985     Smokeless tobacco: Never Used     Alcohol use 0.0 - 0.5 oz/week     0 - 1 Standard drinks or equivalent per week     Drug use: No     Sexual activity: Not on file     Other Topics Concern     Not on file     Social History Narrative    The patient has a 7 pk yr tobacco hx.  She has no active use since 1985.  Alcohol use is 0 alcoholic drinks per week.  She denies use of recreational drugs.          She is retired.  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           The patient is .  Has 2 children.        Hot Tub Exposure: NO    Recent Travel: NO     Hx of incarceration:  NO    Bird Exposure:   NO    Animal Exposure:  NO    Inhalation Exposure:  NO         Reviewed with patient at time of initial consult.    FAMILY MEDICAL HISTORY:   Family History   Problem Relation Age of Onset     Cancer - colorectal Father      DIABETES Father      Scleroderma Mother      CEREBROVASCULAR DISEASE Brother      DIABETES Brother      Hypertension Brother      DIABETES Brother      2nd brother     Reviewed with patient at time of initial consult.    LABS:   CMP    Recent Labs  Lab 05/21/17  0616 05/20/17  1736 05/20/17  1346 05/20/17  0426 05/19/17  0512 05/18/17  0630 05/17/17  0128 05/16/17  0951   * 125* 125* 129* 131* 133 132* 136   POTASSIUM 4.6  --  4.7 4.2 4.4 4.6 4.4 5.1   CHLORIDE 97  --  95 98 100 103 101 104   CO2 18*  --  21 20 20 23 24 20   ANIONGAP 12  --  9 11 11 8 7 12   *  --  260* 118* 181* 220* 238* 246*   BUN 53*  --  51* 53* 58* 55* 46* 45*   CR 2.16*  --  2.11* 2.20* 2.30* 2.38* 1.78* 1.74*   GFRESTIMATED 22*  --  23* 22* 21* 20* 28* 29*   GFRESTBLACK 27*  --  28* 27* 25* 24* 34* 35*   ZAHEER 7.6*  --  7.2* 7.3* 7.7* 8.0*  8.2* 8.8   MAG  --   --   --  2.2  --  2.3 2.1 2.1   PHOS  --   --   --  3.8  --  4.0 2.7 3.8     CBC    Recent Labs  Lab 05/21/17  0616 05/20/17  0426 05/19/17  0953 05/19/17  0458 05/18/17  0630   HGB 8.8* 8.4* 9.2*  --  8.9*   WBC 22.5* 18.9* 21.9*  --  18.4*   RBC 2.97* 2.87* 3.15*  --  3.05*   HCT 26.5* 25.7* 29.0*  --  28.2*   MCV 89 90 92  --  93   MCH 29.6 29.3 29.2  --  29.2   MCHC 33.2 32.7 31.7  --  31.6   RDW 14.1 14.1 14.3  --  14.3    235 230 237 228     INR    Recent Labs  Lab 05/21/17  0954   INR 1.16*     ABG  No lab results found in last 7 days.   URINE STUDIES  Recent Labs   Lab Test  05/19/17   0740  05/13/17   0914  05/12/17   2312  08/04/14   0832   COLOR  Yellow  Light Yellow  Light Yellow  Straw   APPEARANCE  Slightly Cloudy  Clear  Clear  Clear   URINEGLC  70*  300*  70*  300*   URINEBILI  Negative  Negative  Negative  Negative   URINEKETONE  Negative  Negative  Negative  Negative   SG  1.013  1.008  1.009  1.008   UBLD  Negative  Negative  Negative  Negative   URINEPH  5.0  5.0  5.0  5.5   PROTEIN  Negative  Negative  Negative  10*   NITRITE  Negative  Negative  Negative  Negative   LEUKEST  Large*  Negative  Negative  Negative   RBCU  <1  0  <1  <1   WBCU  44*  1  1  <1     No lab results found.  PTH  No lab results found.  IRON STUDIES  No lab results found.    IMAGING:  All imaging studies reviewed by me.     Yoly Ruvalcaba MD

## 2017-05-22 NOTE — PLAN OF CARE
"Problem: Goal Outcome Summary  Goal: Goal Outcome Summary  Outcome: Improving  /65 (BP Location: Right arm)  Pulse 68  Temp 97.6  F (36.4  C) (Oral)  Resp 20  Ht 1.626 m (5' 4\")  Wt 63.6 kg (140 lb 4.8 oz)  SpO2 98%  BMI 24.08 kg/m2  A&Ox4 but forgetful. NSR. On regular diet. Walked X2. CT to water seal. Heparin gtt increased.  Xray done. Urine sample. Tylenol given for pain. Continue to monitor.          "

## 2017-05-22 NOTE — PROGRESS NOTES
"                     Diabetes Consult Daily  Progress Note          Assessment/Plan:   Yue Valenzuela is a 71 year old female with DM-1 managed on ambulatory subcutaneous insulin pump at home, CKD IV, factor V Leiden mutation, HTN, HLD, CAD s/p stent, PVD, carotid artery stenosis, asthma, and newly diagnosed lung adenocarcinoma, s/ p right lower lobectomy 05/11.     Mild hypoglycemia this morning.  Appears due to better sleep and consequent absence of snacking.  Still having trouble accurately covering PO carb intake, resulting in post-prandial spikes to high 200s.    Plan:  -decrease glargine to 17 units tonight  -aspart meal Insulin:  Breakfast: 1 unit per 11 grams of CHO  Lunch: 1 unit per 11 grams of CHO  Dinner: 1 unit per 10 grams of CHO  Snacks: 1 unit per 10 grams of CHO  -Aspart medium correction with meals and snacks  Monitor glucose before meals, HS and 0200       Plan discussed with pt, , bedside RN.    Outpatient diabetes follow up:  Dr. Mason Sprague at [facility]             Interval History:   I reviewed the last 24 hours progress notes  Karime since 5/18/  Creatinine improved to <2 today.  At home, pump basal rate was 0.5 units/h overnight.  No BG overnight last night.  Pt slept the best.   endorses absence of snacking overnight.  Pt complains difficulty swallowing especially dry foods.  Prefers soft foods.  Not certain if she is eating more than last week since says it is all still effortful.  Really wants her CT pulled.    RN notes continue to document pt \"forgetful\".  Will need to assess appropriateness for pump resumption as discharge gets closeer.        Recent Labs  Lab 05/22/17  0900 05/22/17  0727 05/21/17  2233 05/21/17  1733 05/21/17  1226 05/21/17  0734 05/21/17  0616 05/21/17  0403  05/20/17  1346  05/20/17  0426  05/19/17  0512  05/18/17  0630   GLC  --  66*  --   --   --   --  154*  --   --  260*  --  118*  --  181*  --  220*   BGM 87  --  190* 290* 217* 170*  --  138* " " < >  --   < >  --   < >  --   < >  --    < > = values in this interval not displayed.            Review of Systems:      please see interval history       Medications:       Active Diet Order      Regular Diet Adult     Physical Exam:  Gen: Alert, resting in chair in NAD,  at bedside and helpful  HEENT: NC/AT, mucous membranes are moist  Resp: Unlabored, on room air  Ext: moving all extremities  Neuro:communicating clearly  /52 (BP Location: Right arm)  Pulse 68  Temp 97.5  F (36.4  C) (Oral)  Resp 16  Ht 1.626 m (5' 4\")  Wt 63.6 kg (140 lb 4.8 oz)  SpO2 100%  BMI 24.08 kg/m2           Data:     Lab Results   Component Value Date    A1C 7.0 05/03/2017    A1C 6.0 08/04/2014            Recent Labs   Lab Test  05/22/17   0727  05/21/17   2343   05/21/17   0616   NA  133  128*   < >  128*   POTASSIUM  4.0   --    --   4.6   CHLORIDE  105   --    --   97   CO2  18*   --    --   18*   ANIONGAP  10   --    --   12   GLC  66*   --    --   154*   BUN  48*   --    --   53*   CR  1.89*   --    --   2.16*   ZAHEER  8.1*   --    --   7.6*    < > = values in this interval not displayed.     CBC RESULTS:   Recent Labs   Lab Test  05/22/17   0727   WBC  20.2*   RBC  3.19*   HGB  9.3*   HCT  28.6*   MCV  90   MCH  29.2   MCHC  32.5   RDW  14.3   PLT  299     Coleen Hemphill APRN [id number]    Diabetes Management Job Code [id number]          "

## 2017-05-22 NOTE — PLAN OF CARE
Neuro: A&Ox4.   Cardiac: SR, with PAC's. VSS.   Respiratory: Sating 100 on RA. Right CT at water seal, leak noted.  GI/: One urine occurrence. Need urine sample.  Diet/appetite: Tolerating regular diet. Appetite poor. Potasium replaced. 1L fluid restriction.  Activity:  Assist of 1, up to chair and in halls.  Pain: At acceptable level on current regimen.   Skin: No new deficits noted. CT dressing changed at 1100.     R: BS 66 this am, 120 cc sprite given, BS now 270's x2 checks. Continue with POC. Notify primary team with changes.

## 2017-05-22 NOTE — PROGRESS NOTES
Nephrology Progress Note  05/22/2017       Ms Valenzuela is a 71 year old female with T1DM, Presumed diabetic nephropathy (followed by Dr Sim of [facility]), CKD stage 4, Scr 1.8-2.2 mg/dL (eGFR 20) who is s/p VATS for lung adenocarcinoma. Nephrology is consulted for CKD and hyperkalemia.    Assessment & Recommendations:     1. SUPRIYA - Resolved. Etiology felt to be 2/2 hypotension/ Afib with RVR during perioperative period in setting of poor renal reserve. Peak creat 2.3. Baseline 1.8-2.2.    - Continue to monitor renal function closely    - Avoid nephrotoxins/hypotension   - Renal dose medications   - Continue to hold ACE. Outpt Nephrologist can restart    2. 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 Baseline 1.8-2.2.    - Patient should be seen by Dr Sim following discharge to assist with managing her antihypertensive regimen given that it has changed during this admission.     3. Hyponatremia - Resolved. Na 133 today.    - Continue fluid restriction.    - Monitor closely given Lasix was restarted    4. Electrolytes - No acute concerns. K 4    5. Afib with RVR - On Warfarin/Coumadin. CCB was switched from Amlodipine to Cardizem, BB switched from Coreg to Lopressor. HR in the 50-70 range.    - Will need close f/u at discharge given med changes    6. HTN - Acceptable control in recovering SUPRIYA. -150/ on Cardizem  mg bid, Lopressor 25 mg bid, Lasix 20 mg qd. PTA regimen included: Coreg 3.125 mg bid, Amlodipine 10 mg qd, Lisinopril 10 mg qd.     7. Acid base - Bicarb 18 over last 48 hrs. Not on bicarb replacement. Would continue to monitor    8. BMD - Ca 8.1, Phos 3.8   - Continue to hold Phoslo ( currently not requiring, outpt Nephrologist can restart if needed)   - Would restart Vit D 1000 u qd    9. Antimicrobials - Currently on Levaquin. WBC 20.2, Afebrile     10. Disposition - Given stability of renal function, Nephrology will sign off. Please be sure patient has appt  "to see her primary Nephrologist within 1-2 wks of discharge.     Recommendations were communicated to primary team via progress note     Seen and discussed with Dr. Jacinto Ruiz, NP   [phone]    Interval History :   Creat is at baseline  UO has increased with resumption of Furosemide    Review of Systems:     I reviewed the following systems:  GI: Reports poor appetite. No nausea/abdominal pain. Having stools  : voiding w/o swift  Neuro:  no confusion  Constitutional:  no fever or chills  CV: denies dyspnea, CP. Has mild ankle edema.      Physical Exam:   I/O last 3 completed shifts:  In: 754.15 [P.O.:565; I.V.:189.15]  Out: 2055 [Urine:1975; Chest Tube:80]   /43 (BP Location: Right arm)  Pulse 68  Temp 97.6  F (36.4  C) (Oral)  Resp 16  Ht 1.626 m (5' 4\")  Wt 63.6 kg (140 lb 4.8 oz)  SpO2 100%  BMI 24.08 kg/m2     GENERAL APPEARANCE: WDWN female in NAD.  Oscar present  EYES:  no scleral icterus, pupils equal  PULM: lungs CTA. Breathing is non labored. No supplemental oxygen. CT present   CV: RRR        -edema- trace  GI: soft, NT, Non distended.   INTEGUMENT: no cyanosis or rash  NEURO:  Alert, interactive    Labs:   All labs reviewed by me  Electrolytes/Renal -   Recent Labs   Lab Test  05/22/17   1115  05/22/17   0727  05/21/17   2343   05/21/17   0616   05/20/17   1346  05/20/17   0426   05/18/17   0630  05/17/17   0128   NA  132*  133  128*   < >  128*   < >  125*  129*   < >  133  132*   POTASSIUM   --   4.0   --    --   4.6   --   4.7  4.2   < >  4.6  4.4   CHLORIDE   --   105   --    --   97   --   95  98   < >  103  101   CO2   --   18*   --    --   18*   --   21  20   < >  23  24   BUN   --   48*   --    --   53*   --   51*  53*   < >  55*  46*   CR   --   1.89*   --    --   2.16*   --   2.11*  2.20*   < >  2.38*  1.78*   GLC   --   66*   --    --   154*   --   260*  118*   < >  220*  238*   ZAHEER   --   8.1*   --    --   7.6*   --   7.2*  7.3*   < >  8.0*  8.2*   MAG   -- "    --    --    --    --    --    --   2.2   --   2.3  2.1   PHOS   --    --    --    --    --    --    --   3.8   --   4.0  2.7    < > = values in this interval not displayed.       CBC -   Recent Labs   Lab Test  05/22/17   0727  05/21/17   0616  05/20/17   0426   WBC  20.2*  22.5*  18.9*   HGB  9.3*  8.8*  8.4*   PLT  299  286  235       LFTs -   Recent Labs   Lab Test  08/04/14   0712   ALKPHOS  79   BILITOTAL  0.4   ALT  44   AST  29   PROTTOTAL  7.5   ALBUMIN  4.3       Iron Panel - No lab results found.      Chest PA and lateral     HISTORY: Interval exam     COMPARISON STUDY: 5/21/2017.     FINDINGS: Right apical chest tube in place. Subcutaneous emphysema on  the right. Loculated hydropneumothorax in the medial right chest.  Right apical pneumothorax is small and unchanged. Interstitial  opacities noted bilaterally. Right basilar atelectasis and  consolidation. Peripheral left midlung consolidative opacities again  noted.         IMPRESSION: Surgical changes right lower lobectomy with moderate  loculated right medial hydropneumothorax, right apical and basilar  pneumothoraces and consolidative changes in the right lower lung  field.    Current Medications:    furosemide  20 mg Oral QAM     insulin glargine  17 Units Subcutaneous Q24H     levofloxacin  250 mg Oral Daily     diltiazem  180 mg Oral BID     metoprolol  25 mg Oral BID     lidocaine  1 patch Transdermal Q24h    And     lidocaine   Transdermal Q24H    And     lidocaine   Transdermal Q8H     artificial saliva  10 mL Swish & Spit 4x Daily     sodium chloride (PF)  3 mL Intracatheter Q8H     insulin aspart   Subcutaneous Daily with breakfast     insulin aspart   Subcutaneous Daily before lunch     insulin aspart   Subcutaneous Daily with supper     levalbuterol  0.63 mg Nebulization Q4H WA     sodium chloride  3 mL Nebulization Q4H While awake     guaiFENesin  600 mg Oral BID     insulin aspart  1-7 Units Subcutaneous TID AC     insulin aspart  1-5  Units Subcutaneous At Bedtime     bisacodyl  10 mg Rectal Daily     senna-docusate  2 tablet Oral BID     polyethylene glycol  17 g Oral BID     acetaminophen  975 mg Oral Q8H     melatonin  3 mg Oral At Bedtime     menthol   Transdermal Q8H     aspirin EC  81 mg Oral QPM     cetirizine  5 mg Oral Daily     pravastatin  40 mg Oral QPM     pantoprazole  40 mg Oral Daily     fluticasone-vilanterol  1 puff Inhalation Daily       HEParin 750 Units/hr (05/22/17 0846)     Toma Ruiz, NP

## 2017-05-22 NOTE — PLAN OF CARE
Problem: Goal Outcome Summary  Goal: Goal Outcome Summary  Outcome: No Change  A/O X4; forgetful @ times. Able to make needs known, uses call light appropriately. No c/o of chest pain, SOB, N/V, dizziness, and denies pain. HR 50-80's, VSS; afebrile; RA. Adequate urine output. Up/ 1 to bathroom. Heparin gtt infusing at 9 ml/hr, next 10A check with AM labs. Right CT to water seal w/ minimal output, leaking and reddened around insertion site. Changed dressing 3x between 4287-2750. Regular diet, 1L fluid restriction. Tolerating well.  at bedside, involved and supportive w/ cares. Will continue to monitor and follow plan of care.

## 2017-05-22 NOTE — PROGRESS NOTES
"Thoracic Surgery Daily Progress Note  Yue Valenzuela  05/22/2017    Subjective:   Back into sinus rhythm, though some atrial ectopy noted overnight. Otherwise no events overnight. Tolerating diet. Placed on fluid restriction per nephrology. Tolerating diet.     Objective:    /52 (BP Location: Right arm)  Pulse 68  Temp 97.5  F (36.4  C) (Oral)  Resp 16  Ht 1.626 m (5' 4\")  Wt 63.6 kg (140 lb 4.8 oz)  SpO2 100%  BMI 24.08 kg/m2  Sitting up in chair in no acute distress  Awake, alert and appropriate  Non-labored breathing  Sinus rhythm on telemetry  CT in place with SS drainage. + forced expiratory air leak (improved from last week).     Ins/Outs:   I/O last 3 completed shifts:  In: 754.15 [P.O.:565; I.V.:189.15]  Out: 2055 [Urine:1975; Chest Tube:80]    Labs:   AM labs reviewed - NA, Cr, & WBC all improved   Urine culture with pseudomonas    Assessment:  Yue Valenzuela is a 71 year old female now post operative day 12 from a right VATS, lower lobectomy and middle wedge.     Plan:       Neuro:  PO pain medications, limit narcotics as able.    Cardiovascular/Heme: Cardiology consulted, appreciate assistance. Back in sinus rhythm, continue current medications.      Respiratory/Pulm: Continue chest tube to water-seal, follow up AM xray. CT to stay in place for air leak. Continue aggressive pulmonary toilet.     FEN/Gastrointestinal: Regular diet. Bowel regimen.    Renal/Genitourinary: SUPRIYA on CKD resolving. Appreciate nephrology assistance with SUPRIYA/electorlytes and hypoNa.     Infectious: Pseudomonal UTI, on levaquin. Continued leukocytosis, improved slightly today.     Prophy: Heparin drip, protonix.    Activity:  Up to chair, out of bed, increase activity.     Endo: DM, on insulin, appreciate endocrinology assistance.     Dispo: Continue care on 6B      Emiliano Encarnacion PA-C  P: [phone]                        "

## 2017-05-22 NOTE — PROGRESS NOTES
Progress Note:    Hospital Day: 12  Date of Initial Assessment: RNCC completed assessment on 5/11/17.    Data: Yue Valenzuela is a 72 yo female admitted to [facility] 05/10/2017.    Intervention: Met w/pt and pt's spouse to discuss dc plans as pt/family were planning to dc directly home but now are considering TCU.     Assessment: Pt would like to dc home, but pt's spouse is hesitant about managing pt's chest tube cares. Pt and spouse received the Medicare list of SNFs and will consider their options for TCU. Writer discussed services provided at a TCU and general insurance coverage for TCU. SW will follow up about referrals tomorrow.    Plan:  -Discharge plans in progress: No dc plans at this time. Pt/family deciding between home vs TCU.  -Barriers to discharge plan: medical stability and safe dc plan.  -Follow up plan: SW will continue to follow and assist as needed.    VU Herbert, [facility]  6B Intermediate Care Unit   Phone: [phone]  Pager: [phone]

## 2017-05-22 NOTE — PLAN OF CARE
Problem: Goal Outcome Summary  Goal: Goal Outcome Summary  OT 6B: Patient gives good effort working with OT; progressing to 10 min continuous standing while participating in light UE stretches/ROM exercises. VSS on RA. Activity tolerance limited by pain and fatigue.  Patient would benefit from TCU at discharge to progress activity tolerance and endurance.

## 2017-05-22 NOTE — PLAN OF CARE
Problem: Goal Outcome Summary  Goal: Goal Outcome Summary  Outcome: Therapy, progress toward functional goals as expected  PT/6B: Patient working very well with therapy and motivated to increase strength and functional mobility. This morning limited by fatigue from events of the morning and variable blood glucose levels. Patient participates in seated exercises and standing step up in the room. Encouraged to go walking later today as able. Continue to recommend TCU at this time and patient is below baseline mobility and making good gains towards PLOF.

## 2017-05-23 ENCOUNTER — APPOINTMENT (OUTPATIENT)
Dept: PHYSICAL THERAPY | Facility: CLINIC | Age: 72
DRG: 163 | End: 2017-05-23
Attending: STUDENT IN AN ORGANIZED HEALTH CARE EDUCATION/TRAINING PROGRAM
Payer: MEDICARE

## 2017-05-23 ENCOUNTER — APPOINTMENT (OUTPATIENT)
Dept: OCCUPATIONAL THERAPY | Facility: CLINIC | Age: 72
DRG: 163 | End: 2017-05-23
Attending: STUDENT IN AN ORGANIZED HEALTH CARE EDUCATION/TRAINING PROGRAM
Payer: MEDICARE

## 2017-05-23 LAB
ANION GAP SERPL CALCULATED.3IONS-SCNC: 10 MMOL/L (ref 3–14)
BUN SERPL-MCNC: 45 MG/DL (ref 7–30)
CALCIUM SERPL-MCNC: 7.7 MG/DL (ref 8.5–10.1)
CHLORIDE SERPL-SCNC: 106 MMOL/L (ref 94–109)
CO2 SERPL-SCNC: 18 MMOL/L (ref 20–32)
CREAT SERPL-MCNC: 1.8 MG/DL (ref 0.52–1.04)
ERYTHROCYTE [DISTWIDTH] IN BLOOD BY AUTOMATED COUNT: 14.7 % (ref 10–15)
GFR SERPL CREATININE-BSD FRML MDRD: 28 ML/MIN/1.7M2
GLUCOSE BLDC GLUCOMTR-MCNC: 112 MG/DL (ref 70–99)
GLUCOSE BLDC GLUCOMTR-MCNC: 187 MG/DL (ref 70–99)
GLUCOSE BLDC GLUCOMTR-MCNC: 213 MG/DL (ref 70–99)
GLUCOSE BLDC GLUCOMTR-MCNC: 225 MG/DL (ref 70–99)
GLUCOSE SERPL-MCNC: 216 MG/DL (ref 70–99)
HCT VFR BLD AUTO: 27.3 % (ref 35–47)
HGB BLD-MCNC: 8.8 G/DL (ref 11.7–15.7)
INR PPP: 1.19 (ref 0.86–1.14)
INTERPRETATION ECG - MUSE: NORMAL
INTERPRETATION ECG - MUSE: NORMAL
LMWH PPP CHRO-ACNC: 0.21 IU/ML
LMWH PPP CHRO-ACNC: 0.29 IU/ML
LMWH PPP CHRO-ACNC: 0.37 IU/ML
MCH RBC QN AUTO: 29.2 PG (ref 26.5–33)
MCHC RBC AUTO-ENTMCNC: 32.2 G/DL (ref 31.5–36.5)
MCV RBC AUTO: 91 FL (ref 78–100)
PLATELET # BLD AUTO: 301 10E9/L (ref 150–450)
POTASSIUM SERPL-SCNC: 5.1 MMOL/L (ref 3.4–5.3)
RBC # BLD AUTO: 3.01 10E12/L (ref 3.8–5.2)
SODIUM SERPL-SCNC: 134 MMOL/L (ref 133–144)
WBC # BLD AUTO: 16.9 10E9/L (ref 4–11)

## 2017-05-23 PROCEDURE — 85520 HEPARIN ASSAY: CPT | Performed by: HOSPITALIST

## 2017-05-23 PROCEDURE — 40000193 ZZH STATISTIC PT WARD VISIT: Performed by: PHYSICAL THERAPIST

## 2017-05-23 PROCEDURE — 85520 HEPARIN ASSAY: CPT | Performed by: STUDENT IN AN ORGANIZED HEALTH CARE EDUCATION/TRAINING PROGRAM

## 2017-05-23 PROCEDURE — A9270 NON-COVERED ITEM OR SERVICE: HCPCS | Mod: GY | Performed by: PHYSICIAN ASSISTANT

## 2017-05-23 PROCEDURE — 36415 COLL VENOUS BLD VENIPUNCTURE: CPT | Performed by: SURGERY

## 2017-05-23 PROCEDURE — 36415 COLL VENOUS BLD VENIPUNCTURE: CPT | Performed by: HOSPITALIST

## 2017-05-23 PROCEDURE — 00000146 ZZHCL STATISTIC GLUCOSE BY METER IP

## 2017-05-23 PROCEDURE — A9270 NON-COVERED ITEM OR SERVICE: HCPCS | Mod: GY | Performed by: STUDENT IN AN ORGANIZED HEALTH CARE EDUCATION/TRAINING PROGRAM

## 2017-05-23 PROCEDURE — 94640 AIRWAY INHALATION TREATMENT: CPT

## 2017-05-23 PROCEDURE — 25000132 ZZH RX MED GY IP 250 OP 250 PS 637: Mod: GY | Performed by: STUDENT IN AN ORGANIZED HEALTH CARE EDUCATION/TRAINING PROGRAM

## 2017-05-23 PROCEDURE — A9270 NON-COVERED ITEM OR SERVICE: HCPCS | Mod: GY | Performed by: THORACIC SURGERY (CARDIOTHORACIC VASCULAR SURGERY)

## 2017-05-23 PROCEDURE — 12000006 ZZH R&B IMCU INTERMEDIATE UMMC

## 2017-05-23 PROCEDURE — 36415 COLL VENOUS BLD VENIPUNCTURE: CPT | Performed by: STUDENT IN AN ORGANIZED HEALTH CARE EDUCATION/TRAINING PROGRAM

## 2017-05-23 PROCEDURE — 40000275 ZZH STATISTIC RCP TIME EA 10 MIN

## 2017-05-23 PROCEDURE — 25000132 ZZH RX MED GY IP 250 OP 250 PS 637: Mod: GY | Performed by: PHYSICIAN ASSISTANT

## 2017-05-23 PROCEDURE — 85520 HEPARIN ASSAY: CPT | Performed by: SURGERY

## 2017-05-23 PROCEDURE — 97110 THERAPEUTIC EXERCISES: CPT | Mod: GO

## 2017-05-23 PROCEDURE — 25000132 ZZH RX MED GY IP 250 OP 250 PS 637: Mod: GY | Performed by: THORACIC SURGERY (CARDIOTHORACIC VASCULAR SURGERY)

## 2017-05-23 PROCEDURE — A9270 NON-COVERED ITEM OR SERVICE: HCPCS | Mod: GY | Performed by: SURGERY

## 2017-05-23 PROCEDURE — 97535 SELF CARE MNGMENT TRAINING: CPT | Mod: GO

## 2017-05-23 PROCEDURE — 85027 COMPLETE CBC AUTOMATED: CPT | Performed by: SURGERY

## 2017-05-23 PROCEDURE — 97116 GAIT TRAINING THERAPY: CPT | Mod: GP | Performed by: PHYSICAL THERAPIST

## 2017-05-23 PROCEDURE — 40000133 ZZH STATISTIC OT WARD VISIT

## 2017-05-23 PROCEDURE — 80048 BASIC METABOLIC PNL TOTAL CA: CPT | Performed by: STUDENT IN AN ORGANIZED HEALTH CARE EDUCATION/TRAINING PROGRAM

## 2017-05-23 PROCEDURE — 25000132 ZZH RX MED GY IP 250 OP 250 PS 637: Mod: GY | Performed by: SURGERY

## 2017-05-23 PROCEDURE — 25000125 ZZHC RX 250: Performed by: PHYSICIAN ASSISTANT

## 2017-05-23 PROCEDURE — 94640 AIRWAY INHALATION TREATMENT: CPT | Mod: 76

## 2017-05-23 PROCEDURE — 85610 PROTHROMBIN TIME: CPT | Performed by: SURGERY

## 2017-05-23 RX ADMIN — FLUTICASONE FUROATE AND VILANTEROL TRIFENATATE 1 PUFF: 100; 25 POWDER RESPIRATORY (INHALATION) at 08:06

## 2017-05-23 RX ADMIN — Medication 10 ML: at 15:41

## 2017-05-23 RX ADMIN — LEVALBUTEROL HYDROCHLORIDE 0.63 MG: 0.63 SOLUTION RESPIRATORY (INHALATION) at 16:27

## 2017-05-23 RX ADMIN — DILTIAZEM HYDROCHLORIDE 180 MG: 90 CAPSULE, EXTENDED RELEASE ORAL at 08:04

## 2017-05-23 RX ADMIN — ACETAMINOPHEN 975 MG: 325 TABLET, FILM COATED ORAL at 00:40

## 2017-05-23 RX ADMIN — ASPIRIN 81 MG CHEWABLE TABLET 81 MG: 81 TABLET CHEWABLE at 19:21

## 2017-05-23 RX ADMIN — ACETAMINOPHEN 650 MG: 325 TABLET, FILM COATED ORAL at 11:33

## 2017-05-23 RX ADMIN — SODIUM CHLORIDE SOLN NEBU 3% 3 ML: 3 NEBU SOLN at 16:27

## 2017-05-23 RX ADMIN — LIDOCAINE 1 PATCH: 50 PATCH TOPICAL at 19:11

## 2017-05-23 RX ADMIN — PRAVASTATIN SODIUM 40 MG: 40 TABLET ORAL at 19:20

## 2017-05-23 RX ADMIN — METOPROLOL TARTRATE 25 MG: 25 TABLET ORAL at 08:06

## 2017-05-23 RX ADMIN — METOPROLOL TARTRATE 25 MG: 25 TABLET ORAL at 19:20

## 2017-05-23 RX ADMIN — DILTIAZEM HYDROCHLORIDE 180 MG: 90 CAPSULE, EXTENDED RELEASE ORAL at 19:21

## 2017-05-23 RX ADMIN — Medication 10 ML: at 19:22

## 2017-05-23 RX ADMIN — FUROSEMIDE 20 MG: 20 TABLET ORAL at 08:05

## 2017-05-23 RX ADMIN — Medication 10 ML: at 08:06

## 2017-05-23 RX ADMIN — ACETAMINOPHEN 975 MG: 325 TABLET, FILM COATED ORAL at 15:40

## 2017-05-23 RX ADMIN — SODIUM CHLORIDE SOLN NEBU 3% 3 ML: 3 NEBU SOLN at 20:21

## 2017-05-23 RX ADMIN — GUAIFENESIN 600 MG: 600 TABLET, EXTENDED RELEASE ORAL at 08:05

## 2017-05-23 RX ADMIN — LEVOFLOXACIN 250 MG: 250 TABLET, FILM COATED ORAL at 08:05

## 2017-05-23 RX ADMIN — PANTOPRAZOLE SODIUM 40 MG: 40 TABLET, DELAYED RELEASE ORAL at 08:05

## 2017-05-23 RX ADMIN — GUAIFENESIN 600 MG: 600 TABLET, EXTENDED RELEASE ORAL at 19:20

## 2017-05-23 RX ADMIN — MELATONIN TAB 3 MG 3 MG: 3 TAB at 19:20

## 2017-05-23 RX ADMIN — LEVALBUTEROL HYDROCHLORIDE 0.63 MG: 0.63 SOLUTION RESPIRATORY (INHALATION) at 08:38

## 2017-05-23 RX ADMIN — ACETAMINOPHEN 975 MG: 325 TABLET, FILM COATED ORAL at 08:05

## 2017-05-23 RX ADMIN — CETIRIZINE HYDROCHLORIDE 5 MG: 5 TABLET ORAL at 08:05

## 2017-05-23 RX ADMIN — SODIUM CHLORIDE SOLN NEBU 3% 3 ML: 3 NEBU SOLN at 08:38

## 2017-05-23 RX ADMIN — LEVALBUTEROL HYDROCHLORIDE 0.63 MG: 0.63 SOLUTION RESPIRATORY (INHALATION) at 20:20

## 2017-05-23 ASSESSMENT — PAIN DESCRIPTION - DESCRIPTORS: DESCRIPTORS: JABBING;NAGGING

## 2017-05-23 NOTE — PROGRESS NOTES
"Thoracic Surgery Daily Progress Note  Yue Valenzuela  05/23/2017    Subjective:   No acute events overnight. Pain well controlled. Tolerating diet. Having normal bowel/bladder function. Eager to get CT removed.     Objective:    /58 (BP Location: Right arm)  Pulse 68  Temp 97.9  F (36.6  C) (Oral)  Resp 18  Ht 1.626 m (5' 4\")  Wt 62 kg (136 lb 11.2 oz)  SpO2 92%  BMI 23.46 kg/m2  Sitting up in bed, working with physical therapy  Reg rate and rhythm  Ct in place, SS drainage, still small air leak.     Ins/Outs:   I/O last 3 completed shifts:  In: 851.83 [P.O.:650; I.V.:201.83]  Out: 440 [Urine:300; Chest Tube:140]    Labs:   Reviewed. Na improved, leukocytosis to 16.9 improved.     Recent Imaging:   None yet    Assessment:  Yue Valenzuela is a 71 year old female now post op day 13 from a right VATS, lower lobectomy and middle lobe wedge.     Plan:       Neuro: PO medicatiosn for pain control    Cardiovascular/Heme: Cardiology consulted for aflutter, continue diltiazem PO. Continue heparin drip. Will DC on coumadin per cardiology recommendations. Holding home lisinopril/amlodipine.     Respiratory/Pulm: Aggressive pulmonary toilet. CT to stay in place for air leak.     FEN/Gastrointestinal: Regular diet as tolerated    Renal/Genitourinary: SUPRIYA on CKD resolving. Appreciate nephrology assistance. Restarted homelasix.     Infectious: Intermittently worsening leukocytosis. On levaquin for pseduomonal UTI. No other signs/symptoms of infection.    Prophy: Heparin, protonix    Activity: PT/OT, out of bed, up chair    Dispo: Anticipate discharge to TCU in next few days pending clinical progress    Patient was seen and discussed with Dr. Diaz, Thoracic Surgery Fellow, who agrees with the assessment and plan and will discuss with staff.     Evelyn Liriano MD  PGY-1, General Surgery  Pager: [phone]                       "

## 2017-05-23 NOTE — PLAN OF CARE
"Problem: Goal Outcome Summary  Goal: Goal Outcome Summary  Outcome: Improving  /58 (BP Location: Right arm)  Pulse 68  Temp 97.9  F (36.6  C) (Oral)  Resp 18  Ht 1.626 m (5' 4\")  Wt 62 kg (136 lb 11.2 oz)  SpO2 92% RA     Neuro: A&Ox4. Forgetful at times   Cardiac: SR. VSS.   Respiratory: Sating 99% on RA. Chest tube to water seal with known leak. Team is aware. Chest tube dressing changed this morning.  GI/: Adequate urine output. No BM reported. Denies nausea/vomiting  Diet/appetite: Regular Diet. Pt reports having decreased appetite, MD aware. Informed her that dryness and decreased appetite was related to the medication. Continues with 1 L fluid restriction  Activity: Assist of 1, up to bathroom and hallway. Working with PT/OT tolerated activity  Pain: At acceptable level on current regimen.   Skin: Intact, no new deficits noted.     Heparin drip at 750 unit/hr, Next Hep10A at 1415  Plan is to clamp chest tube tomorrow 5/24, possible discharge to TCU on Thursday per Thoracic surgery      R: Continue with POC. Notify primary team with changes.      "

## 2017-05-23 NOTE — PLAN OF CARE
Problem: Goal Outcome Summary  Goal: Goal Outcome Summary  PT/6b: pt continues to make progress in her functional IND; however, still limited by MARIE, fatigue, c/o LE weakness and deconditioning. Pt is SBA for transfers; ambulated 200 ft x2 w/ SBA-CGA w/ balance deficits and gait instability noted.  Recommend pt disch to TCU to improve her functional IND and endurance.

## 2017-05-23 NOTE — PLAN OF CARE
Problem: Goal Outcome Summary  Goal: Goal Outcome Summary  Outcome: Therapy, progress towards functional goals is fair  OT-6B: Pt required SBA/CGA and vc's for functional transfers. Pt engaged in RUE thoracotomy exercise/stretches with vc's and some [place] A. Pt required many rest breaks due to fatigue and some pain due to CT. Pt engaged in self cares standing at sink with setup and vc's. Pt tolerated therapy session well with VSS.      REC: Discharge to TCU to progress strengthening and activity tolerance to return to PLOF.

## 2017-05-23 NOTE — PROGRESS NOTES
Progress Note:    Hospital Day: 13    Data: Yue Valenzuela is a 72 yo female admitted to [facility] 05/10/2017.    Intervention: Met w/pt and pt's spouse Oscar.     Assessment: PT/OT recommending TCU and pt/spouse in agreement. Per Thoracic Surgery, chest tube to be clamped 5/24 and might be removed prior to dc. Pt might be ready for dc Thurs 5/25. Pt and pt's spouse reviewed Medicare list of SNFs and based on location and rating, requested referrals be sent to the following facilities. First choice is [facility], but after that pt/spouse have no preference.  Referrals sent to:  1. [facility] in [place]- x referral  (P: [phone], Admissions: [phone], F: [phone]  2. [facility]- faxed referral  (P: [phone], Admissions: [phone], F: [phone])  3. [facility] in [place]- faxed referral  (P: [phone], Admissions: [phone], F: [phone])  4. [facility] in [place]- faxed referral  (P: [phone], F: [phone])     Plan:  -Discharge plans in progress: Referrals out to TCUs. Possibly ready for DC Thurs 5/25/17.  -Barriers to discharge plan: medical stability and TCU placement.  -Follow up plan: SW will continue to follow and assist as needed.    ADDENDUM 1600: Received call from Coleen worrell/Endocrinology. Coleen prefers that SNF would be able to do carb counted meal insulin with pt. [facility] cannot do carb counting. Waiting to hear from other SNFs.    VU Herbert, [facility]  6B Intermediate Care Unit   Phone: [phone]  Pager: [phone]

## 2017-05-23 NOTE — PLAN OF CARE
"Problem: Goal Outcome Summary  Goal: Goal Outcome Summary  Outcome: No Change  /56  Pulse 68  Temp 98.8  F (37.1  C) (Oral)  Resp 18  Ht 1.626 m (5' 4\")  Wt 62 kg (136 lb 11.2 oz)  SpO2 96%  BMI 23.46 kg/m2  HR sinus rhythm, on room air. Maintaining adequate O2 sats. Endorses MARIE. BGs increasing over night, pt refused scheduled lantus in evening due to BG of 92.  Pt A&Ox4, forgetful. Resting comfortably. Using call light appropriately. Pt denies pain. Regular diet orders, appetite poor. Denying nausea. Right chest tube to waterseal, airleak present. Drainage serous. Leaking at insertion site. Heparin gtt at 900 units/hr. Therapeutic per latest XA. Voiding adequately, 1 small BM. Nursing will continue to follow the POC and update the MD team with concerns.          "

## 2017-05-23 NOTE — PROGRESS NOTES
Diabetes Consult Daily  Progress Note          Assessment/Plan:   Yue Valenzuela is a 71 year old female with DM-1 managed on ambulatory subcutaneous insulin pump at home, CKD IV, factor V Leiden mutation, HTN, HLD, CAD s/p stent, PVD, carotid artery stenosis, asthma, and newly diagnosed lung adenocarcinoma, s/ p right lower lobectomy 05/11.     Hyperglycemia yesterday after overcorrection of hypoglycemia or insufficient aspart for later intake.  BG persistently high overnight in setting of withheld glargine    Plan:  -give glargine 17 units this morning  -aspart meal Insulin:  Breakfast: 1 unit per 11 grams of CHO  Lunch: 1 unit per 11 grams of CHO  Dinner: 1 unit per 10 grams of CHO  Snacks: 1 unit per 10 grams of CHO  -Aspart medium correction with meals and snacks  Monitor glucose before meals, HS and 0200       Plan discussed with pt's , bedside RN.    Outpatient diabetes follow up:  Dr. Mason Sprague at [facility]             Interval History:   I reviewed the last 24 hours progress notes  Levaquin since 5/18/  Creatinine improved slightly further.  BG 66 yesterday morning after not eating overnight.  Planned for lower dose glargine last night, but pt unwilling to take it at 2100 when BG ws 92.  Unclear why glargine not given later when BGs were up to 250s.  Thoracic expecting pt may be ready for TCU on Thursday.  Spoke with SW about preference for facility that will do carb counted meal insulin.        Recent Labs  Lab 05/23/17  1150 05/23/17  0645 05/23/17  0411 05/22/17  2343 05/22/17  2129 05/22/17  1811 05/22/17  1327  05/22/17  0727  05/21/17  0616  05/20/17  1346  05/20/17  0426  05/19/17  0512   GLC  --  216*  --   --   --   --   --   --  66*  --  154*  --  260*  --  118*  --  181*   *  --  225* 254* 92 142* 270*  < >  --   < >  --   < >  --   < >  --   < >  --    < > = values in this interval not displayed.            Review of Systems:      please see interval  "history       Medications:       Active Diet Order      Regular Diet Adult     Physical Exam:  Gen: Alert, dozing in chair in NAD,  at bedside and helpful  HEENT: NC/AT, mucous membranes are moist  Resp: Unlabored, on room air  Ext: moving all extremities  Neuro: poor problem solving  /58 (BP Location: Right arm)  Pulse 68  Temp 97.9  F (36.6  C) (Oral)  Resp 18  Ht 1.626 m (5' 4\")  Wt 62 kg (136 lb 11.2 oz)  SpO2 92%  BMI 23.46 kg/m2           Data:     Lab Results   Component Value Date    A1C 7.0 05/03/2017    A1C 6.0 08/04/2014            Recent Labs   Lab Test  05/23/17   0645  05/22/17   1115  05/22/17   0727   NA  134  132*  133   POTASSIUM  5.1   --   4.0   CHLORIDE  106   --   105   CO2  18*   --   18*   ANIONGAP  10   --   10   GLC  216*   --   66*   BUN  45*   --   48*   CR  1.80*   --   1.89*   ZAHEER  7.7*   --   8.1*     CBC RESULTS:   Recent Labs   Lab Test  05/23/17   0606   WBC  16.9*   RBC  3.01*   HGB  8.8*   HCT  27.3*   MCV  91   MCH  29.2   MCHC  32.2   RDW  14.7   PLT  301     Coleen Alemanpton APRN -[id number]    Diabetes Management Job Code [id number]          "

## 2017-05-24 ENCOUNTER — APPOINTMENT (OUTPATIENT)
Dept: OCCUPATIONAL THERAPY | Facility: CLINIC | Age: 72
DRG: 163 | End: 2017-05-24
Attending: STUDENT IN AN ORGANIZED HEALTH CARE EDUCATION/TRAINING PROGRAM
Payer: MEDICARE

## 2017-05-24 ENCOUNTER — APPOINTMENT (OUTPATIENT)
Dept: GENERAL RADIOLOGY | Facility: CLINIC | Age: 72
DRG: 163 | End: 2017-05-24
Attending: PHYSICIAN ASSISTANT
Payer: MEDICARE

## 2017-05-24 LAB
ANION GAP SERPL CALCULATED.3IONS-SCNC: 8 MMOL/L (ref 3–14)
BACTERIA SPEC CULT: NO GROWTH
BACTERIA SPEC CULT: NO GROWTH
BUN SERPL-MCNC: 42 MG/DL (ref 7–30)
CALCIUM SERPL-MCNC: 7.8 MG/DL (ref 8.5–10.1)
CHLORIDE SERPL-SCNC: 108 MMOL/L (ref 94–109)
CO2 SERPL-SCNC: 19 MMOL/L (ref 20–32)
CREAT SERPL-MCNC: 1.74 MG/DL (ref 0.52–1.04)
ERYTHROCYTE [DISTWIDTH] IN BLOOD BY AUTOMATED COUNT: 15 % (ref 10–15)
GFR SERPL CREATININE-BSD FRML MDRD: 29 ML/MIN/1.7M2
GLUCOSE BLDC GLUCOMTR-MCNC: 160 MG/DL (ref 70–99)
GLUCOSE BLDC GLUCOMTR-MCNC: 203 MG/DL (ref 70–99)
GLUCOSE BLDC GLUCOMTR-MCNC: 261 MG/DL (ref 70–99)
GLUCOSE SERPL-MCNC: 156 MG/DL (ref 70–99)
HCT VFR BLD AUTO: 30.3 % (ref 35–47)
HGB BLD-MCNC: 9.5 G/DL (ref 11.7–15.7)
INR PPP: 1.25 (ref 0.86–1.14)
LMWH PPP CHRO-ACNC: 0.15 IU/ML
MCH RBC QN AUTO: 29 PG (ref 26.5–33)
MCHC RBC AUTO-ENTMCNC: 31.4 G/DL (ref 31.5–36.5)
MCV RBC AUTO: 92 FL (ref 78–100)
MICRO REPORT STATUS: NORMAL
MICRO REPORT STATUS: NORMAL
PLATELET # BLD AUTO: 387 10E9/L (ref 150–450)
POTASSIUM SERPL-SCNC: 5.1 MMOL/L (ref 3.4–5.3)
RBC # BLD AUTO: 3.28 10E12/L (ref 3.8–5.2)
SODIUM SERPL-SCNC: 136 MMOL/L (ref 133–144)
SPECIMEN SOURCE: NORMAL
SPECIMEN SOURCE: NORMAL
WBC # BLD AUTO: 16.2 10E9/L (ref 4–11)

## 2017-05-24 PROCEDURE — 25000132 ZZH RX MED GY IP 250 OP 250 PS 637: Mod: GY | Performed by: SURGERY

## 2017-05-24 PROCEDURE — 40000133 ZZH STATISTIC OT WARD VISIT

## 2017-05-24 PROCEDURE — 71020 XR CHEST 2 VW: CPT

## 2017-05-24 PROCEDURE — 25000125 ZZHC RX 250: Performed by: PHYSICIAN ASSISTANT

## 2017-05-24 PROCEDURE — 94640 AIRWAY INHALATION TREATMENT: CPT | Mod: 76 | Performed by: OPTOMETRIST

## 2017-05-24 PROCEDURE — A9270 NON-COVERED ITEM OR SERVICE: HCPCS | Mod: GY | Performed by: STUDENT IN AN ORGANIZED HEALTH CARE EDUCATION/TRAINING PROGRAM

## 2017-05-24 PROCEDURE — 97535 SELF CARE MNGMENT TRAINING: CPT | Mod: GO

## 2017-05-24 PROCEDURE — 36415 COLL VENOUS BLD VENIPUNCTURE: CPT | Performed by: SURGERY

## 2017-05-24 PROCEDURE — 40000274 ZZH STATISTIC RCP CONSULT EA 30 MIN

## 2017-05-24 PROCEDURE — 40000275 ZZH STATISTIC RCP TIME EA 10 MIN

## 2017-05-24 PROCEDURE — 71020 XR CHEST 2 VW: CPT | Mod: 76

## 2017-05-24 PROCEDURE — A9270 NON-COVERED ITEM OR SERVICE: HCPCS | Mod: GY | Performed by: PHYSICIAN ASSISTANT

## 2017-05-24 PROCEDURE — 00000146 ZZHCL STATISTIC GLUCOSE BY METER IP

## 2017-05-24 PROCEDURE — 80048 BASIC METABOLIC PNL TOTAL CA: CPT | Performed by: SURGERY

## 2017-05-24 PROCEDURE — 25000128 H RX IP 250 OP 636: Performed by: SURGERY

## 2017-05-24 PROCEDURE — A9270 NON-COVERED ITEM OR SERVICE: HCPCS | Mod: GY | Performed by: THORACIC SURGERY (CARDIOTHORACIC VASCULAR SURGERY)

## 2017-05-24 PROCEDURE — A9270 NON-COVERED ITEM OR SERVICE: HCPCS | Mod: GY | Performed by: SURGERY

## 2017-05-24 PROCEDURE — 94640 AIRWAY INHALATION TREATMENT: CPT

## 2017-05-24 PROCEDURE — 25000132 ZZH RX MED GY IP 250 OP 250 PS 637: Mod: GY | Performed by: PHYSICIAN ASSISTANT

## 2017-05-24 PROCEDURE — 25000132 ZZH RX MED GY IP 250 OP 250 PS 637: Mod: GY | Performed by: STUDENT IN AN ORGANIZED HEALTH CARE EDUCATION/TRAINING PROGRAM

## 2017-05-24 PROCEDURE — 25000132 ZZH RX MED GY IP 250 OP 250 PS 637: Mod: GY | Performed by: THORACIC SURGERY (CARDIOTHORACIC VASCULAR SURGERY)

## 2017-05-24 PROCEDURE — 12000006 ZZH R&B IMCU INTERMEDIATE UMMC

## 2017-05-24 PROCEDURE — 85027 COMPLETE CBC AUTOMATED: CPT | Performed by: SURGERY

## 2017-05-24 PROCEDURE — 85610 PROTHROMBIN TIME: CPT | Performed by: SURGERY

## 2017-05-24 PROCEDURE — 85520 HEPARIN ASSAY: CPT | Performed by: SURGERY

## 2017-05-24 PROCEDURE — 40000275 ZZH STATISTIC RCP TIME EA 10 MIN: Performed by: OPTOMETRIST

## 2017-05-24 RX ORDER — HEPARIN SODIUM 10000 [USP'U]/100ML
0-3500 INJECTION, SOLUTION INTRAVENOUS CONTINUOUS
Status: DISCONTINUED | OUTPATIENT
Start: 2017-05-24 | End: 2017-05-26 | Stop reason: HOSPADM

## 2017-05-24 RX ORDER — WARFARIN SODIUM 3 MG/1
3 TABLET ORAL
Status: COMPLETED | OUTPATIENT
Start: 2017-05-24 | End: 2017-05-24

## 2017-05-24 RX ADMIN — LEVALBUTEROL HYDROCHLORIDE 0.63 MG: 0.63 SOLUTION RESPIRATORY (INHALATION) at 20:13

## 2017-05-24 RX ADMIN — LEVALBUTEROL HYDROCHLORIDE 0.63 MG: 0.63 SOLUTION RESPIRATORY (INHALATION) at 11:51

## 2017-05-24 RX ADMIN — GUAIFENESIN 600 MG: 600 TABLET, EXTENDED RELEASE ORAL at 19:51

## 2017-05-24 RX ADMIN — METOPROLOL TARTRATE 25 MG: 25 TABLET ORAL at 08:25

## 2017-05-24 RX ADMIN — Medication 10 ML: at 19:52

## 2017-05-24 RX ADMIN — DILTIAZEM HYDROCHLORIDE 180 MG: 90 CAPSULE, EXTENDED RELEASE ORAL at 19:51

## 2017-05-24 RX ADMIN — ASPIRIN 81 MG CHEWABLE TABLET 81 MG: 81 TABLET CHEWABLE at 19:51

## 2017-05-24 RX ADMIN — METOPROLOL TARTRATE 25 MG: 25 TABLET ORAL at 19:52

## 2017-05-24 RX ADMIN — ACETAMINOPHEN 975 MG: 325 TABLET, FILM COATED ORAL at 00:49

## 2017-05-24 RX ADMIN — FUROSEMIDE 20 MG: 20 TABLET ORAL at 08:26

## 2017-05-24 RX ADMIN — LEVALBUTEROL HYDROCHLORIDE 0.63 MG: 0.63 SOLUTION RESPIRATORY (INHALATION) at 17:11

## 2017-05-24 RX ADMIN — GUAIFENESIN 600 MG: 600 TABLET, EXTENDED RELEASE ORAL at 08:33

## 2017-05-24 RX ADMIN — Medication 10 ML: at 08:26

## 2017-05-24 RX ADMIN — PRAVASTATIN SODIUM 40 MG: 40 TABLET ORAL at 19:51

## 2017-05-24 RX ADMIN — ACETAMINOPHEN 975 MG: 325 TABLET, FILM COATED ORAL at 15:00

## 2017-05-24 RX ADMIN — HEPARIN SODIUM 750 UNITS/HR: 10000 INJECTION, SOLUTION INTRAVENOUS at 06:02

## 2017-05-24 RX ADMIN — ACETAMINOPHEN 975 MG: 325 TABLET, FILM COATED ORAL at 08:25

## 2017-05-24 RX ADMIN — SODIUM CHLORIDE SOLN NEBU 3% 3 ML: 3 NEBU SOLN at 11:51

## 2017-05-24 RX ADMIN — SODIUM CHLORIDE SOLN NEBU 3% 3 ML: 3 NEBU SOLN at 20:14

## 2017-05-24 RX ADMIN — ACETAMINOPHEN 975 MG: 325 TABLET, FILM COATED ORAL at 23:54

## 2017-05-24 RX ADMIN — LEVOFLOXACIN 250 MG: 250 TABLET, FILM COATED ORAL at 08:25

## 2017-05-24 RX ADMIN — PANTOPRAZOLE SODIUM 40 MG: 40 TABLET, DELAYED RELEASE ORAL at 08:25

## 2017-05-24 RX ADMIN — WARFARIN SODIUM 3 MG: 3 TABLET ORAL at 18:24

## 2017-05-24 RX ADMIN — ACETAMINOPHEN 650 MG: 325 TABLET, FILM COATED ORAL at 19:51

## 2017-05-24 RX ADMIN — CETIRIZINE HYDROCHLORIDE 5 MG: 5 TABLET ORAL at 08:25

## 2017-05-24 RX ADMIN — MELATONIN TAB 3 MG 3 MG: 3 TAB at 19:52

## 2017-05-24 RX ADMIN — DILTIAZEM HYDROCHLORIDE 180 MG: 90 CAPSULE, EXTENDED RELEASE ORAL at 08:25

## 2017-05-24 RX ADMIN — FLUTICASONE FUROATE AND VILANTEROL TRIFENATATE 1 PUFF: 100; 25 POWDER RESPIRATORY (INHALATION) at 08:26

## 2017-05-24 RX ADMIN — Medication 10 ML: at 15:27

## 2017-05-24 RX ADMIN — LIDOCAINE 1 PATCH: 50 PATCH TOPICAL at 19:52

## 2017-05-24 RX ADMIN — SODIUM CHLORIDE SOLN NEBU 3% 3 ML: 3 NEBU SOLN at 17:12

## 2017-05-24 ASSESSMENT — PAIN DESCRIPTION - DESCRIPTORS
DESCRIPTORS: ACHING

## 2017-05-24 NOTE — PLAN OF CARE
Problem: Goal Outcome Summary  Goal: Goal Outcome Summary  PT 6B:  Pt and family today politely decline PT 2/2 fatigue from sleep deprivation overnight.  Rescheduled per POC

## 2017-05-24 NOTE — PROGRESS NOTES
CLINICAL NUTRITION SERVICES - REASSESSMENT NOTE     Nutrition Prescription    RECOMMENDATIONS FOR MDs/PROVIDERS TO ORDER:  -none at this time    Malnutrition Status:    -Severe malnutrition in the context of acute illness    Recommendations already ordered by Registered Dietitian (RD):  - Continue with Magic cups and current diet order    Future/Additional Recommendations:  - If pt continues to stay in hospital, recommend calorie counts. Pt supposed to DC tomorrow.   - Schedule snacks  - Cafeteria passes  -Recommend additional low K diet ed, fluid restriction and cho counting if needed/appropriate     EVALUATION OF THE PROGRESS TOWARD GOALS   Diet: Regular Diet- magic cup ordered BID  Intake: Pt states she has been eating better and her appetite has somewhat returned. She states that some foods are just too dry and so she is avoiding some meats and breads. Prior to admission pt was following a renal diet as best she could with avoiding the high potassium foods. Her potassium has been improving while inpatient. While visiting pt, she was consuming a white bread open faced sandwich with meat, cheese and pickles. She did state she enjoys the magic cups and will continue to consume those.   -- fair/50% of meals consumed per nursing flowsheets    NEW FINDINGS   Weight: 5/23: 136 lbs, this is ~4% in the last one month. Pt has had poor appetite, however this is improving. UBW: 145lbs, she then would have lost ~6% of her body weight.   Labs: K: normal    Dosing Weight: 62 kg     ASSESSED NUTRITION NEEDS  Estimated Energy Needs: 7374-4139 kcals/day (25 - 30 kcals/kg)  Justification: Maintenance  Estimated Protein Needs: 74-93 grams protein/day (1.2 - 1.5 grams of pro/kg)  Justification: Increased needs and Maintenance  Estimated Fluid Needs: 1 mL/kcal/day   Justification: Maintenance    MALNUTRITION  % Intake: </= 50% for >/= 5 days (severe)  % Weight Loss: Unable to assess as wt has fluctuated, however suspect some wt loss  as pt has had minimal to no appetite and intakes  Subcutaneous Fat Loss: Facial region:  Mild/moderate  Muscle Loss: Temporal:  moderate, Facial & jaw region:  moderate, Thoracic region (clavicle, acromium bone, deltoid, trapezius, pectoral):  mild and Upper arm (bicep, tricep):  moderate  Fluid Accumulation/Edema: None noted  Malnutrition Diagnosis: Severe malnutrition in the context of acute illness    Previous Goals   Patient to consume % of nutritionally adequate meal trays TID, or the equivalent with supplements/snacks.  Evaluation: Not met    Previous Nutrition Diagnosis  Inadequate protein-energy intake related to decreased appetite, prolonged LOS, dislike of hospital food and recurrent NPO statuses as evidenced by pt having 4/7 days of NPOs, <50% intakes and pt report.   Evaluation: Improving slightly with PO intakes    CURRENT NUTRITION DIAGNOSIS  Inadequate oral intake related to dislike of hospital food (dry) and decreased to no appetite as evidenced by <50% of meals and supplements, noted muscle/fat wasting in facial region and pt report.     INTERVENTIONS  Implementation  Medical food supplement therapy- continue with magic cups  Nutrition education for nutrition relationship to health/disease- ways to moisten foods and increase PO intakes    Goals  Patient to consume % of nutritionally adequate meal trays TID, or the equivalent with supplements/snacks.    Monitoring/Evaluation  Progress toward goals will be monitored and evaluated per protocol.    Rosario Lua, OZZIE, MS, LD  6B- Pager: [phone]

## 2017-05-24 NOTE — PLAN OF CARE
Problem: Goal Outcome Summary  Goal: Goal Outcome Summary  Outcome: Therapy, progress towards functional goals is fair  OT-6B: Therapist educated pt on EC/WS techniques and provided pt with EC/WS handouts. Educated pt on/discussed application to ADLs/IADLs. Pt verbalized understanding. VSS.     REC: Anticipate discharge to TCU to progress, strength, ROM and activity tolerance.

## 2017-05-24 NOTE — PROGRESS NOTES
"Thoracic Surgery Daily Progress Note  Yue Valenzuela  05/24/2017    Subjective:   No acute events overnight. Denies any pain. No chest pain, palpitations or shortness of breath. Tolerating diet. Normal bowel/bladder function.     Objective:    /51 (BP Location: Right arm)  Pulse 68  Temp 98.1  F (36.7  C) (Oral)  Resp 16  Ht 1.626 m (5' 4\")  Wt 62 kg (136 lb 11.2 oz)  SpO2 99%  BMI 23.46 kg/m2  Sitting up in bed in no acute distress  Awake, alert and appropriate  Non-labored breathing on room air  Regular rate and rhythm  CT in place with air cirilo, sero sanguinous drainage  Extremities warm, not edematous.     Ins/Outs:   I/O last 3 completed shifts:  In: 546.35 [P.O.:360; I.V.:186.35]  Out: 1535 [Urine:1375; Chest Tube:160]    Labs:   Reviewed. Na improved. Cr 1.74, INR 1.25    Recent Imaging:   None yet    Assessment:  Yue Valenzuela is a 71 year old female now post op day 14 from a right VATS, lower lobectomy and middle lobe wedge.     Plan:       Neuro: PO medications for pain control    Cardiovascular/Heme: Cardiology consulted for aflutter, continue diltiazem PO. Continue heparin drip. Will DC on coumadin per cardiology recommendations. Holding home lisinopril/amlodipine.     Respiratory/Pulm: Aggressive pulmonary toilet. CT to stay in place for air leak.     FEN/Gastrointestinal: Regular diet as tolerated    Renal/Genitourinary: SUPRIYA on CKD resolving. Appreciate nephrology assistance.  Home lasix.     Infectious: Intermittently worsening leukocytosis. On levaquin for pseduomonal UTI. No other signs/symptoms of infection.    Prophy: Heparin, protonix. Will need to transition to coumadin for home- though holding pending CT.    Activity: PT/OT, out of bed, up chair    Dispo: Anticipate discharge to TCU in next few days pending clinical progress - may need to go with CT in place.     Patient was seen and discussed with Dr. Diaz, Thoracic Surgery Fellow, who agrees with the assessment and plan and " will discuss with staff.     Evelyn Liriano MD  General Surgery  Pager: [phone]

## 2017-05-24 NOTE — PLAN OF CARE
Problem: Goal Outcome Summary  Goal: Goal Outcome Summary  Outcome: No Change  Patient is A & O x4, able to make needs known, uses call light appropriately, SBA.  No c/o chest pain, SOB, N/V, dizziness and denies pain; sinus rhythm with occasional PACs, HR 60s; -160s; afebrile; on RA with sats maintained above 92%, CT to water seal with + airleak; adequate UOP.  Patient rested most of shift. Discussed plan of care with patient.  Will continue to monitor tele, vitals, pain and respiratory status.  Continue with current plan of care and notify MD as needed.     Gracie Chandler RN

## 2017-05-24 NOTE — PLAN OF CARE
"Problem: Goal Outcome Summary  Goal: Goal Outcome Summary  Outcome: Improving  /49 (BP Location: Right arm)  Pulse 68  Temp 97.6  F (36.4  C) (Oral)  Resp 16  Ht 1.626 m (5' 4\")  Wt 62 kg (136 lb 11.2 oz)  SpO2 98%  BMI 23.46 kg/m2 on room air. Pt ambulating with SBA. Right chest tube to water seal w/ known air leak, minimal serous output. Tolerating regular diet with 1L FR. Heparin gtt @ 750 units/hour. Recheck in AM. Will continue to monitor and follow plan of care.       "

## 2017-05-24 NOTE — PLAN OF CARE
Problem: Goal Outcome Summary  Goal: Goal Outcome Summary  Outcome: Improving  Neuro: A&Ox4.   Cardiac: SR. VSS.       Respiratory: Sating 98% on RA.  GI/: Adequate urine output.   Diet/appetite: Tolerating Regular diet. 1000ml fluid restriction, Eating well.  Activity:  SBA, up to chair and in halls. Ambulated multiple times throughout day.   Pain: At acceptable level on current regimen. Scheduled tylenol controls pain.  Skin: Intact, no new deficits noted. R Chest Tube drsg CDI, Change PRN for leaking. R chest/flank incisions JUDI.     R: Pt improving medically. Stable SR this shift, ambulates in halls, increasingly independent with cares. Pt and  very frustrated with double room and pt has not been sleeping poor d/t loud roommate, pt intermittently anxious/labile. Pt transferred to another room on unit today and noted improvement. If pt tolerates clamped Chest tube tonight, CT may get DC'd tomorrow. Continue with POC. Notify primary team with changes.

## 2017-05-24 NOTE — PHARMACY-ANTICOAGULATION SERVICE
Clinical Pharmacy - Warfarin Dosing Consult     Pharmacy has been consulted to manage this patient s warfarin therapy.  Indication: Atrial Fibrillation  Therapy Goal: INR 2-3  Warfarin Prior to Admission: No  Significant drug interactions: levofloxacin  Recent documented change in oral intake/nutrition: Unknown  Dose Comments: Patient received 3mg on 5/21. Therapy was d/c'd before 2nd dose.  Will start at 3 mg again due to pt age & levofloxacin interaction.    INR   Date Value Ref Range Status   05/24/2017 1.25 (H) 0.86 - 1.14 Final   05/23/2017 1.19 (H) 0.86 - 1.14 Final       Recommend warfarin 3 mg today.  Pharmacy will monitor Yue RAMOS Reinkolby daily and order warfarin doses to achieve specified goal.      Please contact pharmacy as soon as possible if the warfarin needs to be held for a procedure or if the warfarin goals change.

## 2017-05-24 NOTE — PROGRESS NOTES
Diabetes Consult Daily  Progress Note          Assessment/Plan:   Yue Valenzuela is a 71 year old female with DM-1 managed on ambulatory subcutaneous insulin pump at home, CKD IV, factor V Leiden mutation, HTN, HLD, CAD s/p stent, PVD, carotid artery stenosis, asthma, and newly diagnosed lung adenocarcinoma, s/ p right lower lobectomy 05/11.     Plan:  -glargine 17 units in the morning  -aspart meal Insulin:  Breakfast: 1 unit per 11 grams of CHO  Lunch: 1 unit per 11 grams of CHO  Dinner: 1 unit per 10 grams of CHO  Snacks: 1 unit per 10 grams of CHO  -Aspart medium correction with meals and HS  - Monitor glucose before meals, HS and 0200    Will continue to follow  Plan for discharge to TCU, possibly ready by Thursday, May 25. TCU will need to have carbohydrate counting    Outpatient diabetes follow up:  Dr. Mason Sprague at [facility]           Interval History:      I reviewed the diabetes management plan with the primary team  I reviewed the last 24 hours progress notes  Blood sugars ar acceptable, no hypoglycemia  Appetite is improving and mentation is clearer off narcotics.      Recent Labs  Lab 05/24/17  1239 05/24/17  0807 05/24/17  0550 05/23/17  1940 05/23/17  1543 05/23/17  1150 05/23/17  0645 05/23/17  0411  05/22/17  0727  05/21/17  0616  05/20/17  1346  05/20/17  0426   GLC  --   --  156*  --   --   --  216*  --   --  66*  --  154*  --  260*  --  118*   * 160*  --  213* 112* 187*  --  225*  < >  --   < >  --   < >  --   < >  --    < > = values in this interval not displayed.            Review of Systems:      please see interval history       Medications:       Active Diet Order      Regular Diet Adult     Physical Exam:  Gen: Alert,  NAD, sitting up in chair  HEENT: NC/AT, mucous membranes are moist  Resp: Unlabored  Ext: moving all extremities   Neuro:oriented x3, communicating clearly  /60 (BP Location: Right arm)  Pulse 68  Temp 97.5  F (36.4  C) (Oral)  Resp  "18  Ht 1.626 m (5' 4\")  Wt 62 kg (136 lb 11.2 oz)  SpO2 98%  BMI 23.46 kg/m2           Data:     Lab Results   Component Value Date    A1C 7.0 05/03/2017    A1C 6.0 08/04/2014              CBC RESULTS:   Recent Labs   Lab Test  05/23/17   0606   WBC  16.9*   RBC  3.01*   HGB  8.8*   HCT  27.3*   MCV  91   MCH  29.2   MCHC  32.2   RDW  14.7   PLT  301     Recent Labs   Lab Test  05/24/17   0550  05/23/17   0645   NA  136  134   POTASSIUM  5.1  5.1   CHLORIDE  108  106   CO2  19*  18*   ANIONGAP  8  10   GLC  156*  216*   BUN  42*  45*   CR  1.74*  1.80*   ZAHEER  7.8*  7.7*     Liver Function Studies -   Recent Labs   Lab Test  08/04/14   0712   PROTTOTAL  7.5   ALBUMIN  4.3   BILITOTAL  0.4   ALKPHOS  79   AST  29   ALT  44     Lab Results   Component Value Date    INR 1.25 05/24/2017    INR 1.19 05/23/2017    INR 1.17 05/22/2017    INR 1.16 05/21/2017    INR 1.06 04/18/2017    INR 1.08 04/04/2017    INR 0.99 01/13/2017    INR 0.97 08/04/2014           Yudy Wilson, CNP pager [phone]  Diabetes Management Job Code [id number]          "

## 2017-05-25 ENCOUNTER — APPOINTMENT (OUTPATIENT)
Dept: PHYSICAL THERAPY | Facility: CLINIC | Age: 72
DRG: 163 | End: 2017-05-25
Attending: STUDENT IN AN ORGANIZED HEALTH CARE EDUCATION/TRAINING PROGRAM
Payer: MEDICARE

## 2017-05-25 ENCOUNTER — APPOINTMENT (OUTPATIENT)
Dept: GENERAL RADIOLOGY | Facility: CLINIC | Age: 72
DRG: 163 | End: 2017-05-25
Attending: PHYSICIAN ASSISTANT
Payer: MEDICARE

## 2017-05-25 ENCOUNTER — APPOINTMENT (OUTPATIENT)
Dept: GENERAL RADIOLOGY | Facility: CLINIC | Age: 72
DRG: 163 | End: 2017-05-25
Attending: STUDENT IN AN ORGANIZED HEALTH CARE EDUCATION/TRAINING PROGRAM
Payer: MEDICARE

## 2017-05-25 LAB
ANION GAP SERPL CALCULATED.3IONS-SCNC: 9 MMOL/L (ref 3–14)
BUN SERPL-MCNC: 42 MG/DL (ref 7–30)
CALCIUM SERPL-MCNC: 8 MG/DL (ref 8.5–10.1)
CHLORIDE SERPL-SCNC: 107 MMOL/L (ref 94–109)
CO2 SERPL-SCNC: 19 MMOL/L (ref 20–32)
CREAT SERPL-MCNC: 1.8 MG/DL (ref 0.52–1.04)
GFR SERPL CREATININE-BSD FRML MDRD: 28 ML/MIN/1.7M2
GLUCOSE BLDC GLUCOMTR-MCNC: 194 MG/DL (ref 70–99)
GLUCOSE BLDC GLUCOMTR-MCNC: 205 MG/DL (ref 70–99)
GLUCOSE BLDC GLUCOMTR-MCNC: 229 MG/DL (ref 70–99)
GLUCOSE BLDC GLUCOMTR-MCNC: 263 MG/DL (ref 70–99)
GLUCOSE BLDC GLUCOMTR-MCNC: 99 MG/DL (ref 70–99)
GLUCOSE SERPL-MCNC: 183 MG/DL (ref 70–99)
INR PPP: 1.13 (ref 0.86–1.14)
LMWH PPP CHRO-ACNC: 0.2 IU/ML
MAGNESIUM SERPL-MCNC: 2.4 MG/DL (ref 1.6–2.3)
PHOSPHATE SERPL-MCNC: 2.5 MG/DL (ref 2.5–4.5)
PLATELET # BLD AUTO: 327 10E9/L (ref 150–450)
POTASSIUM SERPL-SCNC: 4.8 MMOL/L (ref 3.4–5.3)
SODIUM SERPL-SCNC: 136 MMOL/L (ref 133–144)

## 2017-05-25 PROCEDURE — A9270 NON-COVERED ITEM OR SERVICE: HCPCS | Mod: GY | Performed by: STUDENT IN AN ORGANIZED HEALTH CARE EDUCATION/TRAINING PROGRAM

## 2017-05-25 PROCEDURE — 85610 PROTHROMBIN TIME: CPT | Performed by: SURGERY

## 2017-05-25 PROCEDURE — 85520 HEPARIN ASSAY: CPT | Performed by: SURGERY

## 2017-05-25 PROCEDURE — 25000125 ZZHC RX 250: Performed by: PHYSICIAN ASSISTANT

## 2017-05-25 PROCEDURE — 40000193 ZZH STATISTIC PT WARD VISIT: Performed by: PHYSICAL THERAPIST

## 2017-05-25 PROCEDURE — 36415 COLL VENOUS BLD VENIPUNCTURE: CPT | Performed by: SURGERY

## 2017-05-25 PROCEDURE — 84100 ASSAY OF PHOSPHORUS: CPT | Performed by: SURGERY

## 2017-05-25 PROCEDURE — 25000132 ZZH RX MED GY IP 250 OP 250 PS 637: Mod: GY | Performed by: STUDENT IN AN ORGANIZED HEALTH CARE EDUCATION/TRAINING PROGRAM

## 2017-05-25 PROCEDURE — 12000006 ZZH R&B IMCU INTERMEDIATE UMMC

## 2017-05-25 PROCEDURE — A9270 NON-COVERED ITEM OR SERVICE: HCPCS | Mod: GY | Performed by: PHYSICIAN ASSISTANT

## 2017-05-25 PROCEDURE — 97116 GAIT TRAINING THERAPY: CPT | Mod: GP | Performed by: PHYSICAL THERAPIST

## 2017-05-25 PROCEDURE — 00000146 ZZHCL STATISTIC GLUCOSE BY METER IP

## 2017-05-25 PROCEDURE — 80048 BASIC METABOLIC PNL TOTAL CA: CPT | Performed by: SURGERY

## 2017-05-25 PROCEDURE — 25000132 ZZH RX MED GY IP 250 OP 250 PS 637: Mod: GY | Performed by: SURGERY

## 2017-05-25 PROCEDURE — 94640 AIRWAY INHALATION TREATMENT: CPT | Mod: 76

## 2017-05-25 PROCEDURE — 85049 AUTOMATED PLATELET COUNT: CPT | Performed by: THORACIC SURGERY (CARDIOTHORACIC VASCULAR SURGERY)

## 2017-05-25 PROCEDURE — A9270 NON-COVERED ITEM OR SERVICE: HCPCS | Mod: GY | Performed by: SURGERY

## 2017-05-25 PROCEDURE — 40000275 ZZH STATISTIC RCP TIME EA 10 MIN

## 2017-05-25 PROCEDURE — A9270 NON-COVERED ITEM OR SERVICE: HCPCS | Mod: GY | Performed by: THORACIC SURGERY (CARDIOTHORACIC VASCULAR SURGERY)

## 2017-05-25 PROCEDURE — 71020 XR CHEST 2 VW: CPT

## 2017-05-25 PROCEDURE — 71020 XR CHEST 2 VW: CPT | Mod: 77

## 2017-05-25 PROCEDURE — 25000132 ZZH RX MED GY IP 250 OP 250 PS 637: Mod: GY | Performed by: PHYSICIAN ASSISTANT

## 2017-05-25 PROCEDURE — 97530 THERAPEUTIC ACTIVITIES: CPT | Mod: GP | Performed by: PHYSICAL THERAPIST

## 2017-05-25 PROCEDURE — 25000132 ZZH RX MED GY IP 250 OP 250 PS 637: Mod: GY | Performed by: THORACIC SURGERY (CARDIOTHORACIC VASCULAR SURGERY)

## 2017-05-25 PROCEDURE — 83735 ASSAY OF MAGNESIUM: CPT | Performed by: SURGERY

## 2017-05-25 RX ORDER — WARFARIN SODIUM 3 MG/1
3 TABLET ORAL
Status: COMPLETED | OUTPATIENT
Start: 2017-05-25 | End: 2017-05-25

## 2017-05-25 RX ADMIN — FUROSEMIDE 20 MG: 20 TABLET ORAL at 08:58

## 2017-05-25 RX ADMIN — SODIUM CHLORIDE SOLN NEBU 3% 3 ML: 3 NEBU SOLN at 12:53

## 2017-05-25 RX ADMIN — CETIRIZINE HYDROCHLORIDE 5 MG: 5 TABLET ORAL at 08:58

## 2017-05-25 RX ADMIN — PANTOPRAZOLE SODIUM 40 MG: 40 TABLET, DELAYED RELEASE ORAL at 08:58

## 2017-05-25 RX ADMIN — GUAIFENESIN 600 MG: 600 TABLET, EXTENDED RELEASE ORAL at 20:37

## 2017-05-25 RX ADMIN — MELATONIN TAB 3 MG 3 MG: 3 TAB at 20:37

## 2017-05-25 RX ADMIN — LIDOCAINE 1 PATCH: 50 PATCH TOPICAL at 20:37

## 2017-05-25 RX ADMIN — WARFARIN SODIUM 3 MG: 3 TABLET ORAL at 17:08

## 2017-05-25 RX ADMIN — DILTIAZEM HYDROCHLORIDE 180 MG: 90 CAPSULE, EXTENDED RELEASE ORAL at 08:58

## 2017-05-25 RX ADMIN — LEVALBUTEROL HYDROCHLORIDE 0.63 MG: 0.63 SOLUTION RESPIRATORY (INHALATION) at 09:17

## 2017-05-25 RX ADMIN — ASPIRIN 81 MG CHEWABLE TABLET 81 MG: 81 TABLET CHEWABLE at 20:37

## 2017-05-25 RX ADMIN — Medication 10 ML: at 09:01

## 2017-05-25 RX ADMIN — MENTHOL 2 PATCH: 205.5 PATCH TOPICAL at 11:00

## 2017-05-25 RX ADMIN — LEVALBUTEROL HYDROCHLORIDE 0.63 MG: 0.63 SOLUTION RESPIRATORY (INHALATION) at 12:48

## 2017-05-25 RX ADMIN — ACETAMINOPHEN 975 MG: 325 TABLET, FILM COATED ORAL at 16:55

## 2017-05-25 RX ADMIN — METOPROLOL TARTRATE 25 MG: 25 TABLET ORAL at 08:58

## 2017-05-25 RX ADMIN — Medication 10 ML: at 11:02

## 2017-05-25 RX ADMIN — ACETAMINOPHEN 975 MG: 325 TABLET, FILM COATED ORAL at 08:57

## 2017-05-25 RX ADMIN — DILTIAZEM HYDROCHLORIDE 180 MG: 90 CAPSULE, EXTENDED RELEASE ORAL at 20:41

## 2017-05-25 RX ADMIN — LEVALBUTEROL HYDROCHLORIDE 0.63 MG: 0.63 SOLUTION RESPIRATORY (INHALATION) at 19:53

## 2017-05-25 RX ADMIN — MENTHOL 2 PATCH: 205.5 PATCH TOPICAL at 20:37

## 2017-05-25 RX ADMIN — METOPROLOL TARTRATE 25 MG: 25 TABLET ORAL at 20:37

## 2017-05-25 RX ADMIN — GUAIFENESIN 600 MG: 600 TABLET, EXTENDED RELEASE ORAL at 08:58

## 2017-05-25 RX ADMIN — SODIUM CHLORIDE SOLN NEBU 3% 3 ML: 3 NEBU SOLN at 16:05

## 2017-05-25 RX ADMIN — PRAVASTATIN SODIUM 40 MG: 40 TABLET ORAL at 20:37

## 2017-05-25 RX ADMIN — LEVALBUTEROL HYDROCHLORIDE 0.63 MG: 0.63 SOLUTION RESPIRATORY (INHALATION) at 16:04

## 2017-05-25 RX ADMIN — LEVOFLOXACIN 250 MG: 250 TABLET, FILM COATED ORAL at 08:58

## 2017-05-25 RX ADMIN — SODIUM CHLORIDE SOLN NEBU 3% 3 ML: 3 NEBU SOLN at 09:20

## 2017-05-25 RX ADMIN — SODIUM CHLORIDE SOLN NEBU 3% 3 ML: 3 NEBU SOLN at 19:57

## 2017-05-25 ASSESSMENT — PAIN DESCRIPTION - DESCRIPTORS: DESCRIPTORS: ACHING

## 2017-05-25 NOTE — PROGRESS NOTES
Thoracic surgery progress note    No acute events overnight. Wants the chest tube out. Tolerating PO.     Temp: 97.5  F (36.4  C) Temp src: Oral BP: 151/55   Heart Rate: 67 Resp: 16 SpO2: 98 % O2 Device: None (Room air) Oxygen Delivery: 2 LPM     A&O, NAD  Unlabored breathing on RA  Chest tube clamped.  Abd soft, non tender    I&O  UOP 1675/600    Imaging  Exam: XR CHEST 2 VW, 5/25/2017 8:41 AM  Impression:   1. Grossly unchanged right pneumothorax with an apically directed chest tube.  2. Right lung base opacity, likely compressive atelectasis from the basilar portion of the pneumothorax.  3. Unchanged opacity in the left lateral midlung    A/P: 71F w/ CKD, DM1, CAD s/p PCI, PVD s/p VATS RLL for adenocarcinoma (5/10) c/b post op aflutter, mucus plugging, pneumothorax requiring pigtail (5/15), pseudomonas UTI    - Pull chest tube. CXR this PM and tomorrow AM  - C/w heparin gtt for AFib/AFlutter. Coumadin started  - Reg diet.  - Levaquin for UTI    Dispo: TCU, likely tomorrow    Seen w/ team    Nasir Acuna MD  Surgery PGY1  [phone]

## 2017-05-25 NOTE — PROGRESS NOTES
Diabetes Consult Daily  Progress Note          Assessment/Plan:     Yue Valenzuela is a 71 year old female with DM-1 managed on ambulatory subcutaneous insulin pump at home, CKD IV, factor V Leiden mutation, HTN, HLD, CAD s/p stent, PVD, carotid artery stenosis, asthma, and newly diagnosed lung adenocarcinoma, s/ p right lower lobectomy 05/11.      Plan for discharge to TCU:  -glargine 17 units in the morning  -aspart meal Insulin:  Breakfast: 1 unit per 11 grams of CHO  Lunch: 1 unit per 11 grams of CHO  Dinner: 1 unit per 10 grams of CHO  Snacks: 1 unit per 10 grams of CHO  -Aspart medium correction with meals and HS  - Monitor glucose before meals, HS and 0200  -If bedtime glucose < 150 ( Ms. Valenzuela will have a snack of approximately 6 oz of Sprit and some pretzels)     Diabetes team will sign-off, final recommendations for discharge are as per current plan, discussed with Vitaliy Encarnacion PA-C, please contact via job code pager [phone] for questions.    Plan for discharge to TCU on Friday May 26.     Outpatient diabetes follow up:  Dr. Mason Sprague at [facility]             Interval History:      I reviewed the diabetes management with the primary team  I reviewed the last 24 hours progress notes  Blood sugars are moderately controlled and no hypoglycemia  Glucose at 0200, not documented  Fasting glucose 183/194  glucose at ~ midnight 99, Ms Valenzuela had 6 oz of Sprit and some pretzels. She prefers her glucose to be 150 or > at HS.  Reviewed glucose according noticed a mismatch in the testing of glucose and when correction insulin given ( ~ 2 hours after testing).     Discussed when to resume ambulatory pump ( discharge from TCU), setting a temporary basal setting and what to decrease basal settings. ( basal insulin settings to start at 0.5 units, keep current I:C ratio, and change sensitivity from 1 unit per 28 to closer to 1 unit per 50).  Mr. Valenzuela was present and acknowledge the plan and  "voiced he was able to assist in changing of pump settings    Recent Labs  Lab 05/25/17  0853 05/25/17  0648 05/24/17  2357 05/24/17  1558 05/24/17  1239 05/24/17  0807 05/24/17  0550 05/23/17  1940  05/23/17  0645  05/22/17  0727  05/21/17  0616  05/20/17  1346   GLC  --  183*  --   --   --   --  156*  --   --  216*  --  66*  --  154*  --  260*   *  --  99 261* 203* 160*  --  213*  < >  --   < >  --   < >  --   < >  --    < > = values in this interval not displayed.            Review of Systems:      please see interval  history       Medications:       Active Diet Order      Regular Diet Adult     Physical Exam:  Gen: Alert, resting in bed, in NAD   HEENT: NC/AT, mucous membranes are moist  Resp: Unlabored  Ext: moving all extremities  Neuro:oriented x3, communicating clearly  /55  Pulse 68  Temp 97.5  F (36.4  C) (Oral)  Resp 16  Ht 1.626 m (5' 4\")  Wt 60.5 kg (133 lb 4.8 oz)  SpO2 98%  BMI 22.88 kg/m2           Data:     Lab Results   Component Value Date    A1C 7.0 05/03/2017    A1C 6.0 08/04/2014              CBC RESULTS:   Recent Labs   Lab Test  05/25/17   0648  05/24/17   1410   WBC   --   16.2*   RBC   --   3.28*   HGB   --   9.5*   HCT   --   30.3*   MCV   --   92   MCH   --   29.0   MCHC   --   31.4*   RDW   --   15.0   PLT  327  387     Recent Labs   Lab Test  05/25/17   0648  05/24/17   0550   NA  136  136   POTASSIUM  4.8  5.1   CHLORIDE  107  108   CO2  19*  19*   ANIONGAP  9  8   GLC  183*  156*   BUN  42*  42*   CR  1.80*  1.74*   ZAHEER  8.0*  7.8*     Liver Function Studies -   Recent Labs   Lab Test  08/04/14   0712   PROTTOTAL  7.5   ALBUMIN  4.3   BILITOTAL  0.4   ALKPHOS  79   AST  29   ALT  44     Lab Results   Component Value Date    INR 1.13 05/25/2017    INR 1.25 05/24/2017    INR 1.19 05/23/2017    INR 1.17 05/22/2017    INR 1.16 05/21/2017    INR 1.06 04/18/2017    INR 1.08 04/04/2017    INR 0.99 01/13/2017    INR 0.97 08/04/2014           Yudy Wilson, CNP pager [phone]  Diabetes Management Job Code [id number]

## 2017-05-25 NOTE — PROGRESS NOTES
Progress Note:    Hospital Day: 15    Data: Yue Valenzuela is a 72 yo female admitted to [facility] 05/10/2017.    Intervention: Met w/pt and pt's spouse Oscar, collaborated w/Vitaliy from Thoracic Surgery team and Cristiane LEI. Spoke to Mary Lou in admissions at [facility] (P: [phone], Admissions: [phone], F: [phone]).    Assessment: Chest tube removed today. Pt has been accepted to her first choice facility, [facility]. Pt/spouse are okay with $50/day private room upcharge. [facility] can do carb counting. Pt had questions re: what to expect and what to bring so [facility] admissions will be calling pt/spouse today. Pt's spouse will provide transport tomorrow. [facility] would like pt to leave the hospital by 1100 and will need orders no later than 1030.    Plan:  -Discharge plans in progress: Per Thoracic, planning for discharge Friday 5/26/17. [facility] TCU needs pt to leave hospital by 1100. Spouse will transport.  -Barriers to discharge plan: medical clearance  -Follow up plan: SW will continue to follow and assist as needed.    VU Herbert, [facility]  6B Intermediate Care Unit   Phone: [phone]  Pager: [phone]

## 2017-05-25 NOTE — PLAN OF CARE
Problem: Goal Outcome Summary  Goal: Goal Outcome Summary  Outcome: No Change  Patient is A & O x4, able to make needs known, uses call light appropriately, SBA; SOB at times with exertion, denies pain; sinus rhythm, HR 60s; VSS; afebrile; on RA with sats maintained above 92%, CT clamped; adequate UOP.  Patient rested most of shift. Discussed plan of care with patient.  Will continue to monitor tele, vitals, pain and respiratory status.  Continue with current plan of care and notify MD as needed.      Gracie Chandler RN

## 2017-05-25 NOTE — PLAN OF CARE
Problem: Goal Outcome Summary  Goal: Goal Outcome Summary  PT 6B: Patient ambulates 150ft x 2 with close SBA/CGA without AD, has a few minor losses of balance requiring CGA/min assist to correct. Pt reports some anxiety with upcoming discharge after prolonged hospital stay, encouragement provided.  Recommend discharge to TCU when medically appropriate to improve strength, activity tolerance, and independence with functional mobility.

## 2017-05-25 NOTE — PLAN OF CARE
Problem: Goal Outcome Summary  Goal: Goal Outcome Summary  Outcome: Improving  Pt AOx4, VSS, afebrile, on RA sats > 95%. Denies SOB. Pt c/o right sided incisional pain relieved with icy hot patches. CT removed, leaking at site, dressing reinforced prn. Up with SBA, ambulating in hallway,  tentative to pt needs. Heparin gtt infusing. Plan to d/c to TCU tomorrow. Will continue to monitor per plan of cares.

## 2017-05-26 ENCOUNTER — APPOINTMENT (OUTPATIENT)
Dept: GENERAL RADIOLOGY | Facility: CLINIC | Age: 72
DRG: 163 | End: 2017-05-26
Attending: PHYSICIAN ASSISTANT
Payer: MEDICARE

## 2017-05-26 ENCOUNTER — APPOINTMENT (OUTPATIENT)
Dept: OCCUPATIONAL THERAPY | Facility: CLINIC | Age: 72
DRG: 163 | End: 2017-05-26
Attending: STUDENT IN AN ORGANIZED HEALTH CARE EDUCATION/TRAINING PROGRAM
Payer: MEDICARE

## 2017-05-26 VITALS
DIASTOLIC BLOOD PRESSURE: 58 MMHG | SYSTOLIC BLOOD PRESSURE: 163 MMHG | HEART RATE: 68 BPM | OXYGEN SATURATION: 96 % | HEIGHT: 64 IN | WEIGHT: 131.7 LBS | RESPIRATION RATE: 16 BRPM | TEMPERATURE: 98.4 F | BODY MASS INDEX: 22.48 KG/M2

## 2017-05-26 LAB
GLUCOSE BLDC GLUCOMTR-MCNC: 267 MG/DL (ref 70–99)
INR PPP: 1.35 (ref 0.86–1.14)
LMWH PPP CHRO-ACNC: 0.15 IU/ML

## 2017-05-26 PROCEDURE — 85520 HEPARIN ASSAY: CPT | Performed by: SURGERY

## 2017-05-26 PROCEDURE — 71020 XR CHEST 2 VW: CPT

## 2017-05-26 PROCEDURE — 25000132 ZZH RX MED GY IP 250 OP 250 PS 637: Mod: GY | Performed by: THORACIC SURGERY (CARDIOTHORACIC VASCULAR SURGERY)

## 2017-05-26 PROCEDURE — 40000275 ZZH STATISTIC RCP TIME EA 10 MIN

## 2017-05-26 PROCEDURE — 25000132 ZZH RX MED GY IP 250 OP 250 PS 637: Mod: GY | Performed by: STUDENT IN AN ORGANIZED HEALTH CARE EDUCATION/TRAINING PROGRAM

## 2017-05-26 PROCEDURE — 25000132 ZZH RX MED GY IP 250 OP 250 PS 637: Mod: GY | Performed by: SURGERY

## 2017-05-26 PROCEDURE — 25000132 ZZH RX MED GY IP 250 OP 250 PS 637: Mod: GY | Performed by: PHYSICIAN ASSISTANT

## 2017-05-26 PROCEDURE — A9270 NON-COVERED ITEM OR SERVICE: HCPCS | Mod: GY | Performed by: STUDENT IN AN ORGANIZED HEALTH CARE EDUCATION/TRAINING PROGRAM

## 2017-05-26 PROCEDURE — 25000125 ZZHC RX 250: Performed by: PHYSICIAN ASSISTANT

## 2017-05-26 PROCEDURE — 97535 SELF CARE MNGMENT TRAINING: CPT | Mod: GO

## 2017-05-26 PROCEDURE — 40000133 ZZH STATISTIC OT WARD VISIT

## 2017-05-26 PROCEDURE — 36415 COLL VENOUS BLD VENIPUNCTURE: CPT | Performed by: SURGERY

## 2017-05-26 PROCEDURE — A9270 NON-COVERED ITEM OR SERVICE: HCPCS | Mod: GY | Performed by: SURGERY

## 2017-05-26 PROCEDURE — 00000146 ZZHCL STATISTIC GLUCOSE BY METER IP

## 2017-05-26 PROCEDURE — A9270 NON-COVERED ITEM OR SERVICE: HCPCS | Mod: GY | Performed by: PHYSICIAN ASSISTANT

## 2017-05-26 PROCEDURE — A9270 NON-COVERED ITEM OR SERVICE: HCPCS | Mod: GY | Performed by: THORACIC SURGERY (CARDIOTHORACIC VASCULAR SURGERY)

## 2017-05-26 PROCEDURE — 94640 AIRWAY INHALATION TREATMENT: CPT

## 2017-05-26 PROCEDURE — 85610 PROTHROMBIN TIME: CPT | Performed by: SURGERY

## 2017-05-26 RX ORDER — WARFARIN SODIUM 4 MG/1
4 TABLET ORAL
Status: DISCONTINUED | OUTPATIENT
Start: 2017-05-26 | End: 2017-05-26 | Stop reason: HOSPADM

## 2017-05-26 RX ORDER — DILTIAZEM HYDROCHLORIDE 90 MG/1
180 CAPSULE, EXTENDED RELEASE ORAL 2 TIMES DAILY
DISCHARGE
Start: 2017-05-26

## 2017-05-26 RX ORDER — AMOXICILLIN 250 MG
2 CAPSULE ORAL 2 TIMES DAILY
Qty: 100 TABLET | DISCHARGE
Start: 2017-05-26

## 2017-05-26 RX ORDER — POLYETHYLENE GLYCOL 3350 17 G/17G
17 POWDER, FOR SOLUTION ORAL DAILY
Qty: 30 PACKET | Refills: 0 | DISCHARGE
Start: 2017-05-26

## 2017-05-26 RX ORDER — ACETAMINOPHEN 325 MG/1
975 TABLET ORAL EVERY 8 HOURS
Qty: 100 TABLET | DISCHARGE
Start: 2017-05-26

## 2017-05-26 RX ORDER — METOPROLOL TARTRATE 25 MG/1
25 TABLET, FILM COATED ORAL 2 TIMES DAILY
Qty: 60 TABLET | DISCHARGE
Start: 2017-05-26

## 2017-05-26 RX ADMIN — FLUTICASONE FUROATE AND VILANTEROL TRIFENATATE 1 PUFF: 100; 25 POWDER RESPIRATORY (INHALATION) at 09:33

## 2017-05-26 RX ADMIN — CETIRIZINE HYDROCHLORIDE 5 MG: 5 TABLET ORAL at 09:28

## 2017-05-26 RX ADMIN — ACETAMINOPHEN 975 MG: 325 TABLET, FILM COATED ORAL at 09:31

## 2017-05-26 RX ADMIN — Medication 10 ML: at 09:35

## 2017-05-26 RX ADMIN — DILTIAZEM HYDROCHLORIDE 180 MG: 90 CAPSULE, EXTENDED RELEASE ORAL at 09:30

## 2017-05-26 RX ADMIN — SODIUM CHLORIDE SOLN NEBU 3% 3 ML: 3 NEBU SOLN at 09:52

## 2017-05-26 RX ADMIN — FUROSEMIDE 20 MG: 20 TABLET ORAL at 09:28

## 2017-05-26 RX ADMIN — PANTOPRAZOLE SODIUM 40 MG: 40 TABLET, DELAYED RELEASE ORAL at 09:28

## 2017-05-26 RX ADMIN — METOPROLOL TARTRATE 25 MG: 25 TABLET ORAL at 09:29

## 2017-05-26 RX ADMIN — LEVALBUTEROL HYDROCHLORIDE 0.63 MG: 0.63 SOLUTION RESPIRATORY (INHALATION) at 09:52

## 2017-05-26 RX ADMIN — ACETAMINOPHEN 975 MG: 325 TABLET, FILM COATED ORAL at 00:11

## 2017-05-26 RX ADMIN — SODIUM CHLORIDE SOLN NEBU 3% 3 ML: 3 NEBU SOLN at 00:17

## 2017-05-26 RX ADMIN — MENTHOL 2 PATCH: 205.5 PATCH TOPICAL at 09:39

## 2017-05-26 RX ADMIN — GUAIFENESIN 600 MG: 600 TABLET, EXTENDED RELEASE ORAL at 09:30

## 2017-05-26 NOTE — PROGRESS NOTES
DISCHARGE                         5/26/2017 11:28 AM  ----------------------------------------------------------------------------  Discharged to: TCU   Via: private transportation  Accompanied by: Family  Discharge Instructions: diet, activity, medications, follow up appointments, when to call the MD, aftercare instructions.  Prescriptions: To be administered by TCU, Packet including MAR faxed and sent with pt   Follow Up Appointments: arranged; information given  Belongings: All sent with pt  IV: d/c'd  Telemetry: d/c'd  Pt exhibits understanding of above discharge instructions; all questions answered.    Discharge Paperwork: Signed, copied, and sent home with patient.

## 2017-05-26 NOTE — PLAN OF CARE
Problem: Goal Outcome Summary  Goal: Goal Outcome Summary  Outcome: Therapy, progress towards functional goals is fair  OT-6B: Pt required CGA and vc's for transfer supine<->seated and sit<->standing ambulation to bathroom. Pt required CGA/Min A and vc's for shower transfer and completion of shower task. Pt required CGA/Min A for completion of dressing UE/LE. Reviewed EC/WS techniques and PLB. Pt tolerated this activity though limited by SOB, fatigue and weakness. VSS.     REC: Discharge to TCU to progress strength and activity tolerance.

## 2017-05-26 NOTE — PLAN OF CARE
Problem: Goal Outcome Summary  Goal: Goal Outcome Summary  Outcome: No Change  Patient is A & O x4, able to make needs known, uses call light appropriately, SBA; SOB at times with exertion, denies pain; sinus rhythm, HR 60s; VSS; afebrile; on RA with sats maintained above 92%, adequate UOP.  Patient rested most of shift, the plan is to dc to [facility] landing at 1100. Discussed plan of care with patient.  Will continue to monitor tele, vitals, pain and respiratory status.  Continue with current plan of care and notify MD as needed.      Gracie Chandler RN

## 2017-05-26 NOTE — PROGRESS NOTES
Social Work Services Discharge Note      Patient Name:  Yue Valenzuela     Anticipated Discharge Date: Friday 05/26/17 at [facility]; spouse to transport.     Discharge Disposition:    -SNF:  [facility]  [address] [place] [address]  (P: [phone], Admissions: [phone], F: [phone]).     Pre-Admission Screening (PAS) online form has been completed.  The Level of Care (LOC) is: Determined  Confirmation Code is:  [id number]  Patient/caregiver informed of referral to [facility] for Pre-Admission Screening for skilled nursing facility (SNF) placement and to expect a phone call post discharge from SNF.     Additional Services/Equipment Arranged: Pt will have a private room at the TCU and is agreeable to the $50/day additional charge for a private. Spouse will transport today. [facility] would like pt to leave the hospital by 1100 and will need orders no later than 1030.     Patient / Family response to discharge plan:  Pt and pt's spouse are excited to dc to [facility] today.     Persons notified of above discharge plan: Pt, pt's spouse Oscar, Thoracic Surgery team, [facility], 6B RN, 6B charge RN.    -Staff Discharge Instructions:  Please fax discharge orders and signed hard scripts for any controlled substances.  Please print a packet and send with patient.     VU Herbert, [facility]  6B Intermediate Care Unit  Phone: [phone]  Pager: [phone]

## 2017-05-26 NOTE — PLAN OF CARE
Problem: Goal Outcome Summary  Goal: Goal Outcome Summary  Physical Therapy Discharge Summary     Reason for therapy discharge:    Discharged to transitional care facility.     Progress towards therapy goal(s). See goals on Care Plan in [facility] electronic health record for goal details.  Goals partially met.  Barriers to achieving goals:   discharge from facility.     Therapy recommendation(s):    Continued therapy is recommended.  Rationale/Recommendations:  Pt will benefit from TCU and continued PT to improve strength, balance, endurance, and safety with mobility.

## 2017-05-30 LAB — COPATH REPORT: NORMAL

## 2017-05-31 LAB
EXPTIME-PRE: 9.56 SEC
FEF2575-%PRED-PRE: 35 %
FEF2575-PRE: 0.66 L/SEC
FEF2575-PRED: 1.85 L/SEC
FEFMAX-%PRED-PRE: 60 %
FEFMAX-PRE: 3.38 L/SEC
FEFMAX-PRED: 5.6 L/SEC
FEV1-%PRED-PRE: 63 %
FEV1-PRE: 1.38 L
FEV1FEV6-PRE: 64 %
FEV1FEV6-PRED: 79 %
FEV1FVC-PRE: 62 %
FEV1FVC-PRED: 78 %
FIFMAX-PRE: 4 L/SEC
FVC-%PRED-PRE: 78 %
FVC-PRE: 2.22 L
FVC-PRED: 2.83 L

## 2017-06-01 LAB — COPATH REPORT: NORMAL

## 2017-06-22 ENCOUNTER — OFFICE VISIT (OUTPATIENT)
Dept: SURGERY | Facility: CLINIC | Age: 72
End: 2017-06-22
Attending: STUDENT IN AN ORGANIZED HEALTH CARE EDUCATION/TRAINING PROGRAM
Payer: MEDICARE

## 2017-06-22 VITALS
TEMPERATURE: 98.2 F | RESPIRATION RATE: 16 BRPM | WEIGHT: 133.3 LBS | SYSTOLIC BLOOD PRESSURE: 153 MMHG | DIASTOLIC BLOOD PRESSURE: 66 MMHG | HEIGHT: 64 IN | HEART RATE: 69 BPM | OXYGEN SATURATION: 97 % | BODY MASS INDEX: 22.76 KG/M2

## 2017-06-22 DIAGNOSIS — C80.1: Primary | ICD-10-CM

## 2017-06-22 DIAGNOSIS — C78.01: Primary | ICD-10-CM

## 2017-06-22 DIAGNOSIS — C34.31 MALIGNANT NEOPLASM OF LOWER LOBE OF RIGHT LUNG (H): Primary | ICD-10-CM

## 2017-06-22 PROCEDURE — 99213 OFFICE O/P EST LOW 20 MIN: CPT | Mod: ZF

## 2017-06-22 ASSESSMENT — PAIN SCALES - GENERAL: PAINLEVEL: NO PAIN (0)

## 2017-06-22 NOTE — LETTER
6/22/2017       RE: Yue Valenzuela  [address]  [place] [address]     Dear Colleague,    Thank you for referring your patient, Yue Valenzuela, to the [facility]IC. Please see a copy of my visit note below.    THORACIC SURGERY FOLLOW UP VISIT    Dear Dr. Elliott,  I saw Ms. Valenzuela in follow-up today. 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 Right middle lobe wedge consistent with granuloma.     COMPLICATIONS  Postoperative hospital course included atelectasis requiring bronchoscopy, paroxysmal atrial flutter, acute kidney injury, prolonged post-operative air leak and a UTI.     INTERVAL STUDIES  CXR performed today shows       ETOHdenies  TOBdenies  BMI22  SUBJECTIVE   Ms. Valenzuela is [facility] with [facility] after discharge. She has been walking at home, recording 2000 steps on a recent day using her new fitness tracker. She does fatigue with increased activity during the day and feels she needs to recover strength in her thighs.     From a personal perspective, she is accompanied by her  Oscar, and they've come from home in [place].    IMPRESSION (C34.31) Malignant neoplasm of lower lobe of right lung (H)  (primary encounter diagnosis)  71 year-old female with history of type 1 DM and CKD seen in follow up after minimally invasive right lower lobectomy, middle lobe wedge, and mediastinal lymph node biopsy for  right lower lobe adenocarcinoma.    PLAN  I spent a total of 25 minutes with Ms. Yue Valenzuela and her , more than 50% of which were spent in counseling, coordination of care, and face-to-face time. I reviewed the plan as follows:  Continue physiotherapy and rehab.  No further treatment needed for the cancer.   Necessary Tests & Appointments: Chest CT to be scheduled for 3-month follow up scan in August.    They had all their questions answered and were in agreement with the plan.  I appreciate the opportunity to participate in the care of your patient and will keep you updated.  Sincerely,        Again, thank you for allowing me to participate in the care of your patient.      Sincerely,    Glenna Hall MD

## 2017-06-22 NOTE — NURSING NOTE
"Oncology Rooming Note    June 22, 2017 11:52 AM   Yue Valenzuela is a 71 year old female who presents for:    Chief Complaint   Patient presents with     Oncology Clinic Visit     1 month hosp follow up- chest xray     Initial Vitals: /66 (BP Location: Left arm, Patient Position: Sitting, Cuff Size: Adult Regular)  Pulse 69  Temp 98.2  F (36.8  C) (Oral)  Resp 16  Ht 1.626 m (5' 4.02\")  Wt 60.5 kg (133 lb 4.8 oz)  SpO2 97%  BMI 22.87 kg/m2 Estimated body mass index is 22.87 kg/(m^2) as calculated from the following:    Height as of this encounter: 1.626 m (5' 4.02\").    Weight as of this encounter: 60.5 kg (133 lb 4.8 oz). Body surface area is 1.65 meters squared.  No Pain (0) Comment: Data Unavailable   No LMP recorded. Patient is postmenopausal.  Allergies reviewed: Yes  Medications reviewed: Yes    Medications: Medication refills not needed today.  Pharmacy name entered into [employer]: [id number] [address]    Clinical concerns: States that she has discomfort in the right incisions area with increase in activity. Physical therapy- wants to start lifting 3 lbs weights.     10 minutes for nursing intake (face to face time)     Aaron Araujo LPN            "

## 2017-06-22 NOTE — MR AVS SNAPSHOT
After Visit Summary   6/22/2017    Yue Valenzuela    MRN: [id number]           Patient Information     Date Of Birth          1945        Visit Information        Provider Department      6/22/2017 12:00 PM Glenna Hall MD [facility]        Today's Diagnoses     Malignant neoplasm of lower lobe of right lung (H)    -  1       Follow-ups after your visit        Your next 10 appointments already scheduled     Jul 18, 2017  9:20 AM CDT   (Arrive by 9:05 AM)   CT CHEST W/O CONTRAST with UCCT1   [facility] [place] [address] ([facility])    [address] [place] [id number] [phone]           Please bring any scans or X-rays taken at other hospitals, if similar tests were done. Also bring a list of your medicines, including vitamins, minerals and over-the-counter drugs. It is safest to leave personal items at home.  Be sure to tell your doctor:   If you have any allergies.   If there s any chance you are pregnant.   If you are breastfeeding.   If you have any special needs.  You do not need to do anything special to prepare.  Please wear loose clothing, such as a sweat suit or jogging clothes. Avoid snaps, zippers and other metal. We may ask you to undress and put on a hospital gown.            Jul 18, 2017 10:30 AM CDT   (Arrive by 10:15 AM)   Return Visit with John Plaza MD   [facility] ([facility])    [address] [place] [id number] [phone]              Future tests that were ordered for you today     Open Future Orders        Priority Expected Expires Ordered    CT Chest w/o contrast Routine  6/22/2018 6/22/2017            Who to contact     If you have questions or need follow up information about today's clinic visit or your schedule please contact [facility]ER [facility] directly at [phone].  Normal or non-critical lab  "and imaging results will be communicated to you by [employer], letter or phone within 4 business days after the clinic has received the results. If you do not hear from us within 7 days, please contact the clinic through [employer] or phone. If you have a critical or abnormal lab result, we will notify you by phone as soon as possible.  Submit refill requests through [employer] or call your pharmacy and they will forward the refill request to us. Please allow 3 business days for your refill to be completed.          Additional Information About Your Visit        [employer] Information     [employer] gives you secure access to your electronic health record. If you see a primary care provider, you can also send messages to your care team and make appointments. If you have questions, please call your primary care clinic.  If you do not have a primary care provider, please call [phone] and they will assist you.        Care EveryWhere ID     This is your Care EveryWhere ID. This could be used by other organizations to access your [place] medical records  [id number]        Your Vitals Were     Pulse Temperature Respirations Height Pulse Oximetry BMI (Body Mass Index)    69 98.2  F (36.8  C) (Oral) 16 1.626 m (5' 4.02\") 97% 22.87 kg/m2       Blood Pressure from Last 3 Encounters:   06/22/17 153/66   05/26/17 163/58   05/03/17 187/63    Weight from Last 3 Encounters:   06/22/17 60.5 kg (133 lb 4.8 oz)   05/26/17 59.7 kg (131 lb 11.2 oz)   05/03/17 63.2 kg (139 lb 4.8 oz)              Today, you had the following     No orders found for display         Today's Medication Changes          These changes are accurate as of: 6/22/17  4:43 PM.  If you have any questions, ask your nurse or doctor.               These medicines have changed or have updated prescriptions.        Dose/Directions    * insulin aspart 100 UNIT/ML injection   Commonly known as:  NovoLOG PEN   This may have changed:  additional instructions   Used for:  Type 1 " diabetes mellitus with nephropathy (H)        Dose:  1-7 Units   Inject 1-7 Units Subcutaneous 3 times daily (before meals) Correction Scale - MEDIUM  For Pre-Meal  - 189 give 1 unit For Pre-Meal  - 239 give 2 units For Pre-Meal  - 289 give 3 units For Pre-Meal  - 339 give 4 units For Pre-Meal - 399 give 5 units For Pre-Meal -449 give 6 units For Pre-Meal BG greater than or equal to 450 give 7 units. To be given with prandial insulin, and based on pre-meal glucose   Refills:  0       * insulin aspart 100 UNIT/ML injection   Commonly known as:  NovoLOG PEN   This may have changed:  Another medication with the same name was changed. Make sure you understand how and when to take each.   Used for:  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)        Dose:  1-5 Units   Inject 1-5 Units Subcutaneous At Bedtime MEDIUM INSULIN RESISTANCE DOSING   Do Not give Bedtime Correction Insulin if BG less than  200.  For  - 249 give 1 units.  For  - 299 give 2 units.  For  - 349 give 3 units.  For  -399 give 4 units.  For BG greater than or equal to 400 give 5 units. Notify provider if glucose greater than or equal to 350 mg/dL after administration of correction dose.   Refills:  0       * insulin aspart 100 UNIT/ML injection   Commonly known as:  NovoLOG PEN   This may have changed:  Another medication with the same name was changed. Make sure you understand how and when to take each.   Used for:  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)        Carb counting: LUNCH: 1 unit per 11 grams of CHO   Refills:  0       * insulin aspart 100 UNIT/ML injection   Commonly known as:  NovoLOG PEN   This may have changed:  Another medication with the same name was changed. Make sure you understand how and when to take each.   Used for:  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)        Carb Counting: BREAKFAST: 1 unit per 11 grams of CHO   Refills:  0       * insulin aspart 100  UNIT/ML injection   Commonly known as:  NovoLOG PEN   This may have changed:  Another medication with the same name was changed. Make sure you understand how and when to take each.   Used for:  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)        Carb Counting: DINNER: 1 units per 10 grams of carbohydrate   Refills:  0       * insulin aspart 100 UNIT/ML injection   Commonly known as:  NovoLOG PEN   This may have changed:  Another medication with the same name was changed. Make sure you understand how and when to take each.   Used for:  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)        Carb Counting: Snacks and Supplements: 1 unit per 10 grams of carbohydrate   Refills:  0       * Notice:  This list has 6 medication(s) that are the same as other medications prescribed for you. Read the directions carefully, and ask your doctor or other care provider to review them with you.             Primary Care Provider Office Phone # Fax #    Nory Elliott [phone]       [facility] [address]  [place] [address]        Equal Access to Services     BREANNE CHRISTENSEN : Hadii rudy ku hadasho Soomaali, waaxda luqadaha, qaybta kaalmada adeegyada, waxay kings thomas . So [facility] [phone].    ATENCIÓN: Si habla español, tiene a mabry disposición servicios gratuitos de asistencia lingüística. Llame al [phone].    We comply with applicable federal civil rights laws and [place] laws. We do not discriminate on the basis of race, color, national origin, age, disability sex, sexual orientation or gender identity.            Thank you!     Thank you for choosing [facility]  for your care. Our goal is always to provide you with excellent care. Hearing back from our patients is one way we can continue to improve our services. Please take a few minutes to complete the written survey that you may receive in the mail after your visit with us. Thank you!              Your Updated Medication List - Protect others around you: Learn how to safely use, store and throw away your medicines at www.disposemymeds.org.          This list is accurate as of: 6/22/17  4:43 PM.  Always use your most recent med list.                   Brand Name Dispense Instructions for use Diagnosis    AEROBIKA Sakshi     1 each    1 Device 6 times daily    Chronic obstructive pulmonary disease, unspecified COPD type (H)       albuterol 108 (90 BASE) MCG/ACT Inhaler    PROAIR HFA/PROVENTIL HFA/VENTOLIN HFA     Inhale 2 puffs into the lungs 2 times daily        aspirin EC 81 MG EC tablet      Take 81 mg by mouth every evening        calcium 600 MG tablet      Take 1 tablet by mouth every morning        calcium acetate 667 MG Caps capsule    PHOSLO     Take 667 mg by mouth 4 times daily (with meals and nightly)        cetirizine 10 MG tablet    zyrTEC     Take 5 mg by mouth daily        cholecalciferol 1000 UNIT tablet    vitamin D     Take 1,000 Units by mouth every morning        diltiazem 90 MG 12 hr capsule    CARDIZEM SR     Take 2 capsules (180 mg) by mouth 2 times daily    Atrial flutter, unspecified type (H)       furosemide 20 MG tablet    LASIX     20 mg every morning        * insulin aspart 100 UNIT/ML injection    NovoLOG PEN     Inject 1-7 Units Subcutaneous 3 times daily (before meals) Correction Scale - MEDIUM  For Pre-Meal  - 189 give 1 unit For Pre-Meal  - 239 give 2 units For Pre-Meal  - 289 give 3 units For Pre-Meal  - 339 give 4 units For Pre-Meal - 399 give 5 units For Pre-Meal -449 give 6 units For Pre-Meal BG greater than or equal to 450 give 7 units. To be given with prandial insulin, and based on pre-meal glucose    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)       * insulin aspart 100 UNIT/ML injection    NovoLOG PEN     Inject 1-5 Units Subcutaneous At Bedtime MEDIUM INSULIN RESISTANCE DOSING   Do Not give Bedtime Correction Insulin if BG less  than  200.  For  - 249 give 1 units.  For  - 299 give 2 units.  For  - 349 give 3 units.  For  -399 give 4 units.  For BG greater than or equal to 400 give 5 units. Notify provider if glucose greater than or equal to 350 mg/dL after administration of correction dose.    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)       * insulin aspart 100 UNIT/ML injection    NovoLOG PEN     Carb Counting: Snacks and Supplements: 1 unit per 10 grams of carbohydrate    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)       metoprolol 25 MG tablet    LOPRESSOR    60 tablet    Take 1 tablet (25 mg) by mouth 2 times daily    Atrial flutter, unspecified type (H)       ONE TOUCH ULTRA test strip   Generic drug:  blood glucose monitoring           ONETOUCH DELICA LANCETS 33G Misc           polyethylene glycol Packet    MIRALAX/GLYCOLAX    30 packet    Take 17 g by mouth daily    Bowel habit changes       pravastatin 40 MG tablet    PRAVACHOL     40 mg every evening        senna-docusate 8.6-50 MG per tablet    SENOKOT-S;PERICOLACE    100 tablet    Take 2 tablets by mouth 2 times daily    Bowel habit changes       * TYLENOL PO      Take 1,000 mg by mouth every 8 hours as needed for mild pain or fever        * acetaminophen 325 MG tablet    TYLENOL    100 tablet    Take 3 tablets (975 mg) by mouth every 8 hours    Acute post-operative pain       Warfarin Therapy Reminder      1 each continuous prn    Atrial flutter,  unspecified type (H)       * Notice:  This list has 8 medication(s) that are the same as other medications prescribed for you. Read the directions carefully, and ask your doctor or other care provider to review them with you.

## 2017-06-22 NOTE — PROGRESS NOTES
THORACIC SURGERY FOLLOW UP VISIT    Dear Dr. Elliott,  I saw Ms. Valenzuela in follow-up today. 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 Right middle lobe wedge consistent with granuloma.     COMPLICATIONS  Postoperative hospital course included atelectasis requiring bronchoscopy, paroxysmal atrial flutter, acute kidney injury, prolonged post-operative air leak and a UTI.     INTERVAL STUDIES  CXR performed today shows       ETOHdenies  TOBdenies  BMI22  SUBJECTIVE   Ms. Valenzuela is happy with her decision to go to [facility], where she spent 12 days after discharge. She has been walking at home, recording 2000 steps on a recent day using her new fitness tracker. She does fatigue with increased activity during the day and feels she needs to recover strength in her thighs.     From a personal perspective, she is accompanied by her  Oscar, and they've come from home in [place].    IMPRESSION (C34.31) Malignant neoplasm of lower lobe of right lung (H)  (primary encounter diagnosis)  71 year-old female with history of type 1 DM and CKD seen in follow up after minimally invasive right lower lobectomy, middle lobe wedge, and mediastinal lymph node biopsy for right lower lobe adenocarcinoma.    PLAN  I spent a total of 25 minutes with Ms. Yue Valenzuela and her , more than 50% of which were spent in counseling, coordination of care, and face-to-face time. I reviewed the plan as follows:  Continue  physiotherapy and rehab.  No further treatment needed for the cancer.   Necessary Tests & Appointments: Chest CT to be scheduled for 3-month follow up scan in August.    They had all their questions answered and were in agreement with the plan.  I appreciate the opportunity to participate in the care of your patient and will keep you updated.  Sincerely,

## 2017-06-22 NOTE — LETTER
6/22/2017      RE: Yue Valenzuela  [address]  [facility] [facility] [address]       THORACIC SURGERY FOLLOW UP VISIT    Dear Dr. Elliott,  I saw Ms. Valenzuela in follow-up today. 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 Right middle lobe wedge consistent with granuloma.     COMPLICATIONS  Postoperative hospital course included atelectasis requiring bronchoscopy, paroxysmal atrial flutter, acute kidney injury, prolonged post-operative air leak and a UTI.     INTERVAL STUDIES  CXR performed today shows       ETOHdenies  TOBdenies  BMI22  SUBJECTIVE   Ms. Valenzuela is happy with her decision to go to [facility], where she spent 12 days after discharge. She has been walking at home, recording 2000 steps on a recent day using her new fitness tracker. She does fatigue with increased activity during the day and feels she needs to recover strength in her thighs.     From a personal perspective, she is accompanied by her  Oscar, and they've come from home in [place].    IMPRESSION (C34.31) Malignant neoplasm of lower lobe of right lung (H)  (primary encounter diagnosis)  71 year-old female with history of type 1 DM and CKD seen in follow up after minimally invasive right lower lobectomy, middle lobe wedge, and mediastinal lymph node biopsy for right lower lobe adenocarcinoma.    PLAN  I spent a total of 25 minutes with Ms. Yue Valenzuela and her , more than 50% of which were spent in  counseling, coordination of care, and face-to-face time. I reviewed the plan as follows:  Continue physiotherapy and rehab.  No further treatment needed for the cancer.   Necessary Tests & Appointments: Chest CT to be scheduled for 3-month follow up scan in August.    They had all their questions answered and were in agreement with the plan.  I appreciate the opportunity to participate in the care of your patient and will keep you updated.  Sincerely,        Glenna Hall MD

## 2017-06-29 ENCOUNTER — TELEPHONE (OUTPATIENT)
Dept: TRANSPLANT | Facility: CLINIC | Age: 72
End: 2017-06-29

## 2017-06-29 NOTE — TELEPHONE ENCOUNTER
Call from Yue today. She understands that she will not need chemo or radiation for her lung cancer. She has not been referred to oncology. We discussed the period of waiting time between cancer surgery and when you might become a transplant candidate, which could be years. Often opinions on this and follow up are from oncology. She will talk to her PCP about what her follow up should be. It is her understanding that she has been discharged to her care.

## 2017-07-26 ENCOUNTER — TELEPHONE (OUTPATIENT)
Dept: SURGERY | Facility: CLINIC | Age: 72
End: 2017-07-26

## 2017-07-26 NOTE — TELEPHONE ENCOUNTER
"I called patient to ask about arrangements for a repeat chest CT scan due in August.   At last visit, she indicated she may want that done closer to home.       She told me she is meeting 8/22 with Dr. Sarai Payne at [facility] and getting a new chest CT scan.   She will request that they push images to our [place] PAC system.   She wants us to have records as she \"doesn't know what the future holds\".    She will call me if anything further is needed.  "

## 2017-08-04 NOTE — PLAN OF CARE
Keepsafe Access Code: [email or web address]  Expires: 9/3/2017  3:43 PM    Your email address is not on file at [facility].  Email Addresses are required for you to sign up for Keepsafe, please contact [phone] to verify your personal information and to provide your email address prior to attempting to register for Keepsafe.    Mauricio Santizo  [address] [place] [address] [place] [address]    [employer]  A secure, online tool to manage your health information     [facility]’s Keepsafe® is a secure, online tool that connects you to your personalized health information from the privacy of your home -- day or night - making it very easy for you to manage your healthcare. Once the activation process is completed, you can even access your medical information using the Keepsafe ama, which is available for free in the Apple Ama store or Google Play store.     To learn more about Keepsafe, visit [email or web address]    There are two levels of access available (as shown below):   My Chart Features  [facility] Primary Care Doctor [facility]  Specialists [facility]  Urgent  Care Non-[facility] Primary Care Doctor   Email your healthcare team securely and privately 24/7 X X X    Manage appointments: schedule your next appointment; view details of past/upcoming appointments X      Request prescription refills. X      View recent personal medical records, including lab and immunizations X X X X   View health record, including health history, allergies, medications X X X X   Read reports about your outpatient visits, procedures, consult and ER notes X X X X   See your discharge summary, which is a recap of your hospital and/or ER visit that includes your diagnosis, lab results, and care plan X X  X     How to register for Keepsafe:  Once your e-mail address has been verified, follow the following steps to sign up for Keepsafe.     1. Go to  [email or web address]  2. Click on the Sign Up Now box, which takes you to the New Member Sign Up page. You will  Problem: Goal Outcome Summary  Goal: Goal Outcome Summary  Outcome: Improving  Pt is A&O x 4, hypertensive and on room air. Pt had chest tube removed today. MRI showed no significant findigins. PET scan taken today. Chest x-ray today showed small pneumothorax, series of chest x-ray's scheduled in coming days to rule out. Lisinopril added to lower BP, checking BP Q4H. BG dropped to 59, gave 50 mL dextrose 50% injection per orders, BG raised to 259. Pt gave bolus and managing per her. L PIV saline locked. Pt denies pain/ nausea. Continue to monitor and POC.       need to provide the following information:  a. Enter your [employer] Access Code exactly as it appears at the top of this page. (You will not need to use this code after you’ve completed the sign-up process. If you do not sign up before the expiration date, you must request a new code.)   b. Enter your date of birth.   c. Enter your home email address.   d. Click Submit, and follow the next screen’s instructions.  3. Create a [employer] ID. This will be your [employer] login ID and cannot be changed, so think of one that is secure and easy to remember.  4. Create a [employer] password. You can change your password at any time.  5. Enter your Password Reset Question and Answer. This can be used at a later time if you forget your password.   6. Enter your e-mail address. This allows you to receive e-mail notifications when new information is available in [employer].  7. Click Sign Up. You can now view your health information.    For assistance activating your [employer] account, call [phone]

## 2017-08-16 ENCOUNTER — TRANSFERRED RECORDS (OUTPATIENT)
Dept: HEALTH INFORMATION MANAGEMENT | Facility: CLINIC | Age: 72
End: 2017-08-16

## 2017-12-05 NOTE — TELEPHONE ENCOUNTER
Call Type: Triage Call    Presenting Problem: New cancer patient, and today is having sudden  trouble breathing and wheezing in her throat.  Triage Note:  Guideline Title: Cancer, Diagnosed - Adult  Recommended Disposition: Activate   Original Inclination: Wanted to speak with a nurse  Override Disposition:  Intended Action: Call 911  Physician Contacted: No  Sudden onset of severe breathing difficulty ?  YES  New or worsening signs and symptoms that may indicate shock ? NO  Unconscious now or within last 6 hours ? NO  Chest pain or pressure that may radiate OR be associated with shortness of breath,  nausea or vomiting, diaphoresis, palpitations, lasting 5 minute or more now or  within last hour. ? NO  Physician Instructions:  Care Advice: Write down provider's name. List or place the following in a  bag for transport with the patient: current prescription and/or  nonprescription medications  alternative treatments, therapies and medications  and street drugs.  An adult should stay with the patient, preferably one trained in CPR. If  the person is not trained in CPR, then he or she should provide hands-only  (compression-only) CPR as recommended by the American Heart Association.   Unable to assess

## 2018-04-24 ENCOUNTER — TRANSFERRED RECORDS (OUTPATIENT)
Dept: HEALTH INFORMATION MANAGEMENT | Facility: CLINIC | Age: 73
End: 2018-04-24

## 2018-05-07 ENCOUNTER — MEDICAL CORRESPONDENCE (OUTPATIENT)
Dept: TRANSPLANT | Facility: CLINIC | Age: 73
End: 2018-05-07

## 2018-05-07 ENCOUNTER — TELEPHONE (OUTPATIENT)
Dept: TRANSPLANT | Facility: CLINIC | Age: 73
End: 2018-05-07

## 2018-05-07 NOTE — TELEPHONE ENCOUNTER
Called Yue to talk about her health and how she has been doing this winter. She stated she had pneumonia and has recovered. Her oncologist is Dr. Payne at [facility] in [place]. She has an appt in a few weeks for f/u CT and office visit.   Her nephrologist, Dr. Sim told her the wait time to receive a kidney is 5 years post lung cancer diagnosis and tx. I asked what Dr. Sim's thoughts were on her staying on the list for a transplant. She is not really sure. We reviewed that she is not eligible to receive a  donor offer at this time and she thought if something came up that would match her-she would be offered. She understood.   She will call me once she see's Dr. Sim in July.

## 2018-07-11 ENCOUNTER — TRANSFERRED RECORDS (OUTPATIENT)
Dept: HEALTH INFORMATION MANAGEMENT | Facility: CLINIC | Age: 73
End: 2018-07-11

## 2018-08-07 ENCOUNTER — MEDICAL CORRESPONDENCE (OUTPATIENT)
Dept: TRANSPLANT | Facility: CLINIC | Age: 73
End: 2018-08-07

## 2018-12-11 NOTE — PROVIDER NOTIFICATION
Paged Anesthesia about Bupivacaine epidural running at 6mL/hr and rate dose verified but ordered for 8mL/hr. States may increased to 8mL/hr pending BP's and pain level.     no

## 2020-02-05 NOTE — PROGRESS NOTES
[facility], [place]    Internal Medicine Progress Note - Gold Service      Assessment & Plan   Yue Valenzuela is a 71 year old female admitted on 4/16/2017. She has a history of Type 1 DM, CKD Stage IV, factor V Leiden mutation, HTN, HLD, CAD s/p stent placement, PVD, carotid artery stenosis, asthma, and newly diagnosed lung adenocarcinoma (4/14/17) and is admitted from an OSH for further management of R sided iatrogenic pneumothorax with CT in place, following lung biopsy on 4/11.     # Right pneumothorax, likely iatrogenic - Stable.  Currently c/o pain at insertion site but no dyspnea or chest pain.  O2 sats 94%. CXR from today 4/17 shows malpositioned CT projected outside pleural space, diffuse R sided opacities. Patient is s/p R lung bx on 4/11 with worsening dyspnea on 4/14.        - Pulmonary following, appreciate recommendations - connect CT to waterseal and clamp with resolution of air leak  - Repeat CXR in am   - Continue Tylenol and Dilaudid for pain   - Continue O2 as needed with goal for definitive weaning w/ resolution of ptx     # Lung adenocarcinoma - Newly diagnosed on 4/14.  Follows with Dr. Plaza and established with [facility] for PET scan and MRI brain, scheduled on 4/20 here at [facility].   - Pulmonary following    # CKD Stage IV - BUN 60, Cr 2.36 this am 4/17. BL appears to be 2.2 - 2.6. Follows with Dr. Vinicius Sim at [facility] outpatient, last seen on 4/6. Had been pursuing workup for kidney transplant at [facility] prior to CA on 4/14.    - BMP in am     # Hyperkalemia - K 5.7 this am 4/17, improved to 5.3 after kayexalate. Appears to have chronically high K 2/2 advanced CKD, long term use w/ Lisinopril likely also contributory. Managed at home with kayexalate 1-2x weekly. BL typically 5.0-5.5.   - Recheck BMP in am   - Hold PTA Lisinopril   - Continue tele     # Type DM 1 - Ha1c 6.9% on 4/5.  BG 98 - 235 last 24 hr.  On insulin pump PTA.  - Continue home insulin  Sandra Matthew   to Abby Abraham MD            2/5/20 10:06 AM   I have an appointment with Dr. Paula Olivarez on February 12 for my yearly exam. Since he typically orders a CBC during my visit, can this lab order request be faxed to the [address] [place] [address]   - Continue BG checks AC/HS  - Hypoglycemia protocol in place      # CAD, Hypertension - Stable.   - Continue PTA Coreg, Norvasc, and Lasix  - Hold PTA Lisinopril - will increase other antihypertensives where possible if BP not adequately controlled  - Continue PTA Pravastatin     Diet: Renal Diet (non-dialysis)  DVT Prophylaxis: mechanical   Code Status: Full Code    Disposition Plan   Expected discharge: 2 - 3 days; likely discharge to home pending PT/OT evaluation      Entered: Carmen Garcia 04/17/2017, 10:49 AM   Information in the above section will display in the discharge planner report.      The patient's care was discussed with the Attending Physician, Dr. Graves .    Carmen RAMIREZ Radha  Internal Medicine Staff Hospitalist Service  [facility]  Pager: [phone]  Please see sticky note for cross cover information    Interval History   Patient reports dull, achy pain at chest tube insertion site but markedly improved since yesterday.  No pain with inspiration, mostly with movement of R arm, R side.  She denies fevers, chills, abdominal pain, nausea, vomiting, chest pain, and dyspnea.       Data reviewed today: I reviewed all medications, new labs and imaging results over the last 24 hours.     Physical Exam   Vital Signs: Temp: 97  F (36.1  C) Temp src: Oral BP: (!) 147/39 Pulse: 70   Resp: 18 SpO2: 94 % O2 Device: Nasal cannula Oxygen Delivery: 2.5 LPM  Weight: 140 lbs 4.8 oz  General Appearance: AAO x 4. Well nourished, well developed. No acute distress.   HEENT: Normocephalic, atraumatic. Anicteric sclera. Mucous membranes moist.   Respiratory: Effort normal on room air. Lungs CTAB with no wheezing, rales, rhonchi.   Cardiovascular: RRR. S1, S2. No murmurs appreciated.   GI: Abdomen soft and non distended, bowel sounds present x all 4 quadrants. No tenderness, rebound, guarding.  Skin: Grossly warm, dry, and intact. No jaundice.  Neurological: No focal deficits. Moves all  extremities         Data   Data     Recent Labs  Lab 04/17/17  1355 04/17/17  1149 04/17/17  0749   NA  --   --  135   POTASSIUM 4.9 5.3 5.7*   CHLORIDE  --   --  106   CO2  --   --  20   BUN  --   --  60*   CR  --   --  2.36*   ANIONGAP  --   --  9   ZAHEER  --   --  9.2   GLC  --   --  264*

## 2020-03-02 ENCOUNTER — HEALTH MAINTENANCE LETTER (OUTPATIENT)
Age: 75
End: 2020-03-02

## 2020-12-20 ENCOUNTER — HEALTH MAINTENANCE LETTER (OUTPATIENT)
Age: 75
End: 2020-12-20

## 2021-01-01 ENCOUNTER — HEALTH MAINTENANCE LETTER (OUTPATIENT)
Age: 76
End: 2021-01-01

## 2021-01-15 ENCOUNTER — HEALTH MAINTENANCE LETTER (OUTPATIENT)
Age: 76
End: 2021-01-15

## 2022-03-20 ENCOUNTER — HEALTH MAINTENANCE LETTER (OUTPATIENT)
Age: 77
End: 2022-03-20

## 2022-04-19 ENCOUNTER — POST MORTEM DOCUMENTATION (OUTPATIENT)
Dept: TRANSPLANT | Facility: CLINIC | Age: 77
End: 2022-04-19
Payer: MEDICARE

## 2022-04-19 NOTE — PROGRESS NOTES
Reviewed pt's death from chart review in care everywhere on 22 at 1112.   Place of death was reported as Hospice facility .  TIS verification is: Complete  The Transplant Office has been notified that patient is . The Post Mortem Encounter has been completed. Notifications have been sent to the Care team, Donor team, Immunology lab, Transplant Financial  and Social Work.   Instructions have been sent to cancel pending appointments, discontinue pending orders and send a sympathy card to the family.

## 2022-04-20 ENCOUNTER — DOCUMENTATION ONLY (OUTPATIENT)
Dept: TRANSPLANT | Facility: CLINIC | Age: 77
End: 2022-04-20

## (undated) DEVICE — DRAPE IOBAN INCISE 23X17" 6650EZ

## (undated) DEVICE — CATH TRAY FOLEY SURESTEP 16FR W/URNE MTR STLK LATEX A303316A

## (undated) DEVICE — STPL ENDO RELOAD 45MM MEDIUM THICK PURPLE EGIA45AMT

## (undated) DEVICE — Device

## (undated) DEVICE — SYR 10ML SLIP TIP W/O NDL 303134

## (undated) DEVICE — LINEN TOWEL PACK X5 5464

## (undated) DEVICE — SU DERMABOND ADVANCED .7ML DNX12

## (undated) DEVICE — SU MONOCRYL 4-0 PS-2 27" UND Y426H

## (undated) DEVICE — TUBING SUCTION 10'X3/16" N510

## (undated) DEVICE — SU VICRYL 0 UR-6 27" J603H

## (undated) DEVICE — SYR 30ML SLIP TIP W/O NDL 302833

## (undated) DEVICE — TAPE MEDIPORE 4"X2YD 2864

## (undated) DEVICE — SU VICRYL 2-0 CT-2 27" J333H

## (undated) DEVICE — SUCTION DRY CHEST DRAIN OASIS 3600-100

## (undated) DEVICE — WIPES FOLEY CARE SURESTEP PROVON DFC100

## (undated) DEVICE — ESU GROUND PAD ADULT W/CORD E7507

## (undated) DEVICE — DRAIN CHEST TUBE 28FR STR 8028

## (undated) DEVICE — SU MONOCRYL 4-0 PS-2 18" UND Y496G

## (undated) DEVICE — SPONGE RAY-TEC 4X8" 7318

## (undated) DEVICE — TIES BANDING T50R

## (undated) DEVICE — LINEN TOWEL PACK X6 WHITE 5487

## (undated) DEVICE — STPL ENDO RELOAD GIA 60X3.5MM ROTIC 030458

## (undated) DEVICE — SU VICRYL 2-0 UR-6 27" J602H

## (undated) DEVICE — ENDO ADPT BRONCH SWIVEL Y A1002

## (undated) DEVICE — STPL ENDO HANDLE GIA ULTRA UNIVERSAL STD EGIAUSTND

## (undated) DEVICE — GLOVE PROTEXIS MICRO 6.0  2D73PM60

## (undated) DEVICE — SU VICRYL 3-0 SH 27" J316H

## (undated) DEVICE — SU SILK 0 TIE 6X30" A306H

## (undated) DEVICE — ENDO VALVE BX EVIS MAJ-210

## (undated) DEVICE — ESU LIGASURE MARYLAND JAW OPEN SEALER/DVDR 5MMX37CM LF1737

## (undated) DEVICE — SUCTION MANIFOLD DORNOCH ULTRA CART UL-CL500

## (undated) DEVICE — PREP CHLORAPREP 26ML TINTED ORANGE  260815

## (undated) DEVICE — ENDO VALVE SUCTION BRONCH EVIS MAJ-209

## (undated) DEVICE — ESU PENCIL W/COATED BLADE E2450H

## (undated) DEVICE — ESU ELEC BLADE 6" COATED/INSULATED E1455-6

## (undated) DEVICE — STPL ENDO RELOAD GIA 45X3.5MM ROTIC 030455

## (undated) DEVICE — KIT ENDO FIRST STEP DISINFECTANT 200ML W/POUCH EP-4

## (undated) DEVICE — LINEN GOWN XLG 5407

## (undated) DEVICE — SUCTION TIP YANKAUER STR K87

## (undated) DEVICE — TAPE DURAPORE 3" SILK 1538-3

## (undated) DEVICE — NDL 21GA 1.5"

## (undated) DEVICE — STPL ENDO RELOAD GIA 45X3.5MM 030422

## (undated) DEVICE — SUCTION TIP YANKAUER W/O VENT K86

## (undated) DEVICE — ENDO POUCH 8X10" LAPSAC 054800

## (undated) DEVICE — SPONGE TONSIL W/STRING MED 23275-680

## (undated) DEVICE — STPL ENDO RELOAD SIG GIA CVD TIP TAN 30MM MED SIG30CTAVM

## (undated) DEVICE — DRSG TELFA 3"X8" NON25720

## (undated) DEVICE — BLADE KNIFE SURG 10 371110

## (undated) DEVICE — SU SILK 0 SH 30" K834H

## (undated) DEVICE — SOL ADH LIQUID BENZOIN SWAB 0.6ML C1544

## (undated) DEVICE — DRSG TELFA 3X8" 1238

## (undated) DEVICE — SPONGE DOUBLE 1.25" W/STRING 23275-690

## (undated) RX ORDER — FENTANYL CITRATE 50 UG/ML
INJECTION, SOLUTION INTRAMUSCULAR; INTRAVENOUS
Status: DISPENSED
Start: 2017-01-13

## (undated) RX ORDER — FENTANYL CITRATE 50 UG/ML
INJECTION, SOLUTION INTRAMUSCULAR; INTRAVENOUS
Status: DISPENSED
Start: 2017-05-10

## (undated) RX ORDER — ALBUTEROL SULFATE 0.83 MG/ML
SOLUTION RESPIRATORY (INHALATION)
Status: DISPENSED
Start: 2017-05-10

## (undated) RX ORDER — IPRATROPIUM BROMIDE AND ALBUTEROL SULFATE 2.5; .5 MG/3ML; MG/3ML
SOLUTION RESPIRATORY (INHALATION)
Status: DISPENSED
Start: 2017-05-10

## (undated) RX ORDER — ACETAMINOPHEN 325 MG/1
TABLET ORAL
Status: DISPENSED
Start: 2017-04-11

## (undated) RX ORDER — DEXTROSE MONOHYDRATE, SODIUM CHLORIDE, AND POTASSIUM CHLORIDE 50; 1.49; 4.5 G/1000ML; G/1000ML; G/1000ML
INJECTION, SOLUTION INTRAVENOUS
Status: DISPENSED
Start: 2017-05-10

## (undated) RX ORDER — FENTANYL CITRATE 50 UG/ML
INJECTION, SOLUTION INTRAMUSCULAR; INTRAVENOUS
Status: DISPENSED
Start: 2017-04-11

## (undated) RX ORDER — ACETAMINOPHEN 325 MG/1
TABLET ORAL
Status: DISPENSED
Start: 2017-05-10

## (undated) RX ORDER — BUPIVACAINE HYDROCHLORIDE AND EPINEPHRINE 2.5; 5 MG/ML; UG/ML
INJECTION, SOLUTION EPIDURAL; INFILTRATION; INTRACAUDAL; PERINEURAL
Status: DISPENSED
Start: 2017-05-10

## (undated) RX ORDER — SODIUM CHLORIDE 9 MG/ML
INJECTION, SOLUTION INTRAVENOUS
Status: DISPENSED
Start: 2017-04-11

## (undated) RX ORDER — ONDANSETRON 2 MG/ML
INJECTION INTRAMUSCULAR; INTRAVENOUS
Status: DISPENSED
Start: 2017-05-10

## (undated) RX ORDER — REGADENOSON 0.08 MG/ML
INJECTION, SOLUTION INTRAVENOUS
Status: DISPENSED
Start: 2017-03-24

## (undated) RX ORDER — CHLORHEXIDINE GLUCONATE ORAL RINSE 1.2 MG/ML
SOLUTION DENTAL
Status: DISPENSED
Start: 2017-05-10

## (undated) RX ORDER — SODIUM CHLORIDE 9 MG/ML
INJECTION, SOLUTION INTRAVENOUS
Status: DISPENSED
Start: 2017-01-13

## (undated) RX ORDER — LIDOCAINE HYDROCHLORIDE 10 MG/ML
INJECTION, SOLUTION EPIDURAL; INFILTRATION; INTRACAUDAL; PERINEURAL
Status: DISPENSED
Start: 2017-05-10